# Patient Record
Sex: MALE | Race: WHITE | Employment: OTHER | URBAN - METROPOLITAN AREA
[De-identification: names, ages, dates, MRNs, and addresses within clinical notes are randomized per-mention and may not be internally consistent; named-entity substitution may affect disease eponyms.]

---

## 2017-02-13 ENCOUNTER — APPOINTMENT (OUTPATIENT)
Dept: GENERAL RADIOLOGY | Age: 62
End: 2017-02-13
Attending: EMERGENCY MEDICINE
Payer: MEDICARE

## 2017-02-13 ENCOUNTER — HOSPITAL ENCOUNTER (EMERGENCY)
Age: 62
Discharge: HOME | End: 2017-02-13
Attending: EMERGENCY MEDICINE
Payer: MEDICARE

## 2017-02-13 VITALS
TEMPERATURE: 98 F | HEIGHT: 70 IN | SYSTOLIC BLOOD PRESSURE: 169 MMHG | HEART RATE: 70 BPM | BODY MASS INDEX: 37.22 KG/M2 | RESPIRATION RATE: 17 BRPM | DIASTOLIC BLOOD PRESSURE: 80 MMHG | OXYGEN SATURATION: 97 % | WEIGHT: 260 LBS

## 2017-02-13 DIAGNOSIS — R07.89 CHEST WALL PAIN: Primary | ICD-10-CM

## 2017-02-13 LAB
ALBUMIN SERPL BCP-MCNC: 3.8 G/DL (ref 3.2–4.6)
ALBUMIN/GLOB SERPL: 1 {RATIO} (ref 1.2–3.5)
ALP SERPL-CCNC: 96 U/L (ref 50–136)
ALT SERPL-CCNC: 48 U/L (ref 12–65)
ANION GAP BLD CALC-SCNC: 9 MMOL/L (ref 7–16)
AST SERPL W P-5'-P-CCNC: 21 U/L (ref 15–37)
ATRIAL RATE: 63 BPM
BASOPHILS # BLD AUTO: 0 K/UL (ref 0–0.2)
BASOPHILS # BLD: 1 % (ref 0–2)
BILIRUB SERPL-MCNC: 0.6 MG/DL (ref 0.2–1.1)
BUN SERPL-MCNC: 18 MG/DL (ref 8–23)
CALCIUM SERPL-MCNC: 8.9 MG/DL (ref 8.3–10.4)
CALCULATED P AXIS, ECG09: 64 DEGREES
CALCULATED R AXIS, ECG10: 80 DEGREES
CALCULATED T AXIS, ECG11: 60 DEGREES
CHLORIDE SERPL-SCNC: 105 MMOL/L (ref 98–107)
CO2 SERPL-SCNC: 27 MMOL/L (ref 21–32)
CREAT SERPL-MCNC: 1.04 MG/DL (ref 0.8–1.5)
DIAGNOSIS, 93000: NORMAL
DIASTOLIC BP, ECG02: NORMAL MMHG
DIFFERENTIAL METHOD BLD: NORMAL
EOSINOPHIL # BLD: 0.2 K/UL (ref 0–0.8)
EOSINOPHIL NFR BLD: 3 % (ref 0.5–7.8)
ERYTHROCYTE [DISTWIDTH] IN BLOOD BY AUTOMATED COUNT: 13.5 % (ref 11.9–14.6)
GLOBULIN SER CALC-MCNC: 3.8 G/DL (ref 2.3–3.5)
GLUCOSE SERPL-MCNC: 119 MG/DL (ref 65–100)
HCT VFR BLD AUTO: 43.8 % (ref 41.1–50.3)
HGB BLD-MCNC: 14.7 G/DL (ref 13.6–17.2)
IMM GRANULOCYTES # BLD: 0.1 K/UL (ref 0–0.5)
IMM GRANULOCYTES NFR BLD AUTO: 0.8 % (ref 0–5)
LYMPHOCYTES # BLD AUTO: 18 % (ref 13–44)
LYMPHOCYTES # BLD: 1.1 K/UL (ref 0.5–4.6)
MCH RBC QN AUTO: 30.4 PG (ref 26.1–32.9)
MCHC RBC AUTO-ENTMCNC: 33.6 G/DL (ref 31.4–35)
MCV RBC AUTO: 90.5 FL (ref 79.6–97.8)
MONOCYTES # BLD: 0.7 K/UL (ref 0.1–1.3)
MONOCYTES NFR BLD AUTO: 11 % (ref 4–12)
NEUTS SEG # BLD: 4.4 K/UL (ref 1.7–8.2)
NEUTS SEG NFR BLD AUTO: 66 % (ref 43–78)
P-R INTERVAL, ECG05: 158 MS
PLATELET # BLD AUTO: 179 K/UL (ref 150–450)
PMV BLD AUTO: 10.9 FL (ref 10.8–14.1)
POTASSIUM SERPL-SCNC: 4 MMOL/L (ref 3.5–5.1)
PROT SERPL-MCNC: 7.6 G/DL (ref 6.3–8.2)
Q-T INTERVAL, ECG07: 398 MS
QRS DURATION, ECG06: 86 MS
QTC CALCULATION (BEZET), ECG08: 407 MS
RBC # BLD AUTO: 4.84 M/UL (ref 4.23–5.67)
SODIUM SERPL-SCNC: 141 MMOL/L (ref 136–145)
SYSTOLIC BP, ECG01: NORMAL MMHG
TROPONIN I BLD-MCNC: 0 NG/ML (ref 0–0.08)
VENTRICULAR RATE, ECG03: 63 BPM
WBC # BLD AUTO: 6.5 K/UL (ref 4.3–11.1)

## 2017-02-13 PROCEDURE — 99284 EMERGENCY DEPT VISIT MOD MDM: CPT | Performed by: EMERGENCY MEDICINE

## 2017-02-13 PROCEDURE — 93005 ELECTROCARDIOGRAM TRACING: CPT | Performed by: EMERGENCY MEDICINE

## 2017-02-13 PROCEDURE — 85025 COMPLETE CBC W/AUTO DIFF WBC: CPT | Performed by: EMERGENCY MEDICINE

## 2017-02-13 PROCEDURE — 96374 THER/PROPH/DIAG INJ IV PUSH: CPT | Performed by: EMERGENCY MEDICINE

## 2017-02-13 PROCEDURE — 71020 XR CHEST PA LAT: CPT

## 2017-02-13 PROCEDURE — 80053 COMPREHEN METABOLIC PANEL: CPT | Performed by: EMERGENCY MEDICINE

## 2017-02-13 PROCEDURE — 96375 TX/PRO/DX INJ NEW DRUG ADDON: CPT | Performed by: EMERGENCY MEDICINE

## 2017-02-13 PROCEDURE — 74011250636 HC RX REV CODE- 250/636: Performed by: EMERGENCY MEDICINE

## 2017-02-13 PROCEDURE — 84484 ASSAY OF TROPONIN QUANT: CPT

## 2017-02-13 RX ORDER — KETOROLAC TROMETHAMINE 30 MG/ML
30 INJECTION, SOLUTION INTRAMUSCULAR; INTRAVENOUS
Status: COMPLETED | OUTPATIENT
Start: 2017-02-13 | End: 2017-02-13

## 2017-02-13 RX ORDER — METHYLPREDNISOLONE 4 MG/1
TABLET ORAL
Qty: 1 DOSE PACK | Refills: 0 | Status: SHIPPED | OUTPATIENT
Start: 2017-02-13 | End: 2018-03-06

## 2017-02-13 RX ORDER — DEXAMETHASONE SODIUM PHOSPHATE 100 MG/10ML
10 INJECTION INTRAMUSCULAR; INTRAVENOUS
Status: COMPLETED | OUTPATIENT
Start: 2017-02-13 | End: 2017-02-13

## 2017-02-13 RX ORDER — HYDROCODONE BITARTRATE AND ACETAMINOPHEN 7.5; 325 MG/1; MG/1
1 TABLET ORAL
Qty: 20 TAB | Refills: 0 | Status: SHIPPED | OUTPATIENT
Start: 2017-02-13 | End: 2018-03-06

## 2017-02-13 RX ADMIN — KETOROLAC TROMETHAMINE 30 MG: 30 INJECTION, SOLUTION INTRAMUSCULAR at 09:36

## 2017-02-13 RX ADMIN — DEXAMETHASONE SODIUM PHOSPHATE 10 MG: 10 INJECTION INTRAMUSCULAR; INTRAVENOUS at 10:13

## 2017-02-13 NOTE — ED TRIAGE NOTES
Patient complains of left side chest pain. Patient is unsure if pain is from a fall yesterday. Pain is worse with movement and deep breathing.

## 2017-02-13 NOTE — ED NOTES
I have reviewed discharge instructions with the patient. The patient verbalized understanding. Pt ambulatory to exit with family.

## 2017-02-13 NOTE — ED PROVIDER NOTES
HPI Comments: Patient complains of left chest pain for the last 5 days, worse since last night. Patient was looking in a house with his daughter  5 days ago, triple walking on a cement walkway letting on his left side. States his chest was a little sore then begun precipitously worse over the last 12 hours or so. He denies any shortness of breath, diaphoresis, nausea or vomiting. Pain is pleuritic as well as reproducible with palpation. Patient is a 60 y.o. male presenting with chest pain. The history is provided by a relative. Chest Pain (Angina)    This is a new problem. The current episode started yesterday. The problem has not changed since onset. Duration of episode(s) is 12 hours. The problem occurs constantly. The pain is associated with normal activity. The pain is present in the lateral region and left side. The pain is at a severity of 6/10. The quality of the pain is described as sharp. The pain does not radiate. The symptoms are aggravated by movement, deep breathing, palpation and certain positions. Pertinent negatives include no abdominal pain, no back pain, no claudication, no cough, no diaphoresis, no dizziness, no exertional chest pressure, no fever, no headaches, no hemoptysis, no irregular heartbeat, no leg pain, no lower extremity edema, no malaise/fatigue, no nausea, no near-syncope, no numbness, no orthopnea, no palpitations, no PND, no shortness of breath, no sputum production, no vomiting and no weakness. He has tried rest for the symptoms. The treatment provided no relief. Risk factors include male gender, hypertension and obesity. His past medical history is significant for HTN. His past medical history does not include aneurysm, cancer, DM, DVT, PE or CHF.  Pertinent negatives include no cardiac catheterization and no exercise treadmill test.       Past Medical History:   Diagnosis Date    Arthritis     Chronic pain 2003     back fracture    CP (cerebral palsy) ([facility] [address]       3 surgeries right leg as child; no other problems     GERD (gastroesophageal reflux disease)      rare    Gout      med    Hypertension      med    Morbid obesity ([facility] [address]     Rheumatic fever age 14    Unspecified sleep apnea      does not use c-pap       Past Surgical History:   Procedure Laterality Date    Hx orthopaedic       back    Hx orthopaedic       right leg repair x 3 as child ue to C.P. Family History:   Problem Relation Age of Onset    Lung Disease Mother        Social History     Social History    Marital status:      Spouse name: N/A    Number of children: N/A    Years of education: N/A     Occupational History    Not on file. Social History Main Topics    Smoking status: Never Smoker    Smokeless tobacco: Current User      Comment: dips snuff    Alcohol use No    Drug use: No    Sexual activity: Not on file     Other Topics Concern    Not on file     Social History Narrative         ALLERGIES: Review of patient's allergies indicates no known allergies. Review of Systems   Constitutional: Negative for diaphoresis, fever and malaise/fatigue. Respiratory: Negative for cough, hemoptysis, sputum production and shortness of breath. Cardiovascular: Positive for chest pain. Negative for palpitations, orthopnea, claudication, leg swelling, PND and near-syncope. Gastrointestinal: Negative for abdominal pain, nausea and vomiting. Musculoskeletal: Negative for back pain. Neurological: Negative for dizziness, weakness, numbness and headaches. All other systems reviewed and are negative. Vitals:    02/13/17 0907   BP: 139/76   Pulse: 80   Resp: 20   Temp: 97.9 °F (36.6 °C)   SpO2: 94%   Weight: 117.9 kg (260 lb)   Height: 5' 10\" (1.778 m)            Physical Exam   Constitutional: He is oriented to person, place, and time. He appears well-developed and well-nourished. No distress. HENT:   Head: Normocephalic and atraumatic.    Right Ear: Tympanic membrane and external ear normal.   Left Ear: Tympanic membrane and external ear normal.   Mouth/Throat: Oropharynx is clear and moist.   Eyes: Conjunctivae and EOM are normal. Pupils are equal, round, and reactive to light. Neck: Normal range of motion. Neck supple. No tracheal deviation present. Cardiovascular: Normal rate, regular rhythm, normal heart sounds and intact distal pulses. Exam reveals no gallop and no friction rub. No murmur heard. Pulmonary/Chest: Effort normal and breath sounds normal. No respiratory distress. He has no wheezes. He exhibits tenderness (reproduces pain). He exhibits no crepitus, no deformity and no swelling. Abdominal: Soft. Bowel sounds are normal. He exhibits no distension and no mass. There is no hepatosplenomegaly. There is no tenderness. There is no rebound and no guarding. Musculoskeletal: Normal range of motion. He exhibits no edema. Lymphadenopathy:     He has no cervical adenopathy. Neurological: He is alert and oriented to person, place, and time. He displays normal reflexes. No cranial nerve deficit. Skin: Skin is warm and dry. No rash noted. He is not diaphoretic. No erythema. Psychiatric: He has a normal mood and affect. Nursing note and vitals reviewed. MDM  Number of Diagnoses or Management Options  Chest wall pain: new and requires workup     Amount and/or Complexity of Data Reviewed  Clinical lab tests: ordered and reviewed  Tests in the radiology section of CPT®: ordered and reviewed  Obtain history from someone other than the patient: yes  Review and summarize past medical records: yes  Independent visualization of images, tracings, or specimens: yes    Risk of Complications, Morbidity, and/or Mortality  Presenting problems: high  Diagnostic procedures: moderate  Management options: high    Patient Progress  Patient progress: stable    ED Course       Procedures      The patient was observed in the ED.     Results Reviewed:      Recent Results (from the past 24 hour(s))   CBC WITH AUTOMATED DIFF    Collection Time: 02/13/17  9:10 AM   Result Value Ref Range    WBC 6.5 4.3 - 11.1 K/uL    RBC 4.84 4.23 - 5.67 M/uL    HGB 14.7 13.6 - 17.2 g/dL    HCT 43.8 41.1 - 50.3 %    MCV 90.5 79.6 - 97.8 FL    MCH 30.4 26.1 - 32.9 PG    MCHC 33.6 31.4 - 35.0 g/dL    RDW 13.5 11.9 - 14.6 %    PLATELET 951 608 - 960 K/uL    MPV 10.9 10.8 - 14.1 FL    DF AUTOMATED      NEUTROPHILS 66 43 - 78 %    LYMPHOCYTES 18 13 - 44 %    MONOCYTES 11 4.0 - 12.0 %    EOSINOPHILS 3 0.5 - 7.8 %    BASOPHILS 1 0.0 - 2.0 %    IMMATURE GRANULOCYTES 0.8 0.0 - 5.0 %    ABS. NEUTROPHILS 4.4 1.7 - 8.2 K/UL    ABS. LYMPHOCYTES 1.1 0.5 - 4.6 K/UL    ABS. MONOCYTES 0.7 0.1 - 1.3 K/UL    ABS. EOSINOPHILS 0.2 0.0 - 0.8 K/UL    ABS. BASOPHILS 0.0 0.0 - 0.2 K/UL    ABS. IMM. GRANS. 0.1 0.0 - 0.5 K/UL   METABOLIC PANEL, COMPREHENSIVE    Collection Time: 02/13/17  9:10 AM   Result Value Ref Range    Sodium 141 136 - 145 mmol/L    Potassium 4.0 3.5 - 5.1 mmol/L    Chloride 105 98 - 107 mmol/L    CO2 27 21 - 32 mmol/L    Anion gap 9 7 - 16 mmol/L    Glucose 119 (H) 65 - 100 mg/dL    BUN 18 8 - 23 MG/DL    Creatinine 1.04 0.8 - 1.5 MG/DL    GFR est AA >60 >60 ml/min/1.73m2    GFR est non-AA >60 >60 ml/min/1.73m2    Calcium 8.9 8.3 - 10.4 MG/DL    Bilirubin, total 0.6 0.2 - 1.1 MG/DL    ALT (SGPT) 48 12 - 65 U/L    AST (SGOT) 21 15 - 37 U/L    Alk. phosphatase 96 50 - 136 U/L    Protein, total 7.6 6.3 - 8.2 g/dL    Albumin 3.8 3.2 - 4.6 g/dL    Globulin 3.8 (H) 2.3 - 3.5 g/dL    A-G Ratio 1.0 (L) 1.2 - 3.5     POC TROPONIN-I    Collection Time: 02/13/17  9:14 AM   Result Value Ref Range    Troponin-I (POC) 0 0.0 - 0.08 ng/ml     XR CHEST PA LAT   Final Result   Impression: Mild left basilar subsegmental atelectasis or scar. .                     I discussed the results of all labs, procedures, radiographs, and treatments with the patient and available family.   Treatment plan is agreed upon and the patient is ready for discharge. All voiced understanding of the discharge plan and medication instructions or changes as appropriate. Questions about treatment in the ED were answered. All were encouraged to return should symptoms worsen or new problems develop.

## 2018-03-06 ENCOUNTER — HOSPITAL ENCOUNTER (EMERGENCY)
Age: 63
Discharge: HOME | End: 2018-03-06
Attending: EMERGENCY MEDICINE
Payer: MEDICARE

## 2018-03-06 VITALS
OXYGEN SATURATION: 94 % | BODY MASS INDEX: 37.22 KG/M2 | DIASTOLIC BLOOD PRESSURE: 87 MMHG | WEIGHT: 260 LBS | HEART RATE: 68 BPM | TEMPERATURE: 97.5 F | RESPIRATION RATE: 18 BRPM | SYSTOLIC BLOOD PRESSURE: 145 MMHG | HEIGHT: 70 IN

## 2018-03-06 DIAGNOSIS — M54.41 ACUTE RIGHT-SIDED LOW BACK PAIN WITH RIGHT-SIDED SCIATICA: Primary | ICD-10-CM

## 2018-03-06 PROCEDURE — 99283 EMERGENCY DEPT VISIT LOW MDM: CPT | Performed by: EMERGENCY MEDICINE

## 2018-03-06 PROCEDURE — 74011250636 HC RX REV CODE- 250/636: Performed by: EMERGENCY MEDICINE

## 2018-03-06 PROCEDURE — 74011250637 HC RX REV CODE- 250/637: Performed by: EMERGENCY MEDICINE

## 2018-03-06 PROCEDURE — 96372 THER/PROPH/DIAG INJ SC/IM: CPT | Performed by: EMERGENCY MEDICINE

## 2018-03-06 RX ORDER — DEXAMETHASONE SODIUM PHOSPHATE 100 MG/10ML
10 INJECTION INTRAMUSCULAR; INTRAVENOUS
Status: COMPLETED | OUTPATIENT
Start: 2018-03-06 | End: 2018-03-06

## 2018-03-06 RX ORDER — PREDNISONE 20 MG/1
40 TABLET ORAL DAILY
Qty: 10 TAB | Refills: 0 | Status: SHIPPED | OUTPATIENT
Start: 2018-03-06 | End: 2018-03-11

## 2018-03-06 RX ORDER — METAXALONE 800 MG/1
800 TABLET ORAL ONCE
Status: COMPLETED | OUTPATIENT
Start: 2018-03-06 | End: 2018-03-06

## 2018-03-06 RX ORDER — KETOROLAC TROMETHAMINE 30 MG/ML
15 INJECTION, SOLUTION INTRAMUSCULAR; INTRAVENOUS
Status: COMPLETED | OUTPATIENT
Start: 2018-03-06 | End: 2018-03-06

## 2018-03-06 RX ORDER — OXYCODONE HYDROCHLORIDE 5 MG/1
5 TABLET ORAL
Status: COMPLETED | OUTPATIENT
Start: 2018-03-06 | End: 2018-03-06

## 2018-03-06 RX ORDER — METHOCARBAMOL 750 MG/1
1500 TABLET, FILM COATED ORAL 4 TIMES DAILY
Qty: 40 TAB | Refills: 0 | Status: SHIPPED | OUTPATIENT
Start: 2018-03-06 | End: 2018-06-11 | Stop reason: CLARIF

## 2018-03-06 RX ORDER — OXYCODONE HYDROCHLORIDE 5 MG/1
5 TABLET ORAL
Qty: 19 TAB | Refills: 0 | Status: SHIPPED | OUTPATIENT
Start: 2018-03-06 | End: 2018-06-11 | Stop reason: CLARIF

## 2018-03-06 RX ORDER — MELOXICAM 15 MG/1
15 TABLET ORAL DAILY
Qty: 20 TAB | Refills: 0 | Status: SHIPPED | OUTPATIENT
Start: 2018-03-06 | End: 2018-06-11 | Stop reason: CLARIF

## 2018-03-06 RX ADMIN — DEXAMETHASONE SODIUM PHOSPHATE 10 MG: 10 INJECTION INTRAMUSCULAR; INTRAVENOUS at 01:46

## 2018-03-06 RX ADMIN — OXYCODONE HYDROCHLORIDE 5 MG: 5 TABLET ORAL at 01:46

## 2018-03-06 RX ADMIN — METAXALONE 800 MG: 800 TABLET ORAL at 01:45

## 2018-03-06 RX ADMIN — KETOROLAC TROMETHAMINE 15 MG: 30 INJECTION, SOLUTION INTRAMUSCULAR at 01:46

## 2018-03-06 NOTE — ED PROVIDER NOTES
Patient is a 58 y.o. male presenting with back pain. The history is provided by the patient and the spouse. Back Pain    This is a recurrent problem. The current episode started 2 days ago. The problem has not changed since onset. The problem occurs constantly. Patient reports not work related injury. The pain is associated with no known injury. The quality of the pain is described as burning and aching. The pain radiates to the right thigh and right knee. The pain is moderate. The symptoms are aggravated by twisting and bending. The pain is the same all the time. Associated symptoms include tingling. Pertinent negatives include no chest pain, no fever, no numbness, no weight loss, no headaches, no abdominal pain, no dysuria, no leg pain and no paresis. He has tried nothing for the symptoms. The patient's surgical history non-contributory        Past Medical History:   Diagnosis Date    Arthritis     Chronic pain 2003    back fracture    CP (cerebral palsy) ([facility] [address]      3 surgeries right leg as child; no other problems     GERD (gastroesophageal reflux disease)     rare    Gout     med    Hypertension     med    Morbid obesity ([facility] [address]     Rheumatic fever age 14    Unspecified sleep apnea     does not use c-pap       Past Surgical History:   Procedure Laterality Date    HX ORTHOPAEDIC      back    HX ORTHOPAEDIC      right leg repair x 3 as child ue to C.P. Family History:   Problem Relation Age of Onset    Lung Disease Mother        Social History     Social History    Marital status:      Spouse name: N/A    Number of children: N/A    Years of education: N/A     Occupational History    Not on file.      Social History Main Topics    Smoking status: Never Smoker    Smokeless tobacco: Current User      Comment: dips snuff    Alcohol use No    Drug use: No    Sexual activity: Not on file     Other Topics Concern    Not on file     Social History Narrative         ALLERGIES: Review of patient's allergies indicates no known allergies. Review of Systems   Constitutional: Negative for activity change, chills, diaphoresis, fever and weight loss. HENT: Negative for dental problem, hearing loss, nosebleeds, rhinorrhea and sore throat. Eyes: Negative for pain, discharge, redness and visual disturbance. Respiratory: Negative for cough, chest tightness and shortness of breath. Cardiovascular: Negative for chest pain, palpitations and leg swelling. Gastrointestinal: Negative for abdominal pain, constipation, diarrhea, nausea and vomiting. Endocrine: Negative for cold intolerance, heat intolerance, polydipsia and polyuria. Genitourinary: Negative for dysuria and flank pain. Musculoskeletal: Positive for back pain. Negative for arthralgias, joint swelling, myalgias and neck pain. Skin: Negative for pallor and rash. Allergic/Immunologic: Negative for environmental allergies and food allergies. Neurological: Positive for tingling. Negative for dizziness, tremors, light-headedness, numbness and headaches. Hematological: Negative for adenopathy. Does not bruise/bleed easily. Psychiatric/Behavioral: Negative for confusion and dysphoric mood. The patient is not nervous/anxious and is not hyperactive. All other systems reviewed and are negative. Vitals:    03/06/18 0108   BP: 132/69   Pulse: 97   Resp: 20   Temp: 97.5 °F (36.4 °C)   SpO2: 95%   Weight: 117.9 kg (260 lb)   Height: 5' 10\" (1.778 m)            Physical Exam   Constitutional: He is oriented to person, place, and time. He appears well-developed and well-nourished. He appears distressed. HENT:   Head: Normocephalic and atraumatic. Mouth/Throat: Oropharynx is clear and moist.   Eyes: Conjunctivae and EOM are normal. Pupils are equal, round, and reactive to light. Right eye exhibits no discharge. Left eye exhibits no discharge. No scleral icterus. Neck: Normal range of motion. Neck supple. No JVD present. Cardiovascular: Normal rate, regular rhythm, normal heart sounds and intact distal pulses. Exam reveals no gallop and no friction rub. No murmur heard. Pulmonary/Chest: Effort normal and breath sounds normal. No respiratory distress. He has no wheezes. Abdominal: Soft. Bowel sounds are normal. He exhibits no distension. There is no tenderness. There is no rebound and no guarding. Musculoskeletal: Normal range of motion. He exhibits no edema or tenderness. Back:    Lymphadenopathy:     He has no cervical adenopathy. Neurological: He is alert and oriented to person, place, and time. He has normal strength. No cranial nerve deficit or sensory deficit. He exhibits normal muscle tone. GCS eye subscore is 4. GCS verbal subscore is 5. GCS motor subscore is 6. Patient has  Chronic pain and weakness in his legs related to cerebral palsy and advanced degenerative arthritis in both knees. He reports that his strength and sensation is intact and unchanged. Skin: Skin is warm and dry. No rash noted. He is not diaphoretic. No erythema. Psychiatric: He has a normal mood and affect. His speech is normal and behavior is normal. Judgment and thought content normal. Cognition and memory are normal.   Nursing note reviewed. MDM  Number of Diagnoses or Management Options  Acute right-sided low back pain with right-sided sciatica: new and does not require workup  Diagnosis management comments: Differential diagnosis  Sciatica, lumbar strain, muscle spasm       Amount and/or Complexity of Data Reviewed  Tests in the medicine section of CPT®: ordered and reviewed  Review and summarize past medical records: yes    Risk of Complications, Morbidity, and/or Mortality  Presenting problems: moderate  Diagnostic procedures: low  Management options: moderate  General comments: Elements of this note have been dictated via voice recognition software.   Text and phrases may be limited by the accuracy of the software. The chart has been reviewed, but errors may still be present.       Patient Progress  Patient progress: improved        ED Course       Procedures

## 2018-03-06 NOTE — ED TRIAGE NOTES
Pt states that his back when out 2 or 3 days ago states that he had bad knees and was to go get shots this morning with ortho.  Pt states he tried to lay on the floor and the pain got worse

## 2018-03-06 NOTE — DISCHARGE INSTRUCTIONS

## 2018-06-11 ENCOUNTER — HOSPITAL ENCOUNTER (OUTPATIENT)
Dept: SURGERY | Age: 63
Discharge: HOME | End: 2018-06-11
Payer: MEDICARE

## 2018-06-11 ENCOUNTER — HOSPITAL ENCOUNTER (OUTPATIENT)
Dept: PHYSICAL THERAPY | Age: 63
Discharge: HOME | End: 2018-06-11
Payer: MEDICARE

## 2018-06-11 VITALS
WEIGHT: 260 LBS | TEMPERATURE: 97.2 F | SYSTOLIC BLOOD PRESSURE: 134 MMHG | BODY MASS INDEX: 37.22 KG/M2 | HEART RATE: 66 BPM | HEIGHT: 70 IN | DIASTOLIC BLOOD PRESSURE: 70 MMHG | RESPIRATION RATE: 18 BRPM | OXYGEN SATURATION: 96 %

## 2018-06-11 LAB
ANION GAP SERPL CALC-SCNC: 11 MMOL/L (ref 7–16)
APPEARANCE UR: CLEAR
APTT PPP: 29.2 SEC (ref 23.2–35.3)
ATRIAL RATE: 63 BPM
BACTERIA SPEC CULT: NORMAL
BASOPHILS # BLD: 0.1 K/UL (ref 0–0.2)
BASOPHILS NFR BLD: 1 % (ref 0–2)
BILIRUB UR QL: NEGATIVE
BUN SERPL-MCNC: 22 MG/DL (ref 8–23)
CALCIUM SERPL-MCNC: 8.9 MG/DL (ref 8.3–10.4)
CALCULATED P AXIS, ECG09: 30 DEGREES
CALCULATED R AXIS, ECG10: 43 DEGREES
CALCULATED T AXIS, ECG11: 41 DEGREES
CHLORIDE SERPL-SCNC: 103 MMOL/L (ref 98–107)
CO2 SERPL-SCNC: 25 MMOL/L (ref 21–32)
COLOR UR: YELLOW
CREAT SERPL-MCNC: 1.24 MG/DL (ref 0.8–1.5)
DIAGNOSIS, 93000: NORMAL
DIFFERENTIAL METHOD BLD: ABNORMAL
EOSINOPHIL # BLD: 0.2 K/UL (ref 0–0.8)
EOSINOPHIL NFR BLD: 4 % (ref 0.5–7.8)
ERYTHROCYTE [DISTWIDTH] IN BLOOD BY AUTOMATED COUNT: 13.4 % (ref 11.9–14.6)
GLUCOSE SERPL-MCNC: 148 MG/DL (ref 65–100)
GLUCOSE UR STRIP.AUTO-MCNC: NEGATIVE MG/DL
HCT VFR BLD AUTO: 41 % (ref 41.1–50.3)
HGB BLD-MCNC: 13.6 G/DL (ref 13.6–17.2)
HGB UR QL STRIP: NEGATIVE
IMM GRANULOCYTES # BLD: 0 K/UL (ref 0–0.5)
IMM GRANULOCYTES NFR BLD AUTO: 1 % (ref 0–5)
INR PPP: 1.1
KETONES UR QL STRIP.AUTO: NEGATIVE MG/DL
LEUKOCYTE ESTERASE UR QL STRIP.AUTO: NEGATIVE
LYMPHOCYTES # BLD: 1.3 K/UL (ref 0.5–4.6)
LYMPHOCYTES NFR BLD: 21 % (ref 13–44)
MCH RBC QN AUTO: 30 PG (ref 26.1–32.9)
MCHC RBC AUTO-ENTMCNC: 33.2 G/DL (ref 31.4–35)
MCV RBC AUTO: 90.5 FL (ref 79.6–97.8)
MONOCYTES # BLD: 0.4 K/UL (ref 0.1–1.3)
MONOCYTES NFR BLD: 7 % (ref 4–12)
NEUTS SEG # BLD: 4.2 K/UL (ref 1.7–8.2)
NEUTS SEG NFR BLD: 66 % (ref 43–78)
NITRITE UR QL STRIP.AUTO: NEGATIVE
P-R INTERVAL, ECG05: 154 MS
PH UR STRIP: 6 [PH] (ref 5–9)
PLATELET # BLD AUTO: 170 K/UL (ref 150–450)
PMV BLD AUTO: 11.5 FL (ref 10.8–14.1)
POTASSIUM SERPL-SCNC: 3.9 MMOL/L (ref 3.5–5.1)
PROT UR STRIP-MCNC: NEGATIVE MG/DL
PROTHROMBIN TIME: 14 SEC (ref 11.5–14.5)
Q-T INTERVAL, ECG07: 410 MS
QRS DURATION, ECG06: 88 MS
QTC CALCULATION (BEZET), ECG08: 419 MS
RBC # BLD AUTO: 4.53 M/UL (ref 4.23–5.67)
SERVICE CMNT-IMP: NORMAL
SODIUM SERPL-SCNC: 139 MMOL/L (ref 136–145)
SP GR UR REFRACTOMETRY: 1.02 (ref 1–1.02)
UROBILINOGEN UR QL STRIP.AUTO: 0.2 EU/DL (ref 0.2–1)
VENTRICULAR RATE, ECG03: 63 BPM
WBC # BLD AUTO: 6.3 K/UL (ref 4.3–11.1)

## 2018-06-11 PROCEDURE — G8979 MOBILITY GOAL STATUS: HCPCS

## 2018-06-11 PROCEDURE — 85025 COMPLETE CBC W/AUTO DIFF WBC: CPT | Performed by: PHYSICIAN ASSISTANT

## 2018-06-11 PROCEDURE — 36415 COLL VENOUS BLD VENIPUNCTURE: CPT | Performed by: PHYSICIAN ASSISTANT

## 2018-06-11 PROCEDURE — 85610 PROTHROMBIN TIME: CPT | Performed by: PHYSICIAN ASSISTANT

## 2018-06-11 PROCEDURE — 93005 ELECTROCARDIOGRAM TRACING: CPT | Performed by: ANESTHESIOLOGY

## 2018-06-11 PROCEDURE — 87641 MR-STAPH DNA AMP PROBE: CPT | Performed by: PHYSICIAN ASSISTANT

## 2018-06-11 PROCEDURE — 77030027138 HC INCENT SPIROMETER -A

## 2018-06-11 PROCEDURE — 80048 BASIC METABOLIC PNL TOTAL CA: CPT | Performed by: PHYSICIAN ASSISTANT

## 2018-06-11 PROCEDURE — 85730 THROMBOPLASTIN TIME PARTIAL: CPT | Performed by: PHYSICIAN ASSISTANT

## 2018-06-11 PROCEDURE — 81003 URINALYSIS AUTO W/O SCOPE: CPT | Performed by: PHYSICIAN ASSISTANT

## 2018-06-11 PROCEDURE — 97161 PT EVAL LOW COMPLEX 20 MIN: CPT

## 2018-06-11 PROCEDURE — G8980 MOBILITY D/C STATUS: HCPCS

## 2018-06-11 PROCEDURE — G8978 MOBILITY CURRENT STATUS: HCPCS

## 2018-06-11 RX ORDER — CEFDINIR 300 MG/1
300 CAPSULE ORAL 2 TIMES DAILY
COMMUNITY
End: 2020-12-28 | Stop reason: CLARIF

## 2018-06-11 RX ORDER — DICLOFENAC SODIUM 75 MG/1
75 TABLET, DELAYED RELEASE ORAL 2 TIMES DAILY
COMMUNITY
Start: 2018-01-22 | End: 2018-06-24

## 2018-06-11 NOTE — PERIOP NOTES
Labs dated 6/11/18 routed via [address] [facility] to patients PCP, Dr. Jacinto Dawson, per Dr. Lui Negron' request. Lab results also routed via [facility] to Dr. Lui Negron.

## 2018-06-11 NOTE — PERIOP NOTES
Recent Results (from the past 12 hour(s))   EKG, 12 LEAD, INITIAL    Collection Time: 06/11/18  9:24 AM   Result Value Ref Range    Ventricular Rate 63 BPM    Atrial Rate 63 BPM    P-R Interval 154 ms    QRS Duration 88 ms    Q-T Interval 410 ms    QTC Calculation (Bezet) 419 ms    Calculated P Axis 30 degrees    Calculated R Axis 43 degrees    Calculated T Axis 41 degrees    Diagnosis       Sinus rhythm with Premature atrial complexes  Otherwise normal ECG  When compared with ECG of 13-FEB-2017 09:09,  Premature atrial complexes are now Present     CBC WITH AUTOMATED DIFF    Collection Time: 06/11/18  9:29 AM   Result Value Ref Range    WBC 6.3 4.3 - 11.1 K/uL    RBC 4.53 4.23 - 5.67 M/uL    HGB 13.6 13.6 - 17.2 g/dL    HCT 41.0 (L) 41.1 - 50.3 %    MCV 90.5 79.6 - 97.8 FL    MCH 30.0 26.1 - 32.9 PG    MCHC 33.2 31.4 - 35.0 g/dL    RDW 13.4 11.9 - 14.6 %    PLATELET 583 108 - 399 K/uL    MPV 11.5 10.8 - 14.1 FL    DF AUTOMATED      NEUTROPHILS 66 43 - 78 %    LYMPHOCYTES 21 13 - 44 %    MONOCYTES 7 4.0 - 12.0 %    EOSINOPHILS 4 0.5 - 7.8 %    BASOPHILS 1 0.0 - 2.0 %    IMMATURE GRANULOCYTES 1 0.0 - 5.0 %    ABS. NEUTROPHILS 4.2 1.7 - 8.2 K/UL    ABS. LYMPHOCYTES 1.3 0.5 - 4.6 K/UL    ABS. MONOCYTES 0.4 0.1 - 1.3 K/UL    ABS. EOSINOPHILS 0.2 0.0 - 0.8 K/UL    ABS. BASOPHILS 0.1 0.0 - 0.2 K/UL    ABS. IMM.  GRANS. 0.0 0.0 - 0.5 K/UL   PROTHROMBIN TIME + INR    Collection Time: 06/11/18  9:29 AM   Result Value Ref Range    Prothrombin time 14.0 11.5 - 14.5 sec    INR 1.1     PTT    Collection Time: 06/11/18  9:29 AM   Result Value Ref Range    aPTT 29.2 23.2 - 15.4 SEC   METABOLIC PANEL, BASIC    Collection Time: 06/11/18  9:29 AM   Result Value Ref Range    Sodium 139 136 - 145 mmol/L    Potassium 3.9 3.5 - 5.1 mmol/L    Chloride 103 98 - 107 mmol/L    CO2 25 21 - 32 mmol/L    Anion gap 11 7 - 16 mmol/L    Glucose 148 (H) 65 - 100 mg/dL    BUN 22 8 - 23 MG/DL    Creatinine 1.24 0.8 - 1.5 MG/DL    GFR est AA >60 >60 ml/min/1.73m2    GFR est non-AA >60 >60 ml/min/1.73m2    Calcium 8.9 8.3 - 10.4 MG/DL   URINALYSIS W/ RFLX MICROSCOPIC    Collection Time: 06/11/18  9:29 AM   Result Value Ref Range    Color YELLOW      Appearance CLEAR      Specific gravity 1.017 1.001 - 1.023      pH (UA) 6.0 5.0 - 9.0      Protein NEGATIVE  NEG mg/dL    Glucose NEGATIVE  mg/dL    Ketone NEGATIVE  NEG mg/dL    Bilirubin NEGATIVE  NEG      Blood NEGATIVE  NEG      Urobilinogen 0.2 0.2 - 1.0 EU/dL    Nitrites NEGATIVE  NEG      Leukocyte Esterase NEGATIVE  NEG

## 2018-06-11 NOTE — PROGRESS NOTES
Satinder Mc  : (17 y.o.) [address] at [place] [address].  [phone]       Physical Therapy Prehab Plan of Treatment and Evaluation Summary:2018    ICD-10: Treatment Diagnosis:   · Pain in Right Knee (M25.561)  · Stiffness of Right Knee, Not elsewhere classified (M25.661)  · Difficulty in walking, Not elsewhere classified (R26.2)  · Other abnormalities of gait and mobility (R26.89)  Precautions/Allergies:   Penicillins  MEDICAL/REFERRING DIAGNOSIS:  Unilateral primary osteoarthritis, right knee [M17.11]  REFERRING PHYSICIAN: Wagner Holliday, *  DATE OF SURGERY: 18   Assessment:   Comments:  Pain in right knee joint with decreased independence with functional mobility. Pt plans to return home following hospital stay. Pt motivated and prepared for surgery. PROBLEM LIST (Impacting functional limitations):  Mr. Sheryle Lower presents with the following right lower extremity(s) problems:  1. Transfers  2. Gait  3. Strength  4. Range of Motion  5. Pain   INTERVENTIONS PLANNED:  1. Home Exercise Program  2. Educational Discussion     TREATMENT PLAN: Effective Dates: 2018 TO 2018. Frequency/Duration: Patient to continue to perform home exercise program at least twice per day up until his surgery. GOALS: (Goals have been discussed and agreed upon with patient.)  Discharge Goals: Time Frame: 1 Day  1. Patient will demonstrate independence with a home exercise program designed to increase strength, range of motion and pain control to minimize functional deficits and optimize patient for total joint replacement. Rehabilitation Potential For Stated Goals: Good  Regarding Ava Ocampo's therapy, I certify that the treatment plan above will be carried out by a therapist or under their direction.   Thank you for this referral,  Jarvis Reese, PT               HISTORY:   Present Symptoms:  Pain Intensity 1: 0   History of Present Injury/Illness (Reason for Referral):  Medical/Referring Diagnosis: Unilateral primary osteoarthritis, right knee [M17.11]   Past Medical History/Comorbidities:   Mr. Yuliya Estes  has a past medical history of Arthritis; Chewing tobacco use; Chronic pain (2003); CP [facility]cerebral palsy) ([facility] [address] Environmental and seasonal allergies; GERD (gastroesophageal reflux disease); Gout; History of vertebral fracture (2003); Hypertension; Morbid obesity ([facility] [address] Rheumatic fever (age 14); Sinus infection; and Unspecified sleep apnea. He also has no past medical history of Other ill-defined conditions(799.89) or Unspecified adverse effect of anesthesia. Mr. Yuliya Estes  has a past surgical history that includes hx orthopaedic (Right); hx orthopaedic (Right, 01/2014); hx back surgery (1989); and hx colonoscopy.   Social History/Living Environment:   Home Environment: Private residence  # Steps to Enter: 2  One/Two Story Residence: One story  Living Alone: No  Support Systems: Spouse/Significant Other/Partner  Patient Expects to be Discharged to[de-identified] Private residence  Current DME Used/Available at Home: Cane, straight  Tub or Shower Type: Tub/Shower combination  Work/Activity:  disabled  Dominant Side:  LEFT  Current Medications:  See Pre-assessment nursing note   Number of Personal Factors/Comorbidities that affect the Plan of Care: 0: LOW COMPLEXITY   EXAMINATION:   ADLs (Current Functional Status):   Ambulation:  [x] Independent  [] Walk Indoors Only  [] Walk Outdoors  [] Use Assistive Device  [] Use Wheelchair Only Dressing:  [x] [address] from Someone for:  [] Sock/Shoes  [] Pants  [] Everything   Bathing/Showering:   [x] Independent  [] Requires Assistance from Someone  [] [address]:  [x] Routine house and yard work  [] Light Housework Only  [] None   Observation/Orthostatic Postural Assessment:   Within defined limits   ROM/Flexibility:   AROM: Generally decreased, functional LLE Assessment  LLE Assessment (WDL): Within defined limits      RLE AROM  R Knee Flexion: 90  R Knee Extension: 30   Strength:   Strength: Generally decreased, functional                  Functional Mobility:    Coordination: Generally decreased, functional    Gait Description (WDL): Within defined limits  Stand to Sit: Independent  Sit to Stand: Independent  Distance (ft): 1000 Feet (ft)  Ambulation - Level of Assistance: Independent  Assistive Device: Cane, straight  Gait Abnormalities: Antalgic          Balance:    Sitting: Intact  Standing: Intact   Body Structures Involved:  1. Bones  2. Joints  3. Muscles  4. Ligaments Body Functions Affected:  1. Neuromusculoskeletal  2. Movement Related Activities and Participation Affected:  1. Mobility   Number of elements that affect the Plan of Care: 4+: HIGH COMPLEXITY   CLINICAL PRESENTATION:   Presentation: Stable and uncomplicated: LOW COMPLEXITY   CLINICAL DECISION MAKING:   Outcome Measure: Tool Used: Lower Extremity Functional Scale (LEFS)  Score:  Initial: 21/80 Most Recent: X/80 (Date: -- )   Interpretation of Score: 20 questions each scored on a 5 point scale with 0 representing \"extreme difficulty or unable to perform\" and 4 representing \"no difficulty\". The lower the score, the greater the functional disability. 80/80 represents no disability. Minimal detectable change is 9 points. Score 80 79-65 64-49 48-33 32-17 16-1 0   Modifier CH CI CJ CK CL CM CN     ? Mobility - Walking and Moving Around:     - CURRENT STATUS: CL - 60%-79% impaired, limited or restricted    - GOAL STATUS: CL - 60%-79% impaired, limited or restricted    - D/C STATUS:  CL - 60%-79% impaired, limited or restricted  Medical Necessity:   · Mr. Hansa Frias is expected to optimize his lower extremity strength and ROM in preparation for joint replacement surgery.   Reason for Services/Other Comments:  · Achieve baseline assesment of musculoskeletal system, functional mobility and home environment. , educate in PT HEP in preparation for surgery, educate in hospital plan of care. Use of outcome tool(s) and clinical judgement create a POC that gives a: Clear prediction of patient's progress: LOW COMPLEXITY   TREATMENT:   Treatment/Session Assessment:  Patient was instructed in PT- HEP to increase strength and ROM in LEs. Answered all questions. · Post session pain:  0  · Compliance with Program/Exercises: compliant most of the time.   Total Treatment Duration:  PT Patient Time In/Time Out  Time In: 0915  Time Out: [address] [facility]

## 2018-06-11 NOTE — PERIOP NOTES
Patient verified name, , and surgery as listed in [facility]. Type 3 surgery, PAT joint assessment complete. Labs per surgeon: cbc, bmp, ua, pt/inr, ptt, mrsa/mssa swab, T&S DOS; results within anesthesia limits. Labs per anesthesia protocol: All required lab work included in surgeon's orders. EKG: completed 18; results within anesthesia limits. Hibiclens and instructions to return bottle on DOS given per hospital policy. Nasal Swab collected per MD order and instructions for Mupirocin nasal ointment if required. Patient provided with handouts including Guide to Surgery, Pain Management, Hand Hygiene, Blood Transfusion Education, and [place] [facility] Brochure. Patient answered medical/surgical history questions at their best of ability. All prior to admission medications documented in [facility]. Original medication prescription bottle NOT visualized during patient appointment. Patient instructed to hold all vitamins 7 days prior to surgery and NSAIDS 5 days prior to surgery. Medications to be held: all vitamins/supplements/herbals and Diclofenac. Patient instructed to continue previous medications as prescribed prior to surgery and to take the following medications the day of surgery according to anesthesia guidelines with a small sip of water: Allopurinol and 81 mg ASA. Patient instructed NOT to chew tobacco on the DOS. Patient verbalized understanding. Patient instructed to bring CPAP, bottle of Hibiclens, and incentive spirometer to the hospital on the DOS. Patient teach back successful and patient demonstrates knowledge of instruction.

## 2018-06-11 NOTE — PROGRESS NOTES
06/11/18 0900   Oxygen Therapy   O2 Sat (%) 97 %   Pulse via Oximetry 75 beats per minute   O2 Device Room air   Pre-Treatment   Breath Sounds Bilateral Clear;Diminished   Pre FEV1 (liters) 2.1 liters   % Predicted 61   Incentive Spirometry Treatment   Actual Volume (ml) 3250 ml     Initial respiratory Assessment completed with pt. Pt was interviewed and evaluated in Joint camp prior to surgery. Patient ID:  Dede Miles  [id number]  30 y.o.  1955  Surgeon: Dr. Martine Yepez  Date of Surgery: 6/22/2018  Procedure: Total Right Knee Arthroplasty  Primary Care Physician: Perla Vázquez -[phone]  Specialists:                                  Pt instructed in the use of Incentive Spirometry. Pt instructed to bring Incentive Spirometer back on date of surgery & to start using Is upon return to pt room. Pt taught proper cough technique    History of smoking:   NONE                                                       Quit date:     Secondhand smoke:MOTHER      Past procedures with Oxygen desaturation:NONE    Past Medical History:   Diagnosis Date    Arthritis     OA    Chewing tobacco use     Chronic pain 2003    back fracture    CP (cerebral palsy) ([address]      3 surgeries right leg as child; no other problems     Environmental and seasonal allergies     GERD (gastroesophageal reflux disease)     rare-no medication     Gout     Controlled with medication     History of vertebral fracture 2003    Hypertension     Controlled with medication     Morbid obesity ([address]     Rheumatic fever age 14    history-no murmur auscultated on 6/11/18    Sinus infection     currently on Cefdinir     Unspecified sleep apnea     Patient no longer on CPAP, has to get refitted. Followed by Dr. Gregory Burgess, per last office note (1/2018) \" home study shows moderate to severe FRANCISCO JAVIER with AHI of 28.6, worse in supine position. Lowest oxygen desaturation 66%. \" Respiratory history:DENIES SOB                                                               Respiratory meds:                                         FAMILY PRESENT:           ,                                                   NO                                        PAST SLEEP STUDY:        YES                  HX OF FRANCISCO JAVIER:                        YES             - POSITIVE FOR FRANCISCO JAVIER- NON-COMPLIANT WITH CPAP. DANGERS OF NON-COMPLIANCE EXPLAINED TO PT                                   FRANCISCO JAVIER assessment:                                                                                               PHYSICAL EXAM   Body mass index is 37.31 kg/(m^2).    Visit Vitals    /70 (BP 1 Location: Left arm, BP Patient Position: At rest;Sitting)    Pulse 66    Temp 97.2 °F (36.2 °C)    Resp 18    Ht 5' 10\" (1.778 m)    Wt 117.9 kg (260 lb)    SpO2 96%    BMI 37.31 kg/m2     Neck circumference:  51.5    cm    Loud snoring:        YES                                Witnessed apnea or wakening gasping or choking:,                                                                                                         APNEA    Awakens with headaches:                                                  DENIES    Morning or daytime tiredness/ sleepiness:                                                                                                         TIRED   Dry mouth or sore throat in morning:                YES                                                                       Lilly stage:  4    SACS score:110    STOP/BAN                              CPAP:                       NONE                                              CONT SAT HS        O2 AT 3  LPM HS      Referrals:    Pt. Phone Number:

## 2018-06-12 PROBLEM — Z91.14 NONCOMPLIANCE WITH CPAP TREATMENT: Status: ACTIVE | Noted: 2018-06-12

## 2018-06-12 NOTE — PERIOP NOTES
6/11/2018  1:08 PM - [facility], Lab In [facility]   Component Results   Component Value Flag Ref Range Units Status   Special Requests: NO SPECIAL REQUESTS     Final   Culture result:      Final   SA target not detected.                                 A MRSA NEGATIVE, SA NEGATIVE test result does not preclude MRSA or SA nasal colonization.

## 2018-06-12 NOTE — ADVANCED PRACTICE NURSE
Total Joint Surgery Preoperative Chart Review      Patient ID:  Mt Hatfield  [id number]  54 y.o.  1955  Surgeon: Dr. Shanna Burgos  Date of Surgery: 6/22/2018  Procedure: Total Right Knee Arthroplasty  Primary Care Physician: Curtis Richter [phone]  Specialty Physician(s):      Subjective:   Mt Hatfield is a 58 y.o. WHITE OR  male who presents for preoperative evaluation for Total Right Knee arthroplasty. This is a preoperative chart review note based on data collected by the nurse at the surgical Pre-Assessment visit. Past Medical History:   Diagnosis Date    Arthritis     OA    Chewing tobacco use     Chronic pain 2003    back fracture    [address]ce[address]) ([facility] [address]      3 surgeries right leg as child; no other problems     Environmental and seasonal allergies     GERD (gastroesophageal reflux disease)     rare-no medication     Gout     Controlled with medication     History of vertebral fracture 2003    Hypertension     Controlled with medication     Morbid obesity ([address]     Rheumatic fever age 14    history-no murmur auscultated on 6/11/18    Sinus infection     currently on Cefdinir     Unspecified sleep apnea     Patient no longer on CPAP, has to get refitted. Followed by Dr. Armond Lopez, per last office note (1/2018) \" home study shows moderate to severe FRANCISCO JAVIER with AHI of 28.6, worse in supine position. Lowest oxygen desaturation 66%. \"      Past Surgical History:   Procedure Laterality Date    HX BACK SURGERY  1989    Herniated disc repair     HX COLONOSCOPY      x2    HX ORTHOPAEDIC Right     leg repair x 3 as child ue to C.P.    HX ORTHOPAEDIC Right 01/2014    big toe metatarsophalangeal joint fusion     Family History   Problem Relation Age of Onset    Lung Disease Mother       Social History   Substance Use Topics    Smoking status: Never Smoker    Smokeless tobacco: Current User      Comment: 1 can of chewing tobacco a day for 40 years    Alcohol use No Prior to Admission medications    Medication Sig Start Date End Date Taking? Authorizing Provider   diclofenac EC (VOLTAREN) 75 mg EC tablet Take 75 mg by mouth two (2) times a day. 1/22/18 1/22/19 Yes Historical Provider   cefdinir (OMNICEF) 300 mg capsule Take 300 mg by mouth two (2) times a day. Yes Historical Provider   allopurinol (ZYLOPRIM) 300 mg tablet Take  by mouth daily. Take morning of surgery per anesthesia guidelines. Indications: GOUT   Yes Historical Provider   losartan (COZAAR) 100 mg tablet Take 100 mg by mouth daily. Indications: HYPERTENSION   Yes Historical Provider   Aspirin, Buffered 81 mg tab Take  by mouth daily. Take morning of surgery per anesthesia guidelines. Indications: preventative   Yes Historical Provider     Allergies   Allergen Reactions    Penicillins Itching and Nausea Only          Objective:     Physical Exam:     Visit Vitals    /70 (BP 1 Location: Left arm, BP Patient Position: At rest;Sitting)    Pulse 66    Temp 97.2 °F (36.2 °C)    Resp 18    Ht 5' 10\" (1.778 m)    Wt 117.9 kg (260 lb)    SpO2 96%    BMI 37.31 kg/m2         ECG:    EKG Results     Procedure 720 Value Units Date/Time    EKG, 12 LEAD, INITIAL [[id number]] Collected:  06/11/18 0924    Order Status:  Completed Updated:  06/11/18 2204     Ventricular Rate 63 BPM      Atrial Rate 63 BPM      P-R Interval 154 ms      QRS Duration 88 ms      Q-T Interval 410 ms      QTC Calculation (Bezet) 419 ms      Calculated P Axis 30 degrees      Calculated R Axis 43 degrees      Calculated T Axis 41 degrees      Diagnosis --     Sinus rhythm    When compared with ECG of 13-FEB-2017 09:09,  No significant change was found  Confirmed by ST JOLLY ANDREWS MD (), CHELE CORONADO ([id number]) on 6/11/2018 10:04:21 PM            Data Review:   Labs:     Results for Ezio Crawford (MRN [id number]) as of 6/12/2018 12:00   Ref.  Range 6/11/2018 09:29   WBC Latest Ref Range: 4.3 - 11.1 K/uL 6.3   RBC Latest Ref Range: 4.23 - 5.67 M/uL 4.53   HGB Latest Ref Range: 13.6 - 17.2 g/dL 13.6   HCT Latest Ref Range: 41.1 - 50.3 % 41.0 (L)   MCV Latest Ref Range: 79.6 - 97.8 FL 90.5   MCH Latest Ref Range: 26.1 - 32.9 PG 30.0   MCHC Latest Ref Range: 31.4 - 35.0 g/dL 33.2   RDW Latest Ref Range: 11.9 - 14.6 % 13.4   PLATELET Latest Ref Range: 150 - 450 K/uL 170     Results for Fe Zee (MRN [id number]) as of 6/12/2018 12:00   Ref. Range 6/11/2018 09:29   Sodium Latest Ref Range: 136 - 145 mmol/L 139   Potassium Latest Ref Range: 3.5 - 5.1 mmol/L 3.9   Chloride Latest Ref Range: 98 - 107 mmol/L 103   CO2 Latest Ref Range: 21 - 32 mmol/L 25   Anion gap Latest Ref Range: 7 - 16 mmol/L 11   Glucose Latest Ref Range: 65 - 100 mg/dL 148 (H)   BUN Latest Ref Range: 8 - 23 MG/DL 22   Creatinine Latest Ref Range: 0.8 - 1.5 MG/DL 1.24   Calcium Latest Ref Range: 8.3 - 10.4 MG/DL 8.9   GFR est non-AA Latest Ref Range: >60 ml/min/1.73m2 >60   GFR est AA Latest Ref Range: >60 ml/min/1.73m2 >60     Problem List:  )  Patient Active Problem List   Diagnosis Code    Noncompliance with CPAP treatment Z91.14    Obesity, Class II, BMI 35-39.9, with comorbidity E66.9       Total Joint Surgery Pre-Assessment Recommendations: The patient is not compliant with wearing CPAP. Recommend oxygen saturation monitoring during hospitalization and oxygen at 3 liter via nc qhs.     PEP therapy BID          Signed By: GUDELIA Hobbs    June 12, 2018

## 2018-06-19 NOTE — H&P
[id number] [facility]  Pre Operative History and Physical Exam    Patient ID:  Matt Malone  [id number]  2 y.o.  1955    Today: June 19, 2018       Assessment:   1. Arthritis of the right knee with history of CP  Right leg and foot drop on the right side. 2. Rx Celebrex postop         Plan:    1. Proceed with scheduled Procedure(s) (LRB):  RIGHT KNEE ARTHROPLASTY TOTAL/ YANCI/ FNB (Right)            CC:  Right knee pain    HPI:   The patient has end stage arthritis of the right knee. The patient was evaluated and examined during a consultation prior to this office visit. There have been no changes to the patient's orthopedic condition since the initial consultation. The patient has failed previous conservative treatment for this condition including antiinflammatories , and lifestyle modifications. The necessity for joint replacement is present. The patient will be admitted the day of surgery for Procedure(s) (LRB):  RIGHT KNEE ARTHROPLASTY TOTAL/ YANCI/ FNB (Right)      Past Medical/Surgical History:  Past Medical History:   Diagnosis Date    Arthritis     OA    Chewing tobacco use     Chronic pain 2003    back fracture    CP (cerebral palsy) ([address]      3 surgeries right leg as child; no other problems     Environmental and seasonal allergies     GERD (gastroesophageal reflux disease)     rare-no medication     Gout     Controlled with medication     History of vertebral fracture 2003    Hypertension     Controlled with medication     Morbid obesity ([address]     Rheumatic fever age 14    history-no murmur auscultated on 6/11/18    Sinus infection     currently on Cefdinir     Unspecified sleep apnea     Patient no longer on CPAP, has to get refitted. Followed by Dr. Rafal Santiago, per last office note (1/2018) \" home study shows moderate to severe FRANCISCO JAVIER with AHI of 28.6, worse in supine position. Lowest oxygen desaturation 66%. \"     Past Surgical History:   Procedure Laterality Date    HX BACK SURGERY  1989    Herniated disc repair     HX COLONOSCOPY      x2    HX ORTHOPAEDIC Right     leg repair x 3 as child ue to C.P.    HX ORTHOPAEDIC Right 01/2014    big toe metatarsophalangeal joint fusion        Allergies: Allergies   Allergen Reactions    Penicillins Itching and Nausea Only        Physical Exam:   General: NAD, Alert, Oriented, Appears their stated age     [de-identified]: NC/AT, PERRL    Skin: No rashes, lesions or wounds seen      Psych: normal affect      Heart: Regular Rate, Rhythm     Lungs: unlabored respirations, normal breath sounds     Abdomen: Soft and non-distended     Ortho: Pain with limited ROM of the right knee    Neuro: weakness right leg with foot drop on the right side     Lymph: no lymphadenopathy     Meds:   No current facility-administered medications for this encounter. Current Outpatient Prescriptions   Medication Sig    diclofenac EC (VOLTAREN) 75 mg EC tablet Take 75 mg by mouth two (2) times a day.  cefdinir (OMNICEF) 300 mg capsule Take 300 mg by mouth two (2) times a day.  allopurinol (ZYLOPRIM) 300 mg tablet Take  by mouth daily. Take morning of surgery per anesthesia guidelines. Indications: GOUT    losartan (COZAAR) 100 mg tablet Take 100 mg by mouth daily. Indications: HYPERTENSION    Aspirin, Buffered 81 mg tab Take  by mouth daily. Take morning of surgery per anesthesia guidelines.    Indications: preventative         Labs:  Hospital Outpatient Visit on 06/11/2018   Component Date Value Ref Range Status    WBC 06/11/2018 6.3  4.3 - 11.1 K/uL Final    RBC 06/11/2018 4.53  4.23 - 5.67 M/uL Final    HGB 06/11/2018 13.6  13.6 - 17.2 g/dL Final    HCT 06/11/2018 41.0* 41.1 - 50.3 % Final    MCV 06/11/2018 90.5  79.6 - 97.8 FL Final    MCH 06/11/2018 30.0  26.1 - 32.9 PG Final    MCHC 06/11/2018 33.2  31.4 - 35.0 g/dL Final    RDW 06/11/2018 13.4  11.9 - 14.6 % Final    PLATELET 22/08/2739 479  150 - 450 K/uL Final    MPV 06/11/2018 11.5  10.8 - 14.1 FL Final    DF 06/11/2018 AUTOMATED    Final    NEUTROPHILS 06/11/2018 66  43 - 78 % Final    LYMPHOCYTES 06/11/2018 21  13 - 44 % Final    MONOCYTES 06/11/2018 7  4.0 - 12.0 % Final    EOSINOPHILS 06/11/2018 4  0.5 - 7.8 % Final    BASOPHILS 06/11/2018 1  0.0 - 2.0 % Final    IMMATURE GRANULOCYTES 06/11/2018 1  0.0 - 5.0 % Final    ABS. NEUTROPHILS 06/11/2018 4.2  1.7 - 8.2 K/UL Final    ABS. LYMPHOCYTES 06/11/2018 1.3  0.5 - 4.6 K/UL Final    ABS. MONOCYTES 06/11/2018 0.4  0.1 - 1.3 K/UL Final    ABS. EOSINOPHILS 06/11/2018 0.2  0.0 - 0.8 K/UL Final    ABS. BASOPHILS 06/11/2018 0.1  0.0 - 0.2 K/UL Final    ABS. IMM. GRANS. 06/11/2018 0.0  0.0 - 0.5 K/UL Final    Prothrombin time 06/11/2018 14.0  11.5 - 14.5 sec Final    INR 06/11/2018 1.1    Final    Comment: Suggested therapeutic INR range:  Venous thrombosis and embolus  2.0-3.0  Prosthetic heart valve         2.5-3.5  ** Note new reference range and method **      aPTT 06/11/2018 29.2  23.2 - 35.3 SEC Final    Comment: Heparin Therapeutic Range = 74 - 123 seconds  In addition to factor deficiency, monitoring heparin therapy, etc., evaluation of a prolonged aPTT result should include consideration of preanalytic variables such as heparin flush contamination, specimen integrity issues, etc.  ** Note new reference range and method **      Sodium 06/11/2018 139  136 - 145 mmol/L Final    Potassium 06/11/2018 3.9  3.5 - 5.1 mmol/L Final    Chloride 06/11/2018 103  98 - 107 mmol/L Final    CO2 06/11/2018 25  21 - 32 mmol/L Final    Anion gap 06/11/2018 11  7 - 16 mmol/L Final    Glucose 06/11/2018 148* 65 - 100 mg/dL Final    Comment: 47 - 60 mg/dl Consistent with, but not fully diagnostic of hypoglycemia.   101 - 125 mg/dl Impaired fasting glucose/consistent with pre-diabetes mellitus  > 126 mg/dl Fasting glucose consistent with overt diabetes mellitus      BUN 06/11/2018 22  8 - 23 MG/DL Final    Creatinine 06/11/2018 1.24  0.8 - 1.5 MG/DL Final    GFR est AA 06/11/2018 >60  >60 ml/min/1.73m2 Final    GFR est non-AA 06/11/2018 >60  >60 ml/min/1.73m2 Final    Comment: (NOTE)  Estimated GFR is calculated using the Modification of Diet in Renal   Disease (MDRD) Study equation, reported for both  Americans   (GFRAA) and non- Americans (GFRNA), and normalized to 1.73m2   body surface area. The physician must decide which value applies to   the patient. The MDRD study equation should only be used in   individuals age 25 or older. It has not been validated for the   following: pregnant women, patients with serious comorbid conditions,   or on certain medications, or persons with extremes of body size,   muscle mass, or nutritional status.  Calcium 06/11/2018 8.9  8.3 - 10.4 MG/DL Final    Color 06/11/2018 YELLOW    Final    Appearance 06/11/2018 CLEAR    Final    Specific gravity 06/11/2018 1.017  1.001 - 1.023   Final    pH (UA) 06/11/2018 6.0  5.0 - 9.0   Final    Protein 06/11/2018 NEGATIVE   NEG mg/dL Final    Glucose 06/11/2018 NEGATIVE   mg/dL Final    Ketone 06/11/2018 NEGATIVE   NEG mg/dL Final    Bilirubin 06/11/2018 NEGATIVE   NEG   Final    Blood 06/11/2018 NEGATIVE   NEG   Final    Urobilinogen 06/11/2018 0.2  0.2 - 1.0 EU/dL Final    Nitrites 06/11/2018 NEGATIVE   NEG   Final    Leukocyte Esterase 06/11/2018 NEGATIVE   NEG   Final    Special Requests: 06/11/2018 NO SPECIAL REQUESTS    Final    Culture result: 06/11/2018 SA target not detected. A MRSA NEGATIVE, SA NEGATIVE test result does not preclude MRSA or SA nasal colonization.     Final    Ventricular Rate 06/11/2018 63  BPM Final    Atrial Rate 06/11/2018 63  BPM Final    P-R Interval 06/11/2018 154  ms Final    QRS Duration 06/11/2018 88  ms Final    Q-T Interval 06/11/2018 410  ms Final    QTC Calculation (Bezet) 06/11/2018 419  ms Final    Calculated P Axis 06/11/2018 30  degrees Final    Calculated R Axis 06/11/2018 43  degrees Final    Calculated T Axis 06/11/2018 41  degrees Final    Diagnosis 06/11/2018    Final                    Value:Sinus rhythm    When compared with ECG of 13-FEB-2017 09:09,  No significant change was found  Confirmed by ST JOLLY ANDREWS MD (), CHELE CORONADO ([id number]) on 6/11/2018 10:04:21 PM                   Patient Active Problem List   Diagnosis Code    Noncompliance with CPAP treatment Z91.14    Obesity, Class II, BMI 35-39.9, with comorbidity E66.9         Signed By: DARON Jean Baptiste  June 19, 2018

## 2018-06-21 ENCOUNTER — ANESTHESIA EVENT (OUTPATIENT)
Dept: SURGERY | Age: 63
DRG: 470 | End: 2018-06-21
Payer: MEDICARE

## 2018-06-22 ENCOUNTER — ANESTHESIA (OUTPATIENT)
Dept: SURGERY | Age: 63
DRG: 470 | End: 2018-06-22
Payer: MEDICARE

## 2018-06-22 ENCOUNTER — HOSPITAL ENCOUNTER (INPATIENT)
Age: 63
LOS: 2 days | Discharge: HOME HEALTH | DRG: 470 | End: 2018-06-24
Attending: ORTHOPAEDIC SURGERY | Admitting: ORTHOPAEDIC SURGERY
Payer: MEDICARE

## 2018-06-22 DIAGNOSIS — Z96.651 STATUS POST RIGHT KNEE REPLACEMENT: Primary | ICD-10-CM

## 2018-06-22 DIAGNOSIS — I10 ESSENTIAL HYPERTENSION: ICD-10-CM

## 2018-06-22 DIAGNOSIS — M17.11 PRIMARY OSTEOARTHRITIS OF RIGHT KNEE: ICD-10-CM

## 2018-06-22 LAB
ABO + RH BLD: NORMAL
BLOOD GROUP ANTIBODIES SERPL: NORMAL
GLUCOSE BLD STRIP.AUTO-MCNC: 107 MG/DL (ref 65–100)
HGB BLD-MCNC: 13 G/DL (ref 13.6–17.2)
SPECIMEN EXP DATE BLD: NORMAL

## 2018-06-22 PROCEDURE — 85018 HEMOGLOBIN: CPT | Performed by: ORTHOPAEDIC SURGERY

## 2018-06-22 PROCEDURE — 74011250636 HC RX REV CODE- 250/636

## 2018-06-22 PROCEDURE — 77030031139 HC SUT VCRL2 J&J -A: Performed by: ORTHOPAEDIC SURGERY

## 2018-06-22 PROCEDURE — 76010000162 HC OR TIME 1.5 TO 2 HR INTENSV-TIER 1: Performed by: ORTHOPAEDIC SURGERY

## 2018-06-22 PROCEDURE — 74011000258 HC RX REV CODE- 258: Performed by: ORTHOPAEDIC SURGERY

## 2018-06-22 PROCEDURE — 76942 ECHO GUIDE FOR BIOPSY: CPT | Performed by: ORTHOPAEDIC SURGERY

## 2018-06-22 PROCEDURE — 74011250636 HC RX REV CODE- 250/636: Performed by: ANESTHESIOLOGY

## 2018-06-22 PROCEDURE — 77030025452 HC KT TIB SZR TRTH DSP STRY -B: Performed by: ORTHOPAEDIC SURGERY

## 2018-06-22 PROCEDURE — 77030036688 HC BLNKT CLD THER S2SG -B

## 2018-06-22 PROCEDURE — 77030002912 HC SUT ETHBND J&J -A: Performed by: ORTHOPAEDIC SURGERY

## 2018-06-22 PROCEDURE — 76010010054 HC POST OP PAIN BLOCK: Performed by: ORTHOPAEDIC SURGERY

## 2018-06-22 PROCEDURE — 97165 OT EVAL LOW COMPLEX 30 MIN: CPT

## 2018-06-22 PROCEDURE — 99221 1ST HOSP IP/OBS SF/LOW 40: CPT | Performed by: PHYSICAL MEDICINE & REHABILITATION

## 2018-06-22 PROCEDURE — 74011000250 HC RX REV CODE- 250: Performed by: ORTHOPAEDIC SURGERY

## 2018-06-22 PROCEDURE — 0SRC0JA REPLACEMENT OF RIGHT KNEE JOINT WITH SYNTHETIC SUBSTITUTE, UNCEMENTED, OPEN APPROACH: ICD-10-PCS | Performed by: ORTHOPAEDIC SURGERY

## 2018-06-22 PROCEDURE — 77030037363 HC FEM INST CR  DISP STRY -C: Performed by: ORTHOPAEDIC SURGERY

## 2018-06-22 PROCEDURE — 77030020143 HC AIRWY LARYN INTUB CGAS -A: Performed by: ANESTHESIOLOGY

## 2018-06-22 PROCEDURE — 65270000029 HC RM PRIVATE

## 2018-06-22 PROCEDURE — 77030020782 HC GWN BAIR PAWS FLX 3M -B: Performed by: ANESTHESIOLOGY

## 2018-06-22 PROCEDURE — 77030013727 HC IRR FAN PULSVC ZIMM -B: Performed by: ORTHOPAEDIC SURGERY

## 2018-06-22 PROCEDURE — 77030003602 HC NDL NRV BLK BBMI -B: Performed by: ANESTHESIOLOGY

## 2018-06-22 PROCEDURE — 86900 BLOOD TYPING SEROLOGIC ABO: CPT | Performed by: PHYSICIAN ASSISTANT

## 2018-06-22 PROCEDURE — 77030034849: Performed by: ORTHOPAEDIC SURGERY

## 2018-06-22 PROCEDURE — 86580 TB INTRADERMAL TEST: CPT | Performed by: ORTHOPAEDIC SURGERY

## 2018-06-22 PROCEDURE — 94760 N-INVAS EAR/PLS OXIMETRY 1: CPT

## 2018-06-22 PROCEDURE — 77030035236 HC SUT PDS STRATFX BARB J&J -B: Performed by: ORTHOPAEDIC SURGERY

## 2018-06-22 PROCEDURE — 77030037364 HC TIB INST CR  DISP STRY -C: Performed by: ORTHOPAEDIC SURGERY

## 2018-06-22 PROCEDURE — 77030012935 HC DRSG AQUACEL BMS -B: Performed by: ORTHOPAEDIC SURGERY

## 2018-06-22 PROCEDURE — 77030008467 HC STPLR SKN COVD -B: Performed by: ORTHOPAEDIC SURGERY

## 2018-06-22 PROCEDURE — 77030019557 HC ELECTRD VES SEAL MEDT -F: Performed by: ORTHOPAEDIC SURGERY

## 2018-06-22 PROCEDURE — 77030002966 HC SUT PDS J&J -A: Performed by: ORTHOPAEDIC SURGERY

## 2018-06-22 PROCEDURE — 74011000250 HC RX REV CODE- 250: Performed by: ANESTHESIOLOGY

## 2018-06-22 PROCEDURE — 77030006720 HC BLD PAT RMR ZIMM -B: Performed by: ORTHOPAEDIC SURGERY

## 2018-06-22 PROCEDURE — C1776 JOINT DEVICE (IMPLANTABLE): HCPCS | Performed by: ORTHOPAEDIC SURGERY

## 2018-06-22 PROCEDURE — 74011250636 HC RX REV CODE- 250/636: Performed by: ORTHOPAEDIC SURGERY

## 2018-06-22 PROCEDURE — 77030018836 HC SOL IRR NACL ICUM -A: Performed by: ORTHOPAEDIC SURGERY

## 2018-06-22 PROCEDURE — 77030020263 HC SOL INJ SOD CL0.9% LFCR 1000ML

## 2018-06-22 PROCEDURE — 76060000034 HC ANESTHESIA 1.5 TO 2 HR: Performed by: ORTHOPAEDIC SURGERY

## 2018-06-22 PROCEDURE — 76210000016 HC OR PH I REC 1 TO 1.5 HR: Performed by: ORTHOPAEDIC SURGERY

## 2018-06-22 PROCEDURE — 77030032490 HC SLV COMPR SCD KNE COVD -B

## 2018-06-22 PROCEDURE — 77030011640 HC PAD GRND REM COVD -A: Performed by: ORTHOPAEDIC SURGERY

## 2018-06-22 PROCEDURE — 77030003665 HC NDL SPN BBMI -A: Performed by: ANESTHESIOLOGY

## 2018-06-22 PROCEDURE — 77030007880 HC KT SPN EPDRL BBMI -B: Performed by: ANESTHESIOLOGY

## 2018-06-22 PROCEDURE — 82962 GLUCOSE BLOOD TEST: CPT

## 2018-06-22 PROCEDURE — 36415 COLL VENOUS BLD VENIPUNCTURE: CPT | Performed by: ORTHOPAEDIC SURGERY

## 2018-06-22 PROCEDURE — 77030011208: Performed by: ORTHOPAEDIC SURGERY

## 2018-06-22 PROCEDURE — 74011000250 HC RX REV CODE- 250

## 2018-06-22 PROCEDURE — 74011000302 HC RX REV CODE- 302: Performed by: ORTHOPAEDIC SURGERY

## 2018-06-22 PROCEDURE — 74011250637 HC RX REV CODE- 250/637: Performed by: ORTHOPAEDIC SURGERY

## 2018-06-22 PROCEDURE — 97161 PT EVAL LOW COMPLEX 20 MIN: CPT

## 2018-06-22 DEVICE — BASEPLATE TIB SZ 6 AP52MM ML77MM KNEE TRITANIUM 4 CRUCFRM: Type: IMPLANTABLE DEVICE | Site: KNEE | Status: FUNCTIONAL

## 2018-06-22 DEVICE — PAT ASYM MTL-BK 11MM SZ A38 -- TRIATHLON: Type: IMPLANTABLE DEVICE | Site: KNEE | Status: FUNCTIONAL

## 2018-06-22 DEVICE — COMPNT FEM CR TRIATHLN 5 R PA --: Type: IMPLANTABLE DEVICE | Site: KNEE | Status: FUNCTIONAL

## 2018-06-22 DEVICE — IMPLANTABLE DEVICE: Type: IMPLANTABLE DEVICE | Site: KNEE | Status: FUNCTIONAL

## 2018-06-22 RX ORDER — SODIUM CHLORIDE, SODIUM LACTATE, POTASSIUM CHLORIDE, CALCIUM CHLORIDE 600; 310; 30; 20 MG/100ML; MG/100ML; MG/100ML; MG/100ML
75 INJECTION, SOLUTION INTRAVENOUS CONTINUOUS
Status: DISCONTINUED | OUTPATIENT
Start: 2018-06-22 | End: 2018-06-22 | Stop reason: HOSPADM

## 2018-06-22 RX ORDER — NALOXONE HYDROCHLORIDE 0.4 MG/ML
0.1 INJECTION, SOLUTION INTRAMUSCULAR; INTRAVENOUS; SUBCUTANEOUS AS NEEDED
Status: DISCONTINUED | OUTPATIENT
Start: 2018-06-22 | End: 2018-06-22 | Stop reason: HOSPADM

## 2018-06-22 RX ORDER — SODIUM CHLORIDE 0.9 % (FLUSH) 0.9 %
5-10 SYRINGE (ML) INJECTION AS NEEDED
Status: DISCONTINUED | OUTPATIENT
Start: 2018-06-22 | End: 2018-06-22 | Stop reason: HOSPADM

## 2018-06-22 RX ORDER — DIPHENHYDRAMINE HCL 25 MG
25 CAPSULE ORAL
Status: DISCONTINUED | OUTPATIENT
Start: 2018-06-22 | End: 2018-06-24 | Stop reason: HOSPADM

## 2018-06-22 RX ORDER — SODIUM CHLORIDE 9 MG/ML
100 INJECTION, SOLUTION INTRAVENOUS CONTINUOUS
Status: DISPENSED | OUTPATIENT
Start: 2018-06-22 | End: 2018-06-24

## 2018-06-22 RX ORDER — CELECOXIB 200 MG/1
200 CAPSULE ORAL EVERY 12 HOURS
Status: DISCONTINUED | OUTPATIENT
Start: 2018-06-22 | End: 2018-06-24 | Stop reason: HOSPADM

## 2018-06-22 RX ORDER — HYDROMORPHONE HYDROCHLORIDE 1 MG/ML
1 INJECTION, SOLUTION INTRAMUSCULAR; INTRAVENOUS; SUBCUTANEOUS
Status: DISCONTINUED | OUTPATIENT
Start: 2018-06-22 | End: 2018-06-24 | Stop reason: HOSPADM

## 2018-06-22 RX ORDER — SODIUM CHLORIDE, SODIUM LACTATE, POTASSIUM CHLORIDE, CALCIUM CHLORIDE 600; 310; 30; 20 MG/100ML; MG/100ML; MG/100ML; MG/100ML
1000 INJECTION, SOLUTION INTRAVENOUS CONTINUOUS
Status: DISCONTINUED | OUTPATIENT
Start: 2018-06-22 | End: 2018-06-22 | Stop reason: HOSPADM

## 2018-06-22 RX ORDER — SODIUM CHLORIDE 0.9 % (FLUSH) 0.9 %
5-10 SYRINGE (ML) INJECTION EVERY 8 HOURS
Status: DISCONTINUED | OUTPATIENT
Start: 2018-06-22 | End: 2018-06-24 | Stop reason: HOSPADM

## 2018-06-22 RX ORDER — AMOXICILLIN 250 MG
2 CAPSULE ORAL DAILY
Status: DISCONTINUED | OUTPATIENT
Start: 2018-06-23 | End: 2018-06-24 | Stop reason: HOSPADM

## 2018-06-22 RX ORDER — CEFDINIR 300 MG/1
300 CAPSULE ORAL 2 TIMES DAILY
Status: DISCONTINUED | OUTPATIENT
Start: 2018-06-22 | End: 2018-06-22

## 2018-06-22 RX ORDER — ROPIVACAINE HYDROCHLORIDE 2 MG/ML
INJECTION, SOLUTION EPIDURAL; INFILTRATION; PERINEURAL AS NEEDED
Status: DISCONTINUED | OUTPATIENT
Start: 2018-06-22 | End: 2018-06-22 | Stop reason: HOSPADM

## 2018-06-22 RX ORDER — ASPIRIN 81 MG/1
81 TABLET ORAL EVERY 12 HOURS
Qty: 70 TAB | Refills: 0 | Status: SHIPPED | OUTPATIENT
Start: 2018-06-22 | End: 2018-07-27

## 2018-06-22 RX ORDER — LIDOCAINE HYDROCHLORIDE 10 MG/ML
0.1 INJECTION INFILTRATION; PERINEURAL AS NEEDED
Status: DISCONTINUED | OUTPATIENT
Start: 2018-06-22 | End: 2018-06-22 | Stop reason: HOSPADM

## 2018-06-22 RX ORDER — HYDROMORPHONE HYDROCHLORIDE 2 MG/1
2 TABLET ORAL
Status: DISCONTINUED | OUTPATIENT
Start: 2018-06-22 | End: 2018-06-24 | Stop reason: HOSPADM

## 2018-06-22 RX ORDER — PROPOFOL 10 MG/ML
INJECTION, EMULSION INTRAVENOUS AS NEEDED
Status: DISCONTINUED | OUTPATIENT
Start: 2018-06-22 | End: 2018-06-22 | Stop reason: HOSPADM

## 2018-06-22 RX ORDER — MIDAZOLAM HYDROCHLORIDE 1 MG/ML
INJECTION, SOLUTION INTRAMUSCULAR; INTRAVENOUS AS NEEDED
Status: DISCONTINUED | OUTPATIENT
Start: 2018-06-22 | End: 2018-06-22 | Stop reason: HOSPADM

## 2018-06-22 RX ORDER — ONDANSETRON 8 MG/1
4 TABLET, ORALLY DISINTEGRATING ORAL
Status: DISCONTINUED | OUTPATIENT
Start: 2018-06-22 | End: 2018-06-24 | Stop reason: HOSPADM

## 2018-06-22 RX ORDER — OXYCODONE HYDROCHLORIDE 5 MG/1
10 TABLET ORAL
Status: DISCONTINUED | OUTPATIENT
Start: 2018-06-22 | End: 2018-06-22 | Stop reason: HOSPADM

## 2018-06-22 RX ORDER — CEFAZOLIN SODIUM/WATER 2 G/20 ML
2 SYRINGE (ML) INTRAVENOUS ONCE
Status: COMPLETED | OUTPATIENT
Start: 2018-06-22 | End: 2018-06-22

## 2018-06-22 RX ORDER — DIPHENHYDRAMINE HYDROCHLORIDE 50 MG/ML
12.5 INJECTION, SOLUTION INTRAMUSCULAR; INTRAVENOUS ONCE
Status: DISCONTINUED | OUTPATIENT
Start: 2018-06-22 | End: 2018-06-22 | Stop reason: HOSPADM

## 2018-06-22 RX ORDER — ACETAMINOPHEN 500 MG
500 TABLET ORAL ONCE
Status: DISCONTINUED | OUTPATIENT
Start: 2018-06-22 | End: 2018-06-22 | Stop reason: HOSPADM

## 2018-06-22 RX ORDER — FENTANYL CITRATE 50 UG/ML
INJECTION, SOLUTION INTRAMUSCULAR; INTRAVENOUS AS NEEDED
Status: DISCONTINUED | OUTPATIENT
Start: 2018-06-22 | End: 2018-06-22 | Stop reason: HOSPADM

## 2018-06-22 RX ORDER — SODIUM CHLORIDE 0.9 % (FLUSH) 0.9 %
5-10 SYRINGE (ML) INJECTION AS NEEDED
Status: DISCONTINUED | OUTPATIENT
Start: 2018-06-22 | End: 2018-06-24 | Stop reason: HOSPADM

## 2018-06-22 RX ORDER — ASPIRIN 81 MG/1
81 TABLET ORAL EVERY 12 HOURS
Status: DISCONTINUED | OUTPATIENT
Start: 2018-06-22 | End: 2018-06-24 | Stop reason: HOSPADM

## 2018-06-22 RX ORDER — SODIUM CHLORIDE 0.9 % (FLUSH) 0.9 %
5-10 SYRINGE (ML) INJECTION EVERY 8 HOURS
Status: DISCONTINUED | OUTPATIENT
Start: 2018-06-22 | End: 2018-06-22 | Stop reason: HOSPADM

## 2018-06-22 RX ORDER — ACETAMINOPHEN 500 MG
1000 TABLET ORAL EVERY 6 HOURS
Status: DISCONTINUED | OUTPATIENT
Start: 2018-06-23 | End: 2018-06-24 | Stop reason: HOSPADM

## 2018-06-22 RX ORDER — DEXAMETHASONE SODIUM PHOSPHATE 100 MG/10ML
10 INJECTION INTRAMUSCULAR; INTRAVENOUS ONCE
Status: ACTIVE | OUTPATIENT
Start: 2018-06-23 | End: 2018-06-24

## 2018-06-22 RX ORDER — LOSARTAN POTASSIUM 50 MG/1
100 TABLET ORAL DAILY
Status: DISCONTINUED | OUTPATIENT
Start: 2018-06-23 | End: 2018-06-24 | Stop reason: HOSPADM

## 2018-06-22 RX ORDER — NEOMYCIN AND POLYMYXIN B SULFATES 40; 200000 MG/ML; [USP'U]/ML
SOLUTION IRRIGATION AS NEEDED
Status: DISCONTINUED | OUTPATIENT
Start: 2018-06-22 | End: 2018-06-22 | Stop reason: HOSPADM

## 2018-06-22 RX ORDER — CEFAZOLIN SODIUM/WATER 2 G/20 ML
2 SYRINGE (ML) INTRAVENOUS EVERY 8 HOURS
Status: COMPLETED | OUTPATIENT
Start: 2018-06-22 | End: 2018-06-23

## 2018-06-22 RX ORDER — ACETAMINOPHEN 10 MG/ML
1000 INJECTION, SOLUTION INTRAVENOUS ONCE
Status: COMPLETED | OUTPATIENT
Start: 2018-06-22 | End: 2018-06-22

## 2018-06-22 RX ORDER — ONDANSETRON 2 MG/ML
INJECTION INTRAMUSCULAR; INTRAVENOUS AS NEEDED
Status: DISCONTINUED | OUTPATIENT
Start: 2018-06-22 | End: 2018-06-22 | Stop reason: HOSPADM

## 2018-06-22 RX ORDER — TRANEXAMIC ACID 100 MG/ML
INJECTION, SOLUTION INTRAVENOUS AS NEEDED
Status: DISCONTINUED | OUTPATIENT
Start: 2018-06-22 | End: 2018-06-22 | Stop reason: HOSPADM

## 2018-06-22 RX ORDER — HYDROMORPHONE HYDROCHLORIDE 2 MG/ML
0.5 INJECTION, SOLUTION INTRAMUSCULAR; INTRAVENOUS; SUBCUTANEOUS
Status: DISCONTINUED | OUTPATIENT
Start: 2018-06-22 | End: 2018-06-22 | Stop reason: HOSPADM

## 2018-06-22 RX ORDER — DEXAMETHASONE SODIUM PHOSPHATE 4 MG/ML
INJECTION, SOLUTION INTRA-ARTICULAR; INTRALESIONAL; INTRAMUSCULAR; INTRAVENOUS; SOFT TISSUE AS NEEDED
Status: DISCONTINUED | OUTPATIENT
Start: 2018-06-22 | End: 2018-06-22 | Stop reason: HOSPADM

## 2018-06-22 RX ORDER — NALOXONE HYDROCHLORIDE 0.4 MG/ML
.2-.4 INJECTION, SOLUTION INTRAMUSCULAR; INTRAVENOUS; SUBCUTANEOUS
Status: DISCONTINUED | OUTPATIENT
Start: 2018-06-22 | End: 2018-06-24 | Stop reason: HOSPADM

## 2018-06-22 RX ORDER — ONDANSETRON 2 MG/ML
4 INJECTION INTRAMUSCULAR; INTRAVENOUS ONCE
Status: DISCONTINUED | OUTPATIENT
Start: 2018-06-22 | End: 2018-06-22 | Stop reason: HOSPADM

## 2018-06-22 RX ORDER — FENTANYL CITRATE 50 UG/ML
100 INJECTION, SOLUTION INTRAMUSCULAR; INTRAVENOUS AS NEEDED
Status: DISCONTINUED | OUTPATIENT
Start: 2018-06-22 | End: 2018-06-22 | Stop reason: HOSPADM

## 2018-06-22 RX ORDER — HYDROMORPHONE HYDROCHLORIDE 2 MG/1
2 TABLET ORAL
Qty: 40 TAB | Refills: 0 | Status: SHIPPED | OUTPATIENT
Start: 2018-06-22 | End: 2020-12-28 | Stop reason: CLARIF

## 2018-06-22 RX ORDER — OXYCODONE HYDROCHLORIDE 5 MG/1
5 TABLET ORAL
Status: DISCONTINUED | OUTPATIENT
Start: 2018-06-22 | End: 2018-06-22 | Stop reason: HOSPADM

## 2018-06-22 RX ORDER — ALLOPURINOL 300 MG/1
300 TABLET ORAL DAILY
Status: DISCONTINUED | OUTPATIENT
Start: 2018-06-23 | End: 2018-06-24 | Stop reason: HOSPADM

## 2018-06-22 RX ORDER — MIDAZOLAM HYDROCHLORIDE 1 MG/ML
2 INJECTION, SOLUTION INTRAMUSCULAR; INTRAVENOUS
Status: COMPLETED | OUTPATIENT
Start: 2018-06-22 | End: 2018-06-22

## 2018-06-22 RX ADMIN — SODIUM CHLORIDE 100 ML/HR: 900 INJECTION, SOLUTION INTRAVENOUS at 14:45

## 2018-06-22 RX ADMIN — SODIUM CHLORIDE, SODIUM LACTATE, POTASSIUM CHLORIDE, AND CALCIUM CHLORIDE 500 ML: 600; 310; 30; 20 INJECTION, SOLUTION INTRAVENOUS at 08:01

## 2018-06-22 RX ADMIN — FENTANYL CITRATE 25 MCG: 50 INJECTION, SOLUTION INTRAMUSCULAR; INTRAVENOUS at 10:32

## 2018-06-22 RX ADMIN — FENTANYL CITRATE 25 MCG: 50 INJECTION, SOLUTION INTRAMUSCULAR; INTRAVENOUS at 11:27

## 2018-06-22 RX ADMIN — HYDROMORPHONE HYDROCHLORIDE 0.5 MG: 2 INJECTION, SOLUTION INTRAMUSCULAR; INTRAVENOUS; SUBCUTANEOUS at 11:57

## 2018-06-22 RX ADMIN — ACETAMINOPHEN 1000 MG: 10 INJECTION, SOLUTION INTRAVENOUS at 17:32

## 2018-06-22 RX ADMIN — TUBERCULIN PURIFIED PROTEIN DERIVATIVE 5 UNITS: 5 INJECTION, SOLUTION INTRADERMAL at 08:02

## 2018-06-22 RX ADMIN — ASPIRIN 81 MG: 81 TABLET, COATED ORAL at 21:09

## 2018-06-22 RX ADMIN — HYDROMORPHONE HYDROCHLORIDE 0.5 MG: 2 INJECTION, SOLUTION INTRAMUSCULAR; INTRAVENOUS; SUBCUTANEOUS at 12:02

## 2018-06-22 RX ADMIN — ROPIVACAINE HYDROCHLORIDE 20 ML: 2 INJECTION, SOLUTION EPIDURAL; INFILTRATION; PERINEURAL at 08:29

## 2018-06-22 RX ADMIN — SODIUM CHLORIDE, SODIUM LACTATE, POTASSIUM CHLORIDE, AND CALCIUM CHLORIDE: 600; 310; 30; 20 INJECTION, SOLUTION INTRAVENOUS at 09:51

## 2018-06-22 RX ADMIN — ONDANSETRON 4 MG: 2 INJECTION INTRAMUSCULAR; INTRAVENOUS at 10:25

## 2018-06-22 RX ADMIN — Medication 2 G: at 09:52

## 2018-06-22 RX ADMIN — MIDAZOLAM HYDROCHLORIDE 2 MG: 1 INJECTION, SOLUTION INTRAMUSCULAR; INTRAVENOUS at 08:27

## 2018-06-22 RX ADMIN — FENTANYL CITRATE 100 MCG: 50 INJECTION INTRAMUSCULAR; INTRAVENOUS at 08:27

## 2018-06-22 RX ADMIN — HYDROMORPHONE HYDROCHLORIDE 1 MG: 1 INJECTION, SOLUTION INTRAMUSCULAR; INTRAVENOUS; SUBCUTANEOUS at 14:47

## 2018-06-22 RX ADMIN — CELECOXIB 200 MG: 200 CAPSULE ORAL at 21:09

## 2018-06-22 RX ADMIN — DEXAMETHASONE SODIUM PHOSPHATE 10 MG: 4 INJECTION, SOLUTION INTRA-ARTICULAR; INTRALESIONAL; INTRAMUSCULAR; INTRAVENOUS; SOFT TISSUE at 10:25

## 2018-06-22 RX ADMIN — SODIUM CHLORIDE, SODIUM LACTATE, POTASSIUM CHLORIDE, AND CALCIUM CHLORIDE: 600; 310; 30; 20 INJECTION, SOLUTION INTRAVENOUS at 10:20

## 2018-06-22 RX ADMIN — LIDOCAINE HYDROCHLORIDE 0.1 ML: 10 INJECTION, SOLUTION INFILTRATION; PERINEURAL at 08:01

## 2018-06-22 RX ADMIN — PROPOFOL 160 MG: 10 INJECTION, EMULSION INTRAVENOUS at 10:11

## 2018-06-22 RX ADMIN — Medication 2 G: at 17:29

## 2018-06-22 RX ADMIN — MIDAZOLAM HYDROCHLORIDE 1 MG: 1 INJECTION, SOLUTION INTRAMUSCULAR; INTRAVENOUS at 09:57

## 2018-06-22 RX ADMIN — HYDROMORPHONE HYDROCHLORIDE 2 MG: 2 TABLET ORAL at 21:52

## 2018-06-22 RX ADMIN — MIDAZOLAM HYDROCHLORIDE 1 MG: 1 INJECTION, SOLUTION INTRAMUSCULAR; INTRAVENOUS at 09:55

## 2018-06-22 RX ADMIN — TRANEXAMIC ACID 1000 MG: 100 INJECTION, SOLUTION INTRAVENOUS at 09:59

## 2018-06-22 RX ADMIN — HYDROMORPHONE HYDROCHLORIDE 2 MG: 2 TABLET ORAL at 17:38

## 2018-06-22 NOTE — ANESTHESIA PROCEDURE NOTES
Peripheral Block    Start time: 6/22/2018 8:27 AM  End time: 6/22/2018 8:30 AM  Performed by: Corrine Aguilar by: [place]       Pre-procedure: Indications: at surgeon's request, post-op pain management and procedure for pain    Preanesthetic Checklist: patient identified, risks and benefits discussed, site marked, timeout performed, anesthesia consent given and patient being monitored    Timeout Time: 08:27          Block Type:   Block Type:   Adductor canal  Laterality:  Right  Monitoring:  Standard ASA monitoring, responsive to questions, oxygen, continuous pulse ox, frequent vital sign checks and heart rate  Injection Technique:  Single shot  Procedures: ultrasound guided    Patient Position: supine  Prep: chlorhexidine    Location:  Mid thigh  Needle Type:  Stimuplex  Needle Gauge:  22 G  Needle Localization:  Ultrasound guidance  Medication Injected:  0.2%  ropivacaine  Volume (mL):  20    Assessment:  Number of attempts:  1  Injection Assessment:  Incremental injection every 5 mL, no paresthesia, ultrasound image on chart, no intravascular symptoms, negative aspiration for blood and local visualized surrounding nerve on ultrasound  Patient tolerance:  Patient tolerated the procedure well with no immediate complications

## 2018-06-22 NOTE — PERIOP NOTES
Betadine lavage:  17.5cc of betadine lot # [id number], exp. Date 09/19,  in 500cc of . 9NS Lot # [id number], exp.  Date :03/01/2021

## 2018-06-22 NOTE — PROGRESS NOTES
Patient arrived via bed into room. Patient alert and oriented. Patient unable to move lower extremities due to surgery. Pedal pulses present 2+. SCD applied. Neurovascular status WNL. Admission assessment complete. Discuss diet and pain. Bed is low and locked. Call light within reach. Instructed to call for assistance. Family at bedside.

## 2018-06-22 NOTE — PROGRESS NOTES
Problem: Mobility Impaired (Adult and Pediatric)  Goal: *Acute Goals and Plan of Care (Insert Text)  GOALS (1-4 days):  (1.)Mr. Ocampo will move from supine to sit and sit to supine  in bed with STAND BY ASSIST.  (2.)Mr. Ocampo will transfer from bed to chair and chair to bed with STAND BY ASSIST using the least restrictive device. (3.)Mr. Ocampo will ambulate with STAND BY ASSIST for 100 feet with the least restrictive device. (4.)Mr. Ocampo will ambulate up/down 3 steps with bilateral  railing with CONTACT GUARD ASSIST with no device. (5.)Mr. Ocampo will increase right knee ROM to 5°-80°.  ________________________________________________________________________________________________    PHYSICAL THERAPY Joint camp tKa: Initial Assessment 6/22/2018  INPATIENT: Hospital Day: 1  Payor: Krista Pinto / Plan: [address] [place] [address] HMO / Product Type: [employer] Medicare /      NAME/AGE/GENDER: Abida Coleman is a 58 y.o. male   PRIMARY DIAGNOSIS:  Arthritis of knee, right [M17.11]   Procedure(s) and Anesthesia Type:     * RIGHT KNEE ARTHROPLASTY TOTAL - General (Right)  ICD-10: Treatment Diagnosis:    · Pain in Right Knee (M25.561)  · Stiffness of Right Knee, Not elsewhere classified (M25.661)  · Difficulty in walking, Not elsewhere classified (R26.2)      ASSESSMENT:     Mr. Adelaida Tom presents with limited ROM and strength following his R TKA. We were unable to get him up to the chair this afternoon due to decreased proprioception from nerve block. He will benefit from PT to increase his functional independence. He plans on discharging home with family and HHPT. This section established at most recent assessment   PROBLEM LIST (Impairments causing functional limitations):  1. Decreased Strength  2. Decreased Transfer Abilities  3. Decreased Ambulation Ability/Technique  4. Decreased Balance  5. Increased Pain  6.  Decreased Flexibility/Joint Mobility   INTERVENTIONS PLANNED: (Benefits and precautions of physical therapy have been discussed with the patient.)  1. Bed Mobility  2. Cold  3. Gait Training  4. Home Exercise Program (HEP)  5. Therapeutic Exercise/Strengthening  6. Transfer Training  7. Range of Motion: active/assisted/passive  8. Therapeutic Activities  9. Group Therapy     TREATMENT PLAN: Frequency/Duration: Follow patient BID for duration of hospital stay to address above goals. Rehabilitation Potential For Stated Goals: Good     RECOMMENDED REHABILITATION/EQUIPMENT: (at time of discharge pending progress): Continue Skilled Therapy and Home Health: Physical Therapy. HISTORY:   History of Present Injury/Illness (Reason for Referral): Admitted for R  TKA. Past Medical History/Comorbidities:   Mr. Nael Doe  has a past medical history of Arthritis; Chewing tobacco use; Chronic pain (2003); CP (cerebral palsy) ([facility] [address] Environmental and seasonal allergies; GERD (gastroesophageal reflux disease); Gout; History of vertebral fracture (2003); Hypertension; Morbid obesity ([address] Rheumatic fever (age 14); Sinus infection; Status post right knee replacement (6/22/2018); and Unspecified sleep apnea. He also has no past medical history of Other ill-defined conditions(799.89) or Unspecified adverse effect of anesthesia. Mr. Nael Doe  has a past surgical history that includes hx orthopaedic (Right); hx orthopaedic (Right, 01/2014); hx back surgery (1989); and hx colonoscopy.   Social History/Living Environment:   Home Environment: Private residence  # Steps to Enter: 2  One/Two Story Residence: One story  Living Alone: No  Support Systems: Spouse/Significant Other/Partner  Patient Expects to be Discharged to[de-identified] Private residence  Current DME Used/Available at Home: robby Whitney  Tub or Shower Type: Tub/Shower combination  Prior Level of Function/Work/Activity:  Independent prior to admit   Number of Personal Factors/Comorbidities that affect the Plan of Care: 1-2: MODERATE COMPLEXITY   EXAMINATION:   Most Recent Physical Functioning:      Gross Assessment  AROM: Within functional limits (limited R LE )  Strength: Within functional limits (limited R LE and RLE atrophied due to CP)  Coordination: Within functional limits (limited R LE > L LE due to spinal block)                     Bed Mobility  Supine to Sit: Contact guard assistance  Sit to Supine: Contact guard assistance  Scooting: Additional time    Transfers  Sit to Stand: Moderate assistance;Assist x2  Stand to Sit: Moderate assistance;Assist x2    Balance  Sitting: Intact  Standing: Pull to stand; With support    Posture  Posture (WDL): Within defined limits         Weight Bearing Status  Right Side Weight Bearing: As tolerated  Distance (ft):  (2-3 steps)  Ambulation - Level of Assistance: Assist x2; Moderate assistance  Assistive Device: Walker, rolling  Speed/Krystina: Slow  Step Length: Left shortened;Right shortened  Stance: Right decreased  Gait Abnormalities: Antalgic;Decreased step clearance; Step to gait  Interventions: Safety awareness training;Verbal cues     Braces/Orthotics: none    Right Knee Cold  Type: Cryocuff      Body Structures Involved:  1. Joints  2. Muscles Body Functions Affected:  1. Movement Related Activities and Participation Affected:  1. Mobility   Number of elements that affect the Plan of Care: 4+: HIGH COMPLEXITY   CLINICAL PRESENTATION:   Presentation: Stable and uncomplicated: LOW COMPLEXITY   CLINICAL DECISION MAKIN [place] [id number] AM-PAC 6 Clicks   Basic Mobility Inpatient Short Form  How much difficulty does the patient currently have. .. Unable A Lot A Little None   1. Turning over in bed (including adjusting bedclothes, sheets and blankets)? [] 1   [] 2   [x] 3   [] 4   2. Sitting down on and standing up from a chair with arms ( e.g., wheelchair, bedside commode, etc.)   [] 1   [x] 2   [] 3   [] 4   3. Moving from lying on back to sitting on the side of the bed?    [] 1   [] 2   [x] 3   [] 4   How much help from another person does the patient currently need. .. Total A Lot A Little None   4. Moving to and from a bed to a chair (including a wheelchair)? [] 1   [x] 2   [] 3   [] 4   5. Need to walk in hospital room? [x] 1   [] 2   [] 3   [] 4   6. Climbing 3-5 steps with a railing? [x] 1   [] 2   [] 3   [] 4   © 2007, Trustees of [address] [place] [address] [address] [address], under license to [employer]. All rights reserved        Score:  Initial: 12 Most Recent: X (Date: -- )    Interpretation of Tool:  Represents activities that are increasingly more difficult (i.e. Bed mobility, Transfers, Gait). Score 24 23 22-20 19-15 14-10 9-7 6     Modifier CH CI CJ CK CL CM CN      ? Mobility - Walking and Moving Around:     - CURRENT STATUS: CL - 60%-79% impaired, limited or restricted    - GOAL STATUS: CK - 40%-59% impaired, limited or restricted    - D/C STATUS:  ---------------To be determined---------------  Payor: [employer] MEDICARE / Plan: [address] [place] [address] HMO / Product Type: Managed Care Medicare /      Medical Necessity:     · Patient is expected to demonstrate progress in strength and range of motion to increase independence with gait and transfers. Reason for Services/Other Comments:  · Patient continues to require skilled intervention due to limited functional independence.    Use of outcome tool(s) and clinical judgement create a POC that gives a: Questionable prediction of patient's progress: MODERATE COMPLEXITY            TREATMENT:   (In addition to Assessment/Re-Assessment sessions the following treatments were rendered)     Pre-treatment Symptoms/Complaints:  Some pain  Pain: Initial: 3     Post Session:  3     Assessment/Reassessment only, no treatment provided today    Date:   Date:   Date:     ACTIVITY/EXERCISE AM PM AM PM AM PM   GROUP THERAPY  []  []  []  []  []  []   Ankle Pumps         Quad Sets         Gluteal Sets         Hip ABd/ADduction         Straight Leg Raises         Knee Slides         Short Arc Quads Long Arc Quads         Chair Slides                  B = bilateral; AA = active assistive; A = active; P = passive      Treatment/Session Assessment:     Response to Treatment:  Tolerated therapy well but limited mobility due to spinal block. Education:  [] Home Exercises  [x] Fall Precautions  [] Hip Precautions [] D/C Instruction Review  [] Knee/Hip Prosthesis Review  [x] Walker Management/Safety [] Adaptive Equipment as Needed       Interdisciplinary Collaboration:   o Physical Therapist  o Occupational Therapist  o Registered Nurse    After treatment position/precautions:   o Supine in bed  o Bed/Chair-wheels locked  o Bed in low position  o Call light within reach  o RN notified  o Family at bedside    Compliance with Program/Exercises: compliant all of the time. Recommendations/Intent for next treatment session:  Treatment next visit will focus on increasing Mr. Kaye De Jesus independence with bed mobility, transfers, gait training, strength/ROM exercises, modalities for pain, and patient education.       Total Treatment Duration:  PT Patient Time In/Time Out  Time In: 1540  Time Out: [address], PT

## 2018-06-22 NOTE — OP NOTES
[facility]  Cementless Total Knee Arthroplasty: Posterior Cruciate Retaining     Patient:Julius Ocampo   : 1955  Medical Record Number:[id number]  Pre-operative Diagnosis:  Arthritis of knee, right [M17.11]  Post-operative Diagnosis: Osteoarthritis of right knee  Location: [address] [place] [address]  Surgeon: Mayelin Mejias MD   Assistant: Jodie Delacruz PA-C    Anesthesia: General and FNB    Procedure:Procedure(s) (LRB):  RIGHT KNEE ARTHROPLASTY TOTAL (Right)   The complexity of the total joint surgery requires the use of a first assistant for positioning, retraction and expertise in closure. Tourniquet Time: 0 minutes  EBL: 250 cc  Findings: severe patellofemoral arthritis with loss of cartilage and bony erosion of femur and patella  BMI: Body mass index is 37.33 kg/(m^2). Kylie Olson was brought to the operating room and positioned on the operating table. He was anesthestized with anesthesia. A gtz catheter was placed preoperatively and IV antibiotics was administered. Prior to the incision being made a timeout was called identifying the patient, procedure ,operative side and surgeon The operative leg was prepped and draped in the usual sterile manner. An anterior longitudinal incision was accomplished just medial to the tibial tubercle and extending approximal 6 centimeters proximal to the superior pole of the patella. A medial parapatellar capsular incision was performed. The medial capsular flap was elevated around to the insertion of the semimembranous tendon. The patella was everted and the knee flexed and externally rotated. The medial and external menisci were excised. The lateral half of the fat pad excised and the patella femoral ligament was released. The anterior cruciate ligament was resect and the posterior cruciate ligament was retained. Using extramedullary instrumentation, the tibial cut was accomplished with appropriate posterior slope.       The distal femur was addressed. A drill hole was made above the intracondylar notch. Using appropriate intramedullary instrumentation,a five degree valgus distal cut was accomplished. The femur was sized. The anterior and posterior cuts were then made about the distal femur. The osteophytes were removed from the tibial and femoral surfaces. The flexion and extension gaps were assessed with the appropriate spacer blocks. Additional surgical procedures included: none. The flexion and extension gaps were deemed appropriately balanced. The appropriate cutting blocks were then utilized to perform the anterior, posterior and chamfer cuts, with appropriate lateral translation accomplished for the patellofemoral groove. Approximately 9 mm of bone was removed from the high side of the tibia. The tibia was sized. .  The tibial base plate was pinned into place with the appropriate external rotation and stem site prepared. A preliminary range of motion was accomplished. The  Patient was found to obtain full extension as well as appropriate flexion. The patient's ligaments were stable in flexion and extension to medial and lateral stressing and the alignment was through the appropriate mechanical axis. The patella was then everted. The bone was resect to accommodate the three peg patella button. A trial reduction revealed appropriate tracking through the patellofemoral groove with no lateral retinacular release being accomplished. All trial components were removed. The real implants were opened: Sizes listed below. The knee was irrigated. There were no femoral deficiencies. There were no tibial deficiencies. No augmentation was utilized. The permanent cementless Tibial and Femoral components were impacted into place. The cementless  patella component was then pressed in place. [facility] [place] [facility] was placed through range of motion and noted to be stable as mentioned above with the trail components.   The wound was dry, therefore no drain was used. The operative knee was injected with 60 cc of Naropin, 10 cc's of morphine and 1 cc of 30 mg of Toradol. The knee was then soaked with a diluted betadine solution for approximately 3 min. This was then thoroughly irrigated. The capsular layer was closed using a #1 PDS suture. Then, 1 gram (100 mg/ml) of Transexamic Acid was injected into the joint space. The subcutaneous layers were closed using 2-0 Stratafix. Finally the skin was closed using 3-0 Vicryl and skin staples, which were applied in occlusive fashion and sterile bandage applied. An Iceman cryo pad was applied on the operative leg. Sponge count and needle counts were correct. Catracho Bhargav left the operating room     Implants:   Implant Name Type Inv.  Item Serial No.  Lot No. LRB No. Used   COMPNT FEM CR TRIATHLN 5 R PA --  - [id number]  COMPNT FEM CR TRIATHLN 5 R PA --  [id number] [employer] [facility] [id number] Right 1   BASEPLT TIB PC TRITNM SZ 6 -- TRIATHLON - [id number]  BASEPLT TIB PC TRITNM SZ 6 -- TRIATHLON [id number] [employer] [facility] [id number] Right 1   PAT ASYM MTL-BK 11MM SZ A38 -- TRIATHLON - [id number]  PAT ASYM MTL-BK 11MM SZ A38 -- TRIATHLON DTEY [employer] HOW [id number] Right 1   INSERT TIB CR TRIATHLN 6 11MM --  - [id number]   INSERT TIB CR TRIATHLN 6 11MM --  [id number] [employer] HOW [id number] Right 1         Signed By: Marek Clemente MD   6/22/2018,  11:26 AM

## 2018-06-22 NOTE — ANESTHESIA PREPROCEDURE EVALUATION
Anesthetic History   No history of anesthetic complications            Review of Systems / Medical History  Patient summary reviewed, nursing notes reviewed and pertinent labs reviewed    Pulmonary        Sleep apnea (Non compliant - Desateration to 60's in sleep study - subsequent noncompliance): No treatment           Neuro/Psych         Neuromuscular disease (Cerebral Palsy)     Cardiovascular    Hypertension: well controlled              Exercise tolerance: >4 METS     GI/Hepatic/Renal     GERD: well controlled           Endo/Other        Morbid obesity and arthritis     Other Findings   Comments: Back surgery in past for ruptured discs and vertebral fx           Physical Exam    Airway  Mallampati: II  TM Distance: 4 - 6 cm  Neck ROM: normal range of motion   Mouth opening: Normal     Cardiovascular  Regular rate and rhythm,  S1 and S2 normal,  no murmur, click, rub, or gallop  Rhythm: regular  Rate: normal         Dental    Dentition: Upper partial plate     Pulmonary  Breath sounds clear to auscultation               Abdominal         Other Findings            Anesthetic Plan    ASA: 3  Anesthesia type: spinal - saphenous block      Post-op pain plan if not by surgeon: peripheral nerve block single    Induction: Intravenous  Anesthetic plan and risks discussed with: Patient and Spouse      Discussed SAB with light sedation given Severe FRANCISCO JAVIER. Also discussed possible difficulty with SAB given CP and positioning.  Will plan SAB with GA backup

## 2018-06-22 NOTE — ANESTHESIA POSTPROCEDURE EVALUATION
Post-Anesthesia Evaluation and Assessment    Patient: Redd Sandoval MRN: [id number]  SSN: [id number]    YOB: 1955  Age: 58 y.o. Sex: male       Cardiovascular Function/Vital Signs  Visit Vitals    /79    Pulse 63    Temp 36.8 °C (98.3 °F)    Resp 15    Ht 5' 10\" (1.778 m)    Wt 118 kg (260 lb 2.3 oz)    SpO2 98%    BMI 37.33 kg/m2       Patient is status post spinal anesthesia for Procedure(s):  RIGHT KNEE ARTHROPLASTY TOTAL. Nausea/Vomiting: None    Postoperative hydration reviewed and adequate. Pain:  Pain Scale 1: Visual (06/22/18 0827)  Pain Intensity 1: 0 (06/22/18 0827)   Managed    Neurological Status:   Neuro (WDL): Within Defined Limits (06/22/18 0749)   At baseline    Mental Status and Level of Consciousness: Arousable    Pulmonary Status:   O2 Device: Nasal cannula (06/22/18 1139)   Adequate oxygenation and airway patent    Complications related to anesthesia: None    Post-anesthesia assessment completed.  No concerns    Signed By: Michael Florentino MD     June 22, 2018

## 2018-06-22 NOTE — PERIOP NOTES
Teach back method used with patient concerning hibiclens wash, TB screening, incentive spirometer , pain management and educated pt and family on home discharge needs list  Incentive spirometer achievement in pre-op-

## 2018-06-22 NOTE — CONSULTS
Physical Medicine & Rehabilitation Note-consult    Patient: Leonel Greenwood MRN: [id number]  SSN: [id number]    YOB: 1955  Age: 58 y.o. Sex: male      Admit Date: 6/22/2018  Admitting Physician: Shona Davila MD    Medical Decision Making/Plan/Recommend: Gait impairment /right total knee arthroplasty. Post op PT/ OT therapy progressing steadily, without unusual barriers to progress. Patient plans for home discharge. Continued rehab at home via [facility] PT would be reasonable and necessary. Patient is to continue PT, OT for active/assisted/passive right TKA ROM, strengthening, mobility, transfers, and gait training. Will follow progress. Chief Complaint : Gait dysfunction secondary to below. Admit Diagnosis: Arthritis of knee, right [M17.11]  right total knee arthroplasty   6/22/2018  Pain  DVT risk  Post op hemorrhagic anemia  Acute Rehab Dx:  Gait impairment  Debility    deconditioning  Mobility and ambulation deficits  Self Care/ADL deficits    Medical Dx:  Past Medical History:   Diagnosis Date    Arthritis     OA    Chewing tobacco use     Chronic pain 2003    back fracture    CP (cerebral palsy) ([address]      3 surgeries right leg as child; no other problems     Environmental and seasonal allergies     GERD (gastroesophageal reflux disease)     rare-no medication     Gout     Controlled with medication     History of vertebral fracture 2003    Hypertension     Controlled with medication     Morbid obesity ([address]     Rheumatic fever age 14    history-no murmur auscultated on 6/11/18    Sinus infection     currently on Cefdinir     Status post right knee replacement 6/22/2018    Unspecified sleep apnea     Patient no longer on CPAP, has to get refitted. Followed by Dr. Mao Zelaya, per last office note (1/2018) \" home study shows moderate to severe FRANCISCO JAVIER with AHI of 28.6, worse in supine position. Lowest oxygen desaturation 66%. \"     Subjective:     Date of Evaluation: June 23, 2018    HPI: Christopher Hart is a 58 y.o. male patient at [address] [place] [address] who was admitted on 6/22/2018  by Nettie Bradshaw MD with below mentioned medical history, is being seen for Physical Medicine and Rehabilitation consult. Christopher Hart had right knee pain due to end stage DJD that has not been well controlled with conservative management. Patient underwent a right TKA per Dr. Nettie Bradshaw MD on 6/22/2018. Patient is progressing fairly with acute PT/ OT; starting to stand, taking steps with CGA/ min assist. Patient still shows significant functional deficits due to right knee pain, decreased ROM and strength. We are consulted to assist with rehab needs and placement. Christopher Hart is seen and examined today. Medical Records reviewed. Patient denies any other prior debilities. Patient has been independent with ambulation at baseline. Previous Functional Level:  independent    Current Level of Function:   bed mobility - min A, transfers - min A, decreased balance , ambulation - 150' with RW and CGA         Family History   Problem Relation Age of Onset    Lung Disease Mother       Social History   Substance Use Topics    Smoking status: Never Smoker    Smokeless tobacco: Current User      Comment: 1 can of chewing tobacco a day for 40 years    Alcohol use No     Past Surgical History:   Procedure Laterality Date    HX BACK SURGERY  1989    Herniated disc repair     HX COLONOSCOPY      x2    HX ORTHOPAEDIC Right     leg repair x 3 as child ue to C.P.    HX ORTHOPAEDIC Right 01/2014    big toe metatarsophalangeal joint fusion      Prior to Admission medications    Medication Sig Start Date End Date Taking? Authorizing Provider   aspirin delayed-release 81 mg tablet Take 1 Tab by mouth every twelve (12) hours every twelve (12) hours for 35 days.  6/22/18 7/27/18 Yes DARON Alvarenga   HYDROmorphone (DILAUDID) 2 mg tablet Take 1 Tab by mouth every four (4) hours as needed. Max Daily Amount: 12 mg. 18  Yes DARON Mauricio   allopurinol (ZYLOPRIM) 300 mg tablet Take  by mouth daily. Take morning of surgery per anesthesia guidelines. Indications: GOUT   Yes Historical Provider   Aspirin, Buffered 81 mg tab Take  by mouth daily. Take morning of surgery per anesthesia guidelines. Indications: preventative   Yes Historical Provider   diclofenac EC (VOLTAREN) 75 mg EC tablet Take 75 mg by mouth two (2) times a day. 18  Historical Provider   cefdinir (OMNICEF) 300 mg capsule Take 300 mg by mouth two (2) times a day. Historical Provider   losartan (COZAAR) 100 mg tablet Take 100 mg by mouth daily. Indications: HYPERTENSION    Historical Provider     Allergies   Allergen Reactions    Penicillins Itching and Nausea Only        Review of Systems: +right knee pain, +antalgic gait. Denies chest pain, shortness of breath, cough, headache, visual problems, abdominal pain, dysurea, calf pain. Pertinent positives are as noted in the medical records and unremarkable otherwise. Objective:     Vitals:  Blood pressure 134/61, pulse 79, temperature 97.6 °F (36.4 °C), resp. rate 19, height 5' 10\" (1.778 m), weight 260 lb 2.3 oz (118 kg), SpO2 96 %. Temp (24hrs), Av.9 °F (36.6 °C), Min:97.4 °F (36.3 °C), Max:98.6 °F (37 °C)    BMI (calculated): 37.3 (18 0629)   Intake and Output:   1901 -  0700  In: 5096 [P.O.:720; I.V.:4376]  Out: 1550 [Urine:1300]    Physical Exam:   General: Alert and age appropriately oriented. No acute cardio respiratory distress. HEENT: Normocephalic, no conjunctival pallor, no scleral icterus  Oral mucosa moist without cyanosis, no JVD   Lungs: Clear to auscultation, no wheezing  Respiration even and unlabored   Heart: Regular rate and rhythm, S1, S2   No  murmurs, clicks, rub or gallops   Abdomen: Soft, non-tender, non-distended. Genitourinary: defered   Neuromuscular:      Grossly no focal motor deficits.   Right knee extension strong  Right ankle dorsiflexion 5/5  Right ankle plantarflexion 5/5  No sensory deficits distally BLE to soft touch. Skin/extremity: No calf tenderness BLE. No rashes, no marginal erythema. Labs/Studies:  Recent Results (from the past 72 hour(s))   TYPE & SCREEN    Collection Time: 06/22/18  8:03 AM   Result Value Ref Range    Crossmatch Expiration 06/25/2018     ABO/Rh(D) Mir Shake NEGATIVE     Antibody screen NEG    GLUCOSE, POC    Collection Time: 06/22/18  8:12 AM   Result Value Ref Range    Glucose (POC) 107 (H) 65 - 100 mg/dL   HEMOGLOBIN    Collection Time: 06/22/18  6:38 PM   Result Value Ref Range    HGB 13.0 (L) 13.6 - 17.2 g/dL   HEMOGLOBIN    Collection Time: 06/23/18  4:52 AM   Result Value Ref Range    HGB 11.7 (L) 13.6 - 36.9 g/dL   METABOLIC PANEL, BASIC    Collection Time: 06/23/18  4:52 AM   Result Value Ref Range    Sodium 134 (L) 136 - 145 mmol/L    Potassium 4.2 3.5 - 5.1 mmol/L    Chloride 102 98 - 107 mmol/L    CO2 23 21 - 32 mmol/L    Anion gap 9 7 - 16 mmol/L    Glucose 197 (H) 65 - 100 mg/dL    BUN 16 8 - 23 MG/DL    Creatinine 1.28 0.8 - 1.5 MG/DL    GFR est AA >60 >60 ml/min/1.73m2    GFR est non-AA >60 >60 ml/min/1.73m2    Calcium 8.2 (L) 8.3 - 10.4 MG/DL       Functional Assessment:  Reviewed participation and progress in therapies  Gross Assessment  AROM: Within functional limits (limited R LE ) (06/22/18 1600)  Strength: Within functional limits (limited R LE and RLE atrophied due to CP) (06/22/18 1600)  Coordination: Within functional limits (limited R LE > L LE due to spinal block) (06/22/18 1600)   Bed Mobility  Supine to Sit: Supervision (06/23/18 1156)  Sit to Supine: Contact guard assistance (06/23/18 1000)  Scooting:  Additional time (06/22/18 1558)   Balance  Sitting: Intact (06/23/18 1156)  Standing: With support (06/23/18 5173)   Grooming  Grooming Assistance: Independent (06/23/18 1154)  Shaving: Independent (06/23/18 1154)  Brushing/Combing Hair: Independent (06/23/18 1154)           Bed/Mat Mobility  Supine to Sit: Supervision (06/23/18 1156)  Sit to Supine: Contact guard assistance (06/23/18 1000)  Sit to Stand: Supervision (06/23/18 1156)  Bed to Chair: Supervision (06/23/18 1156)  Scooting:  Additional time (06/22/18 1558)     Ambulation:  Gait  Speed/Krystina: Delayed (06/23/18 1000)  Step Length: Left shortened;Right shortened (06/23/18 1000)  Stance: Right decreased (06/23/18 1000)  Gait Abnormalities: Antalgic;Decreased step clearance (06/23/18 1000)  Ambulation - Level of Assistance: Contact guard assistance (06/23/18 1000)  Distance (ft): 150 Feet (ft) (06/23/18 1000)  Assistive Device: Walker, rolling (06/23/18 1000)  Interventions: Verbal cues (06/23/18 1000)    Impression/Plan:     Principal Problem:    Status post right knee replacement (6/22/2018)    Active Problems:    Osteoarthritis of right knee (6/22/2018)        Current Facility-Administered Medications   Medication Dose Route Frequency Provider Last Rate Last Dose    allopurinol (ZYLOPRIM) tablet 300 mg  300 mg Oral DAILY Joel Felix MD   300 mg at 06/23/18 0945    losartan (COZAAR) tablet 100 mg  100 mg Oral DAILY Joel Felix MD   100 mg at 06/23/18 0945    0.9% sodium chloride infusion  100 mL/hr IntraVENous CONTINUOUS Joel Felix  mL/hr at 06/22/18 1445 100 mL/hr at 06/22/18 1445    sodium chloride (NS) flush 5-10 mL  5-10 mL IntraVENous Kayce Bennett MD        sodium chloride (NS) flush 5-10 mL  5-10 mL IntraVENous PRN Joel Felix MD        acetaminophen (TYLENOL) tablet 1,000 mg  1,000 mg Oral Q6H Joel Felix MD   1,000 mg at 06/23/18 7260    celecoxib (CELEBREX) capsule 200 mg  200 mg Oral Q12H Joel Felix MD   200 mg at 06/23/18 0945    HYDROmorphone (DILAUDID) tablet 2 mg  2 mg Oral Q4H PRN Joel Felix MD   2 mg at 06/23/18 0945    HYDROmorphone (PF) (DILAUDID) injection 1 mg  1 mg IntraVENous Q3H PRN Raven Hairston MD   1 mg at 06/23/18 0148    naloxone [facility]) injection 0.2-0.4 mg  0.2-0.4 mg IntraVENous Q10MIN PRN Raven Hairston MD        dexamethasone (DECADRON) injection 10 mg  10 mg IntraVENous ONCE Raven Hairston MD        ondansetron (ZOFRAN ODT) tablet 4 mg  4 mg Oral Q4H PRN Raven Hairston MD        diphenhydrAMINE (BENADRYL) capsule 25 mg  25 mg Oral Q4H PRN Raven Hairston MD   25 mg at 06/23/18 2213    senna-docusate (PERICOLACE) 8.6-50 mg per tablet 2 Tab  2 Tab Oral DAILY Raven Hairston MD   2 Tab at 06/23/18 0945    aspirin delayed-release tablet 81 mg  81 mg Oral Q12H Raven Hairston MD   81 mg at 06/23/18 0945        Recommendations:  Plan for home discharge with [facility] PT. Continue Acute Rehab Program.  Coordination of rehab/medical care. Counseling of Physical Medicine & Rehab care issues management. Rehabilitation Management/ Medical Management: 1. Devices:Walkers, Type: Rolling Walker  2. Consult:Rehab team including PT, OT,  and . 3. Disposition Rehab-discussed with patient. 4. Thigh-high or knee-high ABRIL's when out of bed. 5. DVT Prophylaxis - started on aspirin 81mg bid x 30days. 6. Incentive spirometer Q1H while awake  7. Post op hemorrhagic anemia- monitor. hgb 11.7  8. Activity: WBAT RLE  9. Planned Labs: CBC,BMP  10. Pain Control:  Continue with scheduled tylenol, celebrex and  PRN meds. Continue current management. 11. Wound Care: Keep right TKA wound clean and dry and reinforce dressing PRN. May remove Aquacel 1 week post op ad replace with new one. Remove staples 12-14 post surgery, when incision appears appropriately closed and apply benzoin and 1/2\" steristrips. Follow up with ORTHO per instructions. Thank you for the opportunity to participate in the care of this patient.     Signed By: Jorge Aleman MD June 23, 2018

## 2018-06-22 NOTE — PROGRESS NOTES
06/22/18 1415   Oxygen Therapy   O2 Sat (%) 94 %   Pulse via Oximetry 73 beats per minute   O2 Device Room air   O2 Flow Rate (L/min) 0 l/min   Incentive Spirometry Treatment   Actual Volume (ml) 2000 ml   Joint Camp Notes Reviewed. Pt working on IS. Pt encouraged to do 10 breaths per hour while awake on IS. Good NPC. No respiratory distress noted at this time. No complications noted at this time.   C/s # 13

## 2018-06-22 NOTE — PROGRESS NOTES
Problem: Self Care Deficits Care Plan (Adult)  Goal: *Acute Goals and Plan of Care (Insert Text)  GOALS:   DISCHARGE GOALS (in preparation for going home/rehab):  3 days  1. Mr. Nael Doe will perform one lower body dressing activity with minimal assistance required to demonstrate improved functional mobility and safety. 2.  Mr. Nael Doe will perform one lower body bathing activity with minimal assistance required to demonstrate improved functional mobility and safety. 3.  Mr. Nael Doe will perform toileting/toilet transfer with contact guard assistance to demonstrate improved functional mobility and safety. 4.  Mr. Nael Doe will perform shower transfer with contact guard assistance to demonstrate improved functional mobility and safety. JOINT CAMP OCCUPATIONAL THERAPY TKA: Initial Assessment 6/22/2018  INPATIENT: Hospital Day: 1  Payor: Sandra Willis / Plan: [address] [place] [address] HMO / Product Type: [employer] Medicare /      NAME/AGE/GENDER: Mari Buckner is a 58 y.o. male   PRIMARY DIAGNOSIS:  Arthritis of knee, right [M17.11]   Procedure(s) and Anesthesia Type:     * RIGHT KNEE ARTHROPLASTY TOTAL - General (Right)  ICD-10: Treatment Diagnosis:    · Pain in Right Knee (M25.561)  · Stiffness of Right Knee, Not elsewhere classified (M25.661)      ASSESSMENT:     Mr. Nael Doe is s/p right TKA and presents with decreased weight bearing on right LE and decreased independence with functional mobility and activities of daily living as compared to baseline level of function and safety. Pt has CP but does not affect his function. Patient would benefit from skilled Occupational Therapy to maximize independence and safety with self-care task and functional mobility.   Pt would also benefit from education on adaptive equipment and safety precautions in preparation for going home or for recommendations for post-hospital rehab program.  Patient plans for further rehab at home with home health services and good family support, wife.  OT reviewed therapy schedule and plan of care with patient. Patient was able to transfer and preform self care skills as charted below. Patient instructed to call for assistance when needing to get up from the bed and all needs in reach. Patient verbalized understanding of call light. This section established at most recent assessment   PROBLEM LIST (Impairments causing functional limitations):  1. Decreased Strength  2. Decreased ADL/Functional Activities  3. Decreased Transfer Abilities  4. Increased Pain  5. Increased Fatigue  6. Decreased Flexibility/Joint Mobility  7. Decreased Knowledge of Precautions   INTERVENTIONS PLANNED: (Benefits and precautions of occupational therapy have been discussed with the patient.)  1. Activities of daily living training  2. Adaptive equipment training  3. Balance training  4. Clothing management  5. Donning&doffing training  6. Theraputic activity     TREATMENT PLAN: Frequency/Duration: Follow patient 1-2 times to address above goals. Rehabilitation Potential For Stated Goals: Good     RECOMMENDED REHABILITATION/EQUIPMENT: (at time of discharge pending progress): Continue Skilled Therapy and Home Health: Physical Therapy. OCCUPATIONAL PROFILE AND HISTORY:   History of Present Injury/Illness (Reason for Referral): Pt presents this date s/p (right) TKA. Past Medical History/Comorbidities:   Mr. Hyun Iverson  has a past medical history of Arthritis; Chewing tobacco use; Chronic pain (2003); CP (cerebral palsy) ([address] Environmental and seasonal allergies; GERD (gastroesophageal reflux disease); Gout; History of vertebral fracture (2003); Hypertension; Morbid obesity ([address] Rheumatic fever (age 14); Sinus infection; Status post right knee replacement (6/22/2018); and Unspecified sleep apnea. He also has no past medical history of Other ill-defined conditions(799.89) or Unspecified adverse effect of anesthesia.   Mr. Hyun Iverson  has a past surgical history that includes hx orthopaedic (Right); hx orthopaedic (Right, 2014); hx back surgery (); and hx colonoscopy. Social History/Living Environment:      Prior Level of Function/Work/Activity:  Independent      Number of Personal Factors/Comorbidities that affect the Plan of Care: Brief history (0):  LOW COMPLEXITY   ASSESSMENT OF OCCUPATIONAL PERFORMANCE[de-identified]   Most Recent Physical Functioning:   Balance  Sitting: Intact  Standing: Pull to stand; With support                    Coordination  Fine Motor Skills-Upper: Left Intact; Right Intact  Gross Motor Skills-Upper: Left Intact; Right Intact         Mental Status  Neurologic State: Alert  Orientation Level: Oriented X4  Cognition: Appropriate decision making  Perception: Appears intact  Perseveration: No perseveration noted  Safety/Judgement: Awareness of environment                Basic ADLs (From Assessment) Complex ADLs (From Assessment)   Basic ADL  Feeding: Independent  Oral Facial Hygiene/Grooming: Supervision  Bathing: Moderate assistance  Upper Body Dressing: Supervision  Lower Body Dressing: Maximum assistance  Toileting: Maximum assistance     Grooming/Bathing/Dressing Activities of Daily Living     Cognitive Retraining  Safety/Judgement: Awareness of environment                 Functional Transfers  Toilet Transfer : Maximum assistance  Shower Transfer: Maximum assistance     Bed/Mat Mobility  Supine to Sit: Contact guard assistance  Sit to Supine: Contact guard assistance  Sit to Stand: Moderate assistance;Assist x2  Scooting: Additional time         Physical Skills Involved:  1. Range of Motion  2. Balance  3. Strength Cognitive Skills Affected (resulting in the inability to perform in a timely and safe manner): 1. none Psychosocial Skills Affected:  1.  Environmental Adaptation   Number of elements that affect the Plan of Care: 3-5:  MODERATE COMPLEXITY   CLINICAL DECISION MAKIN [facility] [address] [id number] AM-PAC 6 [facility]   Daily Activity Inpatient Short Form  How much help from another person does the patient currently need. .. Total A Lot A Little None   1. Putting on and taking off regular lower body clothing? [] 1   [x] 2   [] 3   [] 4   2. Bathing (including washing, rinsing, drying)? [] 1   [x] 2   [] 3   [] 4   3. Toileting, which includes using toilet, bedpan or urinal?   [] 1   [x] 2   [] 3   [] 4   4. Putting on and taking off regular upper body clothing? [] 1   [] 2   [] 3   [x] 4   5. Taking care of personal grooming such as brushing teeth? [] 1   [] 2   [] 3   [x] 4   6. Eating meals? [] 1   [] 2   [] 3   [x] 4   © 2007, Trustees of [address] [place] [address] [address] [address], under license to [employer]. All rights reserved     Score:  Initial: 18 Most Recent: X (Date: -- )    Interpretation of Tool:  Represents activities that are increasingly more difficult (i.e. Bed mobility, Transfers, Gait). Score 24 23 22-20 19-15 14-10 9-7 6     Modifier CH CI CJ CK CL CM CN      ? Self Care:     - CURRENT STATUS: CK - 40%-59% impaired, limited or restricted    - GOAL STATUS: CJ - 20%-39% impaired, limited or restricted    - D/C STATUS:  ---------------To be determined---------------  Payor: Roseline Falk / Plan: [address] [place] [address] HMO / Product Type: [employer] Care Medicare /      Medical Necessity:     · Patient is expected to demonstrate progress in range of motion, balance and functional technique to increase independence with self care. Reason for Services/Other Comments:  · Patient would benefit from skilled Occupational Therapy to maximize independence and safety with self-care task and functional mobility.  .   Use of outcome tool(s) and clinical judgement create a POC that gives a: LOW COMPLEXITY            TREATMENT:   (In addition to Assessment/Re-Assessment sessions the following treatments were rendered)     Pre-treatment Symptoms/Complaints:  Pt with complaint of being uncomfortable   Pain: Initial:   Pain Intensity 1: 3  Post Session: 3     Assessment/Reassessment only, no treatment provided today    Treatment/Session Assessment:     Response to Treatment:  Pt up to edge of bed tolerated well. Education:  [] Home Exercises  [x] Fall Precautions  [] Hip Precautions [] Going Home Video  [x] Knee/Hip Prosthesis Review  [x] Walker Management/Safety [x] Adaptive Equipment as Needed       Interdisciplinary Collaboration:   o Physical Therapist  o Occupational Therapist  o Registered Nurse    After treatment position/precautions:   o Supine in bed  o Bed/Chair-wheels locked  o Call light within reach  o RN notified  o Family at bedside     Compliance with Program/Exercises: compliant all of the time. Recommendations/Intent for next treatment session:  Treatment next visit will focus on increasing Mr. Charlie Colon independence with bed mobility, transfers, self care, functional mobility, modalities for pain, and patient education.       Total Treatment Duration:  OT Patient Time In/Time Out  Time In: 1550  Time Out: 329 [facility],

## 2018-06-22 NOTE — IP AVS SNAPSHOT
[facility] 
 
 
 [address] 
[phone] Patient: Christopher Hart MRN: [id number] ZYL:[id number] About your hospitalization You were admitted on:  June 22, 2018 You last received care in the:  [facility] You were discharged on:  June 24, 2018 Why you were hospitalized Your primary diagnosis was:  Status Post Right Knee Replacement Your diagnoses also included:  Osteoarthritis Of Right Knee, Htn (Hypertension) Follow-up Information Follow up With Details Comments Contact Info Jc Ponce MD   [address] 
[phone] Nettie Bradshaw MD  Go to scheduled follow-up appointment. [address] [address] 
[phone] [address] [place] [address]  Will call you within 48 hours to schedule home visit. [address] [place] [id number] 
[phone] Discharge Orders Procedure Order Date Status Priority Quantity Spec Type Associated Dx CALL YOUR DOCTOR For: Temperature greater than 100.4., Severe uncontrolled pain. , Persistant nausea and vomiting., Persistant dizziness or light-headedness. , Hives, Difficulty breathing, headache, or visual disturbances. , Redness, tenderness, or s. .. 06/22/18 1422 Normal Routine 1  Status post right knee replacement [[id number]] Questions: For:  Temperature greater than 100.4. For:  Severe uncontrolled pain. For:  Persistant nausea and vomiting. For:  Persistant dizziness or light-headedness. For:  Gia Fling For:  Difficulty breathing, headache, or visual disturbances. For:  Redness, tenderness, or signs of infection. ACTIVITY AFTER DISCHARGE Patient should: Restrict driving, Restrict lifting, Other (specify) 06/22/18 1422 Normal Routine 1  Status post right knee replacement [[id number]] Questions: Patient should:  Restrict driving Patient should:  Restrict lifting Patient should: Other (specify) DRESSING, CHANGE SPECIFY 06/22/18 1422 Normal Routine 1  Status post right knee replacement [[id number]] Comments:  Routine dressing changes. Notify if excessive drainage. If staples are present they are to be removed 10 days post surgery and steri strips applied. REFERRAL TO HOME HEALTH 06/22/18 1422 Normal Routine 1  Status post right knee replacement [[id number]] REFERRAL TO PHYSICAL THERAPY 06/22/18 1422 Normal Routine 1  Status post right knee replacement [[id number]] Comments:  Referral to Home PT A check trixie indicates which time of day the medication should be taken. My Medications START taking these medications Instructions Each Dose to Equal  
 Morning Noon Evening Bedtime  
 aspirin delayed-release 81 mg tablet Your last dose was:  today Your next dose is:  tonight Take 1 Tab by mouth every twelve (12) hours every twelve (12) hours for 35 days. 81 mg HYDROmorphone 2 mg tablet Commonly known as:  DILAUDID Your last dose was:  0835am  
Your next dose is:  1235pm  
Notes to Patient:  As needed Take 1 Tab by mouth every four (4) hours as needed. Max Daily Amount: 12 mg.  
 2 mg CONTINUE taking these medications Instructions Each Dose to Equal  
 Morning Noon Evening Bedtime  
 allopurinol 300 mg tablet Commonly known as:  Eunice Jones Your next dose is:  tomorrow Take  by mouth daily. Take morning of surgery per anesthesia guidelines. Indications: GOUT  
     
   
   
   
  
 aspirin, buffered 81 mg Tab Notes to Patient:  Do not take Take  by mouth daily. Take morning of surgery per anesthesia guidelines. Indications: preventative  
     
   
   
   
  
 cefdinir 300 mg capsule Commonly known as:  OMNICEF Take 300 mg by mouth two (2) times a day.   
 300 mg  
    
   
   
   
  
 losartan 100 mg tablet Commonly known as:  COZAAR Your next dose is:  tomorrow Take 100 mg by mouth daily. Indications: HYPERTENSION  
 100 mg  
    
   
   
   
  
  
STOP taking these medications   
 diclofenac EC 75 mg EC tablet Commonly known as:  VOLTAREN Where to Get Your Medications Information on where to get these meds will be given to you by the nurse or doctor. ! Ask your nurse or doctor about these medications  
  aspirin delayed-release 81 mg tablet HYDROmorphone 2 mg tablet Opioid Education Prescription Opioids: What You Need to Know: 
 
Prescription opioids can be used to help relieve moderate-to-severe pain and are often prescribed following a surgery or injury, or for certain health conditions. These medications can be an important part of treatment but also come with serious risks. Opioids are strong pain medicines. Examples include hydrocodone, oxycodone, fentanyl, and morphine. Heroin is an example of an illegal opioid. It is important to work with your health care provider to make sure you are getting the safest, most effective care. WHAT ARE THE RISKS AND SIDE EFFECTS OF OPIOID USE? Prescription opioids carry serious risks of addiction and overdose, especially with prolonged use. An opioid overdose, often marked by slow breathing, can cause sudden death. The use of prescription opioids can have a number of side effects as well, even when taken as directed. · Tolerance-meaning you might need to take more of a medication for the same pain relief · Physical dependence-meaning you have symptoms of withdrawal when the medication is stopped. Withdrawal symptoms can include nausea, sweating, chills, diarrhea, stomach cramps, and muscle aches. Withdrawal can last up to several weeks, depending on which drug you took and how long you took it. · Increased sensitivity to pain · Constipation · Nausea, vomiting, and dry mouth · Sleepiness and dizziness · Confusion · Depression · Low levels of testosterone that can result in lower sex drive, energy, and strength · Itching and sweating RISKS ARE GREATER WITH:      
· History of drug misuse, substance use disorder, or overdose · Mental health conditions (such as depression or anxiety) · Sleep apnea · Older age (72 years or older) · Pregnancy Avoid alcohol while taking prescription opioids. Also, unless specifically advised by your health care provider, medications to avoid include: · Benzodiazepines (such as Xanax or Valium) · Muscle relaxants (such as Soma or Flexeril) · Hypnotics (such as Ambien or Lunesta) · Other prescription opioids KNOW YOUR OPTIONS Talk to your health care provider about ways to manage your pain that don't involve prescription opioids. Some of these options may actually work better and have fewer risks and side effects. Options may include: 
· Pain relievers such as acetaminophen, ibuprofen, and naproxen · Some medications that are also used for depression or seizures · Physical therapy and exercise · Counseling to help patients learn how to cope better with triggers of pain and stress. · Application of heat or cold compress · Massage therapy · Relaxation techniques Be Informed Make sure you know the name of your medication, how much and how often to take it, and its potential risks & side effects. IF YOU ARE PRESCRIBED OPIOIDS FOR PAIN: 
· Never take opioids in greater amounts or more often than prescribed. Remember the goal is not to be pain-free but to manage your pain at a tolerable level. · Follow up with your primary care provider to: · Work together to create a plan on how to manage your pain. · Talk about ways to help manage your pain that don't involve prescription opioids. · Talk about any and all concerns and side effects. · Help prevent misuse and abuse. · Never sell or share prescription opioids · Help prevent misuse and abuse. · Store prescription opioids in a secure place and out of reach of others (this may include visitors, children, friends, and family). · Safely dispose of unused/unwanted prescription opioids: Find your community drug take-back program or your pharmacy mail-back program, or flush them down the toilet, following guidance from the Food and Drug Administration (www.fda.gov/Drugs/ResourcesForYou). · Visit www.cdc.gov/drugoverdose to learn about the risks of opioid abuse and overdose. · If you believe you may be struggling with addiction, tell your health care provider and ask for guidance or call [employer] at 6-212-231-OBLO. Discharge Instructions [facility] [employer] Patient Discharge Instructions Fredi Snider / [id number] : 1955 Admitted 2018 Discharged: 2018 IF YOU HAVE ANY PROBLEMS ONCE YOU ARE AT HOME CALL THE FOLLOWING NUMBERS:  
Main office number: [phone] Medications · The medications you are to continue on are listed on the medication reconciliation sheet. · Narcotic pain medications as well as supplemental iron can cause constipation. If this occurs try stopping the narcotic pain medication and/or the iron. · It is important that you take the medication exactly as they are prescribed. · Medications which increase your risk of blood clots are listed to stop for 5 weeks after surgery as well as medications or supplements which increase your risk of bleeding complications. · Keep your medication in the bottles provided by the pharmacist and keep a list of the medication names, dosages, and times to be taken in your wallet. · Do not take other medications without consulting your doctor. Important Information Do NOT smoke as this will greatly increase your risk of infection! Resume your prehospital diet. If you have excessive nausea or vomitting call your doctor's office Leg swelling and warmth is normal for 6 months after surgery. If you experience swelling in your leg elevate you leg while laying down with your toes above your heart. If you have sudden onset severe swelling with leg pain call our office. Use Fei Hose stockings until we see you in the office for your follow up appointment. The stitches deep inside take approximately 6 months to dissolve. There will be sharp shooting, stinging and burning pain. This is normal and will resolve between 3-6 months after surgery. Difficulty sleeping is normal following total Knee and Hip replacement. You may try melatonin, an over-the-counter sleep aid or benadryl to help with sleep. Most patients will resume sleeping through the night 8 weeks after surgery. Home Physical Therapy is arranged. Home Health will contact you within 48 hrs of discharge that you have chosen. If you have not received a call within this time frame please contact that provider you chose. You should be given this information before you leave the hospital.  
 
You are at a risk for falls. Use the rolling walker when walking. Patients who have had a joint replacement should not drive if they are still taking narcotic pain mediation during the daytime hours. Most patients wean themselves off of pain medication within 2-5 weeks after surgery. When to Call the office - If you have a temperature greater then 101 
- Uncontrolled vomiting - Loose control of your bladder or bowel function - Are unable to bear any wieght  
- Need a pain medication refill Information obtained by : 
I understand that if any problems occur once I am at home I am to contact my physician. I understand and acknowledge receipt of the instructions indicated above. Physician's or R.N.'s Signature                                                                  Date/Time Patient or Representative Signature                                                          Date/Time Call your doctor for any of the following: 
 
Temp greater than 101 Nausea or vomiting Pain unrelieved by medication Swelling, numbness or tingling Yellow or green drainage from incision Questions or concerns Do not take a tub bath. You may shower. Do not remove your bandage. The home health staff will change the bandage as needed. Only sit in chairs with arms. You will need them to help you stand. Always use your walker until the physical therapist instructs you to use a cane. Only perform exercises as instructed by your therapist. 
 
 
  
Total Knee Replacement: What to Expect at Home Your Recovery When you leave the hospital, you should be able to move around with a walker or crutches. But you will need someone to help you at home for the next few weeks or until you have more energy and can move around better. If there is no one to help you at home, you may go to a rehabilitation center. You will go home with a bandage and stitches or staples. Change the bandage as your doctor tells you to. Your doctor will remove your stitches or staples 10 to 21 days after your surgery. You may still have some mild pain, and the area may be swollen for 3 to 6 months after surgery. Your knee will continue to improve for 6 to 12 months. You will probably use a walker for 1 to 3 weeks and then use crutches. When you are ready, you can use a cane. You will probably be able to walk on your own in 4 to 8 weeks.  
You will need to do months of physical rehabilitation (rehab) after a knee replacement. Rehab will help you strengthen the muscles of the knee and help you regain movement. After you recover, your artificial knee will allow you to do normal daily activities with less pain or no pain at all. You may be able to hike, dance, ride a bike, and play golf. Talk to your doctor about whether you can do more strenuous activities. Always tell your caregivers that you have an artificial knee. How long it will take to walk on your own, return to normal activities, and go back to work depends on your health and how well your rehabilitation (rehab) program goes. The better you do with your rehab exercises, the quicker you will get your strength and movement back. This care sheet gives you a general idea about how long it will take for you to recover. But each person recovers at a different pace. Follow the steps below to get better as quickly as possible. How can you care for yourself at home? Activity ? · Rest when you feel tired. You may take a nap, but do not stay in bed all day. When you sit, use a chair with arms. You can use the arms to help you stand up. ? · Work with your physical therapist to find the best way to exercise. You may be able to take frequent, short walks using crutches or a walker. What you can do as your knee heals will depend on whether your new knee is cemented or uncemented. You may not be able to do certain things for a while if your new knee is uncemented. ? · After your knee has healed enough, you can do more strenuous activities with caution. ¨ You can golf, but use a golf cart, and do not wear shoes with spikes. ¨ You can bike on a flat road or on a stationary bike. Avoid biking up hills. ¨ Your doctor may suggest that you stay away from activities that put stress on your knee. These include tennis or badminton, squash or racquetball, contact sports like football, jumping (such as in basketball), jogging, or running. ¨ Avoid activities where you might fall. These include horseback riding, skiing, and mountain biking. ? · Do not sit for more than 1 hour at a time. Get up and walk around for a while before you sit again. If you must sit for a long time, prop up your leg with a chair or footstool. This will help you avoid swelling. ? · Ask your doctor when you can shower. You may need to take sponge baths until your stitches or staples have been removed. ? · Ask your doctor when you can drive again. It may take up to 8 weeks after knee replacement surgery before it is safe for you to drive. ? · When you get into a car, sit on the edge of the seat. Then pull in your legs, and turn to face the front. ? · You should be able to do many everyday activities 3 to 6 weeks after your surgery. You will probably need to take 4 to 16 weeks off from work. When you can go back to work depends on the type of work you do and how you feel. ? · Ask your doctor when it is okay for you to have sex. ? · Do not lift anything heavier than 10 pounds and do not lift weights for 12 weeks. Diet ? · By the time you leave the hospital, you should be eating your normal diet. If your stomach is upset, try bland, low-fat foods like plain rice, broiled chicken, toast, and yogurt. Your doctor may suggest that you take iron and vitamin supplements. ? · Drink plenty of fluids (unless your doctor tells you not to). ? · Eat healthy foods, and watch your portion sizes. Try to stay at your ideal weight. Too much weight puts more stress on your new knee. ? · You may notice that your bowel movements are not regular right after your surgery. This is common. Try to avoid constipation and straining with bowel movements. You may want to take a fiber supplement every day. If you have not had a bowel movement after a couple of days, ask your doctor about taking a mild laxative. Medicines ? · Your doctor will tell you if and when you can restart your medicines. He or she will also give you instructions about taking any new medicines. ? · If you take blood thinners, such as warfarin (Coumadin), clopidogrel (Plavix), or aspirin, be sure to talk to your doctor. He or she will tell you if and when to start taking those medicines again. Make sure that you understand exactly what your doctor wants you to do.  
? · Your doctor may give you a blood-thinning medicine to prevent blood clots. If you take a blood thinner, be sure you get instructions about how to take your medicine safely. Blood thinners can cause serious bleeding problems. This medicine could be in pill form or as a shot (injection). If a shot is necessary, your doctor will tell you how to do this. ? · Be safe with medicines. Take pain medicines exactly as directed. ¨ If the doctor gave you a prescription medicine for pain, take it as prescribed. ¨ If you are not taking a prescription pain medicine, ask your doctor if you can take an over-the-counter medicine. ¨ Plan to take your pain medicine 30 minutes before exercises. It is easier to prevent pain before it starts than to stop it once it has started. ? · If you think your pain medicine is making you sick to your stomach: 
¨ Take your medicine after meals (unless your doctor has told you not to). ¨ Ask your doctor for a different pain medicine. ? · If your doctor prescribed antibiotics, take them as directed. Do not stop taking them just because you feel better. You need to take the full course of antibiotics. Incision care ? · You will have a bandage over the cut (incision) and staples or stitches. Take the bandage off when your doctor says it is okay. ? · Your doctor will remove the staples or stitches 10 days to 3 weeks after the surgery and replace them with strips of tape. Leave the tape on for a week or until it falls off. Exercise ? · Your rehab program will give you a number of exercises to do to help you get back your knee's range of motion and strength. Always do them as your therapist tells you. Ice and elevation ? · For pain and swelling, put ice or a cold pack on the area for 10 to 20 minutes at a time. Put a thin cloth between the ice and your skin. Other instructions ? · Continue to wear your support stockings as your doctor says. These help to prevent blood clots. The length of time that you will have to wear them depends on your activity level and the amount of swelling. ? · You have metal pieces in your knee. These may set off some airport metal detectors. Carry a medical alert card that says you have an artificial joint, just in case. Follow-up care is a key part of your treatment and safety. Be sure to make and go to all appointments, and call your doctor if you are having problems. It's also a good idea to know your test results and keep a list of the medicines you take. When should you call for help? Call 911 anytime you think you may need emergency care. For example, call if: 
? · You passed out (lost consciousness). ? · You have severe trouble breathing. ? · You have sudden chest pain and shortness of breath, or you cough up blood. ?Call your doctor now or seek immediate medical care if: 
? · You have signs of infection, such as: 
¨ Increased pain, swelling, warmth, or redness. ¨ Red streaks leading from the incision. ¨ Pus draining from the incision. ¨ A fever. ? · You have signs of a blood clot, such as: 
¨ Pain in your calf, back of the knee, thigh, or groin. ¨ Redness and swelling in your leg or groin. ? · Your incision comes open and begins to bleed, or the bleeding increases. ? · You have pain that does not get better after you take pain medicine. ? Watch closely for changes in your health, and be sure to contact your doctor if: ? · You do not have a bowel movement after taking a laxative. Where can you learn more? Go to http://lucia-león.info/. Enter G845 in the search box to learn more about \"Total Knee Replacement: What to Expect at Home. \" Current as of: March 21, 2017 Content Version: 11.4 © 1796-3806 [employer]. Care instructions adapted under license by [employer] (which disclaims liability or warranty for this information). If you have questions about a medical condition or this instruction, always ask your healthcare professional. [address] any warranty or liability for your use of this information. DISCHARGE SUMMARY from Nurse PATIENT INSTRUCTIONS: 
 
After general anesthesia or intravenous sedation, for 24 hours or while taking prescription Narcotics: · Limit your activities · Do not drive and operate hazardous machinery · Do not make important personal or business decisions · Do  not drink alcoholic beverages · If you have not urinated within 8 hours after discharge, please contact your surgeon on call. Report the following to your surgeon: 
· Excessive pain, swelling, redness or odor of or around the surgical area · Temperature over 100.5 · Nausea and vomiting lasting longer than 4 hours or if unable to take medications · Any signs of decreased circulation or nerve impairment to extremity: change in color, persistent  numbness, tingling, coldness or increase pain · Any questions These are general instructions for a healthy lifestyle: No smoking/ No tobacco products/ Avoid exposure to second hand smoke Surgeon General's Warning:  Quitting smoking now greatly reduces serious risk to your health. Obesity, smoking, and sedentary lifestyle greatly increases your risk for illness A healthy diet, regular physical exercise & weight monitoring are important for maintaining a healthy lifestyle You may be retaining fluid if you have a history of heart failure or if you experience any of the following symptoms:  Weight gain of 3 pounds or more overnight or 5 pounds in a week, increased swelling in our hands or feet or shortness of breath while lying flat in bed. Please call your doctor as soon as you notice any of these symptoms; do not wait until your next office visit. Recognize signs and symptoms of STROKE: 
 
F-face looks uneven A-arms unable to move or move unevenly S-speech slurred or non-existent T-time-call 911 as soon as signs and symptoms begin-DO NOT go Back to bed or wait to see if you get better-TIME IS BRAIN. Warning Signs of HEART ATTACK Call 911 if you have these symptoms: 
? Chest discomfort. Most heart attacks involve discomfort in the center of the chest that lasts more than a few minutes, or that goes away and comes back. It can feel like uncomfortable pressure, squeezing, fullness, or pain. ? Discomfort in other areas of the upper body. Symptoms can include pain or discomfort in one or both arms, the back, neck, jaw, or stomach. ? Shortness of breath with or without chest discomfort. ? Other signs may include breaking out in a cold sweat, nausea, or lightheadedness. Don't wait more than five minutes to call [address]! Fast action can save your life. Calling 911 is almost always the fastest way to get lifesaving treatment. Emergency Medical Services staff can begin treatment when they arrive  up to an hour sooner than if someone gets to the hospital by car. The discharge information has been reviewed with the patient. The patient verbalized understanding. Discharge medications reviewed with the patient and appropriate educational materials and side effects teaching were provided. ___________________________________________________________________________________________________________________________________ Introducing [facility]! [address] [place] [address] introduces [employer] patient portal. Now you can access parts of your medical record, email your doctor's office, and request medication refills online. 1. In your internet browser, go to [email or web address] 2. Click on the First Time User? Click Here link in the Sign In box. You will see the New Member Sign Up page. 3. Enter your [employer] Access Code exactly as it appears below. You will not need to use this code after youve completed the sign-up process. If you do not sign up before the expiration date, you must request a new code. · [employer] Access Code: [email or web address] Expires: 9/9/2018 12:43 AM 
 
4. Enter the last four digits of your Social Security Number (xxxx) and Date of Birth (mm/dd/yyyy) as indicated and click Submit. You will be taken to the next sign-up page. 5. Create a [employer] ID. This will be your [employer] login ID and cannot be changed, so think of one that is secure and easy to remember. 6. Create a [employer] password. You can change your password at any time. 7. Enter your Password Reset Question and Answer. This can be used at a later time if you forget your password. 8. Enter your e-mail address. You will receive e-mail notification when new information is available in [address] 9. Click Sign Up. You can now view and download portions of your medical record. 10. Click the Download Summary menu link to download a portable copy of your medical information. If you have questions, please visit the Frequently Asked Questions section of the [employer] website. Remember, [employer] is NOT to be used for urgent needs. For medical emergencies, dial 911. Now available from your iPhone and Android! Introducing Yobany Bedoya As a [address] [place] [address] patient, I wanted to make you aware of our electronic visit tool called Yobany Bedoya. [address] [place] [address] 24/7 allows you to connect within minutes with a medical provider 24 hours a day, seven days a week via a mobile device or tablet or logging into a secure website from your computer. You can access [employer] from anywhere in the [place]. A virtual visit might be right for you when you have a simple condition and feel like you just dont want to get out of bed, or cant get away from work for an appointment, when your regular Aurelia Dumonterin provider is not available (evenings, weekends or holidays), or when youre out of town and need minor care. Electronic visits cost only $49 and if the Aurelia Leone 24/7 provider determines a prescription is needed to treat your condition, one can be electronically transmitted to a nearby pharmacy*. Please take a moment to enroll today if you have not already done so. The enrollment process is free and takes just a few minutes. To enroll, please download the [employer] 24/7 david to your tablet or phone, or visit [email or web address] to enroll on your computer. And, as an [address] [place] [address] patient with a [employer] account, the results of your visits will be scanned into your electronic medical record and your primary care provider will be able to view the scanned results. We urge you to continue to see your regular Aurelia Sulema provider for your ongoing medical care. And while your primary care provider may not be the one available when you seek a Yobany Valenciafin virtual visit, the peace of mind you get from getting a real diagnosis real time can be priceless. For more information on Yobany Akeneogeofin, view our Frequently Asked Questions (FAQs) at [email or web address]. Sincerely, 
 
Enoch Adan MD 
Chief Medical Officer [facility] Luba Hernandez *:  certain medications cannot be prescribed via Yobany AkeneoniCellcrypt Unresulted tests-please follow up with your PCP on these results Procedure/Test Authorizing Provider  Aisha Hernandez MD  
 MD Maryann Moore [address] Bianca Salas MD  
 METABOLIC PANEL, [facility] Yari Flores MD  
  
Providers Seen During Your Hospitalization Provider Specialty Primary office phone Yari Flores MD Orthopedic Surgery [phone] Immunizations Administered for This Admission Name Date  
 TB Skin Test (PPD) Intradermal 6/22/2018 Your Primary Care Physician (PCP) Primary Care Physician Office Phone Office Fax Robert Nagy [phone] You are allergic to the following Allergen Reactions Penicillins Itching Nausea Only Recent Documentation Height Weight BMI Smoking Status 1.778 m 118 kg 37.33 kg/m2 Never Smoker Emergency Contacts Name Discharge Info Relation Home Work Mobile DamarisLily DISCHARGE CAREGIVER [3] Spouse [3] 21 [phone] Patient Belongings The following personal items are in your possession at time of discharge: 
  Dental Appliances: Uppers  Visual Aid: Glasses      Home Medications: None   Jewelry: Ring  Clothing: Footwear, Pants, Shirt Please provide this summary of care documentation to your next provider. Signatures-by signing, you are acknowledging that this After Visit Summary has been reviewed with you and you have received a copy. Patient Signature:  ____________________________________________________________ Date:  ____________________________________________________________  
  
Barbara Carteramento Provider Signature:  ____________________________________________________________ Date:  ____________________________________________________________

## 2018-06-22 NOTE — PROGRESS NOTES
TRANSFER - IN REPORT:    Verbal report received from Cooper RN(name) on [employer]  being received from [facility]) for routine post - op      Report consisted of patients Situation, Background, Assessment and   Recommendations(SBAR). Information from the following report(s) SBAR, OR Summary, Procedure Summary, Intake/Output and MAR was reviewed with the receiving nurse. Opportunity for questions and clarification was provided. Assessment completed upon patients arrival to unit and care assumed.

## 2018-06-22 NOTE — PERIOP NOTES
TRANSFER - OUT REPORT:    Verbal report given to receiving nurse Gina Dunaway  (name) on [facility] being transferred to [facility](unit) for routine progression of care       Report consisted of patient's Situation, Background, Assessment and   Recommendations(SBAR). Information from the following report(s) OR Summary, Procedure Summary, Intake/Output and MAR was reviewed with the receiving nurse. Opportunity for questions and clarification was provided.       Patient transported with:   O2 @ 2 liters  Tech

## 2018-06-23 ENCOUNTER — HOME HEALTH ADMISSION (OUTPATIENT)
Dept: HOME HEALTH SERVICES | Facility: HOME HEALTH | Age: 63
End: 2018-06-23
Payer: MEDICARE

## 2018-06-23 PROBLEM — I10 HTN (HYPERTENSION): Status: ACTIVE | Noted: 2018-06-23

## 2018-06-23 LAB
ANION GAP SERPL CALC-SCNC: 9 MMOL/L (ref 7–16)
BUN SERPL-MCNC: 16 MG/DL (ref 8–23)
CALCIUM SERPL-MCNC: 8.2 MG/DL (ref 8.3–10.4)
CHLORIDE SERPL-SCNC: 102 MMOL/L (ref 98–107)
CO2 SERPL-SCNC: 23 MMOL/L (ref 21–32)
CREAT SERPL-MCNC: 1.28 MG/DL (ref 0.8–1.5)
GLUCOSE SERPL-MCNC: 197 MG/DL (ref 65–100)
HGB BLD-MCNC: 11.7 G/DL (ref 13.6–17.2)
POTASSIUM SERPL-SCNC: 4.2 MMOL/L (ref 3.5–5.1)
SODIUM SERPL-SCNC: 134 MMOL/L (ref 136–145)

## 2018-06-23 PROCEDURE — 94760 N-INVAS EAR/PLS OXIMETRY 1: CPT

## 2018-06-23 PROCEDURE — 74011250636 HC RX REV CODE- 250/636: Performed by: ORTHOPAEDIC SURGERY

## 2018-06-23 PROCEDURE — 97110 THERAPEUTIC EXERCISES: CPT

## 2018-06-23 PROCEDURE — 94664 DEMO&/EVAL PT USE INHALER: CPT

## 2018-06-23 PROCEDURE — 36415 COLL VENOUS BLD VENIPUNCTURE: CPT | Performed by: ORTHOPAEDIC SURGERY

## 2018-06-23 PROCEDURE — 97535 SELF CARE MNGMENT TRAINING: CPT

## 2018-06-23 PROCEDURE — 74011250637 HC RX REV CODE- 250/637: Performed by: ORTHOPAEDIC SURGERY

## 2018-06-23 PROCEDURE — 65270000029 HC RM PRIVATE

## 2018-06-23 PROCEDURE — 97150 GROUP THERAPEUTIC PROCEDURES: CPT

## 2018-06-23 PROCEDURE — 97116 GAIT TRAINING THERAPY: CPT

## 2018-06-23 PROCEDURE — 85018 HEMOGLOBIN: CPT | Performed by: ORTHOPAEDIC SURGERY

## 2018-06-23 PROCEDURE — 80048 BASIC METABOLIC PNL TOTAL CA: CPT | Performed by: ORTHOPAEDIC SURGERY

## 2018-06-23 RX ADMIN — HYDROMORPHONE HYDROCHLORIDE 2 MG: 2 TABLET ORAL at 13:16

## 2018-06-23 RX ADMIN — HYDROMORPHONE HYDROCHLORIDE 2 MG: 2 TABLET ORAL at 17:23

## 2018-06-23 RX ADMIN — ASPIRIN 81 MG: 81 TABLET, COATED ORAL at 09:45

## 2018-06-23 RX ADMIN — CELECOXIB 200 MG: 200 CAPSULE ORAL at 21:58

## 2018-06-23 RX ADMIN — Medication 10 ML: at 22:54

## 2018-06-23 RX ADMIN — Medication 2 G: at 01:15

## 2018-06-23 RX ADMIN — SENNOSIDES AND DOCUSATE SODIUM 2 TABLET: 8.6; 5 TABLET ORAL at 09:45

## 2018-06-23 RX ADMIN — ACETAMINOPHEN 1000 MG: 500 TABLET, FILM COATED ORAL at 13:16

## 2018-06-23 RX ADMIN — HYDROMORPHONE HYDROCHLORIDE 1 MG: 1 INJECTION, SOLUTION INTRAMUSCULAR; INTRAVENOUS; SUBCUTANEOUS at 19:49

## 2018-06-23 RX ADMIN — LOSARTAN POTASSIUM 100 MG: 50 TABLET ORAL at 09:45

## 2018-06-23 RX ADMIN — ALLOPURINOL 300 MG: 300 TABLET ORAL at 09:45

## 2018-06-23 RX ADMIN — ASPIRIN 81 MG: 81 TABLET, COATED ORAL at 21:58

## 2018-06-23 RX ADMIN — HYDROMORPHONE HYDROCHLORIDE 2 MG: 2 TABLET ORAL at 09:45

## 2018-06-23 RX ADMIN — DIPHENHYDRAMINE HYDROCHLORIDE 25 MG: 25 CAPSULE ORAL at 02:04

## 2018-06-23 RX ADMIN — HYDROMORPHONE HYDROCHLORIDE 2 MG: 2 TABLET ORAL at 22:02

## 2018-06-23 RX ADMIN — CELECOXIB 200 MG: 200 CAPSULE ORAL at 09:45

## 2018-06-23 RX ADMIN — HYDROMORPHONE HYDROCHLORIDE 1 MG: 1 INJECTION, SOLUTION INTRAMUSCULAR; INTRAVENOUS; SUBCUTANEOUS at 01:48

## 2018-06-23 RX ADMIN — HYDROMORPHONE HYDROCHLORIDE 2 MG: 2 TABLET ORAL at 05:53

## 2018-06-23 RX ADMIN — ACETAMINOPHEN 1000 MG: 500 TABLET, FILM COATED ORAL at 05:53

## 2018-06-23 RX ADMIN — ACETAMINOPHEN 1000 MG: 500 TABLET, FILM COATED ORAL at 00:05

## 2018-06-23 NOTE — PROGRESS NOTES
Problem: Mobility Impaired (Adult and Pediatric)  Goal: *Acute Goals and Plan of Care (Insert Text)  GOALS (1-4 days):  (1.)Mr. Ocampo will move from supine to sit and sit to supine  in bed with STAND BY ASSIST.  (2.)Mr. Ocampo will transfer from bed to chair and chair to bed with STAND BY ASSIST using the least restrictive device. (3.)Mr. Ocampo will ambulate with STAND BY ASSIST for 100 feet with the least restrictive device. (4.)Mr. Ocampo will ambulate up/down 3 steps with bilateral  railing with CONTACT GUARD ASSIST with no device. (5.)Mr. Ocampo will increase right knee ROM to 5°-80°.  ________________________________________________________________________________________________    PHYSICAL THERAPY Joint camp tKa: Daily Note 6/23/2018  INPATIENT: Hospital Day: 2  Payor: You Villegas / Plan: [address] [place] [address] HMO / Product Type: Managed Care Medicare /      NAME/AGE/GENDER: Christopher Hart is a 58 y.o. male   PRIMARY DIAGNOSIS:  Arthritis of knee, right [M17.11]   Procedure(s) and Anesthesia Type:     * RIGHT KNEE ARTHROPLASTY TOTAL - General (Right)  ICD-10: Treatment Diagnosis:    · Pain in Right Knee (M25.561)  · Stiffness of Right Knee, Not elsewhere classified (M25.661)  · Difficulty in walking, Not elsewhere classified (R26.2)      ASSESSMENT:     Mr. Karyn Choudhury presents with limited ROM and strength following his R TKA. Patient did well this morning. Increased ambulation- required less assistance. Patient left sitting edge of bed eating a late breakfast with wife in the room. This section established at most recent assessment   PROBLEM LIST (Impairments causing functional limitations):  1. Decreased Strength  2. Decreased Transfer Abilities  3. Decreased Ambulation Ability/Technique  4. Decreased Balance  5. Increased Pain  6. Decreased Flexibility/Joint Mobility   INTERVENTIONS PLANNED: (Benefits and precautions of physical therapy have been discussed with the patient.)  1.  Bed Mobility  2. Cold  3. Gait Training  4. Home Exercise Program (HEP)  5. Therapeutic Exercise/Strengthening  6. Transfer Training  7. Range of Motion: active/assisted/passive  8. Therapeutic Activities  9. Group Therapy     TREATMENT PLAN: Frequency/Duration: Follow patient BID for duration of hospital stay to address above goals. Rehabilitation Potential For Stated Goals: Good     RECOMMENDED REHABILITATION/EQUIPMENT: (at time of discharge pending progress): Continue Skilled Therapy and Home Health: Physical Therapy. HISTORY:   History of Present Injury/Illness (Reason for Referral): Admitted for R  TKA. Past Medical History/Comorbidities:   Mr. Nael Doe  has a past medical history of Arthritis; Chewing tobacco use; Chronic pain (2003); CP (cerebral palsy) ([facility] [address] Environmental and seasonal allergies; GERD (gastroesophageal reflux disease); Gout; History of vertebral fracture (2003); Hypertension; Morbid obesity ([facility] [address] Rheumatic fever (age 14); Sinus infection; Status post right knee replacement (6/22/2018); and Unspecified sleep apnea. He also has no past medical history of Other ill-defined conditions(799.89) or Unspecified adverse effect of anesthesia. Mr. Nael Doe  has a past surgical history that includes hx orthopaedic (Right); hx orthopaedic (Right, 01/2014); hx back surgery (1989); and hx colonoscopy.   Social History/Living Environment:   Home Environment: Private residence  # Steps to Enter: 2  One/Two Story Residence: One story  Living Alone: No  Support Systems: Spouse/Significant Other/Partner  Patient Expects to be Discharged to[de-identified] Private residence  Current DME Used/Available at Home: robby Whitney  Tub or Shower Type: Tub/Shower combination  Prior Level of Function/Work/Activity:  Independent prior to admit   Number of Personal Factors/Comorbidities that affect the Plan of Care: 1-2: MODERATE COMPLEXITY   EXAMINATION:   Most Recent Physical Functioning:                            Bed Mobility  Supine to Sit: Contact guard assistance  Sit to Supine: Contact guard assistance    Transfers  Sit to Stand: Minimum assistance  Stand to Sit: Contact guard assistance                   Weight Bearing Status  Right Side Weight Bearing: As tolerated  Distance (ft): 150 Feet (ft)  Ambulation - Level of Assistance: Contact guard assistance  Assistive Device: Walker, rolling  Speed/Krystina: Delayed  Step Length: Left shortened;Right shortened  Stance: Right decreased  Gait Abnormalities: Antalgic;Decreased step clearance  Interventions: Verbal cues     Braces/Orthotics: none           Body Structures Involved:  1. Joints  2. Muscles Body Functions Affected:  1. Movement Related Activities and Participation Affected:  1. Mobility   Number of elements that affect the Plan of Care: 4+: HIGH COMPLEXITY   CLINICAL PRESENTATION:   Presentation: Stable and uncomplicated: LOW COMPLEXITY   CLINICAL DECISION MAKIN[address] [place] [id number] AM-PAC 6 Clicks   Basic Mobility Inpatient Short Form  How much difficulty does the patient currently have. .. Unable A Lot A Little None   1. Turning over in bed (including adjusting bedclothes, sheets and blankets)? [] 1   [] 2   [x] 3   [] 4   2. Sitting down on and standing up from a chair with arms ( e.g., wheelchair, bedside commode, etc.)   [] 1   [x] 2   [] 3   [] 4   3. Moving from lying on back to sitting on the side of the bed? [] 1   [] 2   [x] 3   [] 4   How much help from another person does the patient currently need. .. Total A Lot A Little None   4. Moving to and from a bed to a chair (including a wheelchair)? [] 1   [x] 2   [] 3   [] 4   5. Need to walk in hospital room? [x] 1   [] 2   [] 3   [] 4   6. Climbing 3-5 steps with a railing? [x] 1   [] 2   [] 3   [] 4   © , Trustees of [address] [place] [address], under license to [employer].  All rights reserved        Score:  Initial: 12 Most Recent: X (Date: -- )    Interpretation of Tool:  Represents activities that are increasingly more difficult (i.e. Bed mobility, Transfers, Gait). Score 24 23 22-20 19-15 14-10 9-7 6     Modifier CH CI CJ CK CL CM CN      ? Mobility - Walking and Moving Around:     - CURRENT STATUS: CL - 60%-79% impaired, limited or restricted    - GOAL STATUS: CK - 40%-59% impaired, limited or restricted    - D/C STATUS:  ---------------To be determined---------------  Payor: [employer] MEDICARE / Plan: [address] [place] [address] HMO / Product Type: Managed Care Medicare /      Medical Necessity:     · Patient is expected to demonstrate progress in strength and range of motion to increase independence with gait and transfers. Reason for Services/Other Comments:  · Patient continues to require skilled intervention due to limited functional independence. Use of outcome tool(s) and clinical judgement create a POC that gives a: Questionable prediction of patient's progress: MODERATE COMPLEXITY            TREATMENT:   (In addition to Assessment/Re-Assessment sessions the following treatments were rendered)     Pre-treatment Symptoms/Complaints: \"I am doing better\"  Pain: Initial: 3     Post Session:  3     Gait Training (  15):  Gait training to improve and/or restore physical functioning as related to mobility, strength, balance and coordination. Ambulated 150 Feet (ft) with Contact guard assistance using a Walker, rolling and minimal Verbal cues related to their push off to promote proper body alignment, promote proper body posture and promote proper body mechanics. Therapeutic Exercise: (  15):  Exercises per grid below to improve mobility, strength, balance and coordination. Required minimal verbal cues to promote proper body alignment, promote proper body posture and promote proper body mechanics. Progressed repetitions and complexity of movement as indicated.         Date:  6-23-18 Date:   Date:     ACTIVITY/EXERCISE AM PM AM PM AM PM   GROUP THERAPY  []  []  []  []  []  [] Ankle Pumps 20 R        Quad Sets 15        Gluteal Sets 15        Hip ABd/ADduction 15        Straight Leg Raises 15        Knee Slides 15        Short Arc Quads         Long Arc Quads         Chair Slides                  B = bilateral; AA = active assistive; A = active; P = passive      Treatment/Session Assessment:     Response to Treatment:  Tolerated therapy well this morning    Education:  [] Home Exercises  [x] Fall Precautions  [] Hip Precautions [] D/C Instruction Review  [] Knee/Hip Prosthesis Review  [x] Walker Management/Safety [] Adaptive Equipment as Needed       Interdisciplinary Collaboration:   o Physical Therapist  o Occupational Therapist  o Registered Nurse    After treatment position/precautions:   o Up in chair  o Bed/Chair-wheels locked  o Bed in low position  o Call light within reach  o RN notified  o Family at bedside    Compliance with Program/Exercises: compliant all of the time. Recommendations/Intent for next treatment session:  Treatment next visit will focus on increasing Mr. Antoni Quintana independence with bed mobility, transfers, gait training, strength/ROM exercises, modalities for pain, and patient education.       Total Treatment Duration:  PT Patient Time In/Time Out  Time In: 0930  Time Out: [address]

## 2018-06-23 NOTE — PROGRESS NOTES
Orthopedic Progress Note    2018  Admit Date: 2018  Admit Diagnosis: Arthritis of knee, right [M17.11]    Post Op day: 1 Day Post-Op    Subjective:     Deborah Denis     Patient appears to be doing okay       Objective:     Vital Signs:    Temp (24hrs), Av °F (36.7 °C), Min:97.4 °F (36.3 °C), Max:98.6 °F (37 °C)      LAB:    [unfilled]  Lab Results   Component Value Date/Time    INR 1.1 2018 09:29 AM     Lab Results   Component Value Date/Time    HGB 11.7 (L) 2018 04:52 AM    HGB 13.0 (L) 2018 06:38 PM       Physical Exam:    Patient appears to be A/O  No apparent distress   No neurovascular issues noted   No obvious problems      Plan:     Continue PT/OT rehab as indicated    Nursing and SS for disposition plans         Signed By: Dmitriy Miguel MD

## 2018-06-23 NOTE — PROGRESS NOTES
Problem: Mobility Impaired (Adult and Pediatric)  Goal: *Acute Goals and Plan of Care (Insert Text)  GOALS (1-4 days):  (1.)Mr. Ocampo will move from supine to sit and sit to supine  in bed with STAND BY ASSIST.  (2.)Mr. Ocampo will transfer from bed to chair and chair to bed with STAND BY ASSIST using the least restrictive device. (3.)Mr. Ocampo will ambulate with STAND BY ASSIST for 100 feet with the least restrictive device. (4.)Mr. Ocampo will ambulate up/down 3 steps with bilateral  railing with CONTACT GUARD ASSIST with no device. (5.)Mr. Ocampo will increase right knee ROM to 5°-80°.  ________________________________________________________________________________________________    PHYSICAL THERAPY [facility] camp tKa: Daily Note 6/23/2018  INPATIENT: Hospital Day: 2  Payor: Negin Brown / Plan: [address] [place] [address] HMO / Product Type: [employer] Medicare /      NAME/AGE/GENDER: Maxime Mccarthy is a 58 y.o. male   PRIMARY DIAGNOSIS:  Arthritis of knee, right [M17.11]   Procedure(s) and Anesthesia Type:     * RIGHT KNEE ARTHROPLASTY TOTAL - General (Right)  ICD-10: Treatment Diagnosis:    · Pain in Right Knee (M25.561)  · Stiffness of Right Knee, Not elsewhere classified (M25.661)  · Difficulty in walking, Not elsewhere classified (R26.2)      ASSESSMENT:     Mr. Pallavi Howell presents with limited ROM and strength following his R TKA. Patient did well this afternoon. Increased ambulation- required less assistance. Patient did well on the steps. This section established at most recent assessment   PROBLEM LIST (Impairments causing functional limitations):  1. Decreased Strength  2. Decreased Transfer Abilities  3. Decreased Ambulation Ability/Technique  4. Decreased Balance  5. Increased Pain  6. Decreased Flexibility/Joint Mobility   INTERVENTIONS PLANNED: (Benefits and precautions of physical therapy have been discussed with the patient.)  1. Bed Mobility  2. Cold  3. Gait Training  4.  Home Exercise Program (HEP)  5. Therapeutic Exercise/Strengthening  6. Transfer Training  7. Range of Motion: active/assisted/passive  8. Therapeutic Activities  9. Group Therapy     TREATMENT PLAN: Frequency/Duration: Follow patient BID for duration of hospital stay to address above goals. Rehabilitation Potential For Stated Goals: Good     RECOMMENDED REHABILITATION/EQUIPMENT: (at time of discharge pending progress): Continue Skilled Therapy and Home Health: Physical Therapy. HISTORY:   History of Present Injury/Illness (Reason for Referral): Admitted for R  TKA. Past Medical History/Comorbidities:   Mr. Paco Calvillo  has a past medical history of Arthritis; Chewing tobacco use; Chronic pain (2003); CP (cerebral palsy) ([facility] [address] Environmental and seasonal allergies; GERD (gastroesophageal reflux disease); Gout; History of vertebral fracture (2003); Hypertension; Morbid obesity ([address] Rheumatic fever (age 14); Sinus infection; Status post right knee replacement (6/22/2018); and Unspecified sleep apnea. He also has no past medical history of Other ill-defined conditions(799.89) or Unspecified adverse effect of anesthesia. Mr. Paco Calvillo  has a past surgical history that includes hx orthopaedic (Right); hx orthopaedic (Right, 01/2014); hx back surgery (1989); and hx colonoscopy.   Social History/Living Environment:   Home Environment: Private residence  # Steps to Enter: 2  One/Two Story Residence: One story  Living Alone: No  Support Systems: Spouse/Significant Other/Partner  Patient Expects to be Discharged to[de-identified] Private residence  Current DME Used/Available at Home: [address] [facility] [address], straight  Tub or Shower Type: Tub/Shower combination  Prior Level of Function/Work/Activity:  Independent prior to admit   Number of Personal Factors/Comorbidities that affect the Plan of Care: 1-2: MODERATE COMPLEXITY   EXAMINATION:   Most Recent Physical Functioning:               RLE AROM  R Knee Flexion: 95  R Knee Extension: 10            Bed Mobility  Supine to Sit: Supervision  Sit to Supine: Contact guard assistance    Transfers  Sit to Stand: Supervision  Stand to Sit: Contact guard assistance  Bed to Chair: Supervision    Balance  Sitting: Intact  Standing: With support              Weight Bearing Status  Right Side Weight Bearing: As tolerated  Distance (ft): 280 Feet (ft)  Ambulation - Level of Assistance: Stand-by assistance  Assistive Device: Walker, rolling  Speed/Krystina: Delayed  Step Length: Left shortened;Right shortened  Stance: Right decreased  Gait Abnormalities: Antalgic;Decreased step clearance  Interventions: Verbal cues     Up and down steps twice with railings. Body Structures Involved:  1. Joints  2. Muscles Body Functions Affected:  1. Movement Related Activities and Participation Affected:  1. Mobility   Number of elements that affect the Plan of Care: 4+: HIGH COMPLEXITY   CLINICAL PRESENTATION:   Presentation: Stable and uncomplicated: LOW COMPLEXITY   CLINICAL DECISION MAKIN[address] [place] [id number] AM-PAC 6 Clicks   Basic Mobility Inpatient Short Form  How much difficulty does the patient currently have. .. Unable A Lot A Little None   1. Turning over in bed (including adjusting bedclothes, sheets and blankets)? [] 1   [] 2   [x] 3   [] 4   2. Sitting down on and standing up from a chair with arms ( e.g., wheelchair, bedside commode, etc.)   [] 1   [x] 2   [] 3   [] 4   3. Moving from lying on back to sitting on the side of the bed? [] 1   [] 2   [x] 3   [] 4   How much help from another person does the patient currently need. .. Total A Lot A Little None   4. Moving to and from a bed to a chair (including a wheelchair)? [] 1   [x] 2   [] 3   [] 4   5. Need to walk in hospital room? [x] 1   [] 2   [] 3   [] 4   6. Climbing 3-5 steps with a railing? [x] 1   [] 2   [] 3   [] 4   © , Trustees of [address] [place], [place] [address], under license to [employer].  All rights reserved        Score:  Initial: 12 Most Recent: X (Date: -- ) Interpretation of Tool:  Represents activities that are increasingly more difficult (i.e. Bed mobility, Transfers, Gait). Score 24 23 22-20 19-15 14-10 9-7 6     Modifier CH CI CJ CK CL CM CN      ? Mobility - Walking and Moving Around:     - CURRENT STATUS: CL - 60%-79% impaired, limited or restricted    - GOAL STATUS: CK - 40%-59% impaired, limited or restricted    - D/C STATUS:  ---------------To be determined---------------  Payor: [employer]ICARE / Plan: [address] [place] [address] HMO / Product Type: Managed Care Medicare /      Medical Necessity:     · Patient is expected to demonstrate progress in strength and range of motion to increase independence with gait and transfers. Reason for Services/Other Comments:  · Patient continues to require skilled intervention due to limited functional independence. Use of outcome tool(s) and clinical judgement create a POC that gives a: Questionable prediction of patient's progress: MODERATE COMPLEXITY            TREATMENT:   (In addition to Assessment/Re-Assessment sessions the following treatments were rendered)     Pre-treatment Symptoms/Complaints: \"I am doing better\"  Pain: Initial: 3     Post Session:  3     Gait Training (  15):  Gait training to improve and/or restore physical functioning as related to mobility, strength, balance and coordination. Ambulated 280 Feet (ft) with Stand-by assistance using a Walker, rolling and minimal Verbal cues related to their push off to promote proper body alignment, promote proper body posture and promote proper body mechanics. Therapeutic Exercise: (  45):  Exercises per grid below to improve mobility, strength, balance and coordination. Required minimal verbal cues to promote proper body alignment, promote proper body posture and promote proper body mechanics. Progressed repetitions and complexity of movement as indicated.         Date:  6-23-18 Date:   Date:     ACTIVITY/EXERCISE AM PM AM PM AM PM GROUP THERAPY  []  [x]  []  []  []  []   Ankle Pumps 20 R 20       Quad Sets 15 15       Gluteal Sets 15 15       Hip ABd/ADduction 15 15       Straight Leg Raises 15 15       Knee Slides 15 15       Short Arc Quads  15       Long Arc Quads         Chair Slides  15                B = bilateral; AA = active assistive; A = active; P = passive      Treatment/Session Assessment:     Response to Treatment:  Tolerated therapy well this pm    Education:  [] Home Exercises  [x] Fall Precautions  [] Hip Precautions [] D/C Instruction Review  [] Knee/Hip Prosthesis Review  [x] Walker Management/Safety [] Adaptive Equipment as Needed       Interdisciplinary Collaboration:   o Physical Therapist  o Occupational Therapist  o Registered Nurse    After treatment position/precautions:   o Up in chair  o Bed/Chair-wheels locked  o Bed in low position  o Call light within reach  o RN notified  o Family at bedside    Compliance with Program/Exercises: compliant all of the time. Recommendations/Intent for next treatment session:  Treatment next visit will focus on increasing Mr. Nabila Shay independence with bed mobility, transfers, gait training, strength/ROM exercises, modalities for pain, and patient education.       Total Treatment Duration:  PT Patient Time In/Time Out  Time In: 0930  Time Out: [address] [place] [address], [facility]

## 2018-06-23 NOTE — PROGRESS NOTES
Care Management Interventions  Transition of Care Consult (CM Consult): [address]: Yes  Discharge Durable Medical Equipment: Yes (provided ELZA BRAN from [place] [address])  Current Support Network: Lives with Spouse  Plan discussed with Pt/Family/Caregiver: Yes  Freedom of Choice Offered: Yes  Discharge Location  Discharge Placement: Home with home health

## 2018-06-23 NOTE — PROGRESS NOTES
[address]  Face to Face Encounter    Patients Name: [place]    YOB: 1955    Ordering Physician: Feli Coello      Primary Diagnosis: RTKA    Date of Face to Face:   6/22/2018  Face to Face Encounter findings are related to primary reason for home care:   yes. 1. I certify that the patient needs intermittent care as follows: physical therapy: strengthening    2. I certify that this patient is homebound, that is: 1) patient requires the use of a walker device, special transportation, or assistance of another to leave the home; or 2) patient's condition makes leaving the home medically contraindicated; and 3) patient has a normal inability to leave the home and leaving the home requires considerable and taxing effort. Patient may leave the home for infrequent and short duration for medical reasons, and occasional absences for non-medical reasons. Homebound status is due to the following functional limitations: Patient with strength deficits limiting the performance of all ADL's without caregiver assistance or the use of an assistive device. 3. I certify that this patient is under my care and that I, or a nurse practitioner or [phone], or clinical nurse specialist, or certified nurse midwife, working with me, had a Face-to-Face Encounter that meets the physician Face-to-Face Encounter requirements. The following are the clinical findings from the [address] [place] [address] encounter that support the need for skilled services and is a summary of the encounter: See hospital chart. See attached progess note      Darvin Wesley LMSW  5/31/2017      THE FOLLOWING TO BE COMPLETED BY THE COMMUNITY PHYSICIAN:    I concur with the findings described above from the F encounter that this patient is homebound and in need of a skilled service.     Certifying Physician: _____________________________________      Printed Certifying Physician Name: _____________________________________    Date: _________________

## 2018-06-23 NOTE — PROGRESS NOTES
Problem: Self Care Deficits Care Plan (Adult)  Goal: *Acute Goals and Plan of Care (Insert Text)  GOALS:   DISCHARGE GOALS (in preparation for going home/rehab):  3 days  1. Mr. Tim Eaton will perform one lower body dressing activity with minimal assistance required to demonstrate improved functional mobility and safety. GOAL MET 6/23/2018  2. Mr. Tim Eaton will perform one lower body bathing activity with minimal assistance required to demonstrate improved functional mobility and safety. GOAL MET 6/23/2018  3. Mr. Tim Eaton will perform toileting/toilet transfer with contact guard assistance to demonstrate improved functional mobility and safety. GOAL MET 6/23/2018  4. Mr. Tim Eaton will perform shower transfer with contact guard assistance to demonstrate improved functional mobility and safety. GOAL MET 6/23/2018    JOINT CAMP OCCUPATIONAL THERAPY TKA: Daily Note, Discharge, Treatment Day: 1st and AM 6/23/2018  INPATIENT: Hospital Day: 2  Payor: Lety Luna / Plan: [address] [place] [address] HMO / Product Type: Managed Care Medicare /      NAME/AGE/GENDER: Kunal Jones is a 58 y.o. male   PRIMARY DIAGNOSIS:  Arthritis of knee, right [M17.11]   Procedure(s) and Anesthesia Type:     * RIGHT KNEE ARTHROPLASTY TOTAL - General (Right)  ICD-10: Treatment Diagnosis:    · Pain in Right Knee (M25.561)  · Stiffness of Right Knee, Not elsewhere classified (M25.661)      ASSESSMENT:      Mr. Tim Eaton is s/p R TKA and presents with decreased weight bearing on R LE and decreased independence with functional mobility and activities of daily living. Patient completed shower and dressing as charter below in ADL grid and is ambulating with rolling walker with supervision. Patient has met 4/4 goals and plans to return home with good family support. Family able to provide patient with appropriate level of assistance at this time. OT reviewed safety precautions throughout session and therapy schedule for the remainder of today.   Patient instructed to call for assistance when needing to get up from recliner and all needs in reach. Patient verbalized understanding of call light. This section established at most recent assessment   PROBLEM LIST (Impairments causing functional limitations):  1. Decreased Strength  2. Decreased ADL/Functional Activities  3. Decreased Transfer Abilities  4. Increased Pain  5. Increased Fatigue  6. Decreased Flexibility/Joint Mobility  7. Decreased Knowledge of Precautions   INTERVENTIONS PLANNED: (Benefits and precautions of occupational therapy have been discussed with the patient.)  1. Activities of daily living training  2. Adaptive equipment training  3. Balance training  4. Clothing management  5. Donning&doffing training  6. Theraputic activity     TREATMENT PLAN: Frequency/Duration: Follow patient 1-2 times to address above goals. Rehabilitation Potential For Stated Goals: Good     RECOMMENDED REHABILITATION/EQUIPMENT: (at time of discharge pending progress): Continue Skilled Therapy and Home Health: Physical Therapy. OCCUPATIONAL PROFILE AND HISTORY:   History of Present Injury/Illness (Reason for Referral): Pt presents this date s/p (right) TKA. Past Medical History/Comorbidities:   Mr. Haily Hale  has a past medical history of Arthritis; Chewing tobacco use; Chronic pain (2003); CP (cerebral palsy) ([address] Environmental and seasonal allergies; GERD (gastroesophageal reflux disease); Gout; History of vertebral fracture (2003); Hypertension; Morbid obesity ([address] Rheumatic fever (age 14); Sinus infection; Status post right knee replacement (6/22/2018); and Unspecified sleep apnea. He also has no past medical history of Other ill-defined conditions(799.89) or Unspecified adverse effect of anesthesia. Mr. Haily Hale  has a past surgical history that includes hx orthopaedic (Right); hx orthopaedic (Right, 01/2014); hx back surgery (1989); and hx colonoscopy.   Social History/Living Environment: Home Environment: Private residence  # Steps to Enter: 2  One/Two Story Residence: One story  Living Alone: No  Support Systems: Spouse/Significant Other/Partner  Patient Expects to be Discharged to[de-identified] Private residence  Current DME Used/Available at Home: Aldona Shayy, straight  Tub or Shower Type: Tub/Shower combination  Prior Level of Function/Work/Activity:  Independent      Number of Personal Factors/Comorbidities that affect the Plan of Care: Brief history (0):  LOW COMPLEXITY   ASSESSMENT OF OCCUPATIONAL PERFORMANCE[de-identified]   Most Recent Physical Functioning:   Balance  Sitting: Intact  Standing: With support                              Mental Status  Neurologic State: Alert  Orientation Level: Oriented X4  Cognition: Appropriate decision making; Appropriate for age attention/concentration  Perception: Appears intact  Perseveration: No perseveration noted  Safety/Judgement: Fall prevention                Basic ADLs (From Assessment) Complex ADLs (From Assessment)   Basic ADL  Feeding: Independent  Oral Facial Hygiene/Grooming: Supervision  Bathing:  Moderate assistance  Type of Bath: Chlorhexidine (CHG), Full, Shower  Upper Body Dressing: Supervision  Lower Body Dressing: Maximum assistance  Toileting: Maximum assistance     Grooming/Bathing/Dressing Activities of Daily Living   Grooming  Grooming Assistance: Independent  Shaving: Independent  Brushing/Combing Hair: Independent Cognitive Retraining  Safety/Judgement: Fall prevention   Upper Body Bathing  Bathing Assistance: Modified independent  Position Performed: Seated in chair  Adaptive Equipment: Shower chair     Lower Body Bathing  Bathing Assistance: Modified independent  Perineal  : Independent  Lower Body : Modified independent  Adaptive Equipment: Shower chair     Upper Body Dressing Assistance  Dressing Assistance: Independent  Pullover Shirt: Independent Functional Transfers  Shower Transfer: Supervision   Lower Body Dressing Assistance  Dressing Assistance: Minimum assistance  Underpants: Minimum assistance  Pants With Elastic Waist: Minimum assistance  Socks: Minimum assistance  Cues: Doff;Don;Physical assistance Bed/Mat Mobility  Supine to Sit: Supervision  Sit to Supine: Contact guard assistance  Sit to Stand: Supervision  Bed to Chair: Supervision         Physical Skills Involved:  1. Range of Motion  2. Balance  3. Strength Cognitive Skills Affected (resulting in the inability to perform in a timely and safe manner): 1. none Psychosocial Skills Affected:  1. Environmental Adaptation   Number of elements that affect the Plan of Care: 3-5:  MODERATE COMPLEXITY   CLINICAL DECISION MAKIN[address] [place] [address] AM-PAC 6 Clicks   Daily Activity Inpatient Short Form  How much help from another person does the patient currently need. .. Total A Lot A Little None   1. Putting on and taking off regular lower body clothing? [] 1   [x] 2   [] 3   [] 4   2. Bathing (including washing, rinsing, drying)? [] 1   [x] 2   [] 3   [] 4   3. Toileting, which includes using toilet, bedpan or urinal?   [] 1   [x] 2   [] 3   [] 4   4. Putting on and taking off regular upper body clothing? [] 1   [] 2   [] 3   [x] 4   5. Taking care of personal grooming such as brushing teeth? [] 1   [] 2   [] 3   [x] 4   6. Eating meals? [] 1   [] 2   [] 3   [x] 4   © , Trustees of [address] [place] [address], under license to [employer]. All rights reserved     Score:  Initial: 18 Most Recent: X (Date: -- )    Interpretation of Tool:  Represents activities that are increasingly more difficult (i.e. Bed mobility, Transfers, Gait). Score 24 23 22-20 19-15 14-10 9-7 6     Modifier CH CI CJ CK CL CM CN      ?  Self Care:     - CURRENT STATUS: CK - 40%-59% impaired, limited or restricted    - GOAL STATUS: CJ - 20%-39% impaired, limited or restricted    - D/C STATUS:  ---------------To be determined---------------  Payor: Sandra Wilils / Plan: [address] [place] [address] / Product Type: [email or web address] Medicare /      ·    Use of outcome tool(s) and clinical judgement create a POC that gives a: LOW COMPLEXITY            TREATMENT:   (In addition to Assessment/Re-Assessment sessions the following treatments were rendered)     Pre-treatment Symptoms/Complaints:  Pt with complaint of being uncomfortable   Pain: Initial:   Pain Intensity 1: 3  Pain Location 1: Knee  Pain Intervention(s) 1: Position, Shower  Post Session:  3     Self Care: (45): Procedure(s) (per grid) utilized to improve and/or restore self-care/home management as related to dressing, bathing and grooming. Required min verbal and manual cueing to facilitate activities of daily living skills. Treatment/Session Assessment:     Response to Treatment:  Pt up to edge of bed tolerated well. Education:  [] Home Exercises  [x] Fall Precautions  [] Hip Precautions [] Going Home Video  [x] Knee/Hip Prosthesis Review  [x] Walker Management/Safety [x] Adaptive Equipment as Needed       Interdisciplinary Collaboration:   o Physical Therapist  o Certified Occupational Therapy Assistant  o Registered Nurse    After treatment position/precautions:   o Up in chair  o Bed/Chair-wheels locked  o Call light within reach  o RN notified  o Family at bedside     Compliance with Program/Exercises: compliant all of the time. Pt doing well all goals met and will do well at home with support from family. Patient will be discharged home with home health PT. No further Occupation Therapy warranted, will discharge Occupational Therapy services.       Total Treatment Duration:  OT Patient Time In/Time Out  Time In: 0845  Time Out: [address] [place]

## 2018-06-23 NOTE — PROGRESS NOTES
06/23/18 0840   Oxygen Therapy   O2 Sat (%) 93 %   Pulse via Oximetry 80 beats per minute   O2 Device Room air   Patient achieved   2000    Ml/sec on IS. Patient encouraged to do every hour while awake-patient agreed and demonstrated. No shortness of breath or distress noted. BS are clear b/l. Patient tolerated PEP therapy well.

## 2018-06-23 NOTE — CONSULTS
Hospitalist H&P/Consult Note     Admit Date:  2018  7:15 AM   Name:  Dannielle Stoddard   Age:  58 y.o.  :  1955   MRN:  [id number]   PCP:  Concepcion Odonnell MD  Treatment Team: Attending Provider: Pal Castro MD; Consulting Provider: Gloria Sharp MD; Consulting Provider: Sorin Maria MD; Utilization Review: Karri Raymundo RN    HPI:      58years old M with PMH of cerebral palsy, HTN presented to the hospital for Total R knee replacement on . Hospitalist consulted for medical management. Patient stated having pain on R knee for seral years. Patient tried NSAIDs in the past with minimal improvement of the symptoms. Patient stated pain is controlled after surgery. Patient also reported his blood pressure is usually well controlled at home. Patient denies chest pain, SOB, abdominal pain ,nausea, vomiting, diarrhea. Patient reported having some underlying weakness on RLE due to cerebral palsy. 10 systems reviewed and negative except as noted in HPI. Past Medical History:   Diagnosis Date    Arthritis     OA    Chewing tobacco use     Chronic pain     back fracture    CP (cerebral palsy) ([address]      3 surgeries right leg as child; no other problems     Environmental and seasonal allergies     GERD (gastroesophageal reflux disease)     rare-no medication     Gout     Controlled with medication     History of vertebral fracture     Hypertension     Controlled with medication     Morbid obesity ([address]     Rheumatic fever age 14    history-no murmur auscultated on 18    Sinus infection     currently on Cefdinir     Status post right knee replacement 2018    Unspecified sleep apnea     Patient no longer on CPAP, has to get refitted. Followed by Dr. Aurelia Orourke, per last office note (2018) \" home study shows moderate to severe FRANCISCO JAVIER with AHI of 28.6, worse in supine position. Lowest oxygen desaturation 66%. \"      Past Surgical History:   Procedure Laterality Date    HX BACK SURGERY  1989    Herniated disc repair     HX COLONOSCOPY      x2    HX ORTHOPAEDIC Right     leg repair x 3 as child ue to C.P.    HX ORTHOPAEDIC Right 01/2014    big toe metatarsophalangeal joint fusion      Prior to Admission Medications   Prescriptions Last Dose Informant Patient Reported? Taking? Aspirin, Buffered 81 mg tab 6/22/2018 at Unknown time  Yes Yes   Sig: Take  by mouth daily. Take morning of surgery per anesthesia guidelines. Indications: preventative   allopurinol (ZYLOPRIM) 300 mg tablet 6/22/2018 at Unknown time  Yes Yes   Sig: Take  by mouth daily. Take morning of surgery per anesthesia guidelines. Indications: GOUT   cefdinir (OMNICEF) 300 mg capsule   Yes No   Sig: Take 300 mg by mouth two (2) times a day. diclofenac EC (VOLTAREN) 75 mg EC tablet 6/17/2018  Yes No   Sig: Take 75 mg by mouth two (2) times a day. losartan (COZAAR) 100 mg tablet   Yes No   Sig: Take 100 mg by mouth daily.  Indications: HYPERTENSION      Facility-Administered Medications: None     Allergies   Allergen Reactions    Penicillins Itching and Nausea Only      Social History   Substance Use Topics    Smoking status: Never Smoker    Smokeless tobacco: Current User      Comment: 1 can of chewing tobacco a day for 40 years    Alcohol use No      Family History   Problem Relation Age of Onset    Lung Disease Mother       Immunization History   Administered Date(s) Administered    TB Skin Test (PPD) Intradermal 06/22/2018       Objective:   Patient Vitals for the past 24 hrs:   Temp Pulse Resp BP SpO2   06/23/18 1119 97.6 °F (36.4 °C) 79 19 134/61 96 %   06/23/18 0840 - - - - 93 %   06/23/18 0726 97.9 °F (36.6 °C) 78 18 122/66 95 %   06/23/18 0430 97.9 °F (36.6 °C) 76 16 138/63 97 %   06/23/18 0030 98.6 °F (37 °C) 61 16 119/62 93 %   06/22/18 2015 97.4 °F (36.3 °C) 82 16 122/72 96 %   06/22/18 1415 - - - - 94 %   06/22/18 1254 98.1 °F (36.7 °C) 69 15 177/80 97 %   06/22/18 1239 - 66 15 155/64 96 %   06/22/18 1224 - 63 15 143/79 98 %     Oxygen Therapy  O2 Sat (%): 96 % (06/23/18 1119)  Pulse via Oximetry: 80 beats per minute (06/23/18 0840)  O2 Device: Room air (06/23/18 0840)  O2 Flow Rate (L/min): 0 l/min (06/22/18 1415)    Intake/Output Summary (Last 24 hours) at 06/23/18 1220  Last data filed at 06/23/18 0553   Gross per 24 hour   Intake             3296 ml   Output             1150 ml   Net             2146 ml       Physical Exam:  General:    Well nourished. Alert. Eyes:   Normal sclera. Extraocular movements intact. ENT:  Normocephalic, atraumatic. Moist mucous membranes  CV:   RRR. No murmur, rub, or gallop. Lungs:  CTAB. No wheezing, rhonchi, or rales. Abdomen: Soft, nontender, nondistended. Bowel sounds normal.   Extremities: Warm and dry. R knee with clean dressing. (+) pedal pulse can move RLE  Neurologic: CN II-XII grossly intact. Sensation intact. Skin:     No rashes or jaundice. No wounds.       Data Review:   Recent Results (from the past 24 hour(s))   HEMOGLOBIN    Collection Time: 06/22/18  6:38 PM   Result Value Ref Range    HGB 13.0 (L) 13.6 - 17.2 g/dL   HEMOGLOBIN    Collection Time: 06/23/18  4:52 AM   Result Value Ref Range    HGB 11.7 (L) 13.6 - 82.5 g/dL   METABOLIC PANEL, BASIC    Collection Time: 06/23/18  4:52 AM   Result Value Ref Range    Sodium 134 (L) 136 - 145 mmol/L    Potassium 4.2 3.5 - 5.1 mmol/L    Chloride 102 98 - 107 mmol/L    CO2 23 21 - 32 mmol/L    Anion gap 9 7 - 16 mmol/L    Glucose 197 (H) 65 - 100 mg/dL    BUN 16 8 - 23 MG/DL    Creatinine 1.28 0.8 - 1.5 MG/DL    GFR est AA >60 >60 ml/min/1.73m2    GFR est non-AA >60 >60 ml/min/1.73m2    Calcium 8.2 (L) 8.3 - 10.4 MG/DL       Imaging Studies:  CXR Results  (Last 48 hours)    None        CT Results  (Last 48 hours)    None          Assessment and Plan:     Hospital Problems as of 6/23/2018  Date Reviewed: 1/20/2014          Codes Class Noted - Resolved POA    Osteoarthritis of right knee ICD-10-CM: M17.11  ICD-9-CM: 715.96  6/22/2018 - Present Unknown        * (Principal)Status post right knee replacement ICD-10-CM: V44.136  ICD-9-CM: V43.65  6/22/2018 - Present Clinically Undetermined              A/P:    -S/p R total knee replacement  DVT ppx and management as per primary team    -HTN  Controlled  Cont losartan      We appreciate the opportunity to participate in this patient's care. Will sign off as medical conditions are stable. Will be available, please call us as needed. Estimated LOS:      Signed:   Aster Bobo MD

## 2018-06-24 VITALS
TEMPERATURE: 97.7 F | DIASTOLIC BLOOD PRESSURE: 77 MMHG | HEART RATE: 80 BPM | BODY MASS INDEX: 37.24 KG/M2 | RESPIRATION RATE: 20 BRPM | OXYGEN SATURATION: 93 % | HEIGHT: 70 IN | WEIGHT: 260.14 LBS | SYSTOLIC BLOOD PRESSURE: 152 MMHG

## 2018-06-24 LAB — HGB BLD-MCNC: 11.1 G/DL (ref 13.6–17.2)

## 2018-06-24 PROCEDURE — 74011250637 HC RX REV CODE- 250/637: Performed by: ORTHOPAEDIC SURGERY

## 2018-06-24 PROCEDURE — 97116 GAIT TRAINING THERAPY: CPT

## 2018-06-24 PROCEDURE — 77010033678 HC OXYGEN DAILY

## 2018-06-24 PROCEDURE — 97150 GROUP THERAPEUTIC PROCEDURES: CPT

## 2018-06-24 PROCEDURE — 85018 HEMOGLOBIN: CPT | Performed by: ORTHOPAEDIC SURGERY

## 2018-06-24 PROCEDURE — 94760 N-INVAS EAR/PLS OXIMETRY 1: CPT

## 2018-06-24 PROCEDURE — 36415 COLL VENOUS BLD VENIPUNCTURE: CPT | Performed by: ORTHOPAEDIC SURGERY

## 2018-06-24 RX ORDER — CYCLOBENZAPRINE HCL 10 MG
10 TABLET ORAL
Status: DISCONTINUED | OUTPATIENT
Start: 2018-06-24 | End: 2018-06-24 | Stop reason: HOSPADM

## 2018-06-24 RX ORDER — DIAZEPAM 5 MG/1
2.5 TABLET ORAL ONCE
Status: COMPLETED | OUTPATIENT
Start: 2018-06-24 | End: 2018-06-24

## 2018-06-24 RX ADMIN — CELECOXIB 200 MG: 200 CAPSULE ORAL at 08:35

## 2018-06-24 RX ADMIN — SENNOSIDES AND DOCUSATE SODIUM 2 TABLET: 8.6; 5 TABLET ORAL at 08:34

## 2018-06-24 RX ADMIN — HYDROMORPHONE HYDROCHLORIDE 2 MG: 2 TABLET ORAL at 12:32

## 2018-06-24 RX ADMIN — CYCLOBENZAPRINE HYDROCHLORIDE 10 MG: 10 TABLET, FILM COATED ORAL at 10:03

## 2018-06-24 RX ADMIN — LOSARTAN POTASSIUM 100 MG: 50 TABLET ORAL at 08:35

## 2018-06-24 RX ADMIN — DIAZEPAM 2.5 MG: 5 TABLET ORAL at 00:51

## 2018-06-24 RX ADMIN — ASPIRIN 81 MG: 81 TABLET, COATED ORAL at 08:35

## 2018-06-24 RX ADMIN — ACETAMINOPHEN 1000 MG: 500 TABLET, FILM COATED ORAL at 12:31

## 2018-06-24 RX ADMIN — ACETAMINOPHEN 1000 MG: 500 TABLET, FILM COATED ORAL at 05:32

## 2018-06-24 RX ADMIN — Medication 10 ML: at 05:33

## 2018-06-24 RX ADMIN — HYDROMORPHONE HYDROCHLORIDE 2 MG: 2 TABLET ORAL at 08:35

## 2018-06-24 RX ADMIN — HYDROMORPHONE HYDROCHLORIDE 2 MG: 2 TABLET ORAL at 04:52

## 2018-06-24 RX ADMIN — ALLOPURINOL 300 MG: 300 TABLET ORAL at 08:35

## 2018-06-24 RX ADMIN — ACETAMINOPHEN 1000 MG: 500 TABLET, FILM COATED ORAL at 00:10

## 2018-06-24 NOTE — PROGRESS NOTES
Problem: Mobility Impaired (Adult and Pediatric)  Goal: *Acute Goals and Plan of Care (Insert Text)  GOALS (1-4 days):  (1.)Mr. Ocampo will move from supine to sit and sit to supine  in bed with STAND BY ASSIST.  (2.)Mr. Ocampo will transfer from bed to chair and chair to bed with STAND BY ASSIST using the least restrictive device. (3.)Mr. Ocampo will ambulate with STAND BY ASSIST for 100 feet with the least restrictive device. (4.)Mr. Ocampo will ambulate up/down 3 steps with bilateral  railing with CONTACT GUARD ASSIST with no device. (5.)Mr. Ocampo will increase right knee ROM to 5°-80°.  ________________________________________________________________________________________________    PHYSICAL THERAPY [facility] camp tKa: Daily Note 6/24/2018  INPATIENT: Hospital Day: 3  Payor: [employer] / Plan: [address] [place] [address] HMO / Product Type: [employer] /      NAME/AGE/GENDER: Redd Sandoval is a 58 y.o. male   PRIMARY DIAGNOSIS:  Arthritis of knee, right [M17.11]   Procedure(s) and Anesthesia Type:     * RIGHT KNEE ARTHROPLASTY TOTAL - General (Right)  ICD-10: Treatment Diagnosis:    · Pain in Right Knee (M25.561)  · Stiffness of Right Knee, Not elsewhere classified (M25.661)  · Difficulty in walking, Not elsewhere classified (R26.2)      ASSESSMENT:     Mr. Yuliya Estes presents with limited ROM and strength following his R TKA. Patient did well this afternoon. Increased ambulation- required less assistance. Patient did well on the steps. This section established at most recent assessment   PROBLEM LIST (Impairments causing functional limitations):  1. Decreased Strength  2. Decreased Transfer Abilities  3. Decreased Ambulation Ability/Technique  4. Decreased Balance  5. Increased Pain  6. Decreased Flexibility/Joint Mobility   INTERVENTIONS PLANNED: (Benefits and precautions of physical therapy have been discussed with the patient.)  1. Bed Mobility  2. Cold  3. Gait Training  4.  Home Exercise Program (HEP)  5. Therapeutic Exercise/Strengthening  6. Transfer Training  7. Range of Motion: active/assisted/passive  8. Therapeutic Activities  9. Group Therapy     TREATMENT PLAN: Frequency/Duration: Follow patient BID for duration of hospital stay to address above goals. Rehabilitation Potential For Stated Goals: Good     RECOMMENDED REHABILITATION/EQUIPMENT: (at time of discharge pending progress): Continue Skilled Therapy and Home Health: Physical Therapy. HISTORY:   History of Present Injury/Illness (Reason for Referral): Admitted for R  TKA. Past Medical History/Comorbidities:   Mr. Chandana Tracy  has a past medical history of Arthritis; Chewing tobacco use; Chronic pain (2003); CP (cerebral palsy) [address] Environmental and seasonal allergies; GERD (gastroesophageal reflux disease); Gout; History of vertebral fracture (2003); Hypertension; Morbid obesity ([facility] Rheumatic fever (age 14); Sinus infection; Status post right knee replacement (6/22/2018); and Unspecified sleep apnea. He also has no past medical history of Other ill-defined conditions(799.89) or Unspecified adverse effect of anesthesia. Mr. Chandana Tracy  has a past surgical history that includes hx orthopaedic (Right); hx orthopaedic (Right, 01/2014); hx back surgery (1989); and hx colonoscopy.   Social History/Living Environment:   Home Environment: Private residence  # Steps to Enter: 2  One/Two Story Residence: One story  Living Alone: No  Support Systems: Spouse/Significant Other/Partner  Patient Expects to be Discharged to[de-identified] Private residence  Current DME Used/Available at Home: [facility] [place], straight  Tub or Shower Type: Tub/Shower combination  Prior Level of Function/Work/Activity:  Independent prior to admit   Number of Personal Factors/Comorbidities that affect the Plan of Care: 1-2: MODERATE COMPLEXITY   EXAMINATION:   Most Recent Physical Functioning:               RLE AROM  R Knee Flexion: 90  R Knee Extension: 12            Bed Mobility  Supine to Sit: Supervision    Transfers  Sit to Stand: Supervision  Bed to Chair: Supervision    Balance  Sitting: Intact  Standing: Intact;Pull to stand              Weight Bearing Status  Right Side Weight Bearing: As tolerated  Distance (ft): 60 Feet (ft) (decreased because of increased pain and spasm's)  Ambulation - Level of Assistance: Stand-by assistance  Assistive Device: Walker, rolling  Speed/Krystina: Delayed        Up and down steps twice with railings. Body Structures Involved:  1. Joints  2. Muscles Body Functions Affected:  1. Movement Related Activities and Participation Affected:  1. Mobility   Number of elements that affect the Plan of Care: 4+: HIGH COMPLEXITY   CLINICAL PRESENTATION:   Presentation: Stable and uncomplicated: LOW COMPLEXITY   CLINICAL DECISION MAKIN[address] [place] [id number] AM-PAC 6 Clicks   Basic Mobility Inpatient Short Form  How much difficulty does the patient currently have. .. Unable A Lot A Little None   1. Turning over in bed (including adjusting bedclothes, sheets and blankets)? [] 1   [] 2   [x] 3   [] 4   2. Sitting down on and standing up from a chair with arms ( e.g., wheelchair, bedside commode, etc.)   [] 1   [x] 2   [] 3   [] 4   3. Moving from lying on back to sitting on the side of the bed? [] 1   [] 2   [x] 3   [] 4   How much help from another person does the patient currently need. .. Total A Lot A Little None   4. Moving to and from a bed to a chair (including a wheelchair)? [] 1   [x] 2   [] 3   [] 4   5. Need to walk in hospital room? [x] 1   [] 2   [] 3   [] 4   6. Climbing 3-5 steps with a railing? [x] 1   [] 2   [] 3   [] 4   © 2007, Trustees of [address] [place] [address] [address] [address], under license to [employer]. All rights reserved        Score:  Initial: 12 Most Recent: X (Date: -- )    Interpretation of Tool:  Represents activities that are increasingly more difficult (i.e. Bed mobility, Transfers, Gait).    Score 24 23 22-20 19-15 14-10 9-7 6 Modifier CH CI CJ CK CL CM CN      ? Mobility - Walking and Moving Around:     - CURRENT STATUS: CL - 60%-79% impaired, limited or restricted    - GOAL STATUS: CK - 40%-59% impaired, limited or restricted    - D/C STATUS:  ---------------To be determined---------------  Payor: [employer] MEDICARE / Plan: [address] [place] [address] HMO / Product Type: Managed Care Medicare /      Medical Necessity:     · Patient is expected to demonstrate progress in strength and range of motion to increase independence with gait and transfers. Reason for Services/Other Comments:  · Patient continues to require skilled intervention due to limited functional independence. Use of outcome tool(s) and clinical judgement create a POC that gives a: Questionable prediction of patient's progress: MODERATE COMPLEXITY            TREATMENT:   (In addition to Assessment/Re-Assessment sessions the following treatments were rendered)     Pre-treatment Symptoms/Complaints: \"I am having a hard time, painful and spasms all night and this morning\"  Pain: Initial: 7     Post Session:  3     Gait Training (  15):  Gait training to improve and/or restore physical functioning as related to mobility, strength, balance and coordination. Ambulated 60 Feet (ft) (decreased because of increased pain and spasm's) with Stand-by assistance using a Walker, rolling and minimal   related to their push off to promote proper body alignment, promote proper body posture and promote proper body mechanics. Therapeutic Exercise: (  45):  Exercises per grid below to improve mobility, strength, balance and coordination. Required minimal verbal cues to promote proper body alignment, promote proper body posture and promote proper body mechanics. Progressed repetitions and complexity of movement as indicated.         Date:  6-23-18 Date:  6-24-18 Date:     ACTIVITY/EXERCISE AM PM AM PM AM PM   GROUP THERAPY  []  [x]  [x]  []  []  []   Ankle Pumps 20 R 20 20 Quad Sets 15 15 20      Gluteal Sets 15 15 20      Hip ABd/ADduction 15 15 20      Straight Leg Raises 15 15 20      Knee Slides 15 15 20      Short Arc Quads  15 20      Long Arc Quads         Chair Slides  15 20               B = bilateral; AA = active assistive; A = active; P = passive      Treatment/Session Assessment:     Response to Treatment:  More pain today in knee and spasms in Quad today. Education:  [] Home Exercises  [x] Fall Precautions  [] Hip Precautions [x] D/C Instruction Review  [] Knee/Hip Prosthesis Review  [x] Walker Management/Safety [] Adaptive Equipment as Needed       Interdisciplinary Collaboration:   o Physical Therapist  o Occupational Therapist  o Registered Nurse    After treatment position/precautions:   o Up in chair  o Bed/Chair-wheels locked  o Bed in low position  o Call light within reach  o RN notified  o Family at bedside    Compliance with Program/Exercises: compliant all of the time. Recommendations/Intent for next treatment session:  Treatment next visit will focus on increasing Mr. Mary Cornejo independence with bed mobility, transfers, gait training, strength/ROM exercises, modalities for pain, and patient education.       Total Treatment Duration:  PT Patient Time In/Time Out  Time In: 1030  Time Out: [facility]

## 2018-06-24 NOTE — DISCHARGE INSTRUCTIONS
[facility]   Patient Discharge Instructions    [place] / [id number] : 1955    Admitted 2018 Discharged: 2018     IF YOU HAVE ANY PROBLEMS ONCE YOU ARE AT HOME CALL THE FOLLOWING NUMBERS:   Main office number: [phone]      Medications    · The medications you are to continue on are listed on the medication reconciliation sheet. · Narcotic pain medications as well as supplemental iron can cause constipation. If this occurs try stopping the narcotic pain medication and/or the iron. · It is important that you take the medication exactly as they are prescribed. · Medications which increase your risk of blood clots are listed to stop for 5 weeks after surgery as well as medications or supplements which increase your risk of bleeding complications. · Keep your medication in the bottles provided by the pharmacist and keep a list of the medication names, dosages, and times to be taken in your wallet. · Do not take other medications without consulting your doctor. Important Information    Do NOT smoke as this will greatly increase your risk of infection! Resume your prehospital diet. If you have excessive nausea or vomitting call your doctor's office     Leg swelling and warmth is normal for 6 months after surgery. If you experience swelling in your leg elevate you leg while laying down with your toes above your heart. If you have sudden onset severe swelling with leg pain call our office. Use Fei Hose stockings until we see you in the office for your follow up appointment. The stitches deep inside take approximately 6 months to dissolve. There will be sharp shooting, stinging and burning pain. This is normal and will resolve between 3-6 months after surgery. Difficulty sleeping is normal following total Knee and Hip replacement. You may try melatonin, an over-the-counter sleep aid or benadryl to help with sleep.  Most patients will resume sleeping through the night 8 weeks after surgery. Home Physical Therapy is arranged. Home Health will contact you within 48 hrs of discharge that you have chosen. If you have not received a call within this time frame please contact that provider you chose. You should be given this information before you leave the hospital.     You are at a risk for falls. Use the rolling walker when walking. Patients who have had a joint replacement should not drive if they are still taking narcotic pain mediation during the daytime hours. Most patients wean themselves off of pain medication within 2-5 weeks after surgery. When to Call the office    - If you have a temperature greater then 101  - Uncontrolled vomiting   - Loose control of your bladder or bowel function  - Are unable to bear any wieght   - Need a pain medication refill     Information obtained by :  I understand that if any problems occur once I am at home I am to contact my physician. I understand and acknowledge receipt of the instructions indicated above. Physician's or R.N.'s Signature                                                                  Date/Time                                                                                                                                              Patient or Representative Signature                                                          Date/Time    Call your doctor for any of the following:    Temp greater than 101  Nausea or vomiting  Pain unrelieved by medication  Swelling, numbness or tingling  Yellow or green drainage from incision  Questions or concerns  Do not take a tub bath. You may shower. Do not remove your bandage. The home health staff will change the bandage as needed. Only sit in chairs with arms. You will need them to help you stand.   Always use your walker until the physical therapist instructs you to use a cane. Only perform exercises as instructed by your therapist.         Total Knee Replacement: What to Expect at [address] [place] [address]    When you leave the hospital, you should be able to move around with a walker or crutches. But you will need someone to help you at home for the next few weeks or until you have more energy and can move around better. If there is no one to help you at home, you may go to a rehabilitation center. You will go home with a bandage and stitches or staples. Change the bandage as your doctor tells you to. Your doctor will remove your stitches or staples 10 to 21 days after your surgery. You may still have some mild pain, and the area may be swollen for 3 to 6 months after surgery. Your knee will continue to improve for 6 to 12 months. You will probably use a walker for 1 to 3 weeks and then use crutches. When you are ready, you can use a cane. You will probably be able to walk on your own in 4 to 8 weeks. You will need to do months of physical rehabilitation (rehab) after a knee replacement. Rehab will help you strengthen the muscles of the knee and help you regain movement. After you recover, your artificial knee will allow you to do normal daily activities with less pain or no pain at all. You may be able to hike, dance, ride a bike, and play golf. Talk to your doctor about whether you can do more strenuous activities. Always tell your caregivers that you have an artificial knee. How long it will take to walk on your own, return to normal activities, and go back to work depends on your health and how well your rehabilitation (rehab) program goes. The better you do with your rehab exercises, the quicker you will get your strength and movement back. This care sheet gives you a general idea about how long it will take for you to recover. But each person recovers at a different pace.  Follow the steps below to get better as quickly as possible. How can you care for yourself at home? Activity  ? · Rest when you feel tired. You may take a nap, but do not stay in bed all day. When you sit, use a chair with arms. You can use the arms to help you stand up. ? · Work with your physical therapist to find the best way to exercise. You may be able to take frequent, short walks using crutches or a walker. What you can do as your knee heals will depend on whether your new knee is cemented or uncemented. You may not be able to do certain things for a while if your new knee is uncemented. ? · After your knee has healed enough, you can do more strenuous activities with caution. ¨ You can golf, but use a golf cart, and do not wear shoes with spikes. ¨ You can bike on a flat road or on a stationary bike. Avoid biking up hills. ¨ Your doctor may suggest that you stay away from activities that put stress on your knee. These include tennis or badminton, squash or racquetball, contact sports like football, jumping (such as in basketball), jogging, or running. ¨ Avoid activities where you might fall. These include horseback riding, skiing, and mountain biking. ? · Do not sit for more than 1 hour at a time. Get up and walk around for a while before you sit again. If you must sit for a long time, prop up your leg with a chair or footstool. This will help you avoid swelling. ? · Ask your doctor when you can shower. You may need to take sponge baths until your stitches or staples have been removed. ? · Ask your doctor when you can drive again. It may take up to 8 weeks after knee replacement surgery before it is safe for you to drive. ? · When you get into a car, sit on the edge of the seat. Then pull in your legs, and turn to face the front. ? · You should be able to do many everyday activities 3 to 6 weeks after your surgery. You will probably need to take 4 to 16 weeks off from work.  When you can go back to work depends on the type of work you do and how you feel. ? · Ask your doctor when it is okay for you to have sex. ? · Do not lift anything heavier than 10 pounds and do not lift weights for 12 weeks. Diet  ? · By the time you leave the hospital, you should be eating your normal diet. If your stomach is upset, try bland, low-fat foods like plain rice, broiled chicken, toast, and yogurt. Your doctor may suggest that you take iron and vitamin supplements. ? · Drink plenty of fluids (unless your doctor tells you not to). ? · Eat healthy foods, and watch your portion sizes. Try to stay at your ideal weight. Too much weight puts more stress on your new knee. ? · You may notice that your bowel movements are not regular right after your surgery. This is common. Try to avoid constipation and straining with bowel movements. You may want to take a fiber supplement every day. If you have not had a bowel movement after a couple of days, ask your doctor about taking a mild laxative. Medicines  ? · Your doctor will tell you if and when you can restart your medicines. He or she will also give you instructions about taking any new medicines. ? · If you take blood thinners, such as warfarin (Coumadin), clopidogrel (Plavix), or aspirin, be sure to talk to your doctor. He or she will tell you if and when to start taking those medicines again. Make sure that you understand exactly what your doctor wants you to do.   ? · Your doctor may give you a blood-thinning medicine to prevent blood clots. If you take a blood thinner, be sure you get instructions about how to take your medicine safely. Blood thinners can cause serious bleeding problems. This medicine could be in pill form or as a shot (injection). If a shot is necessary, your doctor will tell you how to do this. ? · Be safe with medicines. Take pain medicines exactly as directed. ¨ If the doctor gave you a prescription medicine for pain, take it as prescribed.   ¨ If you are not taking a prescription pain medicine, ask your doctor if you can take an over-the-counter medicine. ¨ Plan to take your pain medicine 30 minutes before exercises. It is easier to prevent pain before it starts than to stop it once it has started. ? · If you think your pain medicine is making you sick to your stomach:  ¨ Take your medicine after meals (unless your doctor has told you not to). ¨ Ask your doctor for a different pain medicine. ? · If your doctor prescribed antibiotics, take them as directed. Do not stop taking them just because you feel better. You need to take the full course of antibiotics. Incision care  ? · You will have a bandage over the cut (incision) and staples or stitches. Take the bandage off when your doctor says it is okay. ? · Your doctor will remove the staples or stitches 10 days to 3 weeks after the surgery and replace them with strips of tape. Leave the tape on for a week or until it falls off. Exercise  ? · Your rehab program will give you a number of exercises to do to help you get back your knee's range of motion and strength. Always do them as your therapist tells you. Ice and elevation  ? · For pain and swelling, put ice or a cold pack on the area for 10 to 20 minutes at a time. Put a thin cloth between the ice and your skin. Other instructions  ? · Continue to wear your support stockings as your doctor says. These help to prevent blood clots. The length of time that you will have to wear them depends on your activity level and the amount of swelling. ? · You have metal pieces in your knee. These may set off some airport metal detectors. Carry a medical alert card that says you have an artificial joint, just in case. Follow-up care is a key part of your treatment and safety. Be sure to make and go to all appointments, and call your doctor if you are having problems. It's also a good idea to know your test results and keep a list of the medicines you take.   When should you call for help? Call 911 anytime you think you may need emergency care. For example, call if:  ? · You passed out (lost consciousness). ? · You have severe trouble breathing. ? · You have sudden chest pain and shortness of breath, or you cough up blood. ?Call your doctor now or seek immediate medical care if:  ? · You have signs of infection, such as:  ¨ Increased pain, swelling, warmth, or redness. ¨ Red streaks leading from the incision. ¨ Pus draining from the incision. ¨ A fever. ? · You have signs of a blood clot, such as:  ¨ Pain in your calf, back of the knee, thigh, or groin. ¨ Redness and swelling in your leg or groin. ? · Your incision comes open and begins to bleed, or the bleeding increases. ? · You have pain that does not get better after you take pain medicine. ? Watch closely for changes in your health, and be sure to contact your doctor if:  ? · You do not have a bowel movement after taking a laxative. Where can you learn more? Go to http://lucia-león.info/. Enter G862 in the search box to learn more about \"Total Knee Replacement: What to Expect at Home. \"  Current as of: March 21, 2017  Content Version: 11.4  © 9185-5844 [employer]. Care instructions adapted under license by [employer] (which disclaims liability or warranty for this information). If you have questions about a medical condition or this instruction, always ask your healthcare professional. [place] [address] any warranty or liability for your use of this information.     DISCHARGE SUMMARY from Nurse    PATIENT INSTRUCTIONS:    After general anesthesia or intravenous sedation, for 24 hours or while taking prescription Narcotics:  · Limit your activities  · Do not drive and operate hazardous machinery  · Do not make important personal or business decisions  · Do  not drink alcoholic beverages  · If you have not urinated within 8 hours after discharge, please contact your surgeon on call. Report the following to your surgeon:  · Excessive pain, swelling, redness or odor of or around the surgical area  · Temperature over 100.5  · Nausea and vomiting lasting longer than 4 hours or if unable to take medications  · Any signs of decreased circulation or nerve impairment to extremity: change in color, persistent  numbness, tingling, coldness or increase pain  · Any questions    These are general instructions for a healthy lifestyle:    No smoking/ No tobacco products/ Avoid exposure to second hand smoke  Surgeon General's Warning:  Quitting smoking now greatly reduces serious risk to your health. Obesity, smoking, and sedentary lifestyle greatly increases your risk for illness    A healthy diet, regular physical exercise & weight monitoring are important for maintaining a healthy lifestyle    You may be retaining fluid if you have a history of heart failure or if you experience any of the following symptoms:  Weight gain of 3 pounds or more overnight or 5 pounds in a week, increased swelling in our hands or feet or shortness of breath while lying flat in bed. Please call your doctor as soon as you notice any of these symptoms; do not wait until your next office visit. Recognize signs and symptoms of STROKE:    F-face looks uneven    A-arms unable to move or move unevenly    S-speech slurred or non-existent    T-time-call 911 as soon as signs and symptoms begin-DO NOT go       Back to bed or wait to see if you get better-TIME IS BRAIN. Warning Signs of HEART ATTACK     Call 911 if you have these symptoms:   Chest discomfort. Most heart attacks involve discomfort in the center of the chest that lasts more than a few minutes, or that goes away and comes back. It can feel like uncomfortable pressure, squeezing, fullness, or pain.  Discomfort in other areas of the upper body.  Symptoms can include pain or discomfort in one or both arms, the back, neck, jaw, or stomach.  Shortness of breath with or without chest discomfort.  Other signs may include breaking out in a cold sweat, nausea, or lightheadedness. Don't wait more than five minutes to call 911 - MINUTES MATTER! Fast action can save your life. Calling 911 is almost always the fastest way to get lifesaving treatment. Emergency Medical Services staff can begin treatment when they arrive -- up to an hour sooner than if someone gets to the hospital by car. The discharge information has been reviewed with the patient. The patient verbalized understanding. Discharge medications reviewed with the patient and appropriate educational materials and side effects teaching were provided.   ___________________________________________________________________________________________________________________________________

## 2018-06-24 NOTE — PROGRESS NOTES
Orthopedic Progress Note    2018  Admit Date: 2018  Admit Diagnosis: Arthritis of knee, right [M17.11]    Post Op day: 2 Days Post-Op    Subjective:     Rad Lewis     He has a lot of spasticity       Objective:     Vital Signs:    Temp (24hrs), Av.8 °F (36.6 °C), Min:97.5 °F (36.4 °C), Max:98.3 °F (36.8 °C)      LAB:    [unfilled]  Lab Results   Component Value Date/Time    INR 1.1 2018 09:29 AM     Lab Results   Component Value Date/Time    HGB 11.1 (L) 2018 04:36 AM    HGB 11.7 (L) 2018 04:52 AM       Physical Exam:    Patient appears to be A/O  No apparent distress   No neurovascular issues noted   No obvious problems      Plan:     Rx'ed Flexeril 10 mg TID  Continue PT/OT rehab as indicated    Nursing and SS for disposition plans         Signed By: Franchesca Johnson MD

## 2018-06-24 NOTE — PROGRESS NOTES
Patient c/o muscle spasm from medial knee down the calf or surgical leg. Leg is tight and swollen, ice makes spasms worse and causes incisional burning. Leg is visibly tensing up.  Leg is slightly atrophied and patient unable to dorsiflex which patient states is due to cerebral palsy and normal preoperatively

## 2018-06-24 NOTE — PROGRESS NOTES
06/24/18 0815   Oxygen Therapy   O2 Sat (%) 93 %   Pulse via Oximetry 78 beats per minute   O2 Device Room air   Patient achieved   3000     Ml/sec on IS. Patient encouraged to do every hour while awake-patient agreed and demonstrated. No shortness of breath or distress noted. BS are clear b/l. PEP therapy held at this time, patient alert, awake, and stable at this time.

## 2018-06-24 NOTE — PROGRESS NOTES
PC to answering service re: patient having muscle spasms. Received phone call from [place], [place] for Dr Los Nuñez. Verbal order received for 2.5mg Valium one time dose.

## 2018-06-26 ENCOUNTER — HOME CARE VISIT (OUTPATIENT)
Dept: SCHEDULING | Facility: HOME HEALTH | Age: 63
End: 2018-06-26
Payer: MEDICARE

## 2018-06-26 VITALS
HEART RATE: 83 BPM | SYSTOLIC BLOOD PRESSURE: 132 MMHG | RESPIRATION RATE: 18 BRPM | TEMPERATURE: 98.6 F | DIASTOLIC BLOOD PRESSURE: 76 MMHG

## 2018-06-26 PROCEDURE — G0151 HHCP-SERV OF PT,EA 15 MIN: HCPCS

## 2018-06-26 PROCEDURE — 3331090002 HH PPS REVENUE DEBIT

## 2018-06-26 PROCEDURE — 3331090001 HH PPS REVENUE CREDIT

## 2018-06-26 PROCEDURE — 400013 HH SOC

## 2018-06-27 PROCEDURE — 3331090001 HH PPS REVENUE CREDIT

## 2018-06-27 PROCEDURE — 3331090002 HH PPS REVENUE DEBIT

## 2018-06-28 PROCEDURE — 3331090002 HH PPS REVENUE DEBIT

## 2018-06-28 PROCEDURE — 3331090001 HH PPS REVENUE CREDIT

## 2018-06-29 ENCOUNTER — HOME CARE VISIT (OUTPATIENT)
Dept: SCHEDULING | Facility: HOME HEALTH | Age: 63
End: 2018-06-29
Payer: MEDICARE

## 2018-06-29 PROCEDURE — 3331090002 HH PPS REVENUE DEBIT

## 2018-06-29 PROCEDURE — G0157 HHC PT ASSISTANT EA 15: HCPCS

## 2018-06-29 PROCEDURE — 3331090001 HH PPS REVENUE CREDIT

## 2018-06-30 VITALS
DIASTOLIC BLOOD PRESSURE: 70 MMHG | RESPIRATION RATE: 16 BRPM | HEART RATE: 82 BPM | SYSTOLIC BLOOD PRESSURE: 120 MMHG | TEMPERATURE: 97.8 F

## 2018-06-30 PROCEDURE — 3331090001 HH PPS REVENUE CREDIT

## 2018-06-30 PROCEDURE — 3331090002 HH PPS REVENUE DEBIT

## 2018-07-01 PROCEDURE — 3331090001 HH PPS REVENUE CREDIT

## 2018-07-01 PROCEDURE — 3331090002 HH PPS REVENUE DEBIT

## 2018-07-02 ENCOUNTER — HOME CARE VISIT (OUTPATIENT)
Dept: SCHEDULING | Facility: HOME HEALTH | Age: 63
End: 2018-07-02
Payer: MEDICARE

## 2018-07-02 VITALS
DIASTOLIC BLOOD PRESSURE: 80 MMHG | RESPIRATION RATE: 17 BRPM | SYSTOLIC BLOOD PRESSURE: 130 MMHG | HEART RATE: 87 BPM | TEMPERATURE: 98 F

## 2018-07-02 PROCEDURE — G0157 HHC PT ASSISTANT EA 15: HCPCS

## 2018-07-02 PROCEDURE — 3331090001 HH PPS REVENUE CREDIT

## 2018-07-02 PROCEDURE — 3331090002 HH PPS REVENUE DEBIT

## 2018-07-03 PROCEDURE — 3331090002 HH PPS REVENUE DEBIT

## 2018-07-03 PROCEDURE — 3331090001 HH PPS REVENUE CREDIT

## 2018-07-04 PROCEDURE — 3331090001 HH PPS REVENUE CREDIT

## 2018-07-04 PROCEDURE — 3331090002 HH PPS REVENUE DEBIT

## 2018-07-05 PROCEDURE — 3331090002 HH PPS REVENUE DEBIT

## 2018-07-05 PROCEDURE — 3331090001 HH PPS REVENUE CREDIT

## 2018-07-06 ENCOUNTER — HOME CARE VISIT (OUTPATIENT)
Dept: SCHEDULING | Facility: HOME HEALTH | Age: 63
End: 2018-07-06
Payer: MEDICARE

## 2018-07-06 VITALS
DIASTOLIC BLOOD PRESSURE: 70 MMHG | SYSTOLIC BLOOD PRESSURE: 128 MMHG | HEART RATE: 79 BPM | RESPIRATION RATE: 17 BRPM | TEMPERATURE: 98.1 F

## 2018-07-06 PROCEDURE — G0157 HHC PT ASSISTANT EA 15: HCPCS

## 2018-07-06 PROCEDURE — 3331090001 HH PPS REVENUE CREDIT

## 2018-07-06 PROCEDURE — 3331090002 HH PPS REVENUE DEBIT

## 2018-07-07 PROCEDURE — 3331090001 HH PPS REVENUE CREDIT

## 2018-07-07 PROCEDURE — 3331090002 HH PPS REVENUE DEBIT

## 2018-07-08 PROCEDURE — 3331090001 HH PPS REVENUE CREDIT

## 2018-07-08 PROCEDURE — 3331090002 HH PPS REVENUE DEBIT

## 2018-07-09 ENCOUNTER — HOME CARE VISIT (OUTPATIENT)
Dept: HOME HEALTH SERVICES | Facility: HOME HEALTH | Age: 63
End: 2018-07-09
Payer: MEDICARE

## 2018-07-09 ENCOUNTER — HOME CARE VISIT (OUTPATIENT)
Dept: SCHEDULING | Facility: HOME HEALTH | Age: 63
End: 2018-07-09
Payer: MEDICARE

## 2018-07-09 VITALS
RESPIRATION RATE: 17 BRPM | TEMPERATURE: 97.8 F | HEART RATE: 94 BPM | DIASTOLIC BLOOD PRESSURE: 60 MMHG | SYSTOLIC BLOOD PRESSURE: 128 MMHG

## 2018-07-09 PROCEDURE — 3331090002 HH PPS REVENUE DEBIT

## 2018-07-09 PROCEDURE — G0157 HHC PT ASSISTANT EA 15: HCPCS

## 2018-07-09 PROCEDURE — 3331090001 HH PPS REVENUE CREDIT

## 2018-07-10 PROCEDURE — A4649 SURGICAL SUPPLIES: HCPCS

## 2018-07-10 PROCEDURE — 3331090002 HH PPS REVENUE DEBIT

## 2018-07-10 PROCEDURE — 3331090001 HH PPS REVENUE CREDIT

## 2018-07-11 ENCOUNTER — HOME CARE VISIT (OUTPATIENT)
Dept: SCHEDULING | Facility: HOME HEALTH | Age: 63
End: 2018-07-11
Payer: MEDICARE

## 2018-07-11 VITALS
TEMPERATURE: 97.4 F | RESPIRATION RATE: 16 BRPM | DIASTOLIC BLOOD PRESSURE: 70 MMHG | SYSTOLIC BLOOD PRESSURE: 128 MMHG | HEART RATE: 82 BPM

## 2018-07-11 PROCEDURE — G0157 HHC PT ASSISTANT EA 15: HCPCS

## 2018-07-11 PROCEDURE — 3331090002 HH PPS REVENUE DEBIT

## 2018-07-11 PROCEDURE — 3331090001 HH PPS REVENUE CREDIT

## 2018-07-12 PROCEDURE — 3331090001 HH PPS REVENUE CREDIT

## 2018-07-12 PROCEDURE — 3331090002 HH PPS REVENUE DEBIT

## 2018-07-13 PROCEDURE — 3331090002 HH PPS REVENUE DEBIT

## 2018-07-13 PROCEDURE — 3331090001 HH PPS REVENUE CREDIT

## 2018-07-14 PROCEDURE — 3331090001 HH PPS REVENUE CREDIT

## 2018-07-14 PROCEDURE — 3331090002 HH PPS REVENUE DEBIT

## 2018-07-15 PROCEDURE — 3331090001 HH PPS REVENUE CREDIT

## 2018-07-15 PROCEDURE — 3331090002 HH PPS REVENUE DEBIT

## 2018-07-16 ENCOUNTER — HOME CARE VISIT (OUTPATIENT)
Dept: SCHEDULING | Facility: HOME HEALTH | Age: 63
End: 2018-07-16
Payer: MEDICARE

## 2018-07-16 VITALS
DIASTOLIC BLOOD PRESSURE: 70 MMHG | HEART RATE: 84 BPM | RESPIRATION RATE: 18 BRPM | TEMPERATURE: 97.8 F | SYSTOLIC BLOOD PRESSURE: 118 MMHG

## 2018-07-16 PROCEDURE — G0151 HHCP-SERV OF PT,EA 15 MIN: HCPCS

## 2018-07-16 PROCEDURE — 3331090002 HH PPS REVENUE DEBIT

## 2018-07-16 PROCEDURE — 3331090001 HH PPS REVENUE CREDIT

## 2019-04-09 ENCOUNTER — HOSPITAL ENCOUNTER (OUTPATIENT)
Dept: LAB | Age: 64
Discharge: HOME | End: 2019-04-09

## 2019-04-09 PROCEDURE — 88305 TISSUE EXAM BY PATHOLOGIST: CPT

## 2020-01-06 LAB
ALBUMIN SERPL-MCNC: 3.6 G/DL (ref 3.2–4.6)
ALBUMIN/GLOB SERPL: 0.9 {RATIO} (ref 1.2–3.5)
ALP SERPL-CCNC: 132 U/L (ref 50–136)
ALT SERPL-CCNC: 30 U/L (ref 12–65)
ANION GAP SERPL CALC-SCNC: 7 MMOL/L (ref 7–16)
AST SERPL-CCNC: 14 U/L (ref 15–37)
BASOPHILS # BLD: 0.1 K/UL (ref 0–0.2)
BASOPHILS NFR BLD: 1 % (ref 0–2)
BILIRUB SERPL-MCNC: 0.5 MG/DL (ref 0.2–1.1)
BUN SERPL-MCNC: 22 MG/DL (ref 8–23)
CALCIUM SERPL-MCNC: 9.3 MG/DL (ref 8.3–10.4)
CHLORIDE SERPL-SCNC: 106 MMOL/L (ref 98–107)
CO2 SERPL-SCNC: 25 MMOL/L (ref 21–32)
CREAT SERPL-MCNC: 1.2 MG/DL (ref 0.8–1.5)
DIFFERENTIAL METHOD BLD: ABNORMAL
EOSINOPHIL # BLD: 0.2 K/UL (ref 0–0.8)
EOSINOPHIL NFR BLD: 2 % (ref 0.5–7.8)
ERYTHROCYTE [DISTWIDTH] IN BLOOD BY AUTOMATED COUNT: 13.1 % (ref 11.9–14.6)
GLOBULIN SER CALC-MCNC: 4.2 G/DL (ref 2.3–3.5)
GLUCOSE SERPL-MCNC: 142 MG/DL (ref 65–100)
HCT VFR BLD AUTO: 44.9 % (ref 41.1–50.3)
HGB BLD-MCNC: 15.2 G/DL (ref 13.6–17.2)
IMM GRANULOCYTES # BLD AUTO: 0.1 K/UL (ref 0–0.5)
IMM GRANULOCYTES NFR BLD AUTO: 1 % (ref 0–5)
LYMPHOCYTES # BLD: 1.1 K/UL (ref 0.5–4.6)
LYMPHOCYTES NFR BLD: 9 % (ref 13–44)
MCH RBC QN AUTO: 30.8 PG (ref 26.1–32.9)
MCHC RBC AUTO-ENTMCNC: 33.9 G/DL (ref 31.4–35)
MCV RBC AUTO: 91.1 FL (ref 79.6–97.8)
MONOCYTES # BLD: 1 K/UL (ref 0.1–1.3)
MONOCYTES NFR BLD: 8 % (ref 4–12)
NEUTS SEG # BLD: 9.8 K/UL (ref 1.7–8.2)
NEUTS SEG NFR BLD: 79 % (ref 43–78)
NRBC # BLD: 0 K/UL (ref 0–0.2)
PLATELET # BLD AUTO: 195 K/UL (ref 150–450)
PMV BLD AUTO: 10.6 FL (ref 9.4–12.3)
POTASSIUM SERPL-SCNC: 3.6 MMOL/L (ref 3.5–5.1)
PROT SERPL-MCNC: 7.8 G/DL (ref 6.3–8.2)
RBC # BLD AUTO: 4.93 M/UL (ref 4.23–5.6)
SODIUM SERPL-SCNC: 138 MMOL/L (ref 136–145)
WBC # BLD AUTO: 12.5 K/UL (ref 4.3–11.1)

## 2020-01-06 PROCEDURE — 80053 COMPREHEN METABOLIC PANEL: CPT

## 2020-01-06 PROCEDURE — 81003 URINALYSIS AUTO W/O SCOPE: CPT | Performed by: EMERGENCY MEDICINE

## 2020-01-06 PROCEDURE — 99284 EMERGENCY DEPT VISIT MOD MDM: CPT | Performed by: EMERGENCY MEDICINE

## 2020-01-06 PROCEDURE — 85025 COMPLETE CBC W/AUTO DIFF WBC: CPT

## 2020-01-07 ENCOUNTER — APPOINTMENT (OUTPATIENT)
Dept: CT IMAGING | Age: 65
End: 2020-01-07
Attending: EMERGENCY MEDICINE
Payer: MEDICARE

## 2020-01-07 ENCOUNTER — HOSPITAL ENCOUNTER (EMERGENCY)
Age: 65
Discharge: HOME | End: 2020-01-07
Attending: EMERGENCY MEDICINE
Payer: MEDICARE

## 2020-01-07 VITALS
OXYGEN SATURATION: 96 % | TEMPERATURE: 98.3 F | HEIGHT: 70 IN | RESPIRATION RATE: 20 BRPM | BODY MASS INDEX: 37.22 KG/M2 | DIASTOLIC BLOOD PRESSURE: 56 MMHG | HEART RATE: 70 BPM | WEIGHT: 260 LBS | SYSTOLIC BLOOD PRESSURE: 118 MMHG

## 2020-01-07 DIAGNOSIS — N20.1 URETERAL STONE: Primary | ICD-10-CM

## 2020-01-07 LAB
BACTERIA URNS QL MICRO: 0 /HPF
CASTS URNS QL MICRO: 0 /LPF
CRYSTALS URNS QL MICRO: 0 /LPF
EPI CELLS #/AREA URNS HPF: 0 /HPF
MUCOUS THREADS URNS QL MICRO: 0 /LPF
OTHER OBSERVATIONS,UCOM: NORMAL
RBC #/AREA URNS HPF: NORMAL /HPF
WBC URNS QL MICRO: NORMAL /HPF

## 2020-01-07 PROCEDURE — 74011250636 HC RX REV CODE- 250/636: Performed by: EMERGENCY MEDICINE

## 2020-01-07 PROCEDURE — 81015 MICROSCOPIC EXAM OF URINE: CPT

## 2020-01-07 PROCEDURE — 74176 CT ABD & PELVIS W/O CONTRAST: CPT

## 2020-01-07 RX ORDER — MELOXICAM 15 MG/1
15 TABLET ORAL DAILY
Qty: 30 TAB | Refills: 0 | Status: SHIPPED | OUTPATIENT
Start: 2020-01-07 | End: 2020-12-28 | Stop reason: CLARIF

## 2020-01-07 RX ORDER — HYDROCODONE BITARTRATE AND ACETAMINOPHEN 7.5; 325 MG/1; MG/1
1 TABLET ORAL
Qty: 15 TAB | Refills: 0 | Status: SHIPPED | OUTPATIENT
Start: 2020-01-07 | End: 2020-01-12

## 2020-01-07 RX ORDER — TAMSULOSIN HYDROCHLORIDE 0.4 MG/1
0.4 CAPSULE ORAL DAILY
Qty: 15 CAP | Refills: 0 | Status: SHIPPED | OUTPATIENT
Start: 2020-01-07 | End: 2020-01-22

## 2020-01-07 RX ORDER — ONDANSETRON 4 MG/1
4 TABLET, ORALLY DISINTEGRATING ORAL
Qty: 12 TAB | Refills: 0 | Status: SHIPPED | OUTPATIENT
Start: 2020-01-07 | End: 2020-12-28 | Stop reason: CLARIF

## 2020-01-07 RX ADMIN — SODIUM CHLORIDE 1000 ML: 900 INJECTION, SOLUTION INTRAVENOUS at 01:43

## 2020-01-07 NOTE — ED TRIAGE NOTES
Reports groin pain, intermittent over last week. Reports sudden onset right flank pain, n/v/d this afternoon. Denies fevers. Denies urinary symptoms including blood or discoloration. Hx kidney stones.

## 2020-01-07 NOTE — ED NOTES
I have reviewed discharge instructions with the patient. The patient verbalized understanding. Patient left ED via Discharge Method: ambulatory to Home with family. Opportunity for questions and clarification provided. Patient given 3 scripts. To continue your aftercare when you leave the hospital, you may receive an automated call from our care team to check in on how you are doing. This is a free service and part of our promise to provide the best care and service to meet your aftercare needs.  If you have questions, or wish to unsubscribe from this service please call [phone]. Thank you for Choosing our [facility] Emergency Department.

## 2020-01-07 NOTE — DISCHARGE INSTRUCTIONS
Take the Flomax once daily until you pass the stones. Strain your urine. Use the Zofran as needed for nausea. Take the meloxicam for mild to moderate pain and use the Norco for severe pain. If your symptoms persist or start worsening you may need to follow-up with urology. Risk of opioid analgesics include, but are not limited to: Overdose they can stop or slow your breathing and lead to death; fractures from falls; drowsiness leading to injury; tolerance, dependence and addiction. You should not operate any motorized vehicles or work from a height greater than ground level when taking opioid analgesics as they increase your fall risks.

## 2020-01-07 NOTE — ED PROVIDER NOTES
Patient is a pleasant 40-year-old male presenting to the emergency department today complaining of sudden onset right flank pain. The patient said that he has a history of kidney stones and he did notice earlier this week that he had some pain at the head of the penis however it only lasted for a couple seconds and was not severe but this evening he said that he had a sudden sharp uncontrolled pain in the right flank causing him nausea and vomiting. Patient could not find a position of ease originally however while he was waiting to come back to a room his pain stopped without intervention. The patient is currently resting comfortably in no acute distress. He denies any blood in his urine. Patient states \"feels just like my last kidney stone. \"           Past Medical History:   Diagnosis Date    Arthritis     OA    Chewing tobacco use     Chronic pain 2003    back fracture    CP (cerebral palsy) ([address]      3 surgeries right leg as child; no other problems     Environmental and seasonal allergies     GERD (gastroesophageal reflux disease)     rare-no medication     Gout     Controlled with medication     History of vertebral fracture 2003    Hypertension     Controlled with medication     Morbid obesity ([address]     Rheumatic fever age 14    history-no murmur auscultated on 6/11/18    Sinus infection     currently on Cefdinir     Status post right knee replacement 6/22/2018    Unspecified sleep apnea     Patient no longer on CPAP, has to get refitted. Followed by Dr. Nury Brown, per last office note (1/2018) \" home study shows moderate to severe FRANCISCO JAVIER with AHI of 28.6, worse in supine position. Lowest oxygen desaturation 66%. \"       Past Surgical History:   Procedure Laterality Date    HX BACK SURGERY  1989    Herniated disc repair     HX COLONOSCOPY      x2    HX ORTHOPAEDIC Right     leg repair x 3 as child ue to C.P.    HX ORTHOPAEDIC Right 01/2014    big toe metatarsophalangeal joint fusion Family History:   Problem Relation Age of Onset    Lung Disease Mother        Social History     Socioeconomic History    Marital status:      Spouse name: Not on file    Number of children: Not on file    Years of education: Not on file    Highest education level: Not on file   Occupational History    Not on file   Social Needs    Financial resource strain: Not on file    Food insecurity:     Worry: Not on file     Inability: Not on file    Transportation needs:     Medical: Not on file     Non-medical: Not on file   Tobacco Use    Smoking status: Never Smoker    Smokeless tobacco: Current User    Tobacco comment: 1 can of chewing tobacco a day for 40 years   Substance and Sexual Activity    Alcohol use: No    Drug use: No    Sexual activity: Not on file   Lifestyle    Physical activity:     Days per week: Not on file     Minutes per session: Not on file    Stress: Not on file   Relationships    Social connections:     Talks on phone: Not on file     Gets together: Not on file     Attends Yarsanism service: Not on file     Active member of club or organization: Not on file     Attends meetings of clubs or organizations: Not on file     Relationship status: Not on file    Intimate partner violence:     Fear of current or ex partner: Not on file     Emotionally abused: Not on file     Physically abused: Not on file     Forced sexual activity: Not on file   Other Topics Concern    Not on file   Social History Narrative    Not on file         ALLERGIES: Penicillins    Review of Systems   Constitutional: Negative. Respiratory: Negative. Cardiovascular: Negative. Gastrointestinal: Positive for abdominal pain. Genitourinary: Positive for flank pain and penile pain. Negative for difficulty urinating, hematuria and urgency. Neurological: Negative. Hematological: Negative.         Vitals:    01/06/20 1938   BP: 127/85   Pulse: 84   Resp: 20   Temp: 98.3 °F (36.8 °C)   SpO2: 94% Weight: 117.9 kg (260 lb)   Height: 5' 10\" (1.778 m)            Physical Exam     GENERAL:The patient is morbid obesity, and well-hydrated. No acute distress  VITAL SIGNS: Heart rate, blood pressure, respiratory rate reviewed as recorded in  nurse's notes  EYES: Pupils reactive. Extraocular motion intact. No conjunctival redness or drainage. NECK: Supple, no meningeal signs. Trachea midline. No masses or thyromegaly. LUNGS: Breath sounds clear and equal bilaterally no accessory muscle use  CARDIOVASCULAR: Regular rate and rhythm  ABDOMEN: Soft without tenderness. No palpable masses or organomegaly. No  peritoneal signs. No rigidity. EXTREMITIES: No clubbing or cyanosis. No joint swelling. Normal muscle tone. No  restricted range of motion appreciated. NEUROLOGIC: Sensation is grossly intact. Cranial nerve exam reveals face is  symmetrical, tongue is midline speech is clear. SKIN: No rash or petechiae. Good skin turgor palpated. PSYCHIATRIC: Alert and oriented. Appropriate behavior and judgment. MDM  Number of Diagnoses or Management Options  Diagnosis management comments: Constipation, fecal impaction, small bowel obstruction, partial small bowel obstruction,  Ileus    UTI, pyelonephritis, renal colic, ureteral stone     Peptic ulcer disease, esophagitis, GERD    Pancreatitis, pancreatic pseudocyst,           Amount and/or Complexity of Data Reviewed  Clinical lab tests: reviewed and ordered  Tests in the radiology section of CPT®: ordered and reviewed  Tests in the medicine section of CPT®: reviewed and ordered  Decide to obtain previous medical records or to obtain history from someone other than the patient: yes  Independent visualization of images, tracings, or specimens: yes      ED Course as of Jan 07 0143   Tue Jan 07, 2020   0139 IMPRESSION:   1. Mild acute sigmoid diverticulitis. 2. Bilateral kidney stones, and nonobstructing stones in both distal ureters.   3. 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    CT UROGRAM WO CONT [KH]      ED Course User Index  [KH] Marino Hermosillo DO       Procedures

## 2020-12-16 RX ORDER — CEFAZOLIN SODIUM/WATER 2 G/20 ML
2 SYRINGE (ML) INTRAVENOUS ONCE
Status: CANCELLED | OUTPATIENT
Start: 2020-12-16 | End: 2020-12-16

## 2020-12-28 ENCOUNTER — HOSPITAL ENCOUNTER (OUTPATIENT)
Dept: SURGERY | Age: 65
Discharge: HOME | End: 2020-12-28
Payer: MEDICARE

## 2020-12-28 VITALS
TEMPERATURE: 97.4 F | DIASTOLIC BLOOD PRESSURE: 66 MMHG | BODY MASS INDEX: 33.41 KG/M2 | WEIGHT: 233.4 LBS | OXYGEN SATURATION: 95 % | SYSTOLIC BLOOD PRESSURE: 120 MMHG | HEIGHT: 70 IN | RESPIRATION RATE: 17 BRPM | HEART RATE: 66 BPM

## 2020-12-28 LAB
ANION GAP SERPL CALC-SCNC: 5 MMOL/L (ref 7–16)
APPEARANCE UR: CLEAR
BACTERIA SPEC CULT: NORMAL
BASOPHILS # BLD: 0.1 K/UL (ref 0–0.2)
BASOPHILS NFR BLD: 1 % (ref 0–2)
BILIRUB UR QL: NEGATIVE
BUN SERPL-MCNC: 13 MG/DL (ref 8–23)
CALCIUM SERPL-MCNC: 9.3 MG/DL (ref 8.3–10.4)
CHLORIDE SERPL-SCNC: 109 MMOL/L (ref 98–107)
CO2 SERPL-SCNC: 28 MMOL/L (ref 21–32)
COLOR UR: YELLOW
CREAT SERPL-MCNC: 1.26 MG/DL (ref 0.8–1.5)
DIFFERENTIAL METHOD BLD: NORMAL
EOSINOPHIL # BLD: 0.4 K/UL (ref 0–0.8)
EOSINOPHIL NFR BLD: 5 % (ref 0.5–7.8)
ERYTHROCYTE [DISTWIDTH] IN BLOOD BY AUTOMATED COUNT: 13.1 % (ref 11.9–14.6)
GLUCOSE SERPL-MCNC: 105 MG/DL (ref 65–100)
GLUCOSE UR STRIP.AUTO-MCNC: NEGATIVE MG/DL
HCT VFR BLD AUTO: 41.8 % (ref 41.1–50.3)
HGB BLD-MCNC: 13.8 G/DL (ref 13.6–17.2)
HGB UR QL STRIP: NEGATIVE
IMM GRANULOCYTES # BLD AUTO: 0.3 K/UL (ref 0–0.5)
IMM GRANULOCYTES NFR BLD AUTO: 4 % (ref 0–5)
KETONES UR QL STRIP.AUTO: NEGATIVE MG/DL
LEUKOCYTE ESTERASE UR QL STRIP.AUTO: NEGATIVE
LYMPHOCYTES # BLD: 1.2 K/UL (ref 0.5–4.6)
LYMPHOCYTES NFR BLD: 16 % (ref 13–44)
MCH RBC QN AUTO: 31.4 PG (ref 26.1–32.9)
MCHC RBC AUTO-ENTMCNC: 33 G/DL (ref 31.4–35)
MCV RBC AUTO: 95 FL (ref 79.6–97.8)
MONOCYTES # BLD: 0.8 K/UL (ref 0.1–1.3)
MONOCYTES NFR BLD: 10 % (ref 4–12)
NEUTS SEG # BLD: 5.1 K/UL (ref 1.7–8.2)
NEUTS SEG NFR BLD: 64 % (ref 43–78)
NITRITE UR QL STRIP.AUTO: NEGATIVE
NRBC # BLD: 0 K/UL (ref 0–0.2)
PH UR STRIP: 6 [PH] (ref 5–9)
PLATELET # BLD AUTO: 193 K/UL (ref 150–450)
PMV BLD AUTO: 9.9 FL (ref 9.4–12.3)
POTASSIUM SERPL-SCNC: 3.6 MMOL/L (ref 3.5–5.1)
PROT UR STRIP-MCNC: NEGATIVE MG/DL
RBC # BLD AUTO: 4.4 M/UL (ref 4.23–5.6)
SERVICE CMNT-IMP: NORMAL
SODIUM SERPL-SCNC: 142 MMOL/L (ref 136–145)
SP GR UR REFRACTOMETRY: 1.01 (ref 1–1.02)
UROBILINOGEN UR QL STRIP.AUTO: 1 EU/DL (ref 0.2–1)
WBC # BLD AUTO: 8 K/UL (ref 4.3–11.1)

## 2020-12-28 PROCEDURE — 81003 URINALYSIS AUTO W/O SCOPE: CPT

## 2020-12-28 PROCEDURE — 80048 BASIC METABOLIC PNL TOTAL CA: CPT

## 2020-12-28 PROCEDURE — 87641 MR-STAPH DNA AMP PROBE: CPT

## 2020-12-28 PROCEDURE — 85025 COMPLETE CBC W/AUTO DIFF WBC: CPT

## 2020-12-28 PROCEDURE — 77030027138 HC INCENT SPIROMETER -A

## 2020-12-28 RX ORDER — DULOXETIN HYDROCHLORIDE 60 MG/1
60 CAPSULE, DELAYED RELEASE ORAL
COMMUNITY

## 2020-12-28 RX ORDER — HYDROCODONE BITARTRATE AND ACETAMINOPHEN 7.5; 325 MG/1; MG/1
1 TABLET ORAL
COMMUNITY
End: 2021-02-19

## 2020-12-28 RX ORDER — ROPINIROLE 1 MG/1
1 TABLET, FILM COATED ORAL
COMMUNITY

## 2020-12-28 NOTE — PERIOP NOTES
Patient verified name, , and surgery as listed in [facility]. Patient provided medical/health information and PTA medications to the best of their ability. TYPE  CASE: 3  Orders per surgeon: were received  Labs per surgeon: cbcw/ diff, bmp, ua, mrsa/mssa colledted and results in [facility]. Labs per anesthesia protocol: no additional    EKG:  EKG from 10/2020 in care everywhere. Patient denies any hx of CAD, chest pain or BANUELOS    Patient aware that a negative Covid swab result is required to proceed with surgery;  appointments are made by the surgeon office and test should be collected 7 days prior to surgery. The testing center is located at the . [address]. Scheduled for 2020     Nasal Swab collected per MD order. Patient provided with and instructed on education handouts including Guide to Surgery, blood transfusions, pain management, and hand hygiene for the family and community, and [facility] brochure. Road to Recovery Spine surgery patient guide given. Instructed on incentive spirometry  Patient viewed spine prehab video. Hibiclens and instructions given per hospital policy. Original medication prescription bottles  were visualized during patient appointment. Patient teach back successful and patient demonstrates knowledge of instruction. PLEASE CONTINUE  Taking PRESCRIPTION MEDICATIONS UP TO THE DAY OF SURGERY UNLESS OTHERWISE DIRECTED BELOW. DISCONTINUE all vitamins and supplements 7 days prior to surgery. DISCONTINUE Non-Steriodal Anti-Inflammatory (NSAIDS) such as Advil and Aleve 5 days prior to surgery. Home Medications to take  the day of surgery      HYDROCODONE IF NEEDED           Home Medications   to [address] [place] [address] 5 DAYS PRIOR TO SURGERY     Comments          *Visitor policy of 1 visitor per patient discussed.        Please do not bring home medications with you on the day of surgery unless otherwise directed by your nurse. If you are instructed to bring home medications, please give them to your nurse as they will be administered by the nursing staff. If you have any questions, please call [facility] [phone] or [facility] [phone]. A copy of this note was provided to the patient for reference.

## 2021-02-10 ENCOUNTER — HOSPITAL ENCOUNTER (OUTPATIENT)
Dept: SURGERY | Age: 66
Discharge: HOME | End: 2021-02-10
Payer: MEDICARE

## 2021-02-10 VITALS
OXYGEN SATURATION: 95 % | WEIGHT: 233.1 LBS | SYSTOLIC BLOOD PRESSURE: 118 MMHG | DIASTOLIC BLOOD PRESSURE: 69 MMHG | HEART RATE: 90 BPM | BODY MASS INDEX: 33.37 KG/M2 | RESPIRATION RATE: 18 BRPM | HEIGHT: 70 IN | TEMPERATURE: 97.1 F

## 2021-02-10 LAB
ANION GAP SERPL CALC-SCNC: 8 MMOL/L (ref 7–16)
APPEARANCE UR: CLEAR
BACTERIA SPEC CULT: NORMAL
BACTERIA URNS QL MICRO: 0 /HPF
BASOPHILS # BLD: 0.1 K/UL (ref 0–0.2)
BASOPHILS NFR BLD: 1 % (ref 0–2)
BILIRUB UR QL: NEGATIVE
BUN SERPL-MCNC: 17 MG/DL (ref 8–23)
CALCIUM SERPL-MCNC: 9.4 MG/DL (ref 8.3–10.4)
CASTS URNS QL MICRO: ABNORMAL /LPF
CHLORIDE SERPL-SCNC: 108 MMOL/L (ref 98–107)
CO2 SERPL-SCNC: 25 MMOL/L (ref 21–32)
COLOR UR: YELLOW
CREAT SERPL-MCNC: 0.91 MG/DL (ref 0.8–1.5)
DIFFERENTIAL METHOD BLD: NORMAL
EOSINOPHIL # BLD: 0.3 K/UL (ref 0–0.8)
EOSINOPHIL NFR BLD: 3 % (ref 0.5–7.8)
EPI CELLS #/AREA URNS HPF: 0 /HPF
ERYTHROCYTE [DISTWIDTH] IN BLOOD BY AUTOMATED COUNT: 12.6 % (ref 11.9–14.6)
GLUCOSE SERPL-MCNC: 96 MG/DL (ref 65–100)
GLUCOSE UR STRIP.AUTO-MCNC: NEGATIVE MG/DL
HCT VFR BLD AUTO: 42.7 % (ref 41.1–50.3)
HGB BLD-MCNC: 14.2 G/DL (ref 13.6–17.2)
HGB UR QL STRIP: NEGATIVE
IMM GRANULOCYTES # BLD AUTO: 0.1 K/UL (ref 0–0.5)
IMM GRANULOCYTES NFR BLD AUTO: 1 % (ref 0–5)
KETONES UR QL STRIP.AUTO: NEGATIVE MG/DL
LEUKOCYTE ESTERASE UR QL STRIP.AUTO: ABNORMAL
LYMPHOCYTES # BLD: 1.3 K/UL (ref 0.5–4.6)
LYMPHOCYTES NFR BLD: 18 % (ref 13–44)
MCH RBC QN AUTO: 30.9 PG (ref 26.1–32.9)
MCHC RBC AUTO-ENTMCNC: 33.3 G/DL (ref 31.4–35)
MCV RBC AUTO: 93 FL (ref 79.6–97.8)
MONOCYTES # BLD: 0.6 K/UL (ref 0.1–1.3)
MONOCYTES NFR BLD: 9 % (ref 4–12)
NEUTS SEG # BLD: 5 K/UL (ref 1.7–8.2)
NEUTS SEG NFR BLD: 69 % (ref 43–78)
NITRITE UR QL STRIP.AUTO: NEGATIVE
NRBC # BLD: 0 K/UL (ref 0–0.2)
PH UR STRIP: 6 [PH] (ref 5–9)
PLATELET # BLD AUTO: 202 K/UL (ref 150–450)
PMV BLD AUTO: 10.5 FL (ref 9.4–12.3)
POTASSIUM SERPL-SCNC: 3.9 MMOL/L (ref 3.5–5.1)
PROT UR STRIP-MCNC: NEGATIVE MG/DL
RBC # BLD AUTO: 4.59 M/UL (ref 4.23–5.6)
RBC #/AREA URNS HPF: ABNORMAL /HPF
SERVICE CMNT-IMP: NORMAL
SODIUM SERPL-SCNC: 141 MMOL/L (ref 136–145)
SP GR UR REFRACTOMETRY: 1.02 (ref 1–1.02)
UROBILINOGEN UR QL STRIP.AUTO: 1 EU/DL (ref 0.2–1)
WBC # BLD AUTO: 7.3 K/UL (ref 4.3–11.1)
WBC URNS QL MICRO: ABNORMAL /HPF

## 2021-02-10 PROCEDURE — 85025 COMPLETE CBC W/AUTO DIFF WBC: CPT

## 2021-02-10 PROCEDURE — 81001 URINALYSIS AUTO W/SCOPE: CPT

## 2021-02-10 PROCEDURE — 87641 MR-STAPH DNA AMP PROBE: CPT

## 2021-02-10 PROCEDURE — 80048 BASIC METABOLIC PNL TOTAL CA: CPT

## 2021-02-10 NOTE — PERIOP NOTES
Patient verified name, , and surgery as listed in [facility]. Patient provided medical/health information and PTA medications to the best of their ability. TYPE  CASE: 3  Orders per surgeon: were received  Labs per surgeon: cbc w/ diff, bmp, ua, mrsa/mssa collected and results in [facility] received  Labs per anesthesia protocol: no additional  EKG:  EKG in [facility] from 10/01/2020    Patient COVID test date 2021; The testing center is located at the . [address] , [place]. If appointment is needed patient provided telephone number of [phone]. Nasal Swab collected per MD order. Patient previuously viewed video and states still have road to recovery book and incentive spirometry     Patient provided with and instructed on education handouts including Guide to Surgery, blood transfusions, pain management, and hand hygiene for the family and community, and [facility] brochure. .    Hibiclens and instructions given per hospital policy. Original medication prescription bottles not visualized during patient appointment. Patient teach back successful and patient demonstrates knowledge of instruction. PLEASE CONTINUE TAKING ALL PRESCRIPTION MEDICATIONS UP TO THE DAY OF SURGERY UNLESS OTHERWISE DIRECTED BELOW. DISCONTINUE all vitamins and supplements 7 days prior to surgery. DISCONTINUE Non-Steriodal Anti-Inflammatory (NSAIDS) such as Advil and Aleve 5 days prior to surgery. Home Medications to take  the day of surgery      HYDROCODONE IF NEEDED           Home Medications   to Hold     HOLD PREVENTIVE ASPIRIN 5 DAYS PRIOR TO SURGERY     HOLD LOSARTAN MORNING OF SURGERY     Comments    Covid test 2021 @ 2 2 [facility],[address], [address] policy of 1 visitor per patient discussed.        Please do not bring home medications with you on the day of surgery unless otherwise directed by your nurse.  If you are instructed to bring home medications, please give them to your nurse as they will be administered by the nursing staff. If you have any questions, please call [facility] Jemima Castanon [phone] or [address] [place] [address] [phone]. A copy of this note was provided to the patient for reference.

## 2021-02-16 ENCOUNTER — ANESTHESIA EVENT (OUTPATIENT)
Dept: SURGERY | Age: 66
End: 2021-02-16
Payer: MEDICARE

## 2021-02-17 ENCOUNTER — ANESTHESIA (OUTPATIENT)
Dept: SURGERY | Age: 66
End: 2021-02-17
Payer: MEDICARE

## 2021-02-17 ENCOUNTER — APPOINTMENT (OUTPATIENT)
Dept: GENERAL RADIOLOGY | Age: 66
End: 2021-02-17
Attending: ORTHOPAEDIC SURGERY
Payer: MEDICARE

## 2021-02-17 ENCOUNTER — HOSPITAL ENCOUNTER (OUTPATIENT)
Age: 66
Discharge: HOME | End: 2021-02-19
Attending: ORTHOPAEDIC SURGERY | Admitting: ORTHOPAEDIC SURGERY
Payer: MEDICARE

## 2021-02-17 DIAGNOSIS — Z98.1 STATUS POST LUMBAR SPINAL ARTHRODESIS: Primary | ICD-10-CM

## 2021-02-17 PROBLEM — M48.062 LUMBAR STENOSIS WITH NEUROGENIC CLAUDICATION: Status: ACTIVE | Noted: 2021-02-17

## 2021-02-17 PROBLEM — M43.10 SPONDYLOLISTHESIS: Status: ACTIVE | Noted: 2021-02-17

## 2021-02-17 LAB
ABO + RH BLD: NORMAL
BLOOD GROUP ANTIBODIES SERPL: NORMAL
SPECIMEN EXP DATE BLD: NORMAL

## 2021-02-17 PROCEDURE — 74011250637 HC RX REV CODE- 250/637: Performed by: ANESTHESIOLOGY

## 2021-02-17 PROCEDURE — 77030039425 HC BLD LARYNG TRULITE DISP TELE -A: Performed by: ANESTHESIOLOGY

## 2021-02-17 PROCEDURE — 74011250636 HC RX REV CODE- 250/636

## 2021-02-17 PROCEDURE — 97530 THERAPEUTIC ACTIVITIES: CPT

## 2021-02-17 PROCEDURE — 76010000172 HC OR TIME 2.5 TO 3 HR INTENSV-TIER 1: Performed by: ORTHOPAEDIC SURGERY

## 2021-02-17 PROCEDURE — 77030028270 HC SRGFL HEMSTAT MTRX J&J -C: Performed by: ORTHOPAEDIC SURGERY

## 2021-02-17 PROCEDURE — 77030040830 HC CATH URETH FOL MDII -A: Performed by: ORTHOPAEDIC SURGERY

## 2021-02-17 PROCEDURE — C1889 IMPLANT/INSERT DEVICE, NOC: HCPCS | Performed by: ORTHOPAEDIC SURGERY

## 2021-02-17 PROCEDURE — 74011000272 HC RX REV CODE- 272: Performed by: ORTHOPAEDIC SURGERY

## 2021-02-17 PROCEDURE — 77030037145 HC XCONN SPN POLYX XIA STRY -G: Performed by: ORTHOPAEDIC SURGERY

## 2021-02-17 PROCEDURE — 77030012894: Performed by: ORTHOPAEDIC SURGERY

## 2021-02-17 PROCEDURE — 72100 X-RAY EXAM L-S SPINE 2/3 VWS: CPT

## 2021-02-17 PROCEDURE — 77030038552 HC DRN WND MDII -A: Performed by: ORTHOPAEDIC SURGERY

## 2021-02-17 PROCEDURE — 74011250637 HC RX REV CODE- 250/637

## 2021-02-17 PROCEDURE — 97112 NEUROMUSCULAR REEDUCATION: CPT

## 2021-02-17 PROCEDURE — 77030031139 HC SUT VCRL2 J&J -A: Performed by: ORTHOPAEDIC SURGERY

## 2021-02-17 PROCEDURE — 74011000250 HC RX REV CODE- 250: Performed by: ANESTHESIOLOGY

## 2021-02-17 PROCEDURE — 77030037158 HC BIT DRL SPN XIA DISP STRY -C: Performed by: ORTHOPAEDIC SURGERY

## 2021-02-17 PROCEDURE — 97165 OT EVAL LOW COMPLEX 30 MIN: CPT

## 2021-02-17 PROCEDURE — 74011250636 HC RX REV CODE- 250/636: Performed by: ORTHOPAEDIC SURGERY

## 2021-02-17 PROCEDURE — 74011250636 HC RX REV CODE- 250/636: Performed by: ANESTHESIOLOGY

## 2021-02-17 PROCEDURE — 74011250637 HC RX REV CODE- 250/637: Performed by: ORTHOPAEDIC SURGERY

## 2021-02-17 PROCEDURE — 77030019908 HC STETH ESOPH SIMS -A: Performed by: ANESTHESIOLOGY

## 2021-02-17 PROCEDURE — 77030037162 HC ROD SPN W/O HEX XIA TI STRY -D: Performed by: ORTHOPAEDIC SURGERY

## 2021-02-17 PROCEDURE — 77030037157 HC TAP SPN MOD XIA DISP STRY -C: Performed by: ORTHOPAEDIC SURGERY

## 2021-02-17 PROCEDURE — 77030037710: Performed by: ORTHOPAEDIC SURGERY

## 2021-02-17 PROCEDURE — 77030010507 HC ADH SKN DERMBND J&J -B: Performed by: ORTHOPAEDIC SURGERY

## 2021-02-17 PROCEDURE — 76060000036 HC ANESTHESIA 2.5 TO 3 HR: Performed by: ORTHOPAEDIC SURGERY

## 2021-02-17 PROCEDURE — 97535 SELF CARE MNGMENT TRAINING: CPT

## 2021-02-17 PROCEDURE — 77030038601 HC DEV SYS W/CANN LITE BIO STRY -F: Performed by: ORTHOPAEDIC SURGERY

## 2021-02-17 PROCEDURE — 74011000250 HC RX REV CODE- 250: Performed by: REGISTERED NURSE

## 2021-02-17 PROCEDURE — 77030019557 HC ELECTRD VES SEAL MEDT -F: Performed by: ORTHOPAEDIC SURGERY

## 2021-02-17 PROCEDURE — 74011250636 HC RX REV CODE- 250/636: Performed by: REGISTERED NURSE

## 2021-02-17 PROCEDURE — 77030032817 HC GRFT BN CHIPS STRY -C: Performed by: ORTHOPAEDIC SURGERY

## 2021-02-17 PROCEDURE — 77030037088 HC TUBE ENDOTRACH ORAL NSL COVD-A: Performed by: ANESTHESIOLOGY

## 2021-02-17 PROCEDURE — 76210000006 HC OR PH I REC 0.5 TO 1 HR: Performed by: ORTHOPAEDIC SURGERY

## 2021-02-17 PROCEDURE — 77030025623 HC BUR RND PRECIS STRY -D: Performed by: ORTHOPAEDIC SURGERY

## 2021-02-17 PROCEDURE — 2709999900 HC NON-CHARGEABLE SUPPLY: Performed by: ORTHOPAEDIC SURGERY

## 2021-02-17 PROCEDURE — 74011000250 HC RX REV CODE- 250: Performed by: ORTHOPAEDIC SURGERY

## 2021-02-17 PROCEDURE — 2709999900 HC NON-CHARGEABLE SUPPLY

## 2021-02-17 PROCEDURE — 77030041392 HC GRFT BN PROT GRWTH FCTR BSYC -G1: Performed by: ORTHOPAEDIC SURGERY

## 2021-02-17 PROCEDURE — 77030040361 HC SLV COMPR DVT MDII -B: Performed by: ORTHOPAEDIC SURGERY

## 2021-02-17 PROCEDURE — 77030029099 HC BN WAX SSPC -A: Performed by: ORTHOPAEDIC SURGERY

## 2021-02-17 PROCEDURE — C1713 ANCHOR/SCREW BN/BN,TIS/BN: HCPCS | Performed by: ORTHOPAEDIC SURGERY

## 2021-02-17 PROCEDURE — 97161 PT EVAL LOW COMPLEX 20 MIN: CPT

## 2021-02-17 PROCEDURE — 86900 BLOOD TYPING SEROLOGIC ABO: CPT

## 2021-02-17 PROCEDURE — 77030018673: Performed by: ORTHOPAEDIC SURGERY

## 2021-02-17 PROCEDURE — 77030037155 HC BLCKR SPN CAP LOK AXI STRY -B: Performed by: ORTHOPAEDIC SURGERY

## 2021-02-17 DEVICE — IMPLANTABLE DEVICE: Type: IMPLANTABLE DEVICE | Site: SPINE LUMBAR | Status: FUNCTIONAL

## 2021-02-17 DEVICE — VITALLIUM PREBENT AND PRECUT ROD WITHOUT HEX
Type: IMPLANTABLE DEVICE | Site: SPINE LUMBAR | Status: FUNCTIONAL
Brand: XIA 4 5

## 2021-02-17 DEVICE — BLOCKER
Type: IMPLANTABLE DEVICE | Site: SPINE LUMBAR | Status: FUNCTIONAL
Brand: XIA 4.5 SYSTEM -  XIA CT

## 2021-02-17 DEVICE — BIO DBM PLUS PUTTY (WITH CANCELLOUS)
Type: IMPLANTABLE DEVICE | Site: SPINE LUMBAR | Status: FUNCTIONAL
Brand: BIO DBM

## 2021-02-17 DEVICE — POLYAXIAL CORTICAL SCREW
Type: IMPLANTABLE DEVICE | Site: SPINE LUMBAR | Status: FUNCTIONAL
Brand: XIA 4.5 SYSTEM -  XIA CT

## 2021-02-17 DEVICE — 30-36 MM POLYAXIAL CROSS CONNECTOR
Type: IMPLANTABLE DEVICE | Site: SPINE LUMBAR | Status: FUNCTIONAL
Brand: XIA 4 5

## 2021-02-17 RX ORDER — LIDOCAINE HYDROCHLORIDE 20 MG/ML
INJECTION, SOLUTION EPIDURAL; INFILTRATION; INTRACAUDAL; PERINEURAL AS NEEDED
Status: DISCONTINUED | OUTPATIENT
Start: 2021-02-17 | End: 2021-02-17 | Stop reason: HOSPADM

## 2021-02-17 RX ORDER — GLYCOPYRROLATE 0.2 MG/ML
INJECTION INTRAMUSCULAR; INTRAVENOUS AS NEEDED
Status: DISCONTINUED | OUTPATIENT
Start: 2021-02-17 | End: 2021-02-17 | Stop reason: HOSPADM

## 2021-02-17 RX ORDER — MORPHINE SULFATE 2 MG/ML
2 INJECTION, SOLUTION INTRAMUSCULAR; INTRAVENOUS
Status: DISCONTINUED | OUTPATIENT
Start: 2021-02-17 | End: 2021-02-19 | Stop reason: HOSPADM

## 2021-02-17 RX ORDER — CEFAZOLIN SODIUM/WATER 2 G/20 ML
2 SYRINGE (ML) INTRAVENOUS EVERY 8 HOURS
Status: COMPLETED | OUTPATIENT
Start: 2021-02-17 | End: 2021-02-18

## 2021-02-17 RX ORDER — VANCOMYCIN HYDROCHLORIDE 1 G/20ML
INJECTION, POWDER, LYOPHILIZED, FOR SOLUTION INTRAVENOUS AS NEEDED
Status: DISCONTINUED | OUTPATIENT
Start: 2021-02-17 | End: 2021-02-17 | Stop reason: HOSPADM

## 2021-02-17 RX ORDER — ACETAMINOPHEN 325 MG/1
650 TABLET ORAL EVERY 6 HOURS
Status: DISCONTINUED | OUTPATIENT
Start: 2021-02-17 | End: 2021-02-19 | Stop reason: HOSPADM

## 2021-02-17 RX ORDER — NEOSTIGMINE METHYLSULFATE 1 MG/ML
INJECTION, SOLUTION INTRAVENOUS AS NEEDED
Status: DISCONTINUED | OUTPATIENT
Start: 2021-02-17 | End: 2021-02-17 | Stop reason: HOSPADM

## 2021-02-17 RX ORDER — FENTANYL CITRATE 50 UG/ML
INJECTION, SOLUTION INTRAMUSCULAR; INTRAVENOUS AS NEEDED
Status: DISCONTINUED | OUTPATIENT
Start: 2021-02-17 | End: 2021-02-17 | Stop reason: HOSPADM

## 2021-02-17 RX ORDER — PROPOFOL 10 MG/ML
INJECTION, EMULSION INTRAVENOUS AS NEEDED
Status: DISCONTINUED | OUTPATIENT
Start: 2021-02-17 | End: 2021-02-17 | Stop reason: HOSPADM

## 2021-02-17 RX ORDER — DIPHENHYDRAMINE HCL 25 MG
25 CAPSULE ORAL
Status: DISCONTINUED | OUTPATIENT
Start: 2021-02-17 | End: 2021-02-19 | Stop reason: HOSPADM

## 2021-02-17 RX ORDER — ACETAMINOPHEN 500 MG
1000 TABLET ORAL ONCE
Status: COMPLETED | OUTPATIENT
Start: 2021-02-17 | End: 2021-02-17

## 2021-02-17 RX ORDER — OXYCODONE HYDROCHLORIDE 5 MG/1
5-10 TABLET ORAL
Status: DISCONTINUED | OUTPATIENT
Start: 2021-02-17 | End: 2021-02-19 | Stop reason: HOSPADM

## 2021-02-17 RX ORDER — SODIUM CHLORIDE 0.9 % (FLUSH) 0.9 %
5-40 SYRINGE (ML) INJECTION EVERY 8 HOURS
Status: DISCONTINUED | OUTPATIENT
Start: 2021-02-17 | End: 2021-02-19 | Stop reason: HOSPADM

## 2021-02-17 RX ORDER — PROMETHAZINE HYDROCHLORIDE 25 MG/ML
INJECTION, SOLUTION INTRAMUSCULAR; INTRAVENOUS
Status: ACTIVE
Start: 2021-02-17 | End: 2021-02-17

## 2021-02-17 RX ORDER — DULOXETIN HYDROCHLORIDE 60 MG/1
60 CAPSULE, DELAYED RELEASE ORAL
Status: DISCONTINUED | OUTPATIENT
Start: 2021-02-17 | End: 2021-02-19 | Stop reason: HOSPADM

## 2021-02-17 RX ORDER — DOCUSATE SODIUM 100 MG/1
100 CAPSULE, LIQUID FILLED ORAL 2 TIMES DAILY
Status: DISCONTINUED | OUTPATIENT
Start: 2021-02-17 | End: 2021-02-19 | Stop reason: HOSPADM

## 2021-02-17 RX ORDER — OXYCODONE HYDROCHLORIDE 5 MG/1
5 TABLET ORAL
Status: DISCONTINUED | OUTPATIENT
Start: 2021-02-17 | End: 2021-02-17 | Stop reason: HOSPADM

## 2021-02-17 RX ORDER — ONDANSETRON 2 MG/ML
INJECTION INTRAMUSCULAR; INTRAVENOUS AS NEEDED
Status: DISCONTINUED | OUTPATIENT
Start: 2021-02-17 | End: 2021-02-17 | Stop reason: HOSPADM

## 2021-02-17 RX ORDER — CYCLOBENZAPRINE HCL 10 MG
10 TABLET ORAL
Status: DISCONTINUED | OUTPATIENT
Start: 2021-02-17 | End: 2021-02-19 | Stop reason: HOSPADM

## 2021-02-17 RX ORDER — DIPHENHYDRAMINE HYDROCHLORIDE 50 MG/ML
12.5 INJECTION, SOLUTION INTRAMUSCULAR; INTRAVENOUS
Status: DISCONTINUED | OUTPATIENT
Start: 2021-02-17 | End: 2021-02-17 | Stop reason: HOSPADM

## 2021-02-17 RX ORDER — FAMOTIDINE 20 MG/1
20 TABLET, FILM COATED ORAL EVERY 12 HOURS
Status: DISCONTINUED | OUTPATIENT
Start: 2021-02-17 | End: 2021-02-19 | Stop reason: HOSPADM

## 2021-02-17 RX ORDER — LOSARTAN POTASSIUM 50 MG/1
100 TABLET ORAL DAILY
Status: DISCONTINUED | OUTPATIENT
Start: 2021-02-18 | End: 2021-02-19 | Stop reason: HOSPADM

## 2021-02-17 RX ORDER — NALOXONE HYDROCHLORIDE 0.4 MG/ML
0.1 INJECTION, SOLUTION INTRAMUSCULAR; INTRAVENOUS; SUBCUTANEOUS AS NEEDED
Status: DISCONTINUED | OUTPATIENT
Start: 2021-02-17 | End: 2021-02-17 | Stop reason: HOSPADM

## 2021-02-17 RX ORDER — NALOXONE HYDROCHLORIDE 0.4 MG/ML
0.4 INJECTION, SOLUTION INTRAMUSCULAR; INTRAVENOUS; SUBCUTANEOUS
Status: DISCONTINUED | OUTPATIENT
Start: 2021-02-17 | End: 2021-02-19 | Stop reason: HOSPADM

## 2021-02-17 RX ORDER — OXYCODONE HYDROCHLORIDE 5 MG/1
5 TABLET ORAL
Qty: 40 TAB | Refills: 0 | Status: SHIPPED | OUTPATIENT
Start: 2021-02-17 | End: 2021-02-24

## 2021-02-17 RX ORDER — SODIUM CHLORIDE, SODIUM LACTATE, POTASSIUM CHLORIDE, CALCIUM CHLORIDE 600; 310; 30; 20 MG/100ML; MG/100ML; MG/100ML; MG/100ML
75 INJECTION, SOLUTION INTRAVENOUS CONTINUOUS
Status: DISPENSED | OUTPATIENT
Start: 2021-02-17 | End: 2021-02-18

## 2021-02-17 RX ORDER — LIDOCAINE HYDROCHLORIDE 10 MG/ML
0.1 INJECTION INFILTRATION; PERINEURAL AS NEEDED
Status: DISCONTINUED | OUTPATIENT
Start: 2021-02-17 | End: 2021-02-17 | Stop reason: HOSPADM

## 2021-02-17 RX ORDER — DEXAMETHASONE SODIUM PHOSPHATE 4 MG/ML
INJECTION, SOLUTION INTRA-ARTICULAR; INTRALESIONAL; INTRAMUSCULAR; INTRAVENOUS; SOFT TISSUE AS NEEDED
Status: DISCONTINUED | OUTPATIENT
Start: 2021-02-17 | End: 2021-02-17 | Stop reason: HOSPADM

## 2021-02-17 RX ORDER — CEFAZOLIN SODIUM/WATER 2 G/20 ML
2 SYRINGE (ML) INTRAVENOUS ONCE
Status: COMPLETED | OUTPATIENT
Start: 2021-02-17 | End: 2021-02-17

## 2021-02-17 RX ORDER — FLUMAZENIL 0.1 MG/ML
0.2 INJECTION INTRAVENOUS
Status: DISCONTINUED | OUTPATIENT
Start: 2021-02-17 | End: 2021-02-17 | Stop reason: HOSPADM

## 2021-02-17 RX ORDER — FENTANYL CITRATE 50 UG/ML
100 INJECTION, SOLUTION INTRAMUSCULAR; INTRAVENOUS AS NEEDED
Status: DISCONTINUED | OUTPATIENT
Start: 2021-02-17 | End: 2021-02-17 | Stop reason: HOSPADM

## 2021-02-17 RX ORDER — SODIUM CHLORIDE 0.9 G/100ML
IRRIGANT IRRIGATION AS NEEDED
Status: DISCONTINUED | OUTPATIENT
Start: 2021-02-17 | End: 2021-02-17 | Stop reason: HOSPADM

## 2021-02-17 RX ORDER — SODIUM CHLORIDE 0.9 % (FLUSH) 0.9 %
5-40 SYRINGE (ML) INJECTION AS NEEDED
Status: DISCONTINUED | OUTPATIENT
Start: 2021-02-17 | End: 2021-02-19 | Stop reason: HOSPADM

## 2021-02-17 RX ORDER — SODIUM CHLORIDE, SODIUM LACTATE, POTASSIUM CHLORIDE, CALCIUM CHLORIDE 600; 310; 30; 20 MG/100ML; MG/100ML; MG/100ML; MG/100ML
100 INJECTION, SOLUTION INTRAVENOUS CONTINUOUS
Status: DISCONTINUED | OUTPATIENT
Start: 2021-02-17 | End: 2021-02-17 | Stop reason: HOSPADM

## 2021-02-17 RX ORDER — ROPINIROLE 0.5 MG/1
1 TABLET, FILM COATED ORAL
Status: DISCONTINUED | OUTPATIENT
Start: 2021-02-17 | End: 2021-02-19 | Stop reason: HOSPADM

## 2021-02-17 RX ORDER — ROCURONIUM BROMIDE 10 MG/ML
INJECTION, SOLUTION INTRAVENOUS AS NEEDED
Status: DISCONTINUED | OUTPATIENT
Start: 2021-02-17 | End: 2021-02-17 | Stop reason: HOSPADM

## 2021-02-17 RX ORDER — ZOLPIDEM TARTRATE 5 MG/1
5 TABLET ORAL
Status: DISCONTINUED | OUTPATIENT
Start: 2021-02-17 | End: 2021-02-19 | Stop reason: HOSPADM

## 2021-02-17 RX ORDER — ONDANSETRON 2 MG/ML
4 INJECTION INTRAMUSCULAR; INTRAVENOUS
Status: DISCONTINUED | OUTPATIENT
Start: 2021-02-17 | End: 2021-02-19 | Stop reason: HOSPADM

## 2021-02-17 RX ORDER — HYDROMORPHONE HYDROCHLORIDE 2 MG/ML
0.5 INJECTION, SOLUTION INTRAMUSCULAR; INTRAVENOUS; SUBCUTANEOUS
Status: DISCONTINUED | OUTPATIENT
Start: 2021-02-17 | End: 2021-02-17 | Stop reason: HOSPADM

## 2021-02-17 RX ORDER — MIDAZOLAM HYDROCHLORIDE 1 MG/ML
2 INJECTION, SOLUTION INTRAMUSCULAR; INTRAVENOUS ONCE
Status: DISCONTINUED | OUTPATIENT
Start: 2021-02-17 | End: 2021-02-17 | Stop reason: HOSPADM

## 2021-02-17 RX ORDER — KETOROLAC TROMETHAMINE 30 MG/ML
INJECTION, SOLUTION INTRAMUSCULAR; INTRAVENOUS AS NEEDED
Status: DISCONTINUED | OUTPATIENT
Start: 2021-02-17 | End: 2021-02-17 | Stop reason: HOSPADM

## 2021-02-17 RX ORDER — SODIUM CHLORIDE, SODIUM LACTATE, POTASSIUM CHLORIDE, CALCIUM CHLORIDE 600; 310; 30; 20 MG/100ML; MG/100ML; MG/100ML; MG/100ML
75 INJECTION, SOLUTION INTRAVENOUS CONTINUOUS
Status: DISCONTINUED | OUTPATIENT
Start: 2021-02-17 | End: 2021-02-17 | Stop reason: HOSPADM

## 2021-02-17 RX ADMIN — FAMOTIDINE 20 MG: 20 TABLET ORAL at 22:45

## 2021-02-17 RX ADMIN — PROMETHAZINE HYDROCHLORIDE 6.25 MG: 25 INJECTION INTRAMUSCULAR; INTRAVENOUS at 10:35

## 2021-02-17 RX ADMIN — ACETAMINOPHEN 1000 MG: 500 TABLET ORAL at 06:00

## 2021-02-17 RX ADMIN — PROMETHAZINE HYDROCHLORIDE 6.25 MG: 25 INJECTION INTRAMUSCULAR; INTRAVENOUS at 10:20

## 2021-02-17 RX ADMIN — SODIUM CHLORIDE, SODIUM LACTATE, POTASSIUM CHLORIDE, AND CALCIUM CHLORIDE 75 ML/HR: 600; 310; 30; 20 INJECTION, SOLUTION INTRAVENOUS at 12:53

## 2021-02-17 RX ADMIN — Medication 3 MG: at 09:59

## 2021-02-17 RX ADMIN — ONDANSETRON 4 MG: 2 INJECTION INTRAMUSCULAR; INTRAVENOUS at 07:42

## 2021-02-17 RX ADMIN — DEXAMETHASONE SODIUM PHOSPHATE 10 MG: 4 INJECTION, SOLUTION INTRAMUSCULAR; INTRAVENOUS at 07:42

## 2021-02-17 RX ADMIN — HYDROMORPHONE HYDROCHLORIDE 0.5 MG: 2 INJECTION INTRAMUSCULAR; INTRAVENOUS; SUBCUTANEOUS at 10:25

## 2021-02-17 RX ADMIN — Medication 5 ML: at 22:45

## 2021-02-17 RX ADMIN — GLYCOPYRROLATE 0.4 MG: 0.2 INJECTION, SOLUTION INTRAMUSCULAR; INTRAVENOUS at 09:59

## 2021-02-17 RX ADMIN — ACETAMINOPHEN 650 MG: 325 TABLET ORAL at 17:56

## 2021-02-17 RX ADMIN — FAMOTIDINE 20 MG: 20 TABLET ORAL at 12:52

## 2021-02-17 RX ADMIN — DULOXETINE HYDROCHLORIDE 60 MG: 60 CAPSULE, DELAYED RELEASE ORAL at 22:45

## 2021-02-17 RX ADMIN — SODIUM CHLORIDE, SODIUM LACTATE, POTASSIUM CHLORIDE, AND CALCIUM CHLORIDE 100 ML/HR: 600; 310; 30; 20 INJECTION, SOLUTION INTRAVENOUS at 06:01

## 2021-02-17 RX ADMIN — FENTANYL CITRATE 50 MCG: 50 INJECTION INTRAMUSCULAR; INTRAVENOUS at 10:02

## 2021-02-17 RX ADMIN — ONDANSETRON 4 MG: 2 INJECTION INTRAMUSCULAR; INTRAVENOUS at 23:33

## 2021-02-17 RX ADMIN — ROCURONIUM BROMIDE 20 MG: 10 INJECTION, SOLUTION INTRAVENOUS at 08:59

## 2021-02-17 RX ADMIN — ROPINIROLE HYDROCHLORIDE 1 MG: 0.5 TABLET, FILM COATED ORAL at 22:48

## 2021-02-17 RX ADMIN — ACETAMINOPHEN 650 MG: 325 TABLET ORAL at 23:06

## 2021-02-17 RX ADMIN — ZOLPIDEM TARTRATE 5 MG: 5 TABLET, COATED ORAL at 22:45

## 2021-02-17 RX ADMIN — PROPOFOL 200 MG: 10 INJECTION, EMULSION INTRAVENOUS at 07:24

## 2021-02-17 RX ADMIN — CYCLOBENZAPRINE 10 MG: 10 TABLET, FILM COATED ORAL at 22:45

## 2021-02-17 RX ADMIN — Medication 1 AMPULE: at 22:45

## 2021-02-17 RX ADMIN — FENTANYL CITRATE 100 MCG: 50 INJECTION INTRAMUSCULAR; INTRAVENOUS at 07:22

## 2021-02-17 RX ADMIN — OXYCODONE 10 MG: 5 TABLET ORAL at 17:56

## 2021-02-17 RX ADMIN — OXYCODONE 10 MG: 5 TABLET ORAL at 22:45

## 2021-02-17 RX ADMIN — CEFAZOLIN SODIUM 2 G: 100 INJECTION, POWDER, LYOPHILIZED, FOR SOLUTION INTRAVENOUS at 16:00

## 2021-02-17 RX ADMIN — CEFAZOLIN SODIUM 2 G: 100 INJECTION, POWDER, LYOPHILIZED, FOR SOLUTION INTRAVENOUS at 23:07

## 2021-02-17 RX ADMIN — FENTANYL CITRATE 50 MCG: 50 INJECTION INTRAMUSCULAR; INTRAVENOUS at 09:59

## 2021-02-17 RX ADMIN — Medication 10 ML: at 14:04

## 2021-02-17 RX ADMIN — ROCURONIUM BROMIDE 50 MG: 10 INJECTION, SOLUTION INTRAVENOUS at 07:24

## 2021-02-17 RX ADMIN — ACETAMINOPHEN 650 MG: 325 TABLET ORAL at 12:52

## 2021-02-17 RX ADMIN — Medication 2 G: at 07:40

## 2021-02-17 RX ADMIN — Medication 1 AMPULE: at 12:52

## 2021-02-17 RX ADMIN — DOCUSATE SODIUM 100 MG: 100 CAPSULE, LIQUID FILLED ORAL at 12:52

## 2021-02-17 RX ADMIN — Medication 3 AMPULE: at 05:59

## 2021-02-17 RX ADMIN — LIDOCAINE HYDROCHLORIDE 100 MG: 20 INJECTION, SOLUTION EPIDURAL; INFILTRATION; INTRACAUDAL; PERINEURAL at 07:24

## 2021-02-17 RX ADMIN — KETOROLAC TROMETHAMINE 30 MG: 30 INJECTION, SOLUTION INTRAMUSCULAR at 09:53

## 2021-02-17 NOTE — ANESTHESIA PREPROCEDURE EVALUATION
Anesthetic History   No history of anesthetic complications            Review of Systems / Medical History  Patient summary reviewed, nursing notes reviewed and pertinent labs reviewed    Pulmonary        Sleep apnea (Sounds like severe FRANCISCO JAVIER - noncompliant with CPAP):  No treatment           Neuro/Psych         Neuromuscular disease (Cerebral Palsy - does not really affect him currently other than his R leg is slightly shorter)     Cardiovascular    Hypertension: well controlled              Exercise tolerance[de-identified] Questionable 4 METs currently - limited by significant arthritis and pain  Comments: Nuclear stress 2018 - negative ischemia, normal perfusion, normal LV function    GI/Hepatic/Renal     GERD: well controlled    Renal disease: stones       Endo/Other        Obesity and arthritis     Other Findings   Comments: Hb 14.2         Physical Exam    Airway  Mallampati: II  TM Distance: > 6 cm  Neck ROM: normal range of motion   Mouth opening: Normal     Cardiovascular  Regular rate and rhythm,  S1 and S2 normal,  no murmur, click, rub, or gallop  Rhythm: regular  Rate: normal         Dental    Dentition: Upper partial plate     Pulmonary  Breath sounds clear to auscultation               Abdominal  GI exam deferred       Other Findings            Anesthetic Plan    ASA: 3  Anesthesia type: general  ETT        Induction: Intravenous  Anesthetic plan and risks discussed with: Patient and Spouse

## 2021-02-17 NOTE — OP NOTES
[facility] [address]   [phone]    OPERATIVE REPORT    Patient ID:Julius Ocampo  [id number]  1955  66 y.o.    DATE OF SURGERY: 2/17/2021    SURGEON: Brayan Carmona MD    PREOP DIAGNOSIS:     1. Lumbar spondylolisthesis   2. Lumbar stenosis     POSTOP DIAGNOSIS:     1. Lumbar spondylolisthesis   2. Lumbar stenosis   3. Intraspinal extradural lesion    PROCEDURE:  1. Posterolateral and transforaminal lumbar interbody fusion L4 through L5  (CPT 49089)  2. Insertion biomechanical device L4 through L5 (CPT 87965)  3. Lumbar laminectomy L4-5 for extradural lesion.  (CPT 89992)  4. Cortical screw instrumentation  L4 through L5 . (CPT 98547)   5. Local autograft bone harvest (CPT 00558)     ANESTHESIA: General    ESTIMATED BLOOD LOSS:  300 ml    INTRAOPERATIVE COMPLICATIONS: None.    POSTOP CONDITION: Stable.    IMPLANTS:   Implant Name Type Inv. Item Serial No.  Lot No. LRB No. Used Action   BIO chips cancellous 15cc 1-4mm   [id number]   N/A 1 Implanted   Proteios growth factor 2.5cc   [id number] [employer] SYSTEM MATT [id number] N/A 1 Implanted   SERVICE FEE 10ML BIO DBM PTTY W CHIP - [id number]  SERVICE FEE 10ML BIO DBM PTTY W CHIP  YANCI SPINE HOWM_WD [id number] N/A 1 Implanted   CAGE SPNL 12DEG [id number] 10X25/28MM SELF EXP INTBDY FUS - [id number]  CAGE SPNL 12DEG [id number] 10X25/28MM SELF EXP INTBDY FUS [id number] SPINEOLOGY [facility]_WD  N/A 1 Implanted   BLOCKER SPNL CT SRAVANI 45 - [id number]  BLOCKER SPNL CT SRAVANI 45  YANCI SPINE HOWM_WD [id number] N/A 4 Implanted   SCREW SPNL L20MM OD55MM POLYAX CRYSTAL CT SRAVANI - [id number]  SCREW SPNL L20MM OD55MM POLYAX CRYSTAL CT SRAVANI  YANCI SPINE HOWM_WD [id number] N/A 2 Implanted   JANNETH SPINE PREBENT W O HEX VITALIUM [id number] SRAVANI - [id number]  JANNETH SPINE PREBENT W O HEX VITALIUM [id number] SRAVANI  YANCI SPINE HOWM_WD [id number] N/A 1 Implanted   SCREW SPNL L45MM OD5MM POLYAX CRYSTAL CT SRAVANI - [id number]  SCREW SPNL L45MM  OD5MM POLYAX CRYSTAL CT Alia Iliff SPINE HOWM_WD [id number] N/A 1 Implanted   SCREW SPNL L40MM OD5MM POLYAX CRYSTAL CT SRAVANI - [id number]  SCREW SPNL L40MM OD5MM POLYAX CRYSTAL CT Alia Iliff SPINE HOWM_WD [id number] N/A 1 Implanted   CONNECTOR SURG [facility] M POLYAX VIERHÖF - [id number]  CONNECTOR SURG [facility] M POLYAX Arnaud Charisse SPINE HOWM_WD [id number] N/A 1 Implanted   JANNETH SPINE PREBENT W O HEX VITALIUM [id number] [facility] [id number]  JANNETH SPINE PREBENT W O HEX VITALIUM [id number] Alia Iliff SPINE HOWM_WD [id number] N/A 1 Implanted       INDICATIONS FOR PROCEDURE: Patient has had low back pain with radiation to the buttocks and lower extremities for an extended period of time. The symptoms and exam findings were felt to be consistent with neurogenic claudication. The preoperative radiographs and other imaging confirmed showed spondylolisthesis and stenosis. Conservative measures have been exhausted as outlined in the H&P. The symptoms progressed to the point where there is difficulty performing any task that requires prolonged standing or walking which interfered with activities of daily living and ability to enjoy life. In the outpatient setting the risks, benefits and potential complications of the above-listed procedure were discussed with her and an informed consent was obtained. DESCRIPTION OF PROCEDURE: After adequate induction of general anesthesia, the patient was positioned prone on the [facility] spinal table. Care was taken to pad all bony prominences. The shoulders and elbows were placed in the 90/90 position. The abdomen was allowed to hang free to decrease intraabdominal and venous pressure. The lumbar area was prepped and draped in the usual sterile fashion. Preoperative antibiotics were administered. A time out was called to confirm appropriate patient, proposed procedure and proposed incision site.  With this confirmation, an incision was created in the midline of the back over the lumbar spinous processes. Dissection was carried down through the skin and subcutaneous tissues to the level of the lumbodorsal fascia. The lumbar dorsal fascia was released off of the spinous processes. The paraspinous musculature was elevated in a subperiosteal fashion in a lateral direction off of the lamina and over the the facet joints to be fused. A curet was slipped beneath the lamina and a cross table flouroscopic image was obtained to identify appropriate spinal level. The L4 through L5 transverse processes were exposed to their lateral tips. All soft tissue was elevated off of the intertransverse membrane laterally in preparation for a fusion bed. With the posterior lateral dissection completed, attention was directed centrally where a Leksell rongeur was used to resect the spinous processes of L4 through L5. The 4 mm jaqueline was then used to thin the lamina to an egg shell like thickness. A triple-0 angled curet was used to elevate the ligamentum flavum off of its origin on the caudal surface of the L4 lamina as well as off the cephalad surface of the L5 lamina. The ligamentum flavum was elevated from the thecal sac and a plane was created in the epidural space with a Saurabh elevator. A 4 mm Kerrison was used to perform a central laminectomy of L5 and this was carried cephalad through L4. The central laminectomy was widened to the medial border of the pedicle at each level. With the central laminectomy completed, a 4 mm Kerrison was used to undercut the lateral recess along the entire length of the laminectomy site. A large right sided extradural lesion was noted and consistent with an facet cyst. The cyst was quite adherent to the dura. The majority of the cyst was elevated with a Saurabh and curet and excised from the wound. A portion of the lesion was adherent to the dura and could not be elevated. This was debulked sharply with a 15 blade.    Then using the [facility] elevator to retract the thecal sac and identify each of the nerve roots, partial foraminotomies were performed and each nerve was visualized and decompressed bilaterally. Attention was re-directed towards the lumbar wound. The jin nerve root retractor was used to retrace the thecal sac overlying the L4 - L5  disc space. Care was taken to protect the exiting and descending nerve roots at all times. An annulotomy was then performed with a 15-blade. A complete discectomy was performed using a series of curets and rongeurs. The interbody sizing system was brought to the field and the sizing paddles were used sequentially to an appropriate fit. The endplates were prepared for fusion using the rasps. A trial interbody device was sized and inserted. Fluoroscopic images were obtained of the trial in both planes. The final interbody implant was the opened and packed with local autograft. Additional local bone graft was placed anteriorly in the interbody space. The biomechanical device was then impacted and rotated appropriately. Again fluoroscopy was ws used to confirm cage positioning. The 4 mm jaqueline was used to decorticate the previously exposed transverse processes and lateral aspect of the facet joints and pars intra-articularis. The Christen Kissousa spinal instrumentation system was brought to the field and using a free hand intersection technique, cortical screws were placed bilaterally from L4 to L5. The medial border of the pedicle was visualized through the spinal canal to confirm no medial or inferior breech. This was felt to be satisfactory. At this point the Protios soaked allograft was then packed into the lateral gutters beneath the screw heads, along the decorticated transverse processes and lateral facet joints for the arthrodesis. Appropriately sized rods were then selected and bent into additional lordosis and laid into the pedicle screw heads. The set screws were then applied and tightened to the appropriate torque.  C-arm fluoroscopy was brought in and used to obtain images to confirm appropriate hardware level and placement. This was felt to be satisfactory. A cross-link was added minimize the risk of pull-out and provide additional rotational stability. With this, the wound was liberally irrigated and a hemovac drain was inserted through a separate incision in a subfascial plane. The lumbodorsal fascia was approximated with a # 1 Vicryl suture in an interrupted fashion. The subcutaneous tissue and skin were approximated in a layered fashion. Benzoin and Steri-Strips were applied. Sterile dressings were applied. The patient tolerated the procedure well and was returned to the postanesthesia care unit in stable condition. At the end of the case, all sponge, needle, and instrument counts were correct.       Jennifer Jernigan MD

## 2021-02-17 NOTE — PROGRESS NOTES
ACUTE PHYSICAL THERAPY GOALS:  (Developed with and agreed upon by patient and/or caregiver.)  (1.) Catrina Loaiza will move from supine to sit and sit to supine , scoot up and down and roll side to side utilizing log roll technique with MODIFIED INDEPENDENCE within 7 treatment day(s). (2.) Montserrat Ocampo will transfer from bed to chair and chair to bed with MODIFIED INDEPENDENCE using the least restrictive device within 7 treatment day(s). (3.) Montserrat Chroman Orlando will ambulate with MODIFIED INDEPENDENCE for 500 feet with the least restrictive device within 7 treatment day(s). (4.) Montserrat Chroman [place] will perform standing static and dynamic balance activities x 25 minutes with MODIFIED INDEPENDENCE to improve safety within 7 treatment day(s). (5.) Montserrat Chroman Damaris will perform bilateral lower extremity exercises x 20 min for HEP with INDEPENDENCE to improve strength, endurance, and functional mobility within 7 treatment day(s).      PHYSICAL THERAPY ASSESSMENT: Initial Assessment PT Treatment Day # 1    Catrina Loaiza is a 78 y.o. male   PRIMARY DIAGNOSIS: Lumbar stenosis with neurogenic claudication  Spondylolisthesis, unspecified spinal region [M43.10]  Lumbar stenosis with neurogenic claudication [M48.062]  Status post lumbar spinal arthrodesis [Z98.1]  Procedure(s) (LRB):  L4 L5 LAMINECTOMY AND FUSION/ ALLOGRAFT, INSTRUMENTATION, AND TLIF (N/A)  Day of Surgery  Reason for Referral:  ICD-10: Treatment Diagnosis: Generalized Muscle Weakness (M62.81)  Other lack of cordination (R27.8)  Difficulty in walking, Not elsewhere classified (R26.2)  Other abnormalities of gait and mobility (R26.89)  Low Back Pain (M54.5)  OUTPATIENT: Payor: HU[employer]NA MEDICARE / Plan: [facility] HU[employer]NA MEDICARE HMO / Product Type: Managed Care Medicare /      ASSESSMENT:     REHAB RECOMMENDATIONS:   Recommendation to date pending progress:  Setting:  [address] Therapy  Equipment:    None - pt reports he already has RW he can use, as well as BSC and tub transfer bench     PRIOR LEVEL OF FUNCTION:  (Prior to Hospitalization) INITIAL/CURRENT LEVEL OF FUNCTION:  (Most Recently Demonstrated)   Bed Mobility:   Modified Independent  Sit to Stand:   Modified Independent  Transfers:   Modified Independent  Gait/Mobility:   Modified Independent Bed Mobility:   Minimal Assistance x 2  Sit to Stand:   Minimal Assistance x 2  Transfers:   Minimal Assistance  Gait/Mobility:   Minimal Assistance x 2     ASSESSMENT:  Mr. Gutierrez Quiñones is a 78year old M who presents s/p L4-5 laminectomy and fusion. PMH includes CP. This date pt performs mobility including bed mobility, sit <> stand transfers, static and dynamic sitting and standing balance activities, and ambulation in room using RW with Min Ax2. Pt mobilizing quite well for day of surgery. Tolerated mobility on room air. Pt presents as functioning below his baseline, with deficits in mobility including transfers, gait, balance, and activity tolerance. Pt will benefit from skilled therapy services to address stated deficits to promote return to highest level of function, independence, and safety. Will continue to follow. SUBJECTIVE:   Mr. Gutierrez Quiñones states, \"I am really feeling good, thank you. \"    SOCIAL HISTORY/LIVING ENVIRONMENT: Lives with his wife in a one story home with a ramp to enter. Has a tub/shower combination and tub transfer bench, as well as a bedside commode which they use to raise the toilet seat. Endorses one fall in the past six months. Typically uses a straight cane for mobility. OBJECTIVE:     PAIN: VITAL SIGNS: LINES/DRAINS:   Pre Treatment: Pain Screen  Pain Scale 1: Numeric (0 - 10)  Pain Intensity 1: 4  Pain Onset 1: post op  Pain Location 1: Back  Pain Orientation 1: Lower  Pain Description 1: Sore;Aching  Post Treatment: 2/10 resting in bed after mobility.  Vital Signs  O2 Sat (%): 95 %  O2 Device: Room air John Catheter, Hemovac and IV  O2 Device: Room air     GROSS EVALUATION:  BLE Within Functional Limits Abnormal/ Functional Abnormal/ Non-Functional (see comments) Not Tested Comments:   AROM [] [x] [] []  decreased RLE knee ext   PROM [] [x] [] []    Strength [] [x] [] []  slightly decreased BLE, but functional   Balance [] [x] [] []  fair standing balance   Posture [] [x] [] []  forward head   Sensation [x] [] [] []  light touch BLE   Coordination [] [] [] [x]    Tone [] [] [] [x]    Edema [] [] [] [x]    Activity Tolerance [] [x] [] []     [] [] [] []      COGNITION/  PERCEPTION: Intact Impaired   (see comments) Comments:   Orientation [x] []    Vision [x] []    Hearing [x] []    Command Following [x] []    Safety Awareness [x] []     [] []      MOBILITY: I Mod I S SBA CGA Min Mod Max Total  NT x2 Comments:   Bed Mobility    Rolling [] [] [] [] [] [x] [] [] [] [] []    Supine to Sit [] [] [] [] [] [x] [] [] [] [] []    Scooting [] [] [] [] [] [x] [] [] [] [] []    Sit to Supine [] [] [] [] [] [x] [] [] [] [] []    Transfers    Sit to Stand [] [] [] [] [] [x] [] [] [] [] [x]    Bed to Chair [] [] [] [] [] [x] [] [] [] [] [x]    Stand to Sit [] [] [] [] [] [x] [] [] [] [] [x]    I=Independent, Mod I=Modified Independent, S=Supervision, SBA=Standby Assistance, CGA=Contact Guard Assistance,   Min=Minimal Assistance, Mod=Moderate Assistance, Max=Maximal Assistance, Total=Total Assistance, NT=Not Tested  GAIT: I Mod I S SBA CGA Min Mod Max Total  NT x2 Comments:   Level of Assistance [] [] [] [] [] [x] [] [] [] [] [x]    Distance 10 + 20 ft    DME Rolling Walker and Gait Belt    Gait Quality Slow, shuffled    Weightbearing Status N/A     I=Independent, Mod I=Modified Independent, S=Supervision, SBA=Standby Assistance, CGA=Contact Guard Assistance,   Min=Minimal Assistance, Mod=Moderate Assistance, Max=Maximal Assistance, Total=Total Assistance, NT=Not Tested    [facility] AM-PAC 6 Clicks   Basic Mobility Inpatient Short Form       How much difficulty does the patient currently have. .. Unable A Lot A Little None   1. Turning over in bed (including adjusting bedclothes, sheets and blankets)? [] 1   [] 2   [x] 3   [] 4   2. Sitting down on and standing up from a chair with arms ( e.g., wheelchair, bedside commode, etc.)   [] 1   [] 2   [x] 3   [] 4   3. Moving from lying on back to sitting on the side of the bed? [] 1   [] 2   [x] 3   [] 4   How much help from another person does the patient currently need. .. Total A Lot A Little None   4. Moving to and from a bed to a chair (including a wheelchair)? [] 1   [] 2   [x] 3   [] 4   5. Need to walk in hospital room? [] 1   [] 2   [x] 3   [] 4   6. Climbing 3-5 steps with a railing? [] 1   [] 2   [x] 3   [] 4   © 2007, Trustees of [address] [place] [id number], under license to [email or web address]. All rights reserved     Score:  Initial: 18 Most Recent: X (Date: -- )    Interpretation of Tool:  Represents activities that are increasingly more difficult (i.e. Bed mobility, Transfers, Gait). PLAN:   FREQUENCY/DURATION: PT Plan of Care: BID for duration of hospital stay or until stated goals are met, whichever comes first.    PROBLEM LIST:   (Skilled intervention is medically necessary to address:)  1. Decreased Activity Tolerance  2. Decreased Balance  3. Decreased Coordination  4. Decreased Gait Ability  5. Decreased Strength  6. Decreased Transfer Abilities  7. Increased Pain   INTERVENTIONS PLANNED:   (Benefits and precautions of physical therapy have been discussed with the patient.)  1. Therapeutic Activity  2. Therapeutic Exercise/HEP  3. Neuromuscular Re-education  4. Gait Training  5. Education     TREATMENT:     EVALUATION: Low Complexity : (Untimed Charge)    TREATMENT:   (     )  Therapeutic Activity (38 Minutes):  Therapeutic activity included Rolling, Supine to Sit, Sit to Supine, Scooting, Lateral Scooting, Transfer Training, Ambulation on level ground, Sitting balance , Standing balance and instruction in post operative mobility recommendations and precautions to improve functional Mobility, Strength and Activity tolerance.     AFTER TREATMENT POSITION/PRECAUTIONS:  Bed, Needs within reach, RN notified and Visitors at bedside    INTERDISCIPLINARY COLLABORATION:  RN/PCT, PT/PTA and OT/BUNN    TOTAL TREATMENT DURATION:  PT Patient Time In/Time Out  Time In: 1453  Time Out: Rajeev 57, PT

## 2021-02-17 NOTE — PROGRESS NOTES
ACUTE OT GOALS:  (Developed with and agreed upon by patient and/or caregiver.)  1. Patient will verbalize and demonstrate understanding of spinal precautions with 100% accuracy during ADLs. 2. Patient will complete lower body bathing and dressing with minimal assistance and adaptive equipment as needed. 3. Patient will complete functional transfers with modified independence and adaptive equipment as needed. 4. Patient will complete toileting and toilet transfer with modified independence and adaptive equipment as needed. 5. Patient will complete functional mobility of household distances with modified independence and adaptive equipment as needed.    6. Patient will demonstrate ability to log roll in bed independently without verbal cues from therapist.     Timeframe: 7 visits     OCCUPATIONAL THERAPY ASSESSMENT: Initial Assessment and Daily Note OT Treatment Day # 1    Gabriel Noyola is a 78 y.o. male   PRIMARY DIAGNOSIS: Lumbar stenosis with neurogenic claudication  Spondylolisthesis, unspecified spinal region [M43.10]  Lumbar stenosis with neurogenic claudication [M48.062]  Status post lumbar spinal arthrodesis [Z98.1]  Procedure(s) (LRB):  L4 L5 LAMINECTOMY AND FUSION/ ALLOGRAFT, INSTRUMENTATION, AND TLIF (N/A)  Day of Surgery  Reason for Referral:    ICD-10: Treatment Diagnosis: Generalized Muscle Weakness (M62.81)  Difficulty in walking, Not elsewhere classified (R26.2)  History of falling (Z91.81)  Low Back Pain (M54.5)  OUTPATIENT: Payor: HUMANA MEDICARE / Plan: [facility] HUMANA MEDICARE HMO / Product Type: Managed Care Medicare /   ASSESSMENT:     REHAB RECOMMENDATIONS:   Recommendation to date pending progress:  [address] [place] [address] Therapy   vs. no d/c needs  Equipment:    None     PRIOR LEVEL OF FUNCTION:  (Prior to Hospitalization)  INITIAL/CURRENT LEVEL OF FUNCTION:  (Based on today's evaluation) Bathing:   Independent  Dressing:   Independent  Feeding/Grooming:   Independent  Toileting:   Modified Independent  Functional Mobility:   Modified Independent Bathing:   Moderate Assistance  Dressing:   Minimal Assistance  Feeding/Grooming:   Set Up  Toileting:   Minimal Assistance  Functional Mobility:   Contact Guard Assistance - Minimal Assistance x2     ASSESSMENT:  Mr. Blu Cancino presents s/p L4-L5 laminectomy and fusion. Pt has hx of CP and reports 1 fall within the past 6 months. Pt lives with wife in a one level home with tub/shower with TTB. Pt ambulates with SPC but has RW available if needed. Pt on 1/2L O2 NC but tolerated ambulation and bed mobility on room air with sats >95% throughout. Pt demonstrated min A log roll technique supine<>sit and CGA RW ambulating household distances in room. Pt able to demonstrate log roll technique and was educated on spinal precautions. Tolerated well for day of surgery. Pt denies dizziness, lightheadedness and pain with ambulation and movement. Pt set up for grooming ADLs EOB. Pt is currently functioning below baseline and would benefit from skilled OT services to address deficits and goals during acute care stay. Pt requesting [address], otherwise no d/c needs. SUBJECTIVE:   Mr. Blu Cancino states, \"That wasn't so bad. \"    SOCIAL HISTORY/LIVING ENVIRONMENT: Pt lives with wife in a one level home with tub/shower with TTB. Pt ambulates with SPC but has RW available if needed.         OBJECTIVE:     PAIN: VITAL SIGNS: LINES/DRAINS:   Pre Treatment: Pain Screen  Pain Scale 1: Numeric (0 - 10)  Pain Intensity 1: 4  Pain Onset 1: post op  Pain Location 1: Back  Pain Orientation 1: Lower  Pain Description 1: Aching  Pain Intervention(s) 1: Repositioned  Post Treatment: same   John Catheter and IV  O2 Device: Room air     GROSS EVALUATION:  BUE Within Functional Limits Abnormal/ Functional Abnormal/ Non-Functional (see comments) Not Tested Comments:   AROM [x] [] [] [] PROM [] [] [] []    Strength [x] [] [] []    Balance [x] [] [] [] Good sitting balance   Posture [x] [] [] []    Sensation [] [] [] []    Coordination [x] [] [] []    Tone [] [] [] []    Edema [] [] [] []    Activity Tolerance [] [x] [] []     [] [] [] []      COGNITION/  PERCEPTION: Intact Impaired   (see comments) Comments:   Orientation [x] []    Vision [] [x] Wears glasses, reports having a lazy eye   Hearing [] []    Judgment/ Insight [x] []    Attention [x] []    Memory [x] []    Command Following [x] []    Emotional Regulation [] []     [] []      ACTIVITIES OF DAILY LIVING: I Mod I S SBA CGA Min Mod Max Total NT Comments   BASIC ADLs:              Bathing/ Showering [] [] [] [] [] [] [] [] [] []    Toileting [] [] [] [] [] [] [] [] [] []    Dressing [] [] [] [] [] [x] [] [] [] [] Supine in bed   Feeding [] [] [] [] [] [] [] [] [] []    Grooming [] [] [x] [] [] [] [] [] [] [] EOB   Personal Device Care [] [] [] [] [] [] [] [] [] []    Functional Mobility [] [] [] [] [x] [x] [] [] [] [] RW x2   I=Independent, Mod I=Modified Independent, S=Supervision, SBA=Standby Assistance, CGA=Contact Guard Assistance,   Min=Minimal Assistance, Mod=Moderate Assistance, Max=Maximal Assistance, Total=Total Assistance, NT=Not Tested    MOBILITY: I Mod I S SBA CGA Min Mod Max Total  NT x2 Comments:   Supine to sit [] [] [] [] [x] [] [] [] [] [] [] Log roll   Sit to supine [] [] [] [] [] [] [] [] [] [] []    Sit to stand [] [] [] [] [x] [x] [] [] [] [] [x] RW   Bed to chair [] [] [] [] [x] [x] [] [] [] [] [x] RW   I=Independent, Mod I=Modified Independent, S=Supervision, SBA=Standby Assistance, CGA=Contact Guard Assistance,   Min=Minimal Assistance, Mod=Moderate Assistance, Max=Maximal Assistance, Total=Total Assistance, NT=Not [facility] 6 Clicks   Daily Activity Inpatient Short Form        How much help from another person does the patient currently need. .. Total A Lot A Little None   1.   Putting on and taking off regular lower body clothing? [] 1   [x] 2   [] 3   [] 4   2. Bathing (including washing, rinsing, drying)? [] 1   [] 2   [x] 3   [] 4   3. Toileting, which includes using toilet, bedpan or urinal?   [] 1   [] 2   [x] 3   [] 4   4. Putting on and taking off regular upper body clothing? [] 1   [] 2   [x] 3   [] 4   5. Taking care of personal grooming such as brushing teeth? [] 1   [] 2   [x] 3   [] 4   6. Eating meals? [] 1   [] 2   [] 3   [x] 4   © 2007, Trustees of [address] [place] [address] [address] [address], under license to [email or web address]. All rights reserved     Score:  Initial: 18 Most Recent: X (Date: -- )   Interpretation of Tool:  Represents activities that are increasingly more difficult (i.e. Bed mobility, Transfers, Gait). PLAN:   FREQUENCY/DURATION: OT Plan of Care: 6 times/week for duration of hospital stay or until stated goals are met, whichever comes first.    PROBLEM LIST:   (Skilled intervention is medically necessary to address:)  1. Decreased ADL/Functional Activities  2. Decreased Activity Tolerance  3. Decreased AROM/PROM  4. Decreased Balance  5. Decreased Coordination  6. Decreased Gait Ability  7. Decreased Strength  8. Decreased Transfer Abilities  9. Increased Pain   INTERVENTIONS PLANNED:   (Benefits and precautions of occupational therapy have been discussed with the patient.)  1. Self Care Training  2. Therapeutic Activity  3. Therapeutic Exercise/HEP  4. Neuromuscular Re-education  5. Gait Training  6.  Education     TREATMENT:     EVALUATION: Low Complexity : (Untimed Charge)    TREATMENT:   (     )  Co-Treatment PT/OT necessary due to patient's decreased overall endurance/tolerance levels, as well as need for high level skilled assistance to complete functional transfers/mobility and functional tasks  Self Care (8 Minutes): Self care including Upper Body Dressing, Grooming and ambulating household distances to increase independence and decrease level of assistance required. Neuromuscular Re-education (30 Minutes): Neuromuscular Re-education included Balance Training, Coordination training, Functional mobility with facilitation, Postural training, Sitting balance training and Standing balance training to improve Balance, Coordination, Functional Mobility and Postural Control.     AFTER TREATMENT POSITION/PRECAUTIONS:  Bed, Needs within reach, RN notified and Visitors at bedside    INTERDISCIPLINARY COLLABORATION:  RN/PCT, PT/PTA and OT/BUNN    TOTAL TREATMENT DURATION:  OT Patient Time In/Time Out  Time In: 1420 [facility]  Time Out: 45 Crystal Mendez, OT

## 2021-02-17 NOTE — ANESTHESIA POSTPROCEDURE EVALUATION
Procedure(s):  L4 L5 LAMINECTOMY AND FUSION/ ALLOGRAFT, INSTRUMENTATION, AND TLIF.    general    Anesthesia Post Evaluation        Patient location during evaluation: PACU  Patient participation: complete - patient participated  Level of consciousness: awake  Pain management: satisfactory to patient  Airway patency: patent  Anesthetic complications: no  Cardiovascular status: hemodynamically stable  Respiratory status: spontaneous ventilation  Hydration status: euvolemic  Post anesthesia nausea and vomiting:  none      INITIAL Post-op Vital signs:   Vitals Value Taken Time   /58 02/17/21 1056   Temp 36.7 °C (98 °F) 02/17/21 1056   Pulse 81 02/17/21 1056   Resp 18 02/17/21 1056   SpO2 98 % 02/17/21 1056

## 2021-02-17 NOTE — PROGRESS NOTES
Pt admitted from PACU late morning. He is still drowsy but oriented x4 when aroused. Surgical incision remains intact with CDI gauze and Tegaderm dressing. HV to right side of incision intact; draining bloody drainage. John catheter maintained. Pt with family at bedside. POC initiated. Will continue to monitor.

## 2021-02-17 NOTE — PROGRESS NOTES
's pre-procedure visit requested by patient. Conveyed care and concern for patient and family. Offered prayer as requested for patient, family, and staff.     Dayna Lorenz MDiv, BS  Board Certified

## 2021-02-17 NOTE — PERIOP NOTES
TRANSFER - OUT REPORT:    Verbal report given to [address] [place] [address] on [employer]  being transferred to [address] [place] [address] for routine post - op       Report consisted of patients Situation, Background, Assessment and   Recommendations(SBAR). Information from the following report(s) OR Summary, Procedure Summary, Intake/Output and MAR was reviewed with the receiving nurse. Lines:   Peripheral IV 02/17/21 Right Hand (Active)   Site Assessment Clean, dry, & intact 02/17/21 1012   Phlebitis Assessment 0 02/17/21 1012   Infiltration Assessment 0 02/17/21 1012   Dressing Status Clean, dry, & intact 02/17/21 1012   Dressing Type Tape;Transparent 02/17/21 1012   Hub Color/Line Status Pink;Patent 02/17/21 1012   Alcohol Cap Used No 02/17/21 1012        Opportunity for questions and clarification was provided. Patient transported with:   O2 @ 2 liters    VTE prophylaxis orders have been written for [employer]. Patient and family given floor number and nurses name. Family updated re: pt status after security code verified.

## 2021-02-18 PROCEDURE — 74011250637 HC RX REV CODE- 250/637: Performed by: ORTHOPAEDIC SURGERY

## 2021-02-18 PROCEDURE — 97530 THERAPEUTIC ACTIVITIES: CPT

## 2021-02-18 PROCEDURE — 74011000250 HC RX REV CODE- 250: Performed by: ORTHOPAEDIC SURGERY

## 2021-02-18 PROCEDURE — 74011250636 HC RX REV CODE- 250/636: Performed by: ORTHOPAEDIC SURGERY

## 2021-02-18 PROCEDURE — 74011250637 HC RX REV CODE- 250/637: Performed by: FAMILY MEDICINE

## 2021-02-18 RX ORDER — MAG HYDROX/ALUMINUM HYD/SIMETH 200-200-20
30 SUSPENSION, ORAL (FINAL DOSE FORM) ORAL
Status: DISCONTINUED | OUTPATIENT
Start: 2021-02-18 | End: 2021-02-19 | Stop reason: HOSPADM

## 2021-02-18 RX ADMIN — ACETAMINOPHEN 650 MG: 325 TABLET ORAL at 17:23

## 2021-02-18 RX ADMIN — ACETAMINOPHEN 650 MG: 325 TABLET ORAL at 06:03

## 2021-02-18 RX ADMIN — ACETAMINOPHEN 650 MG: 325 TABLET ORAL at 12:59

## 2021-02-18 RX ADMIN — FAMOTIDINE 20 MG: 20 TABLET ORAL at 08:57

## 2021-02-18 RX ADMIN — Medication 1 AMPULE: at 20:49

## 2021-02-18 RX ADMIN — ALUMINUM HYDROXIDE, MAGNESIUM HYDROXIDE, AND SIMETHICONE 30 ML: 200; 200; 20 SUSPENSION ORAL at 02:31

## 2021-02-18 RX ADMIN — ROPINIROLE HYDROCHLORIDE 1 MG: 0.5 TABLET, FILM COATED ORAL at 20:48

## 2021-02-18 RX ADMIN — LOSARTAN POTASSIUM 100 MG: 50 TABLET, FILM COATED ORAL at 08:57

## 2021-02-18 RX ADMIN — DULOXETINE HYDROCHLORIDE 60 MG: 60 CAPSULE, DELAYED RELEASE ORAL at 20:50

## 2021-02-18 RX ADMIN — DOCUSATE SODIUM 100 MG: 100 CAPSULE, LIQUID FILLED ORAL at 17:23

## 2021-02-18 RX ADMIN — Medication 10 ML: at 13:01

## 2021-02-18 RX ADMIN — FAMOTIDINE 20 MG: 20 TABLET ORAL at 20:49

## 2021-02-18 RX ADMIN — CEFAZOLIN SODIUM 2 G: 100 INJECTION, POWDER, LYOPHILIZED, FOR SOLUTION INTRAVENOUS at 08:00

## 2021-02-18 RX ADMIN — Medication 10 ML: at 21:00

## 2021-02-18 RX ADMIN — CYCLOBENZAPRINE 10 MG: 10 TABLET, FILM COATED ORAL at 17:23

## 2021-02-18 RX ADMIN — Medication 10 ML: at 06:03

## 2021-02-18 RX ADMIN — DOCUSATE SODIUM 100 MG: 100 CAPSULE, LIQUID FILLED ORAL at 08:57

## 2021-02-18 RX ADMIN — ACETAMINOPHEN 650 MG: 325 TABLET ORAL at 23:20

## 2021-02-18 RX ADMIN — Medication 1 AMPULE: at 08:57

## 2021-02-18 NOTE — PROGRESS NOTES
PT NOTE:    Pt N/A at time, with other provider. Will check back at later time/date as schedule allows.     Zeferino Tsang, PTA

## 2021-02-18 NOTE — PROGRESS NOTES
ACUTE PHYSICAL THERAPY GOALS:  (Developed with and agreed upon by patient and/or caregiver.)  (1.) Maxx Strauss will move from supine to sit and sit to supine , scoot up and down and roll side to side utilizing log roll technique with MODIFIED INDEPENDENCE within 7 treatment day(s). (2.) Sonia Ocampo will transfer from bed to chair and chair to bed with MODIFIED INDEPENDENCE using the least restrictive device within 7 treatment day(s). (3.) Sonia Carters Damaris will ambulate with MODIFIED INDEPENDENCE for 500 feet with the least restrictive device within 7 treatment day(s). (4.) Seabron Chaffee Damaris will perform standing static and dynamic balance activities x 25 minutes with MODIFIED INDEPENDENCE to improve safety within 7 treatment day(s). (5.) Sonia Chaffee [place] will perform bilateral lower extremity exercises x 20 min for HEP with INDEPENDENCE to improve strength, endurance, and functional mobility within 7 treatment day(s). PHYSICAL THERAPY: Daily Note and AM Treatment Day # 2    Maxx Strauss is a 78 y.o. male   PRIMARY DIAGNOSIS: Lumbar stenosis with neurogenic claudication  Spondylolisthesis, unspecified spinal region [M43.10]  Lumbar stenosis with neurogenic claudication [M48.062]  Status post lumbar spinal arthrodesis [Z98.1]  Procedure(s) (LRB):  L4 L5 LAMINECTOMY AND FUSION/ ALLOGRAFT, INSTRUMENTATION, AND TLIF (N/A)  1 Day Post-Op    ASSESSMENT:     REHAB RECOMMENDATIONS: CURRENT LEVEL OF FUNCTION:  (Most Recently Demonstrated)   Recommendation to date pending progress:  [address] [place] [address]  Equipment:    None Bed Mobility:   Not tested  Sit to Stand:   Contact Guard Assistance  Transfers:   Standby Assistance  Gait/Mobility:   Standby Assistance to CGA     ASSESSMENT:  Mr. Ana Del Valle presents sitting up on the side of bed, very agreeable to therapy. Pt was able to increase his ambulation distance with less assistance as well.    Reviewed spinal precautions with pt.  Pt returned to the chair and was left with needs in reach. Good progress towards goals. Is hoping to go home later today. Will continue with POC. SUBJECTIVE:   Mr. Amos Roca states, \"I hope they'll let me go home. \"    SOCIAL HISTORY/ LIVING ENVIRONMENT: Lives with his wife in a one story home with a ramp to enter. Has a tub/shower combination and tub transfer bench, as well as a bedside commode which they use to raise the toilet seat. Endorses one fall in the past six months. Typically uses a straight cane for mobility.      OBJECTIVE:     PAIN: VITAL SIGNS: LINES/DRAINS:   Pre Treatment:    Post Treatment: not rated   IV  O2 Device: Nasal cannula, Room air     MOBILITY: I Mod I S SBA CGA Min Mod Max Total  NT x2 Comments:   Bed Mobility    Rolling [] [] [] [] [] [] [] [] [] [] []    Supine to Sit [] [] [] [] [] [] [] [] [] [] [] Pt already sitting up on side of bed   Scooting [] [] [] [] [] [] [] [] [] [] []    Sit to Supine [] [] [] [] [] [] [] [] [] [] []    Transfers    Sit to Stand [] [] [] [] [x] [] [] [] [] [] []    Bed to Chair [] [] [] [x] [] [] [] [] [] [] []    Stand to Sit [] [] [] [x] [] [] [] [] [] [] []    I=Independent, Mod I=Modified Independent, S=Supervision, SBA=Standby Assistance, CGA=Contact Guard Assistance,   Min=Minimal Assistance, Mod=Moderate Assistance, Max=Maximal Assistance, Total=Total Assistance, NT=Not Tested    GAIT: I Mod I S SBA CGA Min Mod Max Total  NT x2 Comments:   Level of Assistance [] [] [] [x] [x] [] [] [] [] [] []    Distance 250'    DME Rolling Walker    Gait Quality Slow, shuffled    Weightbearing  Status N/A     I=Independent, Mod I=Modified Independent, S=Supervision, SBA=Standby Assistance, CGA=Contact Guard Assistance,   Min=Minimal Assistance, Mod=Moderate Assistance, Max=Maximal Assistance, Total=Total Assistance, NT=Not Tested    PLAN:   FREQUENCY/DURATION: PT Plan of Care: BID for duration of hospital stay or until stated goals are met, whichever comes first.  TREATMENT:     TREATMENT:   (     )  Therapeutic Activity (27 Minutes): Therapeutic activity included Scooting, Ambulation on level ground and Standing balance to improve functional Mobility, Strength and Activity tolerance.     AFTER TREATMENT POSITION/PRECAUTIONS:  Chair, Needs within reach and RN notified    INTERDISCIPLINARY COLLABORATION:  RN/PCT and PT/PTA    TOTAL TREATMENT DURATION:  PT Patient Time In/Time Out  Time In: 0930  Time Out: 2500 Chela Marcano, PTA

## 2021-02-18 NOTE — PROGRESS NOTES
CM spoke with patient to complete initial assessment. Patient alert and oriented x3. States that he lives in a one story home with a ramp to enter with his wife. Patient is completely independent with ADL's, still drives. DME's include WC, RW, shower hair, BSC, cane. [facility] with [address] [place] [address] in reference to knee replacement. PT and OT evaluated patient and recommended HH. CM spoke with patient about recommendation. Patient agreeable and stated that he wanted to take a look at the list and let us know. CM delivered [employer] list to room and will check back in before d/c.     CM continues to follow.      Care Management Interventions  PCP Verified by CM: Yes(Dr. Carlie Weinstein)  Transition of Care Consult (CM Consult): Discharge Planning  Discharge Durable Medical Equipment: No  Physical Therapy Consult: Yes  Occupational Therapy Consult: Yes  Speech Therapy Consult: No  Current Support Network: Lives with Spouse  Confirm Follow Up Transport: Family  Discharge Location  Discharge Placement: Home with home health

## 2021-02-18 NOTE — PROGRESS NOTES
Pt alert and oriented, participating in [facility]. Dora was dc'd this morning and pt has been voiding adequate amts of CY urine since. HV drain to back incision still in place since output in 8hrs is still >50ml per order. PT up with therapy and aaron activity fairly well. Pain is managed with current regimen.

## 2021-02-18 NOTE — PROGRESS NOTES
ORTH POST OP PROGRESS NOTE    2021  Admit Date: 2021  Admit Diagnosis: Spondylolisthesis, unspecified spinal region [M43.10]  Lumbar stenosis with neurogenic claudication [M48.062]  Status post lumbar spinal arthrodesis [Z98.1]  Procedure: Procedure(s):  L4 L5 LAMINECTOMY AND FUSION/ ALLOGRAFT, INSTRUMENTATION, AND TLIF  Post Op day: 1 Day Post-Op      Subjective:     Beny Ocampo is a patient who has no complaints. Objective:     Vital Signs:    Blood pressure 125/78, pulse 75, temperature 98.1 °F (36.7 °C), resp. rate 18, height 5' 10\" (1.778 m), weight 104.7 kg (230 lb 12.8 oz), SpO2 94 %. Temp (24hrs), Av.1 °F (36.7 °C), Min:97.8 °F (36.6 °C), Max:98.6 °F (37 °C)      No intake/output data recorded.  1901 -  0700  In: 1898.8 [P.O.:150; I.V.:1748.8]  Out: 2655 [Urine:2150; Drains:205]    LAB:    No results for input(s): HGB, WBC, PLT, HGBEXT, PLTEXT in the last 72 hours. Physical Exam    General:   Alert and oriented. No acute distress  Lungs:  Respirations unlabored. Extremities: No evidence of cyanosis. Calves soft, nontender. Moves both upper and lower extremities.    Dressing:  clean, dry, and intact  Neuro:  no deficit      Assessment:      Patient Active Problem List   Diagnosis Code    Noncompliance with CPAP treatment Z91.14    Obesity, Class II, BMI 35-39.9, with comorbidity [id number]    Osteoarthritis of right knee M17.11    Status post right knee replacement Z96.651    HTN (hypertension) I10    Spondylolisthesis M43.10    Lumbar stenosis with neurogenic claudication M48.062    Status post lumbar spinal arthrodesis Z98.1       Plan:     Continue PT/OT  Discontinue: drain when less than 50; gtz  Consult: none    Anticipate Discharge To: HOME after PT Thursday or Friday       Signed By: Nithya Burdick PA-C

## 2021-02-19 VITALS
WEIGHT: 230.8 LBS | DIASTOLIC BLOOD PRESSURE: 64 MMHG | RESPIRATION RATE: 17 BRPM | SYSTOLIC BLOOD PRESSURE: 119 MMHG | HEART RATE: 63 BPM | HEIGHT: 70 IN | TEMPERATURE: 98.2 F | OXYGEN SATURATION: 100 % | BODY MASS INDEX: 33.04 KG/M2

## 2021-02-19 PROCEDURE — 74011250637 HC RX REV CODE- 250/637: Performed by: ORTHOPAEDIC SURGERY

## 2021-02-19 PROCEDURE — 97530 THERAPEUTIC ACTIVITIES: CPT

## 2021-02-19 PROCEDURE — 77010033678 HC OXYGEN DAILY

## 2021-02-19 PROCEDURE — 2709999900 HC NON-CHARGEABLE SUPPLY

## 2021-02-19 PROCEDURE — 97110 THERAPEUTIC EXERCISES: CPT

## 2021-02-19 PROCEDURE — 97112 NEUROMUSCULAR REEDUCATION: CPT

## 2021-02-19 PROCEDURE — 94760 N-INVAS EAR/PLS OXIMETRY 1: CPT

## 2021-02-19 RX ADMIN — Medication 1 AMPULE: at 09:41

## 2021-02-19 RX ADMIN — LOSARTAN POTASSIUM 100 MG: 50 TABLET, FILM COATED ORAL at 09:42

## 2021-02-19 RX ADMIN — ACETAMINOPHEN 650 MG: 325 TABLET ORAL at 13:49

## 2021-02-19 RX ADMIN — FAMOTIDINE 20 MG: 20 TABLET ORAL at 09:42

## 2021-02-19 RX ADMIN — ACETAMINOPHEN 650 MG: 325 TABLET ORAL at 05:33

## 2021-02-19 RX ADMIN — DOCUSATE SODIUM 100 MG: 100 CAPSULE, LIQUID FILLED ORAL at 09:42

## 2021-02-19 RX ADMIN — Medication 10 ML: at 05:36

## 2021-02-19 NOTE — DISCHARGE INSTRUCTIONS
Patient Education        Lumbar Laminectomy: What to Expect at Home  Your Recovery  A lumbar laminectomy is surgery to ease pressure on the spinal cord and nerves of the lower spine. The doctor took out pieces of bone that were squeezing the spinal cord and nerves. You can expect your back to feel stiff or sore after surgery. This should improve in the weeks after surgery. You may have trouble sitting or standing in one position for very long and may need pain medicine in the weeks after your surgery. Your doctor may advise you to work with a physical therapist to strengthen the muscles around your spine and trunk. You will need to learn how to lift, twist, and bend so that you don't put too much strain on your back. This care sheet gives you a general idea about how long it will take for you to recover. But each person recovers at a different pace. Follow the steps below to get better as quickly as possible. How can you care for yourself at home? Activity    · Rest when you feel tired. Getting enough sleep will help you recover.     · Try to walk each day. Start by walking a little more than you did the day before. Bit by bit, increase the amount you walk. Walking boosts blood flow and helps prevent pneumonia and constipation. Walking may also decrease your muscle soreness after surgery.     · If advised by your doctor, you may need to avoid lifting anything that would cause excessive strain on your back. This may include a child, heavy grocery bags and milk containers, a heavy briefcase or backpack, cat litter or dog food bags, or a vacuum .     · Avoid strenuous activities, such as bicycle riding, jogging, weight lifting, or aerobic exercise, until your doctor says it is okay.     · Do not drive for 2 to 4 weeks after your surgery or until your doctor says it is okay.     · Avoid riding in a car for more than 30 minutes at a time for 2 to 4 weeks after surgery.  If you must ride in a car for a longer distance, stop often to walk and stretch your legs.     · Try to change your position about every 30 minutes while sitting or standing. This will help decrease your back pain while you are healing.     · You will probably need to take 4 to 6 weeks off from work. It depends on the type of work you do and how you feel.     · You may have sex as soon as you feel able, but avoid positions that put stress on your back or cause pain. Diet    · You can eat your normal diet. If your stomach is upset, try bland, low-fat foods like plain rice, broiled chicken, toast, and yogurt.     · Drink plenty of fluids (unless your doctor tells you not to).     · You may notice that your bowel movements are not regular right after your surgery. This is common. Try to avoid constipation and straining with bowel movements. You may want to take a fiber supplement every day. If you have not had a bowel movement after a couple of days, ask your doctor about taking a mild laxative. Medicines    · Your doctor will tell you if and when you can restart your medicines. He or she will also give you instructions about taking any new medicines.     · If you take aspirin or some other blood thinner, ask your doctor if and when to start taking it again. Make sure that you understand exactly what your doctor wants you to do.     · Take pain medicines exactly as directed. ? If the doctor gave you a prescription medicine for pain, take it as prescribed. ? If you are not taking a prescription pain medicine, ask your doctor if you can take an over-the-counter medicine.     · If your doctor prescribed antibiotics, take them as directed. Do not stop taking them just because you feel better. You need to take the full course of antibiotics.     · If you think your pain medicine is making you sick to your stomach:  ? Take your medicine after meals (unless your doctor has told you not to). ? Ask your doctor for a different pain medicine.    Incision care    · If you have strips of tape on the cut (incision) the doctor made, leave the tape on for a week or until it falls off.     · Wash the area daily with warm, soapy water and pat it dry.     · Keep the area clean and dry. You may cover it with a gauze bandage if it weeps or rubs against clothing. Change the bandage every day. Exercise    · Do back exercises as instructed by your doctor.     · Your doctor may advise you to work with a physical therapist to improve the strength and flexibility of your back. Other instructions    · To reduce stiffness and help sore muscles, use a warm water bottle, a heating pad set on low, or a warm cloth on your back. Do not put heat right over the incision. Do not go to sleep with a heating pad on your skin. Follow-up care is a key part of your treatment and safety. Be sure to make and go to all appointments, and call your doctor if you are having problems. It's also a good idea to know your test results and keep a list of the medicines you take. When should you call for help? Call 911 anytime you think you may need emergency care. For example, call if:    · You passed out (lost consciousness).     · You have sudden chest pain and shortness of breath, or you cough up blood.     · You are unable to move a leg at all. Call your doctor now or seek immediate medical care if:    · You have new or worse symptoms in your legs or buttocks. Symptoms may include:  ? Numbness or tingling. ? Weakness. ? Pain.     · You lose bladder or bowel control.     · You have loose stitches, or your incision comes open.     · You have blood or fluid draining from the incision.     · You have signs of infection, such as:  ? Increased pain, swelling, warmth, or redness. ? Pus draining from the incision. ? A fever. ? Red streaks leading from the incision.    Watch closely for changes in your health, and be sure to contact your doctor if:    · You do not have a bowel movement after taking a laxative.     · You are not getting better as expected. Where can you learn more? Go to http://www.gray.com/  Enter T027 in the search box to learn more about \"Lumbar Laminectomy: What to Expect at Home. \"  Current as of: March 2, 2020               Content Version: 12.6  © 3211-3649 [employer]. Care instructions adapted under license by [employer] (which disclaims liability or warranty for this information). If you have questions about a medical condition or this instruction, always ask your healthcare professional. [address] any warranty or liability for your use of this information. Patient Education        Lumbar Spinal Fusion: What to Expect at Home  Your Recovery     After surgery, you can expect your back to feel stiff and sore. You may have trouble sitting or standing in one position for very long and may need pain medicine in the weeks after your surgery. It may take 4 to 6 weeks to get back to doing simple activities, such as light housework. It may take 6 months to a year for your back to get better completely. You may need to wear a back brace while your back heals. And your doctor may have you go to physical therapy. If your job doesn't require physical labor, you will probably be able to go back to work after 1 or 2 months. If your job involves light physical labor, it may take 3 to 6 months. Most people whose jobs involved heavy labor can never return to those jobs. This care sheet gives you a general idea about how long it will take for you to recover. But each person recovers at a different pace. Follow the steps below to get better as quickly as possible. How can you care for yourself at home? Activity    · Rest when you feel tired. Getting enough sleep will help you recover.     · Try to walk each day. Start by walking a little more than you did the day before. Bit by bit, increase the amount you walk. Walking boosts blood flow and helps prevent pneumonia and constipation. Walking may also decrease your muscle soreness after surgery.     · If advised by your doctor, you may need to avoid lifting anything that would cause excessive strain on your back. This may include a child, heavy grocery bags and milk containers, a heavy briefcase or backpack, cat litter or dog food bags, or a vacuum .     · Avoid strenuous activities, such as bicycle riding, jogging, weight lifting, or aerobic exercise, until your doctor says it is okay.     · Do not drive for 2 to 4 weeks after your surgery or until your doctor says it is okay.     · Avoid riding in a car for more than 30 minutes at a time for 2 to 4 weeks after surgery. If you must ride in a car for a longer distance, stop often to walk and stretch your legs.     · Try to change your position about every 30 minutes while sitting or standing. This will help decrease your back pain while you are healing.     · You will probably need to take at least 4 to 6 weeks off from work. It depends on the type of work you do and how you feel.     · You may have sex as soon as you feel able, but avoid positions that put stress on your back or cause pain. Diet    · You can eat your normal diet. If your stomach is upset, try bland, low-fat foods like plain rice, broiled chicken, toast, and yogurt.     · Drink plenty of fluids (unless your doctor tells you not to).     · You may notice that your bowel movements are not regular right after your surgery. This is common. Try to avoid constipation and straining with bowel movements. You may want to take a fiber supplement every day. If you have not had a bowel movement after a couple of days, ask your doctor about taking a mild laxative. Medicines    · Be safe with medicines. Take pain medicines exactly as directed. ? If the doctor gave you a prescription medicine for pain, take it as prescribed.   ? If you are not taking a prescription pain medicine, ask your doctor if you can take an over-the-counter medicine.     · If your doctor prescribed antibiotics, take them as directed. Do not stop taking them just because you feel better. You need to take the full course of antibiotics.     · If you think your pain medicine is making you sick to your stomach:  ? Take your medicine after meals (unless your doctor has told you not to). ? Ask your doctor for a different pain medicine. Incision care    · You will be given specific instructions about how to care for the cuts (incisions) the doctor made. The instructions will depend on the type of materials used to close the cut. Exercise    · Do back exercises as instructed by your doctor.     · Your doctor may advise you to work with a physical therapist to improve the strength and flexibility of your back. Other instructions    · To reduce stiffness and help sore muscles, use a warm water bottle, a heating pad set on low, or a warm cloth on your back. Do not put heat right over the incision. Do not go to sleep with a heating pad on your skin. Follow-up care is a key part of your treatment and safety. Be sure to make and go to all appointments, and call your doctor if you are having problems. It's also a good idea to know your test results and keep a list of the medicines you take. When should you call for help? Call 911 anytime you think you may need emergency care. For example, call if:    · You passed out (lost consciousness).     · You have sudden chest pain and shortness of breath, or you cough up blood.     · You are unable to move a leg at all. Call your doctor now or seek immediate medical care if:    · You have pain that does not get better after you take pain pills.     · You have new or worse symptoms in your legs or buttocks. Symptoms may include:  ? Numbness or tingling. ? Weakness.   ? Pain.     · You lose bladder or bowel control.     · You have loose stitches, or your incision comes open.     · You have blood or fluid draining from the incision.     · You have signs of infection, such as:  ? Increased pain, swelling, warmth, or redness. ? Pus draining from the incision. ? A fever. Watch closely for any changes in your health, and be sure to contact your doctor if:    · You do not have a bowel movement after taking a laxative.     · You are not getting better as expected. Where can you learn more? Go to http://www.gray.com/  Enter N972 in the search box to learn more about \"Lumbar Spinal Fusion: What to Expect at Home. \"  Current as of: March 2, 2020               Content Version: 12.6  © 2006-2020 [employer]. Care instructions adapted under license by [facility] (which disclaims liability or warranty for this information). If you have questions about a medical condition or this instruction, always ask your healthcare professional. [place] [address] any warranty or liability for your use of this information. Patient Education        Lumbar Spinal Stenosis: Care Instructions  Your Care Instructions     Stenosis in the spine is a narrowing of the canal that is around the spinal cord and nerve roots in your back. It can happen as part of aging. Sometimes bone and other tissue grow into this canal and press on the nerves that branch out from the spinal cord. This can cause pain, numbness, and weakness. When it happens in the lower part of your back, it is called lumbar spinal stenosis. It can cause problems in the legs, feet, and rear end (buttocks). You may be able to get relief from the symptoms of spinal stenosis by taking pain medicine. Your doctor may suggest physical therapy and exercises to keep your spine strong and flexible. Some people try steroid shots to reduce swelling.  If pain and numbness in your legs are still so bad that you cannot do your normal activities, you may need surgery. Follow-up care is a key part of your treatment and safety. Be sure to make and go to all appointments, and call your doctor if you are having problems. It's also a good idea to know your test results and keep a list of the medicines you take. How can you care for yourself at home? · Take an over-the-counter pain medicine. Nonsteroidal anti-inflammatory drugs (NSAIDs) such as ibuprofen or naproxen seem to work best. But if you can't take NSAIDs, you can try acetaminophen. Be safe with medicines. Read and follow all instructions on the label. · Do not take two or more pain medicines at the same time unless the doctor told you to. Many pain medicines have acetaminophen, which is Tylenol. Too much acetaminophen (Tylenol) can be harmful. · Stay at a healthy weight. Being overweight puts extra strain on your spine. · Change positions often when you sit or stand. This can ease pain. It may also reduce pressure on the spinal cord and its nerves. · Avoid doing things that make your symptoms worse. Walking downhill and standing for a long time may cause pain. · Stretch and strengthen your back muscles as your doctor or physical therapist recommends. If your doctor says it is okay to do them, these exercises may help. ? Lie on your back with your knees bent. Gently pull one bent knee to your chest. Put that foot back on the floor, and then pull the other knee to your chest.  ? Do pelvic tilts. Lie on your back with your knees bent. Tighten your stomach muscles. Pull your belly button (navel) in and up toward your ribs. You should feel like your back is pressing to the floor and your hips and pelvis are slightly lifting off the floor. Hold for 6 seconds while breathing smoothly. ? Stand with your back flat against a wall. Slowly slide down until your knees are slightly bent. Hold for 10 seconds, then slide back up the wall. · Remove or change anything in your house that may cause you to fall.  Keep walkways clear of clutter, electrical cords, and throw rugs. When should you call for help? Call 911 anytime you think you may need emergency care. For example, call if:    · You are unable to move a leg at all. Call your doctor now or seek immediate medical care if:    · You have new or worse symptoms in your legs, belly, or buttocks. Symptoms may include:  ? Numbness or tingling. ? Weakness. ? Pain.     · You lose bladder or bowel control. Watch closely for changes in your health, and be sure to contact your doctor if:    · You have a fever, lose weight, or don't feel well.     · You are not getting better as expected. Where can you learn more? Go to http://www.gray.com/  Enter X327 in the search box to learn more about \"Lumbar Spinal Stenosis: Care Instructions. \"  Current as of: March 2, 2020               Content Version: 12.6  © 8899-8897 [facility]. Care instructions adapted under license by [employer] (which disclaims liability or warranty for this information). If you have questions about a medical condition or this instruction, always ask your healthcare professional. [place] [address] any warranty or liability for your use of this information. Discharge Instructions - Spine Surgery    Wound Care and Showering  Your wound will typically be covered with a clear plastic dressing when you go home from the hospital.  Since it is transparent, you will see the underlying gauze turn red with blood which is normal.  You do not need to change the dressing unless it is leaking from the edges. Otherwise, leave this dressing in place. The clear plastic dressing is waterproof, so you can take a shower while it is on. You may remove the clear plastic dressing and the underlying gauze 3 days after surgery. There will be small tape strips under the gauze which should be left in place.   If there is no leaking from the wound, you may take a shower and allow the tape strips to get wet. Some of them may fall off. The remaining strips will be removed once you return to the office. If there is persistent leaking when you first remove the clear dressing, apply new gauze and a new clear plastic dressing (typically purchased at a pharmacy) over the wound. Hair washing is permissible while in the shower. No tub baths, hot tubs or whirlpools until seen in the office. If any of the following should occur, please call the office:   Persistent drainage from incision site   Opening of incisions   Fevers greater than 101 degrees   Flu-like symptoms   Increased redness     Exercise  You have unlimited walking and stair climbing privileges. Walking outside or walking on a treadmill without an incline is also allowed. Do NOT lift anything weighing greater than 10-15 lbs. Especially try to avoid lifting or reaching above your head. Sleeping  You may sleep in any comfortable position. Many patients find comfort sleeping in a recliner chair. It is normal to have difficulty sleeping for the first several weeks following your surgery. We recommend trying Benadryl, Melatonin, or Tylenol PM for help sleeping. All are over-the-counter and can be found in drugstores. Eating  Because of the tubes in your throat while asleep during surgery, it is normal to have a sore throat and some difficulty swallowing solid foods after your surgery. This may persist for several weeks. Eating soft foods like yogurt, macaroni and mashed potatoes seem to help. Pain   If you feel you need pain medicine, you may take regular or extra-strength Tylenol. Do NOT take an anti-inflammatory medication such as Advil, Aleve, or Motrin for the first 8 weeks following your surgery. Anti-inflammatory medications like these hinder bone growth and healing, which is critical in the weeks following surgery.    Do not resume taking Fosamax for eight weeks after your fusion surgery.  To help alleviate persistent soreness around the shoulder blades, apply ice or warm moist compresses. Driving  You may NOT drive a car until told otherwise by your physician. You may be a passenger for short distances (about 20-30 minutes.) If you must take a longer trip, be sure to make several pit stops so that you can walk and stretch your legs. Reclining in the passenger seat seems to be the most comfortable position for most patients. In some states, it is illegal to drive a car while wearing a neck brace. Follow-Up Appointments  When you are discharged from the hospital, a follow up appointment will be made for 2-3 weeks from your surgery date. Call [phone] to confirm your appointment. See attached instructions from Dr Fracisco Mccormick    After general anesthesia or intravenous sedation, for 24 hours or while taking prescription Narcotics:  · Limit your activities  · A responsible adult needs to be with you for the next 24 hours  · Do not drive and operate hazardous machinery  · Do not make important personal or business decisions  · Do not drink alcoholic beverages  · If you have not urinated within 8 hours after discharge, and you are experiencing discomfort from urinary retention, please go to the nearest ED. · If you have sleep apnea and have a CPAP machine, please use it for all naps and sleeping. · Please use caution when taking narcotics and any of your home medications that may cause drowsiness. *  Please give a list of your current medications to your Primary Care Provider. *  Please update this list whenever your medications are discontinued, doses are      changed, or new medications (including over-the-counter products) are added. *  Please carry medication information at all times in case of emergency situations.     These are general instructions for a healthy lifestyle:  No smoking/ No tobacco products/ Avoid exposure to second hand smoke  Surgeon General's Warning: Quitting smoking now greatly reduces serious risk to your health. Obesity, smoking, and sedentary lifestyle greatly increases your risk for illness  A healthy diet, regular physical exercise & weight monitoring are important for maintaining a healthy lifestyle    You may be retaining fluid if you have a history of heart failure or if you experience any of the following symptoms:  Weight gain of 3 pounds or more overnight or 5 pounds in a week, increased swelling in our hands or feet or shortness of breath while lying flat in bed. Please call your doctor as soon as you notice any of these symptoms; do not wait until your next office visit.

## 2021-02-19 NOTE — PROGRESS NOTES
CM in to see patient and wife at bedside to follow up on [place] choices. Patient has chosen [facility]. Order placed and referral faxed. Admission liaison notified.

## 2021-02-19 NOTE — PROGRESS NOTES
ORTHO PROGRESS NOTE    2021    Admit Date: 2021  Admit Diagnosis: Spondylolisthesis, unspecified spinal region [M43.10]  Lumbar stenosis with neurogenic claudication [M48.062]  Status post lumbar spinal arthrodesis [Z98.1]  Post Op day: 2 Days Post-Op      Subjective:     Meron Ocampo is a patient who is now 2 Days Post-Op  and has no complaints. Objective:     PT/OT: satisfactory        Vital Signs:    Patient Vitals for the past 8 hrs:   BP Temp Pulse Resp SpO2   21 0812 (!) 101/52 98.3 °F (36.8 °C) 83 18 95 %   21 0323 119/69 98 °F (36.7 °C) 75 16 93 %     Temp (24hrs), Av.2 °F (36.8 °C), Min:98 °F (36.7 °C), Max:98.3 °F (36.8 °C)      LAB:    No results for input(s): HGB, WBC, PLT, HGBEXT, PLTEXT in the last 72 hours. I/O:  No intake/output data recorded.  1901 -  0700  In: -   Out: 1022 [Urine:4150; Drains:370]    Physical Exam:    Awake and in no acute distress. Mood and affect appropriate. Respirations unlabored and no evidence cyanosis. Calves nontender. Abdomen soft and nontender. Dressing clean/dry  No new neurologic deficit.     Assessment:      Patient Active Problem List   Diagnosis Code    Noncompliance with CPAP treatment Z91.14    Obesity, Class II, BMI 35-39.9, with comorbidity [id number]    Osteoarthritis of right knee M17.11    Status post right knee replacement Z96.651    HTN (hypertension) I10    Spondylolisthesis M43.10    Lumbar stenosis with neurogenic claudication M48.062    Status post lumbar spinal arthrodesis Z98.1       2 Days Post-Op STATUS POST Procedure(s):  L4 L5 LAMINECTOMY AND FUSION/ ALLOGRAFT, INSTRUMENTATION, AND TLIF      Plan:     Continue PT/OT/Rehab  Discontinue: daily dressing change and drain    Consult: none  Anticipate discharge to: HOME today after PM PT      Signed By: Shanice Luis NP

## 2021-02-19 NOTE — DISCHARGE SUMMARY
Discharge Summary    Patient Ciro Dhillon  [id number]  1955  78 y.o. Admit date: 2/17/2021    Discharge date:2/19/2021    Admitting Physician: Wes Zee,*    Discharge Physician: Wes MOURA,*    Reason for Admission: spinal stenosis, spondylolisthesis    Discharge Diagnoses:  Patient Active Problem List   Diagnosis Code    Noncompliance with CPAP treatment Z91.14    Obesity, Class II, BMI 35-39.9, with comorbidity [id number]    Osteoarthritis of right knee M17.11    Status post right knee replacement Z96.651    HTN (hypertension) I10    Spondylolisthesis M43.10    Lumbar stenosis with neurogenic claudication M48.062    Status post lumbar spinal arthrodesis Z98.1          Surgeon: Wes Zee,*    Procedure:   1. Posterolateral and transforaminal lumbar interbody fusion L4 through L5  (CPT 69967)  2. Insertion biomechanical device L4 through L5 (CPT [id number])  3. Lumbar laminectomy L4-5 for extradural lesion. (CPT [id number])  4. Cortical screw instrumentation  L4 through L5 . (CPT [id number])   5. Local autograft bone harvest (CPT 79993)               Post Op complications: none    HBG at Discharge: No results for input(s): HGB, HGBEXT in the last 72 hours. Indications for admission/procedure:  Patient has had low back pain with radiation to the buttocks and lower extremities for an extended period of time. The symptoms and exam findings were felt to be consistent with neurogenic claudication. The preoperative radiographs and MRI/CT showed spondylolisthesis and stenosis. Conservative measures have been exhausted. The symptoms progressed to the point where there is difficulty performing any task that requires prolonged standing or walking. In the outpatient setting the risks, benefits and potential complications of the above-listed procedure were discussed with her and an informed consent was obtained. Hospital Course: Patient admitted to ortho floor. Antibiotics were given postop. SCD and radha hose were in place for DVT prophylaxis. John catheter was discontinued on POD # 1. Patient voided normally. Patient  did not receive blood transfusion. Patient tolerated pain medications and po diet. Hemovac drain was removed on POD # 2. Dressing remained clean, dry, and intact. Physical Therapy started on the day following surgery and progressed to assisted ambulation. Patient remained neurologically stable throughout hospital course. Reports improvement of preoperative pain. At the time of discharge, had understanding of precautions needed following surgery. Discharged to: home    Condition: Stable:    New Medications: oxycodone     Follow up: 2 weeks      Discharge instructions:    -Resume pre hospital diet            -Resume home medications per medical continuation form     -Follow up in office as scheduled   -Call doctor immediately if T>100.5, increased pain, swelling, drainage.   -If shortness of breath or chest pain, immediately go to ER  -Post surgical instruction sheet given to patient    Signed:  Yari Trevizo NP  2/19/2021

## 2021-02-19 NOTE — PROGRESS NOTES
ACUTE OT GOALS:  (Developed with and agreed upon by patient and/or caregiver.)    1. Patient will verbalize and demonstrate understanding of spinal precautions with 100% accuracy during ADLs. 2. Patient will complete lower body bathing and dressing with minimal assistance and adaptive equipment as needed. 3. Patient will complete functional transfers with modified independence and adaptive equipment as needed. 4. Patient will complete toileting and toilet transfer with modified independence and adaptive equipment as needed. 5. Patient will complete functional mobility of household distances with modified independence and adaptive equipment as needed. 6. Patient will demonstrate ability to log roll in bed independently without verbal cues from therapist.   OCCUPATIONAL THERAPY: Daily Note OT Treatment Day # 2    Cee Alexis is a 78 y.o. male   PRIMARY DIAGNOSIS: Lumbar stenosis with neurogenic claudication  Spondylolisthesis, unspecified spinal region [M43.10]  Lumbar stenosis with neurogenic claudication [M48.062]  Status post lumbar spinal arthrodesis [Z98.1]  Procedure(s) (LRB):  L4 L5 LAMINECTOMY AND FUSION/ ALLOGRAFT, INSTRUMENTATION, AND TLIF (N/A)  2 Days Post-Op  Payor: [employer] MEDICARE / Plan: [address] [place] [address] HMO / Product Type: Managed Care Medicare /   ASSESSMENT:     REHAB RECOMMENDATIONS: CURRENT LEVEL OF FUNCTION:  (Most Recently Demonstrated)   Recommendation to date pending progress:  [address] [place] [address]  Equipment:    Rolling Walker Bathing:   Not tested  Dressing:   Not tested  Feeding/Grooming:   Not tested  Toileting:   Not tested  Functional Mobility:   Contact Guard Assistance     ASSESSMENT:  Mr. Berta Cordero completed log rolling with CGA and additional time. Pt with good knowledge of his spinal precautions and upon review of AD for dressing, pt appears to have everything he needs at home with good support from his wife.      SUBJECTIVE:   Mr. Berta Cordero states, \"My knees feel better since my surgery. \"    SOCIAL HISTORY/LIVING ENVIRONMENT:       OBJECTIVE:     PAIN: VITAL SIGNS: LINES/DRAINS:   Pre Treatment: Pain Screen  Pain Scale 1: Visual  Pain Location 1: Back  Post Treatment: 0   APRYL Drain  O2 Device: Nasal cannula, Room air     ACTIVITIES OF DAILY LIVING: I Mod I S SBA CGA Min Mod Max Total NT Comments   BASIC ADLs:              Bathing/ Showering [] [] [] [] [] [] [] [] [] [x]    Toileting [] [] [] [] [] [] [] [] [] [x]    Dressing [] [] [] [] [] [] [] [] [] [x]    Feeding [] [] [] [] [] [] [] [] [] [x]    Grooming [] [] [] [] [] [] [] [] [] [x]    Personal Device Care [] [] [] [] [] [] [] [] [x] []    Functional Mobility [] [] [] [] [x] [] [] [] [] []    I=Independent, Mod I=Modified Independent, S=Supervision, SBA=Standby Assistance, CGA=Contact Guard Assistance,   Min=Minimal Assistance, Mod=Moderate Assistance, Max=Maximal Assistance, Total=Total Assistance, NT=Not Tested    MOBILITY: I Mod I S SBA CGA Min Mod Max Total  NT x2 Comments:   Supine to sit [] [] [] [] [x] [] [] [] [] [] []    Sit to supine [] [] [] [] [] [] [] [] [] [x] []    Sit to stand [] [] [] [] [x] [] [] [] [] [] []    Bed to chair [] [] [] [] [] [] [] [] [] [x] []    I=Independent, Mod I=Modified Independent, S=Supervision, SBA=Standby Assistance, CGA=Contact Guard Assistance,   Min=Minimal Assistance, Mod=Moderate Assistance, Max=Maximal Assistance, Total=Total Assistance, NT=Not Tested    PLAN:   FREQUENCY/DURATION: OT Plan of Care: 6 times/week for duration of hospital stay or until stated goals are met, whichever comes first.    TREATMENT:   TREATMENT:   ( $$ Therapeutic Activity: 8-22 mins  $$ Neuromuscular Re-Education: 8-22 mins   )  Therapeutic Activity (10 Minutes): Therapeutic activity included Rolling, Supine to Sit, Scooting, Transfer Training, Ambulation on level ground and Standing balance to improve functional Mobility, Strength and ROM.   Neuromuscular Re-education (13 Minutes): Neuromuscular Re-education included Balance Training, Functional mobility with facilitation, NDT and Standing balance training to improve Balance, Functional Mobility and Postural Control.     AFTER TREATMENT POSITION/PRECAUTIONS:  Needs within reach    INTERDISCIPLINARY COLLABORATION:  RN/PCT, PT/PTA and OT/BUNN    TOTAL TREATMENT DURATION:  OT Patient Time In/Time Out  Time In: 1110  Time Out: Yazan Morrison

## 2021-02-19 NOTE — PROGRESS NOTES
Problem: Falls - Risk of  Goal: *Absence of Falls  Description: Document Treva Gilbert Fall Risk and appropriate interventions in the flowsheet. Outcome: Progressing Towards Goal  Note: Fall Risk Interventions:  Mobility Interventions: Assess mobility with egress test, Bed/chair exit alarm         Medication Interventions: Teach patient to arise slowly    Elimination Interventions:  Toileting schedule/hourly rounds, Toilet paper/wipes in reach

## 2021-02-19 NOTE — PROGRESS NOTES
ACUTE PHYSICAL THERAPY GOALS:  (Developed with and agreed upon by patient and/or caregiver.)  (1.) Boom Mccoy will move from supine to sit and sit to supine , scoot up and down and roll side to side utilizing log roll technique with MODIFIED INDEPENDENCE within 7 treatment day(s). (2.) Altagracia Ocampo will transfer from bed to chair and chair to bed with MODIFIED INDEPENDENCE using the least restrictive device within 7 treatment day(s). (3.) Altagracia Ocampo will ambulate with MODIFIED INDEPENDENCE for 500 feet with the least restrictive device within 7 treatment day(s). (4.) Altagracia Ocampo will perform standing static and dynamic balance activities x 25 minutes with MODIFIED INDEPENDENCE to improve safety within 7 treatment day(s). (5.) Altagracia Ocampo will perform bilateral lower extremity exercises x 20 min for HEP with INDEPENDENCE to improve strength, endurance, and functional mobility within 7 treatment day(s). PHYSICAL THERAPY: Daily Note and AM Treatment Day # 3    Boom Mccoy is a 78 y.o. male   PRIMARY DIAGNOSIS: Lumbar stenosis with neurogenic claudication  Spondylolisthesis, unspecified spinal region [M43.10]  Lumbar stenosis with neurogenic claudication [M48.062]  Status post lumbar spinal arthrodesis [Z98.1]  Procedure(s) (LRB):  L4 L5 LAMINECTOMY AND FUSION/ ALLOGRAFT, INSTRUMENTATION, AND TLIF (N/A)  2 Days Post-Op    ASSESSMENT:     REHAB RECOMMENDATIONS: CURRENT LEVEL OF FUNCTION:  (Most Recently Demonstrated)   Recommendation to date pending progress:  [address] [place] [address]  Equipment:    None Bed Mobility:   Not tested  Sit to Stand:   Contact Guard Assistance  Transfers:   Standby Assistance  Gait/Mobility:   Standby Assistance to CGA     ASSESSMENT:  Mr. Eugene Reynoso presents sitting up on the side of bed, very agreeable to therapy. Pt ambulated in salvador and performed LE exercises as below. Reviewed spinal precautions.   Pt is making progress towards goals with improved gait quality. Pt has a rollator at home to use. He is expecting to go home later today. Will continue with POC. SUBJECTIVE:   Mr. Margo Inman states, \"I'm going home today. \"    SOCIAL HISTORY/ LIVING ENVIRONMENT: Lives with his wife in a one story home with a ramp to enter. Has a tub/shower combination and tub transfer bench, as well as a bedside commode which they use to raise the toilet seat. Endorses one fall in the past six months. Typically uses a straight cane for mobility.      OBJECTIVE:     PAIN: VITAL SIGNS: LINES/DRAINS:   Pre Treatment:    Post Treatment: not rated   IV  O2 Device: Nasal cannula, Room air     MOBILITY: I Mod I S SBA CGA Min Mod Max Total  NT x2 Comments:   Bed Mobility    Rolling [] [] [] [] [] [] [] [] [] [] []    Supine to Sit [] [] [] [] [] [] [] [] [] [] [] Pt already sitting up on side of bed   Scooting [] [] [] [] [] [] [] [] [] [] []    Sit to Supine [] [] [] [] [] [] [] [] [] [] []    Transfers    Sit to Stand [] [] [] [] [x] [] [] [] [] [] []    Bed to Chair [] [] [] [x] [] [] [] [] [] [] []    Stand to Sit [] [] [] [x] [] [] [] [] [] [] []    I=Independent, Mod I=Modified Independent, S=Supervision, SBA=Standby Assistance, CGA=Contact Guard Assistance,   Min=Minimal Assistance, Mod=Moderate Assistance, Max=Maximal Assistance, Total=Total Assistance, NT=Not Tested    GAIT: I Mod I S SBA CGA Min Mod Max Total  NT x2 Comments:   Level of Assistance [] [] [] [x] [x] [] [] [] [] [] []    Distance 250'    DME Rolling Walker    Gait Quality Slow, shuffled    Weightbearing  Status N/A     I=Independent, Mod I=Modified Independent, S=Supervision, SBA=Standby Assistance, CGA=Contact Guard Assistance,   Min=Minimal Assistance, Mod=Moderate Assistance, Max=Maximal Assistance, Total=Total Assistance, NT=Not Tested    PLAN:   FREQUENCY/DURATION: PT Plan of Care: BID for duration of hospital stay or until stated goals are met, whichever comes first.  TREATMENT: TREATMENT:   (     )  Therapeutic Activity (15 Minutes): Therapeutic activity included Scooting, Ambulation on level ground and Standing balance to improve functional Mobility, Strength and Activity tolerance. Therapeutic Exercise (10 Minutes): Therapeutic exercises noted below to improve functional activity tolerance, AROM and strength.        Date:  2/19/21 Date:   Date:     ACTIVITY/EXERCISE AM PM AM PM AM PM   Ambulation:           Distance  Device  Duration         Standing Heel Raises X 10 B        Seated Toe Raises         Seated Long Arc Quads X 15 B        Standing Marching X 12 B        Standing Hip Abduction X 12 B                 B = bilateral; AA = active assistive; A = active; P = passive        AFTER TREATMENT POSITION/PRECAUTIONS:  Chair, Needs within reach and RN notified    INTERDISCIPLINARY COLLABORATION:  RN/PCT and PT/PTA    TOTAL TREATMENT DURATION:  PT Patient Time In/Time Out  Time In: 0845  Time Out: 333  [facility]

## 2022-03-18 PROBLEM — M43.10 SPONDYLOLISTHESIS: Status: ACTIVE | Noted: 2021-02-17

## 2022-03-19 PROBLEM — Z91.199 NONCOMPLIANCE WITH CPAP TREATMENT: Status: ACTIVE | Noted: 2018-06-12

## 2022-03-19 PROBLEM — Z96.651 STATUS POST RIGHT KNEE REPLACEMENT: Status: ACTIVE | Noted: 2018-06-22

## 2022-03-19 PROBLEM — M48.062 LUMBAR STENOSIS WITH NEUROGENIC CLAUDICATION: Status: ACTIVE | Noted: 2021-02-17

## 2022-03-19 PROBLEM — Z91.14 NONCOMPLIANCE WITH CPAP TREATMENT: Status: ACTIVE | Noted: 2018-06-12

## 2022-03-19 PROBLEM — Z98.1 STATUS POST LUMBAR SPINAL ARTHRODESIS: Status: ACTIVE | Noted: 2021-02-17

## 2022-03-19 PROBLEM — I10 HTN (HYPERTENSION): Status: ACTIVE | Noted: 2018-06-23

## 2022-03-19 PROBLEM — M17.11 OSTEOARTHRITIS OF RIGHT KNEE: Status: ACTIVE | Noted: 2018-06-22

## 2022-08-04 NOTE — DISCHARGE INSTRUCTIONS
Musculoskeletal Chest Pain: Care Instructions  Your Care Instructions  Chest pain is not always a sign that something is wrong with your heart or that you have another serious problem. The doctor thinks your chest pain is caused by strained muscles or ligaments, inflamed chest cartilage, or another problem in your chest, rather than by your heart. You may need more tests to find the cause of your chest pain. Follow-up care is a key part of your treatment and safety. Be sure to make and go to all appointments, and call your doctor if you are having problems. Its also a good idea to know your test results and keep a list of the medicines you take. How can you care for yourself at home? · Take pain medicines exactly as directed. ¨ If the doctor gave you a prescription medicine for pain, take it as prescribed. ¨ If you are not taking a prescription pain medicine, ask your doctor if you can take an over-the-counter medicine. · Rest and protect the sore area. · Stop, change, or take a break from any activity that may be causing your pain or soreness. · Put ice or a cold pack on the sore area for 10 to 20 minutes at a time. Try to do this every 1 to 2 hours for the next 3 days (when you are awake) or until the swelling goes down. Put a thin cloth between the ice and your skin. · After 2 or 3 days, apply a heating pad set on low or a warm cloth to the area that hurts. Some doctors suggest that you go back and forth between hot and cold. · Do not wrap or tape your ribs for support. This may cause you to take smaller breaths, which could increase your risk of lung problems. · Mentholated creams such as Bengay or Icy Hot may soothe sore muscles. Follow the instructions on the package. · Follow your doctor's instructions for exercising. · Gentle stretching and massage may help you get better faster. Stretch slowly to the point just before pain begins, and hold the stretch for at least 15 to 30 seconds.  Do Patient attempted to return your call, please return his call as soon as possible, if he does not answer his home phone feel free to try him on his cell phone as well.    this 3 or 4 times a day. Stretch just after you have applied heat. · As your pain gets better, slowly return to your normal activities. Any increased pain may be a sign that you need to rest a while longer. When should you call for help? Call 911 anytime you think you may need emergency care. For example, call if:  · You have chest pain or pressure. This may occur with:  ¨ Sweating. ¨ Shortness of breath. ¨ Nausea or vomiting. ¨ Pain that spreads from the chest to the neck, jaw, or one or both shoulders or arms. ¨ Dizziness or lightheadedness. ¨ A fast or uneven pulse. After calling 911, chew 1 adult-strength aspirin. Wait for an ambulance. Do not try to drive yourself. · You have sudden chest pain and shortness of breath, or you cough up blood. Call your doctor now or seek immediate medical care if:  · You have any trouble breathing. · Your chest pain gets worse. · Your chest pain occurs consistently with exercise and is relieved by rest.  Watch closely for changes in your health, and be sure to contact your doctor if:  · Your chest pain does not get better after 1 week. Where can you learn more? Go to http://lucia-león.info/. Enter V293 in the search box to learn more about \"Musculoskeletal Chest Pain: Care Instructions. \"  Current as of: May 27, 2016  Content Version: 11.1  © 7731-8708 [employer]. Care instructions adapted under license by [employer] (which disclaims liability or warranty for this information). If you have questions about a medical condition or this instruction, always ask your healthcare professional. [place] [address] any warranty or liability for your use of this information.

## 2022-09-09 ENCOUNTER — OFFICE VISIT (OUTPATIENT)
Dept: ORTHOPEDIC SURGERY | Age: 67
End: 2022-09-09
Payer: COMMERCIAL

## 2022-09-09 VITALS — WEIGHT: 249 LBS | HEIGHT: 70 IN | BODY MASS INDEX: 35.65 KG/M2

## 2022-09-09 DIAGNOSIS — M17.12 PRIMARY OSTEOARTHRITIS OF LEFT KNEE: ICD-10-CM

## 2022-09-09 DIAGNOSIS — M25.562 ACUTE PAIN OF LEFT KNEE: Primary | ICD-10-CM

## 2022-09-09 PROCEDURE — 99214 OFFICE O/P EST MOD 30 MIN: CPT | Performed by: PHYSICIAN ASSISTANT

## 2022-09-09 PROCEDURE — 1123F ACP DISCUSS/DSCN MKR DOCD: CPT | Performed by: PHYSICIAN ASSISTANT

## 2022-09-09 RX ORDER — PREDNISONE 10 MG/1
10 TABLET ORAL SEE ADMIN INSTRUCTIONS
Qty: 48 EACH | Refills: 0 | Status: SHIPPED | OUTPATIENT
Start: 2022-09-09 | End: 2022-09-21

## 2022-09-09 RX ORDER — ASPIRIN 81 MG/1
TABLET, CHEWABLE ORAL
COMMUNITY

## 2022-09-09 RX ORDER — LIDOCAINE 36 MG/1
PATCH TOPICAL
COMMUNITY
Start: 2022-09-05

## 2022-09-09 RX ORDER — OMEPRAZOLE 20 MG/1
CAPSULE, DELAYED RELEASE ORAL
COMMUNITY
Start: 2022-07-19

## 2022-09-09 RX ORDER — HYDROCODONE BITARTRATE AND ACETAMINOPHEN 7.5; 325 MG/1; MG/1
TABLET ORAL
COMMUNITY
Start: 2022-08-23

## 2022-09-09 RX ORDER — FLUTICASONE PROPIONATE 50 MCG
SPRAY, SUSPENSION (ML) NASAL
COMMUNITY
Start: 2021-04-01

## 2022-09-09 RX ORDER — AZELASTINE 1 MG/ML
SPRAY, METERED NASAL
COMMUNITY
Start: 2021-04-01

## 2022-09-09 RX ORDER — CELECOXIB 200 MG/1
200 CAPSULE ORAL DAILY
COMMUNITY
Start: 2022-08-18 | End: 2023-08-18

## 2022-09-09 RX ORDER — IPRATROPIUM BROMIDE 42 UG/1
1 SPRAY, METERED NASAL DAILY PRN
COMMUNITY
Start: 2019-10-01

## 2022-09-09 NOTE — PROGRESS NOTES
Name: Collette Stare Boling  YOB: 1955  Gender: male  MRN: [id number]    CC:   Chief Complaint   Patient presents with    Knee Pain     Left knee pain- fell several times in the last month. Bertha Taylor on concrete 8/18/2022. Has been getting shots from pain management in [facility]- last zirletta injection 3-4 weeks prior to fall. HPI: Tripp Diaz is a 75 y.o. male with a PMHx of HTN, GERD, gout, s/p right TKA in 2018 with Dr. Dileep Varela, s/p right JOSEPH in November 2021 in [facility] after a fall, and in chronic pain management  here for evaluation of left knee pain. The pain has been present for many years and is becoming worse. The pain is primarily on the Medial side. he describes the pain as a constant ache that is intermittently sharp with activity and intermittent locking  The pain is worse with going up and/or down stairs, rising after sitting, standing, walking, and at night, often waking them from sleep  Treatment so far has been prescription and OTC NSAIDS which have not effectively relieved pain/inflammation, activity modification, Tylenol, celebrex, muscle relaxers, opioids, cortisone injections, and voltaren gel with little relief. Patient has been getting Zilretta injections with Jame Doll and his most recent steroid injection into the left knee was in July of this year with his pain management doctor, Dr. Karan Schuler who treats him for his chronic back pain with Norco 7.5 mg every 6 hours prn. Allergies   Allergen Reactions    Penicillins Itching and Nausea Only         Review of Systems:  As per HPI. Pertinent positives and negatives are addressed with the patient, particularly those related to musculoskeletal concerns. Non-orthopaedic concerns were referred back to the primary care physician. PHYSICAL EXAMINATION:   The patient appears their stated age and they are in no distress.   Ht 5' 10\" (1.778 m)   Wt 249 lb (112.9 kg)   BMI 35.73 kg/m²       The lower extremities are as described below. Circulation is normal with palpable pedal pulses bilaterally and no edema. There is no lymph adenopathy in the popliteal or malleolar region. The skin is without stasis disease distally bilaterally. Sensation is intact to light touch bilaterally. There is moderate tenderness to palpation over the medial joint line of the left knee(s)  The gait is noted to be moderate antalgic and and ambulates with a Walker  Range of motion is 0-120 degrees on the right and 0-110 degrees on the left. left knee: There is 2mm of anterior/posterior translation and 2mm of medial/lateral instability bilaterally. There is 2 degrees of varus alignment in the left knee. Limb lengths are equal.  Quadriceps strength is excellent. Their judgment and insight are normal.  They are oriented to time, place and person. Their memory is good and the mood and affect appropriate. X-RAY:   AP, lateral, sunrise, and 45 degree  views of the left knee are reviewed. There is tricompartmental joint space loss, eburnated bone, and osteophyte formation present. X-ray impression:  Advanced degenerative joint disease of left knee      IMPRESSION:     ICD-10-CM    1. Acute pain of left knee  M25.562 XR KNEE LEFT (MIN 4 VIEWS)      2. Primary osteoarthritis of left knee  M17.12           RECOMMENDATIONS:    Reviewed x-ray findings with the patient. This patient's clinical history and physical exam is consistent with osteoarthritis of the left knee(s). We discussed the natural history of this condition as a degenerative process that causes inflammation of the joints due to a breakdown of cartilage, the hard, thick tissue that cushions the joints. We discussed that osteoarthritis never completely resolves on its own and symptoms including pain, stiffness, and swelling may wax and wane as the condition progresses.  He understands that conservative treatments typically include activity modification, NSAIDs and physical therapy. Oral and/or steroid injections into the joint could be considered in resistant scenarios. Viscosupplementation injections may also be useful as a temporary cartilage replacement in certain patients. I advised to avoid any prolonged bedrest and to try to maintain ADLs as much as possible. The patient was counseled to follow up with me should he develop any worsening symptoms that begin to limit activities of daily living. Patient has exhausted nonoperative management at this time. He has advanced OA of the left knee and the need for joint replacement is present. Patient would like to schedule with the first available surgeon. I did discuss with him that I will have to clear this with her partner since he did have a previous right knee replacement in 2018 with Dr. Drew Shannon, however since Dr. Drew Shannon is currently booked until March of next year the patient does not want to wait this long. I instructed him that I will talk with her partners and have the team of the first available surgeon reach out to him with a follow-up appointment and to schedule surgery. Patient states he is okay with meeting the surgeon during his preop appointment which I informed him may be only a week before his surgery, since we discussed the risks, benefits, and contraindications of knee replacement surgery extensively today.    ----The patient will be treated observantly with self directed symptomatic care. Instructions were given to follow up if there is any neurologic or functional decline.  ---- A home exercise program was prescribed for stretching and strengthening. A list of exercises was provided. ----Weight Loss: We discussed that pain and problems in the hip and/or knee may be aggravated by obesity. The effectiveness of joint replacement surgery can also be affected by a patient's weight. Obese patients are higher risk of infections and failure of hardware due to weight stresses.  We discussed that

## 2022-09-09 NOTE — PATIENT INSTRUCTIONS
Patient Education        Learning About Total Knee Replacement Surgery  What is a total knee replacement? A total knee replacement replaces the worn ends of the thighbone (femur) and the lower leg bone (tibia) where they meet at the knee. Sometimes the surface of the patella (kneecap) is replaced too. You may want this surgery if you have knee pain, stiffness, swelling, or problems moving your knee that you cannot treat in other ways. For most people, these problems are caused by arthritis. They can also be caused by a knee injury. If you need to have both knees replaced, you may have both surgeries at the same time. Or your doctor may recommend doing one knee at a time. Your doctor would replace the second knee after you recover from the first knee surgery. Recovery after a double knee replacement takes longer than after a singlereplacement. How is a total knee replacement done? Before surgery, you will get medicine to make you sleep or feel relaxed. If you will be awake during surgery, you will also get a shot of medicine into yourspine to make your legs numb. There are two types of replacement joints. They are:  Cemented joints. The cement acts as glue, attaching the new joint to the bone. Uncemented joints. These have a metal coating with many small openings. Over time, new bone grows and fills up the openings. This new bone attaches the joint to the bone. Your doctor may also use a combination of cemented and uncemented parts. Your doctor makes a cut, called an incision, on the front of your knee. Yourdoctor then:  Replaces the damaged part of your femur with a metal piece. Replaces the damaged part of your tibia with a metal piece and plastic surface. May replace part of your kneecap with plastic. The doctor finishes the surgery by closing your incision with stitches,staples, skin glue, or tape strips. What can you expect as you recover from total knee replacement surgery?   Your knee will be swollen and will hurt when you move it. You'll need to take pain medicine for a time after surgery. Most people will start to walk with awalker or crutches the day of surgery. You'll start rehabilitation (rehab) before you leave the hospital. Rehab willhelp you improve strength and movement in your knee. You may need some extra support or help at home for the first few weeks. After you recover, you should be able to do activities such as go for walks, dance, and ride a bike on flat ground. Talk to your doctor about whether youcan do more strenuous activities. Follow-up care is a key part of your treatment and safety. Be sure to make and go to all appointments, and call your doctor if you are having problems. It's also a good idea to know your test results and keep alist of the medicines you take. Where can you learn more? Go to https://WeDeliver.DestinationRX. org and sign in to your Fatwire account. Enter R754 in the [place] box to learn more about \"Learning About Total Knee Replacement Surgery. \"     If you do not have an account, please click on the \"Sign Up Now\" link. Current as of: March 9, 2022               Content Version: 13.3  © 2006-2022 Healthwise, Guest of a Guest. Care instructions adapted under license by [facility] ([facility]). If you have questions about a medical condition or this instruction, always ask your healthcare professional. [place] [address] any warranty or liability for your use of this information. Patient Education        Total Knee Replacement: Before Your Surgery  What is a total knee replacement? A total knee replacement replaces the worn ends of the bones where they meet at the knee. Those bones are the thighbone (femur) and the lower leg bone (tibia). Your doctor will remove the damaged bone. Then your doctor will replace it with plastic and metal parts. These new parts may be attached to your bones withcement.   Your doctor will make a cut down the center of your knee. This cut is called an incision. It will be several inches long. Sometimes the surgery can be done with a smaller incision. Both kinds of incisions leave scars that usually fadewith time. Your doctor will let you know if you will stay in the hospital or if you can go home the day of surgery. If you have both knees done at the same time, you mayneed to be in the hospital for a few days. Most people go back to normal activities or work in 4 to 16 weeks. This depends on your health. It also depends on how well your knee does in your rehabprogram. This may take longer if you have both knees done at the same time. How do you prepare for surgery? Surgery can be stressful. This information will help you understand what youcan expect. And it will help you safely prepare for surgery. Preparing for surgery    You may need to shower or bathe with a special soap the night before and the morning of your surgery. The soap contains chlorhexidine. It reduces the amount of bacteria on your skin that could cause an infection after surgery. Be sure you have someone to take you home. Anesthesia and pain medicine will make it unsafe for you to drive or get home on your own. Understand exactly what surgery is planned, along with the risks, benefits, and other options. If you take aspirin or some other blood thinner, ask your doctor if you should stop taking it before your surgery. Make sure that you understand exactly what your doctor wants you to do. These medicines increase the risk of bleeding. Tell your doctor ALL the medicines, vitamins, supplements, and herbal remedies you take. Some may increase the risk of problems during your surgery. Your doctor will tell you if you should stop taking any of them before the surgery and how soon to do it. Make sure your doctor and the hospital have a copy of your advance directive. If you don't have one, you may want to prepare one.  It lets others know your health care wishes. It's a good thing to have before any type of surgery or procedure. What happens on the day of surgery? Follow the instructions exactly about when to stop eating and drinking. If you don't, your surgery may be canceled. If your doctor told you to take your medicines on the day of surgery, take them with only a sip of water. Take a bath or shower before you come in for your surgery. Do not apply lotions, perfumes, deodorants, or nail polish. Do not shave the surgical site yourself. Take off all jewelry and piercings. And take out contact lenses, if you wear them. At the hospital or surgery center   Bring a picture ID. The area for surgery is often marked to make sure there are no errors. You will be kept comfortable and safe by your anesthesia provider. The anesthesia may make you sleep. Or it may just numb the area being worked on. You also will get antibiotics through the IV tube before surgery. This lowers the risk of an infection of the incision. The surgery will take about 2 to 3 hours. When should you call your doctor? You have questions or concerns. You don't understand how to prepare for your surgery. You become ill before the surgery (such as fever, flu, or a cold). You need to reschedule or have changed your mind about having the surgery. Where can you learn more? Go to https://ShoeboxmarkHomeschool Snowboarding.My [email or web address]...[email or web address]. org and sign in to your Flipzu account. Enter R072 in the [facility] box to learn more about \"Total Knee Replacement: Before Your Surgery. \"     If you do not have an account, please click on the \"Sign Up Now\" link. Current as of: March 9, 2022               Content Version: 13.3  © 9127-5182 Healthwise, Incorporated. Care instructions adapted under license by [facility] ([facility]).  If you have questions about a medical condition or this instruction, always ask your healthcare professional. [employer] disclaims any warranty or liability for your use of this information. Patient Education        Total Knee Replacement: What to Expect at the Hospital  What care can you expect in the hospital?     After knee replacement surgery, you will be taken to the recovery room. In a few hours, you may go to a hospital room. You may see a metal triangle called a trapeze over your bed. You can use this to help move yourself around in bed. You will be very tired and will want to rest. Your nurse may also help turn youas you rest.  You will be getting fluids into your vein through an IV tube. You may also havea tube called a drain near the cut (incision) in your knee. You may not feel hungry. You may feel sick to your stomach or constipated for a couple of days. This is common. Your nurse may give you stool softeners orlaxatives to help with constipation. You may have stockings that put pressure on your legs to prevent blood clots. Your nurse will also give you medicine and exercise instructions to helpprevent clots. Most people get out of bed with help on the day of surgery. Your doctor will let you know if you will stay in the hospital or if you can go home the day ofsurgery. This care sheet gives you a general idea about how your recovery will begin in the hospital. Each person has a different experience and recovers at adifferent pace. What will happen in the hospital?  Pain and pain medicine    You will receive medicine to help control pain. Some pain medicines are given through an IV. Others are taken by mouth. Take it as needed, and remember that it is easier to prevent pain before it starts than to stop it after it has started. If you are still in pain after you take your medicine, tell your nurse. You may need new medicine or to get the medicine in a different form. Other medicines    Your doctor will tell you if and when you can restart your medicines.  You will also get instructions about taking any new medicines. If you take aspirin or some other blood thinner, ask your doctor if and when to start taking it again. Make sure that you understand exactly what your doctor wants you to do. Your doctor will probably give you blood thinners to prevent blood clots in your leg. You take this medicine during your hospital stay and when you go home. Rehabilitation    Your rehabilitation (rehab) will probably begin the day of your surgery. Your physical therapist will get you started. It may be painful to exercise at first, but your nurse will give you pain medicine if you need it. Your physical therapist will help you walk, go up and down stairs, and get in and out of bed and chairs. The therapist will help improve the movement (range of motion) and strength in your knee. You will learn positions and motions that will help prevent your knee from popping out (dislocating). This is a very important part of your therapy. How quickly you regain strength and motion and do things on your own depends on how well you follow your physical therapy. Your physical therapist will teach you the exercises, but you must do them yourself. An occupational therapist will work with you. You will learn how to bathe, dress, and do daily activities. You may need tools to help with everyday activities. Tools include shower stools, shoehorns, and long-handled sponges. Diet    You will probably get liquids at first, but you can start to eat your normal diet when you feel like it. If your stomach is upset, your nurse will probably bring you bland, low-fat foods like plain rice, broiled chicken, toast, and yogurt. Drinking enough fluids, taking a stool softener, and eating foods that are good sources of fiber can help you avoid constipation after surgery. Incision care    You will have a bandage over your incision. Your nurse will care for this.    Other instructions    Your nurse or respiratory therapist will have you do breathing and coughing exercises to prevent problems such as pneumonia. Breathe in deeply through your nose, and slowly breathe out through your mouth. Do this 3 times, and then cough 2 times. You may have a device (incentive spirometer) that you suck air through to help keep your lungs healthy. Use this as your nurse or therapist tells you to. Follow-up care is a key part of your treatment and safety. Be sure to make and go to all appointments, and call your doctor if you are having problems. It's also a good idea to know your test results and keep alist of the medicines you take. When should you call for help? You have severe trouble breathing. You have a cough, shortness of breath, or chest pain. You are sick to your stomach or cannot keep fluids down. You have signs of a blood clot, such as:  Pain in your calf, back of the knee, thigh, or groin. Redness and swelling in your leg or groin. You are in pain or your pain does not get better after you take pain medicine. Bright red blood has soaked through the bandage over your incision. You have signs of infection, such as: Increased pain, swelling, warmth, or redness. Red streaks leading from the incision. Pus draining from the incision. A fever. Where can you learn more? Go to https://Men Rock.ByAllAccounts. org and sign in to your Tello account. Enter F698 in the [facility] box to learn more about \"Total Knee Replacement: What to Expect at the Hospital.\"     If you do not have an account, please click on the \"Sign Up Now\" link. Current as of: March 9, 2022               Content Version: 13.3  © 2614-4054 Healthwise, Incorporated. Care instructions adapted under license by [facility] ([facility]).  If you have questions about a medical condition or this instruction, always ask your healthcare professional. [address]  any warranty or liability for your use of this information. Patient Education        Knee Arthritis: Exercises  Introduction  Here are some examples of exercises for you to try. The exercises may be suggested for a condition or for rehabilitation. Start each exercise slowly. Ease off the exercises if you start to have pain. You will be told when to start these exercises and which ones will work bestfor you. How to do the exercises  Knee flexion with heel slide    Lie on your back with your knees bent. Slide your heel back by bending your affected knee as far as you can. Then hook your other foot around your ankle to help pull your heel even farther back. Hold for about 6 seconds, then rest for up to 10 seconds. Repeat 8 to 12 times. Switch legs and repeat steps 1 through 4, even if only one knee is sore. Quad [employer] Ben[employer]tez with your affected leg straight and supported on the floor or a firm bed. Place a small, rolled-up towel under your knee. Your other leg should be bent, with that foot flat on the floor. Tighten the thigh muscles of your affected leg by pressing the back of your knee down into the towel. Hold for about 6 seconds, then rest for up to 10 seconds. Repeat 8 to 12 times. Switch legs and repeat steps 1 through 4, even if only one knee is sore. Straight-leg raises to the front    Lie on your back with your good knee bent so that your foot rests flat on the floor. Your affected leg should be straight. Make sure that your low back has a normal curve. You should be able to slip your hand in between the floor and the small of your back, with your palm touching the floor and your back touching the back of your hand. Tighten the thigh muscles in your affected leg by pressing the back of your knee flat down to the floor. Hold your knee straight. Keeping the thigh muscles tight and your leg straight, lift your affected leg up so that your heel is about 12 inches off the floor. Hold for about 6 seconds, then lower slowly.   Relax for up to 10 seconds between repetitions. Repeat 8 to 12 times. Switch legs and repeat steps 1 through 5, even if only one knee is sore. Active knee flexion    Lie on your stomach with your knees straight. If your kneecap is uncomfortable, roll up a washcloth and put it under your leg just above your kneecap. Lift the foot of your affected leg by bending the knee so that you bring the foot up toward your buttock. If this motion hurts, try it without bending your knee quite as far. This may help you avoid any painful motion. Slowly move your leg up and down. Repeat 8 to 12 times. Switch legs and repeat steps 1 through 4, even if only one knee is sore. Quadriceps stretch (facedown)    Lie flat on your stomach, and rest your face on the floor. Wrap a towel or belt strap around the lower part of your affected leg. Then use the towel or belt strap to slowly pull your heel toward your buttock until you feel a stretch. Hold for about 15 to 30 seconds, then relax your leg against the towel or belt strap. Repeat 2 to 4 times. Switch legs and repeat steps 1 through 4, even if only one knee is sore. Stationary exercise bike    If you do not have a stationary exercise bike at home, you can find one to ride at your local health club or community center. Adjust the height of the bike seat so that your knee is slightly bent when your leg is extended downward. If your knee hurts when the pedal reaches the top, you can raise the seat so that your knee does not bend as much. Start slowly. At first, try to do 5 to 10 minutes of cycling with little to no resistance. Then increase your time and the resistance bit by bit until you can do 20 to 30 minutes without pain. If you start to have pain, rest your knee until your pain gets back to the level that is normal for you. Or cycle for less time or with less effort. Follow-up care is a key part of your treatment and safety.  Be sure to make and go to all appointments, and call your doctor if you are having problems. It's also a good idea to know your test results and keep alist of the medicines you take. Where can you learn more? Go to https://TMSpeFanhuan.com.[email or web address] org and sign in to your [facility] account. Enter C159 in the [employer] box to learn more about \"Knee Arthritis: Exercises. \"     If you do not have an account, please click on the \"Sign Up Now\" link. Current as of: March 9, 2022               Content Version: 13.3  © 4779-3737 Healthwise, Incorporated. Care instructions adapted under license by [facility] ([facility]). If you have questions about a medical condition or this instruction, always ask your healthcare professional. [address] any warranty or liability for your use of this information.

## 2022-09-26 ENCOUNTER — TELEPHONE (OUTPATIENT)
Dept: ORTHOPEDIC SURGERY | Age: 67
End: 2022-09-26

## 2022-09-27 ENCOUNTER — TELEPHONE (OUTPATIENT)
Dept: ORTHOPEDIC SURGERY | Age: 67
End: 2022-09-27

## 2022-09-27 NOTE — TELEPHONE ENCOUNTER
He missed a call yesterday. He is waiting to hear about having a knee surgery with someone else since Sanjay Garcia is booked so far out. Please try him again.

## 2022-09-27 NOTE — TELEPHONE ENCOUNTER
SPOKE  WITH PATIENT AND SCHEDULED HIM FOR SURGERY ON 03/08/23 AND WILL PUT HIM ON THE CANCELLATION LIST

## 2022-11-21 ENCOUNTER — TELEPHONE (OUTPATIENT)
Dept: ORTHOPEDIC SURGERY | Age: 67
End: 2022-11-21

## 2022-11-21 NOTE — TELEPHONE ENCOUNTER
Spoke with patient and confirmed we will move patient's surgery to January 9th and he will receive a letter soon with pre-op and post-op appointments.

## 2022-11-23 DIAGNOSIS — M17.12 PRIMARY OSTEOARTHRITIS OF LEFT KNEE: Primary | ICD-10-CM

## 2022-12-12 ENCOUNTER — HOSPITAL ENCOUNTER (OUTPATIENT)
Dept: SURGERY | Age: 67
Discharge: HOME | End: 2022-12-15
Payer: MEDICARE

## 2022-12-12 ENCOUNTER — HOSPITAL ENCOUNTER (OUTPATIENT)
Dept: REHABILITATION | Age: 67
Discharge: HOME | End: 2022-12-15
Payer: MEDICARE

## 2022-12-12 VITALS
WEIGHT: 245 LBS | TEMPERATURE: 97.9 F | DIASTOLIC BLOOD PRESSURE: 75 MMHG | RESPIRATION RATE: 16 BRPM | HEIGHT: 70 IN | SYSTOLIC BLOOD PRESSURE: 133 MMHG | HEART RATE: 74 BPM | OXYGEN SATURATION: 96 % | BODY MASS INDEX: 35.07 KG/M2

## 2022-12-12 DIAGNOSIS — M17.12 PRIMARY OSTEOARTHRITIS OF LEFT KNEE: Primary | ICD-10-CM

## 2022-12-12 DIAGNOSIS — Z91.14 NONCOMPLIANCE WITH CPAP TREATMENT: ICD-10-CM

## 2022-12-12 LAB
ANION GAP SERPL CALC-SCNC: 5 MMOL/L (ref 2–11)
APTT PPP: 26.3 SEC (ref 24.5–34.2)
BACTERIA SPEC CULT: ABNORMAL
BASOPHILS # BLD: 0.1 K/UL (ref 0–0.2)
BASOPHILS NFR BLD: 1 % (ref 0–2)
BUN SERPL-MCNC: 23 MG/DL (ref 8–23)
CALCIUM SERPL-MCNC: 9.1 MG/DL (ref 8.3–10.4)
CHLORIDE SERPL-SCNC: 108 MMOL/L (ref 101–110)
CO2 SERPL-SCNC: 27 MMOL/L (ref 21–32)
CREAT SERPL-MCNC: 1.39 MG/DL (ref 0.8–1.5)
DIFFERENTIAL METHOD BLD: ABNORMAL
EKG ATRIAL RATE: 72 BPM
EKG DIAGNOSIS: NORMAL
EKG P AXIS: 80 DEGREES
EKG P-R INTERVAL: 154 MS
EKG Q-T INTERVAL: 368 MS
EKG QRS DURATION: 84 MS
EKG QTC CALCULATION (BAZETT): 402 MS
EKG R AXIS: 73 DEGREES
EKG T AXIS: 75 DEGREES
EKG VENTRICULAR RATE: 72 BPM
EOSINOPHIL # BLD: 0.3 K/UL (ref 0–0.8)
EOSINOPHIL NFR BLD: 4 % (ref 0.5–7.8)
ERYTHROCYTE [DISTWIDTH] IN BLOOD BY AUTOMATED COUNT: 13.2 % (ref 11.9–14.6)
EST. AVERAGE GLUCOSE BLD GHB EST-MCNC: 148 MG/DL
GLUCOSE SERPL-MCNC: 146 MG/DL (ref 65–100)
HBA1C MFR BLD: 6.8 % (ref 4.8–5.6)
HCT VFR BLD AUTO: 40.1 % (ref 41.1–50.3)
HGB BLD-MCNC: 13.2 G/DL (ref 13.6–17.2)
IMM GRANULOCYTES # BLD AUTO: 0.3 K/UL (ref 0–0.5)
IMM GRANULOCYTES NFR BLD AUTO: 3 % (ref 0–5)
INR PPP: 1.1
LYMPHOCYTES # BLD: 1.3 K/UL (ref 0.5–4.6)
LYMPHOCYTES NFR BLD: 16 % (ref 13–44)
MCH RBC QN AUTO: 30.4 PG (ref 26.1–32.9)
MCHC RBC AUTO-ENTMCNC: 32.9 G/DL (ref 31.4–35)
MCV RBC AUTO: 92.4 FL (ref 82–102)
MONOCYTES # BLD: 0.7 K/UL (ref 0.1–1.3)
MONOCYTES NFR BLD: 8 % (ref 4–12)
NEUTS SEG # BLD: 5.7 K/UL (ref 1.7–8.2)
NEUTS SEG NFR BLD: 69 % (ref 43–78)
NRBC # BLD: 0 K/UL (ref 0–0.2)
PLATELET # BLD AUTO: 193 K/UL (ref 150–450)
PMV BLD AUTO: 10.3 FL (ref 9.4–12.3)
POTASSIUM SERPL-SCNC: 3.7 MMOL/L (ref 3.5–5.1)
PROTHROMBIN TIME: 13.7 SEC (ref 12.6–14.3)
RBC # BLD AUTO: 4.34 M/UL (ref 4.23–5.6)
SERVICE CMNT-IMP: ABNORMAL
SODIUM SERPL-SCNC: 140 MMOL/L (ref 133–143)
WBC # BLD AUTO: 8.3 K/UL (ref 4.3–11.1)

## 2022-12-12 PROCEDURE — 97161 PT EVAL LOW COMPLEX 20 MIN: CPT

## 2022-12-12 PROCEDURE — 80048 BASIC METABOLIC PNL TOTAL CA: CPT

## 2022-12-12 PROCEDURE — 85610 PROTHROMBIN TIME: CPT

## 2022-12-12 PROCEDURE — 85730 THROMBOPLASTIN TIME PARTIAL: CPT

## 2022-12-12 PROCEDURE — 93005 ELECTROCARDIOGRAM TRACING: CPT | Performed by: ANESTHESIOLOGY

## 2022-12-12 PROCEDURE — 94760 N-INVAS EAR/PLS OXIMETRY 1: CPT

## 2022-12-12 PROCEDURE — 87641 MR-STAPH DNA AMP PROBE: CPT

## 2022-12-12 PROCEDURE — 85025 COMPLETE CBC W/AUTO DIFF WBC: CPT

## 2022-12-12 PROCEDURE — 83036 HEMOGLOBIN GLYCOSYLATED A1C: CPT

## 2022-12-12 RX ORDER — SODIUM CHLORIDE 9 MG/ML
INJECTION, SOLUTION INTRAVENOUS PRN
Status: DISCONTINUED | OUTPATIENT
Start: 2022-12-12 | End: 2022-12-12

## 2022-12-12 RX ORDER — SODIUM CHLORIDE 0.9 % (FLUSH) 0.9 %
5-40 SYRINGE (ML) INJECTION EVERY 12 HOURS SCHEDULED
Status: DISCONTINUED | OUTPATIENT
Start: 2022-12-12 | End: 2022-12-12

## 2022-12-12 RX ORDER — ACETAMINOPHEN 500 MG
1000 TABLET ORAL ONCE
Status: DISCONTINUED | OUTPATIENT
Start: 2022-12-12 | End: 2022-12-12

## 2022-12-12 RX ORDER — SODIUM CHLORIDE 0.9 % (FLUSH) 0.9 %
5-40 SYRINGE (ML) INJECTION PRN
Status: DISCONTINUED | OUTPATIENT
Start: 2022-12-12 | End: 2022-12-12

## 2022-12-12 ASSESSMENT — PAIN DESCRIPTION - DESCRIPTORS: DESCRIPTORS: SHARP

## 2022-12-12 ASSESSMENT — KOOS JR
HOW SEVERE IS YOUR KNEE STIFFNESS AFTER FIRST WAKING IN MORNING: 4
BENDING TO THE FLOOR TO PICK UP OBJECT: 4
STANDING UPRIGHT: 4
STRAIGHTENING KNEE FULLY: 3
RISING FROM SITTING: 4
KOOS JR TOTAL INTERVAL SCORE: 15.939
TWISING OR PIVOTING ON KNEE: 3
GOING UP OR DOWN STAIRS: 4

## 2022-12-12 ASSESSMENT — PAIN DESCRIPTION - LOCATION: LOCATION: KNEE

## 2022-12-12 ASSESSMENT — PULMONARY FUNCTION TESTS
FEV1 (%PREDICTED): 50
FEV1 (LITERS): 1.66

## 2022-12-12 NOTE — CARE COORDINATION
[facility] Case Management note:  Patient screened in Prehab for discharge planning needs. Patient scheduled for a future total joint replacement. We discussed Home Health and equipment needed after surgery. List of Home Health providers offered. Pt requesting [facility] -asking for L[facility].  Denies equipment needs as he has fixed wheeled walker and 3-1 BSC

## 2022-12-12 NOTE — PROGRESS NOTES
Fernanda Tatum  : 1955  Primary: [facility]  Secondary:  [facility] at [facility]  [address]  [phone]        Physical Therapy Prehab Evaluation Summary:2022   Time In/Out   PT Charge Capture  Episode     MEDICAL/REFERRING DIAGNOSIS: Unilateral primary osteoarthritis, left knee [M17.12]  REFERRING PHYSICIAN: Jayashree Smalls., *    ICD-10: Treatment Diagnosis:   Pain in Left Knee (M25.562)  Stiffness of Left Knee, Not elsewhere classified (M25.662)  Difficulty in walking, Not elsewhere classified (R26.2)    DATE OF SURGERY: 23  Assessment:   COMMENTS:  Pt. Plans to go home with spouse who is here today. He is using a rollator slowly with flexed posture and mentioned a recent fall. He had a right tka in 2017. 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 PROBLEM LIST:   (Impacting functional limitations):  Mr. Jeannie Alfonso presents with the following lower extremity(s) problems:  Strength  Range of Motion  Home Exercise Program  Pain INTERVENTIONS PLANNED:   (Benefits and precautions of physical therapy have been discussed with the patient.)  Home Exercise Program  Educational Discussion       GOALS: (Goals have been discussed and agreed upon with patient.)  Discharge Goals: Time Frame: 1 Day  Patient will demonstrate independence with a home exercise program designed to increase strength, range of motion, and pain control to minimize functional deficits and optimize patient for total joint replacement.     Subjective:   Past Medical History/Comorbidities:   Mr. Jeannie Alfonso  has a past medical history of Arthritis, Chewing tobacco use, Chronic pain, CP (cerebral palsy) ([address] Environmental and seasonal allergies, GERD (gastroesophageal reflux disease), Gout, History of vertebral fracture, Hypertension, Kidney stones, Morbid obesity ([address] Rheumatic fever, Sinus infection, Status post right knee replacement, and Unspecified sleep apnea. Mr. Ray Calderon  has a past surgical history that includes orthopedic surgery (Right, 01/2014); back surgery (1989); orthopedic surgery (Right); Total knee arthroplasty; Colonoscopy; other surgical history; Hip fracture surgery (Right); and lumbar laminectomy.     Allergies:  Penicillins    Current Medications:  Refer to pre-assessment nursing note    Home Set-Up/Prior Level of Function:  Lives With: Spouse  Type of Home: House  Home Layout: One level  Home Access: Stairs to enter with rails  Entrance Stairs - Number of Steps: 3  Bathroom Shower/Tub: Tub/Shower unit  Bathroom Equipment: Tub transfer bench, 3-in-1 commode  Home Equipment: Ziyad Canchola, jah, Cane    Dominant Side:  Dominant Hand: : Left    Current Functional Status:   Ambulation:  [] Independent  [] Walk Indoors Only  [] Walk Outdoors  [x] Use Assistive Device  [] Use Wheelchair Only Dressing:  [x] [address] from Someone for:  [] Sock/Shoes  [] Pants  [] Everything   Bathing/Showering:   [x] Independent  [] Requires Assistance from Someone  [] [address] Only Household Activities:  [x] Routine house and yard work  [] Light Housework Only  [] None     Objective:   PAIN:   Pre-Treatment Pain  Pain Assessment: 0-10  Pain Level:  (10 at worst)  Pain Location: Knee  Pain Descriptors: Sharp    Post Treatment: knee pain    Outcome Measure:   HOOS-JR:       KOOS-JR:  KOOS, Jr. Knee Survey Score: 26    LOWER EXTREMITY GROSS EVALUATION:  RIGHT LE   Within Functional Limits   Abnormal   Comments   Strength [] []  Decreased functional - smaller girth   Range of Motion [] []  Decreased functional      LEFT LE Within Functional Limits   Abnormal   Comments   Strength [] []  4/5   Range of Motion [] [] AROM LLE (degrees)  LLE AROM : Exceptions  L Knee Flexion (0-145): 101  L Knee Extension (0): 7     UPPER EXTREMITY GROSS EVALUATION:     Within Functional Limits   Abnormal   Comments   Strength [] []    Range of Motion [] [] SENSATION  Sensation [x]       COGNITION/  PERCEPTION: Intact Impaired (Comments):   Orientation [x]     Vision [x]     Hearing [x]     Cognition  [x]       TRANSFERS: I Mod I S SBA CGA Min Mod Max Total  NT x2 Comments:   Sit to Stand [] [] [] [x] [] [] [] [] [] [] []    Stand to Sit [] [] [] [x] [] [] [] [] [] [] []    Stand pivot [] [] [] [] [] [] [] [] [] [] []     [] [] [] [] [] [] [] [] [] [] []    I=Independent, Mod I=Modified Independent, S=Supervision, SBA=Standby Assistance, CGA=Contact Guard Assistance,   Min=Minimal Assistance, Mod=Moderate Assistance, Max=Maximal Assistance, Total=Total Assistance, NT=Not Tested    BALANCE: Good Fair+ Fair Fair- Poor NT Comments   Sitting Static [x] [] [] [] [] []    Sitting Dynamic [x] [] [] [] [] []              Standing Static [] [x] [] [] [] [] With rollator   Standing Dynamic [] [] [x] [] [] [] With rollator     Postural Assessment:   Rounded Shoulders  Trunk Flexion  Knees flexed    GAIT: I Mod I S SBA CGA Min Mod Max Total  NT x2 Comments:   Level of Assistance [] [x] [] [] [] [] [] [] [] [] []    Weightbearing Status       Distance  200 feet    Gait Quality Antalgic, Decreased adelso , Decreased step length, Decreased stance, and Trunk flexion    DME Rollator     Stairs      Ramp     I=Independent, Mod I=Modified Independent, S=Supervision, SBA=Standby Assistance, CGA=Contact Guard Assistance,   Min=Minimal Assistance, Mod=Moderate Assistance, Max=Maximal Assistance, Total=Total Assistance, NT=Not Tested    TREATMENT:   EVALUATION: LOW COMPLEXITY: (Untimed Charge)    TREATMENT PLAN:   Effective Dates: 12/12/2022 TO 12/12/2022. Treatment/Session Assessment:  Patient was instructed in PT- HEP to increase strength and ROM in LEs. Answered all questions. Frequency/Duration: Patient to continue to perform home exercise program at least twice per day up until his surgery.     EDUCATION: Education Given To: Patient, Family  Education Provided: Role of Therapy, Home Exercise Program  Education Method: Demonstration, Verbal  Education Outcome: Verbalized understanding    MEDICAL NECESSITY: Mr. Feli Cuello is expected to optimize hislower extremity strength and ROM in preparation for joint replacement surgery. REASON FOR CONTINUED SERVICES: Achieve baseline assesment of musculoskeletal system, functional mobility and home environment. , educate in PT HEP in preparation for surgery, educate in hospital plan of care. COMPLIANCE WITH PROGRAM/EXERCISE: compliant most of the time. TOTAL TREATMENT DURATION:  Time In: 0830  Time Out: 0840  Minutes: 10    Regarding Jennifer Tatum's therapy, I certify that the treatment plan above will be carried out by a therapist or under their direction.   Thank you for this referral,  Veronika Rojas, PT

## 2022-12-12 NOTE — PERIOP NOTE
Labs done today within anesthesia protocols.       Latest Reference Range & Units 12/12/22 08:11   Sodium 133 - 143 mmol/L 140   Potassium 3.5 - 5.1 mmol/L 3.7   Chloride 101 - 110 mmol/L 108   CO2 21 - 32 mmol/L 27   BUN,BUNPL 8 - 23 MG/DL 23   Creatinine 0.8 - 1.5 MG/DL 1.39   Anion Gap 2 - 11 mmol/L 5   Est, Glom Filt Rate >60 ml/min/1.73m2 55 (L)   Glucose, Random 65 - 100 mg/dL 146 (H)   CALCIUM, SERUM, 307647 8.3 - 10.4 MG/DL 9.1   Hemoglobin A1C 4.8 - 5.6 % 6.8 (H)   eAG (mg/dL) mg/dL 148   WBC 4.3 - 11.1 K/uL 8.3   RBC 4.23 - 5.6 M/uL 4.34   Hemoglobin Quant 13.6 - 17.2 g/dL 13.2 (L)   Hematocrit 41.1 - 50.3 % 40.1 (L)   MCV 82.0 - 102.0 FL 92.4   MCH 26.1 - 32.9 PG 30.4   MCHC 31.4 - 35.0 g/dL 32.9   MPV 9.4 - 12.3 FL 10.3   RDW 11.9 - 14.6 % 13.2   Platelet Count 155 - 450 K/uL 193   Absolute Mono # 0.1 - 1.3 K/UL 0.7   Eosinophils % 0.5 - 7.8 % 4   Basophils Absolute 0.0 - 0.2 K/UL 0.1   Differential Type -   AUTOMATED   Seg Neutrophils 43 - 78 % 69   Segs Absolute 1.7 - 8.2 K/UL 5.7   Lymphocytes 13 - 44 % 16   Absolute Lymph # 0.5 - 4.6 K/UL 1.3   Monocytes 4.0 - 12.0 % 8   Absolute Eos # 0.0 - 0.8 K/UL 0.3   Basophils 0.0 - 2.0 % 1   Immature Granulocytes 0.0 - 5.0 % 3   Nucleated Red Blood Cells 0.0 - 0.2 K/uL 0.00   Absolute Immature Granulocyte 0.0 - 0.5 K/UL 0.3   Prothrombin Time 12.6 - 14.3 sec 13.7   INR -   1.1   PTT 24.5 - 34.2 SEC 26.3   MSSA/MRSA SCREEN BY PCR  Rpt   (L): Data is abnormally low  (H): Data is abnormally high  Rpt: View report in Results Review for more information

## 2022-12-12 NOTE — PROGRESS NOTES
Total Joint Surgery Preoperative Chart Review      Patient ID:  Román Covarrubias  [id number]  14 y.o.  1955  Surgeon: Dr. Makayla Roche  Date of Surgery: 1/9/2023  Procedure: Total Left Knee Arthroplasty  Primary Care Physician: Theodore Peng [phone]  Specialty Physician(s):      Subjective:   Román Covarrubias is a 75 y.o. White (non-) male who presents for preoperative evaluation for Total Left Knee arthroplasty. This is a preoperative chart review note based on data collected by the nurse at the surgical Pre-Assessment visit. Past Medical History:   Diagnosis Date    Arthritis     OA    Chewing tobacco use     Chronic pain 2003    back fracture    CP (cerebral palsy) ([facility]      3 surgeries right leg as child; no other problems     Environmental and seasonal allergies     GERD (gastroesophageal reflux disease)     rare-no medication     Gout     Controlled with medication     History of vertebral fracture 2003    Hypertension     Controlled with medication     Kidney stones     Morbid obesity ([facility]     Rheumatic fever age 14    history-no murmur auscultated on 6/11/18 and 12/12/22    Sinus infection     currently on Cefdinir     Status post right knee replacement 6/22/2018    Unspecified sleep apnea     can not sarahi c pap Followed by Dr. Jennifer Tapia, per last office note (1/2018) \" home study shows moderate to severe MAURIZIO with AHI of 28.6, worse in supine position. Lowest oxygen desaturation 66%. \"      Past Surgical History:   Procedure Laterality Date    BACK SURGERY  1989    Herniated disc repair     COLONOSCOPY      x2    HIP FRACTURE SURGERY Right     LUMBAR LAMINECTOMY      ORTHOPEDIC SURGERY Right 01/2014    big toe metatarsophalangeal joint fusion    ORTHOPEDIC SURGERY Right     leg repair x 3 as child ue to C.P.    OTHER SURGICAL HISTORY      skin cancer removed from right ear    TOTAL KNEE ARTHROPLASTY       Family History   Problem Relation Age of Onset    Heart Disease Brother     Lung Disease Mother     Heart Disease Brother       Social History     Tobacco Use    Smoking status: Never    Smokeless tobacco: Current    Tobacco comments:     Quit smokin can of chewing tobacco a day for 40 years   Substance Use Topics    Alcohol use: No       Prior to Admission medications    Medication Sig Start Date End Date Taking?  Authorizing Provider   celecoxib (CELEBREX) 200 MG capsule Take 200 mg by mouth daily 22  Historical Provider, MD   aspirin 81 MG chewable tablet Take by mouth    Historical Provider, MD   azelastine (ASTELIN) 0.1 % nasal spray USE 1 TO 2 SPRAYS IN EACH NOSTRIL TWICE DAILY 21   Historical Provider, MD   diclofenac sodium (VOLTAREN) 1 % GEL APPLY 4 GRAMS TOPICALLY TO THE AFFECTED AREA FOUR TIMES DAILY  Patient not taking: Reported on 2022   Historical Provider, MD   fluticasone (FLONASE) 50 MCG/ACT nasal spray SHAKE LIQUID AND USE 1 TO 2 SPRAYS IN EACH NOSTRIL DAILY  Patient not taking: Reported on 2022   Historical Provider, MD   HYDROcodone-acetaminophen ([place]) 7.5-325 MG per tablet TAKE 1 TABLET BY MOUTH EVERY 6 HOURS AS NEEDED 22   Historical Provider, MD   ipratropium (ATROVENT) 0.06 % nasal spray 1 spray by Nasal route daily as needed  Patient not taking: Reported on 2022 10/1/19   Historical Provider, MD   ZTLIDO 1.8 % [facility]  22   Historical Provider, MD   omeprazole (PRILOSEC) 20 MG delayed release capsule  22   Historical Provider, MD   allopurinol (ZYLOPRIM) 300 MG tablet Take by mouth daily as needed  Patient not taking: No sig reported    Ar Automatic Reconciliation   ALPRAZolam (XANAX) 0.5 MG tablet Take 1 po 45 min prior to test  Patient not taking: No sig reported 4/15/21   Ar Automatic Reconciliation   cyclobenzaprine (FLEXERIL) 10 MG tablet Take 10 mg by mouth 3 times daily as needed  Patient not taking: No sig reported    Ar Automatic Reconciliation   DULoxetine (CYMBALTA) 60 MG extended release capsule Take 60 mg by mouth    Ar Automatic Reconciliation   gabapentin (NEURONTIN) 100 MG capsule Take 200 mg by mouth 2 times daily. Patient not taking: No sig reported 4/9/21   Ar Automatic Reconciliation   losartan (COZAAR) 100 MG tablet Take 100 mg by mouth daily    Ar Automatic Reconciliation   rOPINIRole (REQUIP) 1 MG tablet Take 4 mg by mouth nightly    Ar Automatic Reconciliation     Allergies   Allergen Reactions    Penicillins Itching and Nausea Only          Objective:     Physical Exam:   Patient Vitals for the past 24 hrs:   Temp Pulse Resp BP SpO2   12/12/22 0748 97.9 °F (36.6 °C) 74 16 133/75 96 %   12/12/22 0715 -- 75 -- -- 96 %       ECG:    No results found for this or any previous visit (from the past 4464 hour(s)). Data Review:   Labs:   Recent Results (from the past 24 hour(s))   MSSA/MRSA Screen BY PCR    Collection Time: 12/12/22  8:11 AM    Specimen: Nares; Swab   Result Value Ref Range    Special Requests NO SPECIAL REQUESTS      Culture (A)       MRSA target DNA not detected, SA target DNA detected. A MRSA negative, SA positive test result does not preclude MRSA nasal colonization.    Protime-INR    Collection Time: 12/12/22  8:11 AM   Result Value Ref Range    Protime 13.7 12.6 - 14.3 sec    INR 1.1     Hemoglobin A1c    Collection Time: 12/12/22  8:11 AM   Result Value Ref Range    Hemoglobin A1C 6.8 (H) 4.8 - 5.6 %    eAG 148 mg/dL   CBC with Auto Differential    Collection Time: 12/12/22  8:11 AM   Result Value Ref Range    WBC 8.3 4.3 - 11.1 K/uL    RBC 4.34 4.23 - 5.6 M/uL    Hemoglobin 13.2 (L) 13.6 - 17.2 g/dL    Hematocrit 40.1 (L) 41.1 - 50.3 %    MCV 92.4 82.0 - 102.0 FL    MCH 30.4 26.1 - 32.9 PG    MCHC 32.9 31.4 - 35.0 g/dL    RDW 13.2 11.9 - 14.6 %    Platelets 684 754 - 807 K/uL    MPV 10.3 9.4 - 12.3 FL    nRBC 0.00 0.0 - 0.2 K/uL    Differential Type AUTOMATED      Seg Neutrophils 69 43 - 78 %    Lymphocytes 16 13 - 44 %    Monocytes 8 4.0 - 12.0 %    Eosinophils % 4 0.5 - 7.8 %    Basophils 1 0.0 - 2.0 %    Immature Granulocytes 3 0.0 - 5.0 %    Segs Absolute 5.7 1.7 - 8.2 K/UL    Absolute Lymph # 1.3 0.5 - 4.6 K/UL    Absolute Mono # 0.7 0.1 - 1.3 K/UL    Absolute Eos # 0.3 0.0 - 0.8 K/UL    Basophils Absolute 0.1 0.0 - 0.2 K/UL    Absolute Immature Granulocyte 0.3 0.0 - 0.5 K/UL   Basic Metabolic Panel    Collection Time: 12/12/22  8:11 AM   Result Value Ref Range    Sodium 140 133 - 143 mmol/L    Potassium 3.7 3.5 - 5.1 mmol/L    Chloride 108 101 - 110 mmol/L    CO2 27 21 - 32 mmol/L    Anion Gap 5 2 - 11 mmol/L    Glucose 146 (H) 65 - 100 mg/dL    BUN 23 8 - 23 MG/DL    Creatinine 1.39 0.8 - 1.5 MG/DL    Est, Glom Filt Rate 55 (L) >60 ml/min/1.73m2    Calcium 9.1 8.3 - 10.4 MG/DL   APTT    Collection Time: 12/12/22  8:11 AM   Result Value Ref Range    PTT 26.3 24.5 - 34.2 SEC   EKG 12 Lead    Collection Time: 12/12/22  8:17 AM   Result Value Ref Range    Ventricular Rate 72 BPM    Atrial Rate 72 BPM    P-R Interval 154 ms    QRS Duration 84 ms    Q-T Interval 368 ms    QTc Calculation (Bazett) 402 ms    P Axis 80 degrees    R Axis 73 degrees    T Axis 75 degrees    Diagnosis Normal sinus rhythm          Problem List:  )  Patient Active Problem List   Diagnosis    Spondylolisthesis    HTN (hypertension)    Status post right knee replacement    Osteoarthritis of right knee    Status post lumbar spinal arthrodesis    Lumbar stenosis with neurogenic claudication    Noncompliance with CPAP treatment    Primary osteoarthritis of left knee       Total Joint Surgery Pre-Assessment Recommendations:           Patient reports the symptoms of snoring, fatigue, observed apnea and /or excessive daytime sleepiness. Will refer patient for HST based on above assessment. Recommend continuous saturation monitoring hours of sleep, during hospitalization.       Signed By: DIAZ Hernandez - CNP-C    December 12, 2022

## 2022-12-12 NOTE — PERIOP NOTE
Patient verified name and . Order for consent is found in EHR and matches case posting; patient verified. Type 3 surgery, Joint camp assessment complete. Labs per surgeon: CBC,BMP, PT/PTT, mrsa swab, hgba1c ; results (processing)  Labs per anesthesia protocol: no additional  EKG:done today (WNL)    MRSA/MSSA swab collected; pharmacy to review and dose antibiotic as appropriate. Hospital approved surgical skin cleanser and instructions to return bottle on DOS given per hospital policy. Patient provided with handouts including Guide to Surgery, Pain Management, Hand Hygiene, Blood Transfusion Education, and [facility] Brochure. Patient answered medical/surgical history questions at their best of ability. All prior to admission medications documented in [facility]. Original medication prescription bottle not visualized during patient appointment. Patient instructed to hold all vitamins 3 weeks prior to surgery and NSAIDS 5 days prior to surgery. Patient teach back successful and patient demonstrates knowledge of instruction.

## 2022-12-12 NOTE — PERIOP NOTE
PLEASE CONTINUE TAKING ALL PRESCRIPTION MEDICATIONS UP TO THE DAY OF SURGERY UNLESS OTHERWISE DIRECTED BELOW. DISCONTINUE all vitamins, herbals and supplements 21 days prior to surgery. DISCONTINUE Non-Steriodal Anti-Inflammatory (NSAIDS) such as Advil, Ibuprofen, Motrin, Naproxen and Aleve 5 days prior to surgery. Home Medications to take  the day of surgery      Nasal spray if needed               Omeprazole     Duloxetine     Hydrocodone if needed     Home Medications   to Hold     Hold Aspirin for 5 days prior to surgery. Comments   Take 1 capful of Miralax daily starting one week prior to surgery. On the day before surgery please take Acetaminophen 1000mg in the morning and then again before bed. You may substitute for Tylenol 650 mg.    Bring Dynahex wash and Incentive Spirometer with you to hospital on the day of surgery. Please do not bring home medications with you on the day of surgery unless otherwise directed by your nurse. If you are instructed to bring home medications, please give them to your nurse as they will be administered by the nursing staff. If you have any questions, please call Adilene Palmer [phone]. A copy of this note was provided to the patient for reference.

## 2022-12-12 NOTE — PROGRESS NOTES
12/12/22 0715   Treatment   Treatment Type Bedside spirometry   Oxygen Therapy/Pulse Ox   O2 Therapy Room air   Heart Rate 75   SpO2 96 %   Pulse Oximeter Device Mode Intermittent   $Pulse Oximeter $Spot check (single)   Bedside Spirometry   FEV-1/Actual (Liters) 1.66 Liters   FEV-1/Predicted (Liters) 50 Liters  (NO SOB)   Pt's symptoms include:    Snoring  Tiredness- excessive daytime sleepiness  Observed apnea  PREVGIOUS DIAGNOSI OF MAURIZIO  Waking up from sleep gasping or choking  HTN  GERD  Neck size      50        cm  Modified Laird stage 4  SACS Score 110    Height    5  '  10  \"   Weight    238 lbs  BMI 35.73      Refer patient for sleep study based on above assessment. HST  Phone number:    [phone]  Ehsan Shaw [phone]                                                   Initial respiratory Assessment completed with pt. Pt was interviewed and evaluated in Joint camp prior to surgery. Patient ID:  Nuris Sams  [id number]  83 y.o.  1955  Surgeon: Dr. Marielos Gracia  Date of Surgery: [email or web address]  Procedure: Total Left Knee Arthroplasty  Primary Care Physician: Ree Davis [phone]  Specialists:     BS:DIMINISHED      Pt taught proper COUGH technique  IS REVIEWED WITH PT AS WELL AS BENEFITS OF USING IS IN SEDENTARY PTS.   DIAPHRAGMATIC BREATHING EXERCISE INSTRUCTIONS GIVEN    History of smoking:   DENIES  PT CURRENTLY DIPS TOBACCO                 Quit date:         Secondhand smoke:DENIES    Past procedures with Oxygen desaturation or delayed awakening:DENIES    Past Medical History:   Diagnosis Date    Arthritis     OA    Chewing tobacco use     Chronic pain 2003    back fracture    CP (cerebral palsy) ([facility] [address]      3 surgeries right leg as child; no other problems     Environmental and seasonal allergies     GERD (gastroesophageal reflux disease)     rare-no medication     Gout     Controlled with medication     History of vertebral fracture 2003    Hypertension Controlled with medication     Kidney stones     Morbid obesity (HCC)     Rheumatic fever age 14    history-no murmur auscultated on 6/11/18    Sinus infection     currently on Cefdinir     Status post right knee replacement 6/22/2018    Unspecified sleep apnea     can not sarahi c pap Followed by Dr. Bia Gonzalez, per last office note (1/2018) \" home study shows moderate to severe MAURIZIO with AHI of 28.6, worse in supine position. Lowest oxygen desaturation 66%. \"    HX OF COVID- PT STATES\"I FELT LIKE I WAS GOING TO DIE\"    Respiratory history:DENIES SOB                                                                  Respiratory meds:  DENIES    FAMILY PRESENT:           WIFE  PAST SLEEP STUDY:        YES                     HX OF MAURIZIO:                        YES                    AHI 28.6  ON BACK 37.14 SAT 66%  MAURIZIO assessment:     DANGERS OF UNTREATED MAURIZIO EXPLAINED TO PT.                                          SLEEPS ON  BACK           PHYSICAL EXAM   There is no height or weight on file to calculate BMI. Vitals:    12/12/22 0748   Pulse:    Temp: 97.9 °F (36.6 °C)   SpO2:      Neck circumference:  50    cm    Loud snoring:                                                 YES             Witnessed apnea or wakening gasping or choking:         APNEA  Awakens with headaches:                                               DENIES  Morning or daytime tiredness/ sleepiness:                           TIRED  Dry mouth or sore throat in morning:            YES                                             Laird stage:  4                                   SACS score:110                                 PT NON-COMPLIANT with CPAP. Dangers of non-compliance with treatment of MAURIZIO explained to pt. MAURIZIO handouts given to pt. ALBUTEROL Q 6 PRN  CONT. SAT MONITOR HS after surgery. O2 @ 3 lpm NC HS   RESPIRATORY ASSESSMENT Q SHIFT. Referrals:  HST  Pt.  Phone Number:

## 2022-12-19 DIAGNOSIS — M17.12 PRIMARY OSTEOARTHRITIS OF LEFT KNEE: Primary | ICD-10-CM

## 2022-12-28 DIAGNOSIS — M17.12 PRIMARY OSTEOARTHRITIS OF LEFT KNEE: ICD-10-CM

## 2023-01-04 ENCOUNTER — OFFICE VISIT (OUTPATIENT)
Dept: ORTHOPEDIC SURGERY | Age: 68
End: 2023-01-04

## 2023-01-04 DIAGNOSIS — M17.12 PRIMARY OSTEOARTHRITIS OF LEFT KNEE: Primary | ICD-10-CM

## 2023-01-04 NOTE — PROGRESS NOTES
[id number] [facility]  Pre Operative History and Physical Exam    Patient ID:  Dontae Saravia  [id number]  87 y.o.  1955    Today: January 4, 2023           CC:  left knee pain    HPI:   The patient has  OA left knee. The patient was evaluated and examined during a consultation prior to this office visit. There have been no changes to the patient's orthopedic condition since the initial consultation. The patient has failed previous conservative treatment for this condition including antiinflammatories , and lifestyle modifications. The necessity for joint replacement is present. The patient will be admitted the day of surgery for left knee replacement. Past Medical/Surgical History:  Past Medical History:   Diagnosis Date    Arthritis     OA    Chewing tobacco use     Chronic pain 2003    back fracture    CP (cerebral palsy) ([address]      3 surgeries right leg as child; no other problems     Environmental and seasonal allergies     GERD (gastroesophageal reflux disease)     rare-no medication     Gout     Controlled with medication     History of vertebral fracture 2003    Hypertension     Controlled with medication     Kidney stones     Morbid obesity ([address]     Rheumatic fever age 14    history-no murmur auscultated on 6/11/18 and 12/12/22    Sinus infection     currently on Cefdinir     Status post right knee replacement 6/22/2018    Unspecified sleep apnea     can not sarahi c pap Followed by Dr. Wally Shay, per last office note (1/2018) \" home study shows moderate to severe MAURIZIO with AHI of 28.6, worse in supine position. Lowest oxygen desaturation 66%. \"     Past Surgical History:   Procedure Laterality Date    BACK SURGERY  1989    Herniated disc repair     COLONOSCOPY      x2    HIP FRACTURE SURGERY Right     LUMBAR LAMINECTOMY      ORTHOPEDIC SURGERY Right 01/2014    big toe metatarsophalangeal joint fusion    ORTHOPEDIC SURGERY Right     leg repair x 3 as child ue to C.P.    OTHER SURGICAL HISTORY skin cancer removed from right ear    TOTAL KNEE ARTHROPLASTY          Allergies: Allergies   Allergen Reactions    Penicillins Itching and Nausea Only        Physical Exam:   General: NAD, Alert, Oriented, Appears their stated age     [de-identified]: NC/AT    Skin: No rashes, lesions or wounds seen      Psych: normal affect      Heart: Regular Rate, Rhythm     Lungs: unlabored respirations, no wheezing    Abdomen: Soft and non-distended     Ortho: Pain with limited ROM of the left knee    Neuro: no focal defects, moving extremities equally    Lymph: no lymphadenopathy     Meds:   Current Outpatient Medications   Medication Sig    celecoxib (CELEBREX) 200 MG capsule Take 200 mg by mouth daily    aspirin 81 MG chewable tablet Take by mouth    azelastine (ASTELIN) 0.1 % nasal spray USE 1 TO 2 SPRAYS IN EACH NOSTRIL TWICE DAILY    diclofenac sodium (VOLTAREN) 1 % GEL APPLY 4 GRAMS TOPICALLY TO THE AFFECTED AREA FOUR TIMES DAILY (Patient not taking: Reported on 12/12/2022)    fluticasone (FLONASE) 50 MCG/ACT nasal spray SHAKE LIQUID AND USE 1 TO 2 SPRAYS IN EACH NOSTRIL DAILY (Patient not taking: Reported on 12/12/2022)    HYDROcodone-acetaminophen (NORCO) 7.5-325 MG per tablet TAKE 1 TABLET BY MOUTH EVERY 6 HOURS AS NEEDED    ipratropium (ATROVENT) 0.06 % nasal spray 1 spray by Nasal route daily as needed (Patient not taking: Reported on 12/12/2022)    ZTLIDO 1.8 % [facility]     omeprazole (PRILOSEC) 20 MG delayed release capsule     allopurinol (ZYLOPRIM) 300 MG tablet Take by mouth daily as needed (Patient not taking: No sig reported)    ALPRAZolam (XANAX) 0.5 MG tablet Take 1 po 45 min prior to test (Patient not taking: No sig reported)    cyclobenzaprine (FLEXERIL) 10 MG tablet Take 10 mg by mouth 3 times daily as needed (Patient not taking: No sig reported)    DULoxetine (CYMBALTA) 60 MG extended release capsule Take 60 mg by mouth    gabapentin (NEURONTIN) 100 MG capsule Take 200 mg by mouth 2 times daily.  (Patient not taking: No sig reported)    losartan (COZAAR) 100 MG tablet Take 100 mg by mouth daily    rOPINIRole (REQUIP) 1 MG tablet Take 4 mg by mouth nightly     No current facility-administered medications for this visit. Labs:  Hospital Outpatient Visit on 12/12/2022   Component Date Value Ref Range Status    Special Requests 12/12/2022 NO SPECIAL REQUESTS    Final    Culture 12/12/2022 MRSA target DNA not detected, SA target DNA detected. A MRSA negative, SA positive test result does not preclude MRSA nasal colonization.  (A)    Final    Ventricular Rate 12/12/2022 72  BPM Final    Atrial Rate 12/12/2022 72  BPM Final    P-R Interval 12/12/2022 154  ms Final    QRS Duration 12/12/2022 84  ms Final    Q-T Interval 12/12/2022 368  ms Final    QTc Calculation (Bazett) 12/12/2022 402  ms Final    P Axis 12/12/2022 80  degrees Final    R Axis 12/12/2022 73  degrees Final    T Axis 12/12/2022 75  degrees Final    Diagnosis 12/12/2022 Normal sinus rhythm   Final    Protime 12/12/2022 13.7  12.6 - 14.3 sec Final    INR 12/12/2022 1.1    Final    Comment: Suggested therapeutic INR range:  Venous thrombosis and embolus  2.0-3.0  Prosthetic heart valve         2.5-3.5  ** Note new reference range and method **      Hemoglobin A1C 12/12/2022 6.8 (A)  4.8 - 5.6 % Final    eAG 12/12/2022 148  mg/dL Final    Comment: Reference Range  Normal: 4.8-5.6  Diabetic >=6.5%  Normal       <5.7%      WBC 12/12/2022 8.3  4.3 - 11.1 K/uL Final    RBC 12/12/2022 4.34  4.23 - 5.6 M/uL Final    Hemoglobin 12/12/2022 13.2 (A)  13.6 - 17.2 g/dL Final    Hematocrit 12/12/2022 40.1 (A)  41.1 - 50.3 % Final    MCV 12/12/2022 92.4  82.0 - 102.0 FL Final    MCH 12/12/2022 30.4  26.1 - 32.9 PG Final    MCHC 12/12/2022 32.9  31.4 - 35.0 g/dL Final    RDW 12/12/2022 13.2  11.9 - 14.6 % Final    Platelets 33/18/2952 193  150 - 450 K/uL Final    MPV 12/12/2022 10.3  9.4 - 12.3 FL Final    nRBC 12/12/2022 0.00  0.0 - 0.2 K/uL Final    **Note: Absolute NRBC parameter is now reported with Hemogram**    Differential Type 12/12/2022 AUTOMATED    Final    Seg Neutrophils 12/12/2022 69  43 - 78 % Final    Lymphocytes 12/12/2022 16  13 - 44 % Final    Monocytes 12/12/2022 8  4.0 - 12.0 % Final    Eosinophils % 12/12/2022 4  0.5 - 7.8 % Final    Basophils 12/12/2022 1  0.0 - 2.0 % Final    Immature Granulocytes 12/12/2022 3  0.0 - 5.0 % Final    Segs Absolute 12/12/2022 5.7  1.7 - 8.2 K/UL Final    Absolute Lymph # 12/12/2022 1.3  0.5 - 4.6 K/UL Final    Absolute Mono # 12/12/2022 0.7  0.1 - 1.3 K/UL Final    Absolute Eos # 12/12/2022 0.3  0.0 - 0.8 K/UL Final    Basophils Absolute 12/12/2022 0.1  0.0 - 0.2 K/UL Final    Absolute Immature Granulocyte 12/12/2022 0.3  0.0 - 0.5 K/UL Final    Sodium 12/12/2022 140  133 - 143 mmol/L Final    Potassium 12/12/2022 3.7  3.5 - 5.1 mmol/L Final    Chloride 12/12/2022 108  101 - 110 mmol/L Final    CO2 12/12/2022 27  21 - 32 mmol/L Final    Anion Gap 12/12/2022 5  2 - 11 mmol/L Final    Glucose 12/12/2022 146 (A)  65 - 100 mg/dL Final    BUN 12/12/2022 23  8 - 23 MG/DL Final    Creatinine 12/12/2022 1.39  0.8 - 1.5 MG/DL Final    Est, Glom Filt Rate 12/12/2022 55 (A)  >60 ml/min/1.73m2 Final    Comment:      Pediatric calculator link: [email or web address] org/professionals/kdoqi/gfr_calculatorped       These results are not intended for use in patients <25years of age. eGFR results are calculated without a race factor using  the 2021 CKD-EPI equation. Careful clinical correlation is recommended, particularly when comparing to results calculated using previous equations. The CKD-EPI equation is less accurate in patients with extremes of muscle mass, extra-renal metabolism of creatinine, excessive creatine ingestion, or following therapy that affects renal tubular secretion.       Calcium 12/12/2022 9.1  8.3 - 10.4 MG/DL Final    PTT 12/12/2022 26.3  24.5 - 34.2 SEC Final    Comment: Heparin Therapeutic Range = 74 - 123 seconds  In addition to factor deficiency, monitoring heparin therapy, etc., evaluation of a prolonged aPTT result should include consideration of preanalytic variables such as heparin flush contamination, specimen integrity issues, etc.                   Patient Active Problem List   Diagnosis    Spondylolisthesis    HTN (hypertension)    Status post right knee replacement    Osteoarthritis of right knee    Status post lumbar spinal arthrodesis    Lumbar stenosis with neurogenic claudication    Noncompliance with CPAP treatment    Primary osteoarthritis of left knee         Assessment:   1. OA left knee      Plan:    1. Proceed with scheduled left knee replacement         All material risks, benefits, and reasonable alternatives including postponing the procedure were discussed. The patient does wish to proceed with the procedure at this time.

## 2023-01-05 ENCOUNTER — TELEPHONE (OUTPATIENT)
Dept: ORTHOPEDIC SURGERY | Age: 68
End: 2023-01-05

## 2023-01-05 NOTE — TELEPHONE ENCOUNTER
DR [facility] IN [place]  SAYS WHILE PT IS IN HOSPITAL IT IS UP  TO [facility] TO MANAGE, THEY WILL TAKE  OVER WHEN PT IS DISCHARGED          I called and spoke with the patient. I clarified our plan. We will plan to prescribe postoperative narcotics for the patient after surgery. Until at which time he can be weaned down to his baseline pain medication regimen by his pain management provider.

## 2023-01-08 ENCOUNTER — HOSPITAL ENCOUNTER (INPATIENT)
Age: 68
LOS: 4 days | Discharge: HOME | DRG: 853 | End: 2023-01-12
Attending: EMERGENCY MEDICINE | Admitting: INTERNAL MEDICINE
Payer: MEDICARE

## 2023-01-08 ENCOUNTER — HOSPITAL ENCOUNTER (EMERGENCY)
Dept: ULTRASOUND IMAGING | Age: 68
Discharge: HOME | DRG: 853 | End: 2023-01-11
Payer: MEDICARE

## 2023-01-08 ENCOUNTER — HOSPITAL ENCOUNTER (EMERGENCY)
Dept: CT IMAGING | Age: 68
Discharge: HOME | DRG: 853 | End: 2023-01-11
Payer: MEDICARE

## 2023-01-08 ENCOUNTER — HOSPITAL ENCOUNTER (INPATIENT)
Dept: GENERAL RADIOLOGY | Age: 68
Discharge: HOME | DRG: 853 | End: 2023-01-11
Payer: MEDICARE

## 2023-01-08 DIAGNOSIS — R10.11 RIGHT UPPER QUADRANT ABDOMINAL PAIN: Primary | ICD-10-CM

## 2023-01-08 DIAGNOSIS — K81.0 ACUTE CHOLECYSTITIS: ICD-10-CM

## 2023-01-08 PROBLEM — I10 HTN (HYPERTENSION): Status: ACTIVE | Noted: 2018-06-23

## 2023-01-08 PROBLEM — J96.01 ACUTE RESPIRATORY FAILURE WITH HYPOXIA (HCC): Status: ACTIVE | Noted: 2023-01-08

## 2023-01-08 PROBLEM — G25.81 RESTLESS LEG SYNDROME: Status: ACTIVE | Noted: 2023-01-08

## 2023-01-08 PROBLEM — G47.33 OSA (OBSTRUCTIVE SLEEP APNEA): Status: ACTIVE | Noted: 2023-01-08

## 2023-01-08 PROBLEM — A41.9 SEPSIS (HCC): Status: ACTIVE | Noted: 2023-01-08

## 2023-01-08 LAB
ALBUMIN SERPL-MCNC: 3.4 G/DL (ref 3.2–4.6)
ALBUMIN/GLOB SERPL: 0.9 (ref 0.4–1.6)
ALP SERPL-CCNC: 93 U/L (ref 50–136)
ALT SERPL-CCNC: 25 U/L (ref 12–65)
ANION GAP SERPL CALC-SCNC: 8 MMOL/L (ref 2–11)
AST SERPL-CCNC: 28 U/L (ref 15–37)
BASOPHILS # BLD: 0.1 K/UL (ref 0–0.2)
BASOPHILS NFR BLD: 1 % (ref 0–2)
BILIRUB SERPL-MCNC: 0.8 MG/DL (ref 0.2–1.1)
BUN SERPL-MCNC: 15 MG/DL (ref 8–23)
CALCIUM SERPL-MCNC: 9.4 MG/DL (ref 8.3–10.4)
CHLORIDE SERPL-SCNC: 104 MMOL/L (ref 101–110)
CO2 SERPL-SCNC: 27 MMOL/L (ref 21–32)
CREAT SERPL-MCNC: 1.08 MG/DL (ref 0.8–1.5)
DIFFERENTIAL METHOD BLD: ABNORMAL
EOSINOPHIL # BLD: 0.2 K/UL (ref 0–0.8)
EOSINOPHIL NFR BLD: 1 % (ref 0.5–7.8)
ERYTHROCYTE [DISTWIDTH] IN BLOOD BY AUTOMATED COUNT: 13 % (ref 11.9–14.6)
FLUAV AG NPH QL IA: NEGATIVE
FLUBV AG NPH QL IA: NEGATIVE
GLOBULIN SER CALC-MCNC: 4 G/DL (ref 2.8–4.5)
GLUCOSE SERPL-MCNC: 157 MG/DL (ref 65–100)
HCT VFR BLD AUTO: 42.4 % (ref 41.1–50.3)
HGB BLD-MCNC: 13.5 G/DL (ref 13.6–17.2)
IMM GRANULOCYTES # BLD AUTO: 0.1 K/UL (ref 0–0.5)
IMM GRANULOCYTES NFR BLD AUTO: 1 % (ref 0–5)
LIPASE SERPL-CCNC: 52 U/L (ref 73–393)
LYMPHOCYTES # BLD: 1.1 K/UL (ref 0.5–4.6)
LYMPHOCYTES NFR BLD: 9 % (ref 13–44)
MCH RBC QN AUTO: 29.7 PG (ref 26.1–32.9)
MCHC RBC AUTO-ENTMCNC: 31.8 G/DL (ref 31.4–35)
MCV RBC AUTO: 93.4 FL (ref 82–102)
MONOCYTES # BLD: 0.8 K/UL (ref 0.1–1.3)
MONOCYTES NFR BLD: 6 % (ref 4–12)
NEUTS SEG # BLD: 10.5 K/UL (ref 1.7–8.2)
NEUTS SEG NFR BLD: 83 % (ref 43–78)
NRBC # BLD: 0 K/UL (ref 0–0.2)
PLATELET # BLD AUTO: 216 K/UL (ref 150–450)
PMV BLD AUTO: 10.3 FL (ref 9.4–12.3)
POTASSIUM SERPL-SCNC: 4 MMOL/L (ref 3.5–5.1)
PROCALCITONIN SERPL-MCNC: <0.05 NG/ML (ref 0–0.49)
PROT SERPL-MCNC: 7.4 G/DL (ref 6.3–8.2)
RBC # BLD AUTO: 4.54 M/UL (ref 4.23–5.6)
SARS-COV-2 RDRP RESP QL NAA+PROBE: NOT DETECTED
SODIUM SERPL-SCNC: 139 MMOL/L (ref 133–143)
SOURCE: NORMAL
SPECIMEN SOURCE: NORMAL
TROPONIN I SERPL HS-MCNC: 11.4 PG/ML (ref 0–14)
WBC # BLD AUTO: 12.7 K/UL (ref 4.3–11.1)

## 2023-01-08 PROCEDURE — 96375 TX/PRO/DX INJ NEW DRUG ADDON: CPT

## 2023-01-08 PROCEDURE — 94760 N-INVAS EAR/PLS OXIMETRY 1: CPT

## 2023-01-08 PROCEDURE — 71045 X-RAY EXAM CHEST 1 VIEW: CPT

## 2023-01-08 PROCEDURE — 85025 COMPLETE CBC W/AUTO DIFF WBC: CPT

## 2023-01-08 PROCEDURE — 83690 ASSAY OF LIPASE: CPT

## 2023-01-08 PROCEDURE — 6370000000 HC RX 637 (ALT 250 FOR IP): Performed by: INTERNAL MEDICINE

## 2023-01-08 PROCEDURE — 74177 CT ABD & PELVIS W/CONTRAST: CPT

## 2023-01-08 PROCEDURE — 87635 SARS-COV-2 COVID-19 AMP PRB: CPT

## 2023-01-08 PROCEDURE — 84145 PROCALCITONIN (PCT): CPT

## 2023-01-08 PROCEDURE — 2580000003 HC RX 258: Performed by: EMERGENCY MEDICINE

## 2023-01-08 PROCEDURE — 6360000004 HC RX CONTRAST MEDICATION: Performed by: EMERGENCY MEDICINE

## 2023-01-08 PROCEDURE — 99285 EMERGENCY DEPT VISIT HI MDM: CPT

## 2023-01-08 PROCEDURE — 93005 ELECTROCARDIOGRAM TRACING: CPT | Performed by: EMERGENCY MEDICINE

## 2023-01-08 PROCEDURE — 84484 ASSAY OF TROPONIN QUANT: CPT

## 2023-01-08 PROCEDURE — 2580000003 HC RX 258: Performed by: INTERNAL MEDICINE

## 2023-01-08 PROCEDURE — 96365 THER/PROPH/DIAG IV INF INIT: CPT

## 2023-01-08 PROCEDURE — 96366 THER/PROPH/DIAG IV INF ADDON: CPT

## 2023-01-08 PROCEDURE — 87804 INFLUENZA ASSAY W/OPTIC: CPT

## 2023-01-08 PROCEDURE — 94761 N-INVAS EAR/PLS OXIMETRY MLT: CPT

## 2023-01-08 PROCEDURE — 6360000002 HC RX W HCPCS: Performed by: EMERGENCY MEDICINE

## 2023-01-08 PROCEDURE — 1100000000 HC RM PRIVATE

## 2023-01-08 PROCEDURE — 2500000003 HC RX 250 WO HCPCS: Performed by: INTERNAL MEDICINE

## 2023-01-08 PROCEDURE — 76705 ECHO EXAM OF ABDOMEN: CPT

## 2023-01-08 PROCEDURE — 80053 COMPREHEN METABOLIC PANEL: CPT

## 2023-01-08 PROCEDURE — 96376 TX/PRO/DX INJ SAME DRUG ADON: CPT

## 2023-01-08 PROCEDURE — 2700000000 HC OXYGEN THERAPY PER DAY

## 2023-01-08 RX ORDER — ACETAMINOPHEN 325 MG/1
650 TABLET ORAL EVERY 6 HOURS PRN
Status: DISCONTINUED | OUTPATIENT
Start: 2023-01-08 | End: 2023-01-12 | Stop reason: HOSPADM

## 2023-01-08 RX ORDER — SODIUM CHLORIDE 0.9 % (FLUSH) 0.9 %
5-40 SYRINGE (ML) INJECTION PRN
Status: CANCELLED | OUTPATIENT
Start: 2023-01-08

## 2023-01-08 RX ORDER — ACETAMINOPHEN 650 MG/1
650 SUPPOSITORY RECTAL EVERY 6 HOURS PRN
Status: DISCONTINUED | OUTPATIENT
Start: 2023-01-08 | End: 2023-01-12 | Stop reason: HOSPADM

## 2023-01-08 RX ORDER — 0.9 % SODIUM CHLORIDE 0.9 %
100 INTRAVENOUS SOLUTION INTRAVENOUS
Status: COMPLETED | OUTPATIENT
Start: 2023-01-08 | End: 2023-01-08

## 2023-01-08 RX ORDER — ONDANSETRON 2 MG/ML
4 INJECTION INTRAMUSCULAR; INTRAVENOUS ONCE
Status: COMPLETED | OUTPATIENT
Start: 2023-01-08 | End: 2023-01-08

## 2023-01-08 RX ORDER — CIPROFLOXACIN 2 MG/ML
400 INJECTION, SOLUTION INTRAVENOUS EVERY 12 HOURS
Status: DISCONTINUED | OUTPATIENT
Start: 2023-01-09 | End: 2023-01-12 | Stop reason: HOSPADM

## 2023-01-08 RX ORDER — METRONIDAZOLE 500 MG/100ML
500 INJECTION, SOLUTION INTRAVENOUS EVERY 6 HOURS
Status: DISCONTINUED | OUTPATIENT
Start: 2023-01-08 | End: 2023-01-12 | Stop reason: HOSPADM

## 2023-01-08 RX ORDER — SODIUM CHLORIDE 9 MG/ML
INJECTION, SOLUTION INTRAVENOUS PRN
Status: CANCELLED | OUTPATIENT
Start: 2023-01-08

## 2023-01-08 RX ORDER — SODIUM CHLORIDE 0.9 % (FLUSH) 0.9 %
10 SYRINGE (ML) INJECTION
Status: COMPLETED | OUTPATIENT
Start: 2023-01-08 | End: 2023-01-08

## 2023-01-08 RX ORDER — LOSARTAN POTASSIUM 50 MG/1
100 TABLET ORAL DAILY
Status: DISCONTINUED | OUTPATIENT
Start: 2023-01-09 | End: 2023-01-12 | Stop reason: HOSPADM

## 2023-01-08 RX ORDER — SODIUM CHLORIDE 9 MG/ML
INJECTION, SOLUTION INTRAVENOUS PRN
Status: DISCONTINUED | OUTPATIENT
Start: 2023-01-08 | End: 2023-01-12 | Stop reason: HOSPADM

## 2023-01-08 RX ORDER — MORPHINE SULFATE 4 MG/ML
4 INJECTION INTRAVENOUS ONCE
Status: DISCONTINUED | OUTPATIENT
Start: 2023-01-08 | End: 2023-01-12 | Stop reason: HOSPADM

## 2023-01-08 RX ORDER — SODIUM CHLORIDE 9 MG/ML
INJECTION, SOLUTION INTRAVENOUS CONTINUOUS
Status: DISCONTINUED | OUTPATIENT
Start: 2023-01-08 | End: 2023-01-10

## 2023-01-08 RX ORDER — MORPHINE SULFATE 2 MG/ML
2 INJECTION, SOLUTION INTRAMUSCULAR; INTRAVENOUS EVERY 6 HOURS PRN
Status: DISCONTINUED | OUTPATIENT
Start: 2023-01-08 | End: 2023-01-12 | Stop reason: HOSPADM

## 2023-01-08 RX ORDER — SODIUM CHLORIDE 0.9 % (FLUSH) 0.9 %
5-40 SYRINGE (ML) INJECTION PRN
Status: DISCONTINUED | OUTPATIENT
Start: 2023-01-08 | End: 2023-01-12 | Stop reason: HOSPADM

## 2023-01-08 RX ORDER — DULOXETIN HYDROCHLORIDE 60 MG/1
60 CAPSULE, DELAYED RELEASE ORAL DAILY
Status: DISCONTINUED | OUTPATIENT
Start: 2023-01-09 | End: 2023-01-12 | Stop reason: HOSPADM

## 2023-01-08 RX ORDER — ONDANSETRON 2 MG/ML
4 INJECTION INTRAMUSCULAR; INTRAVENOUS
Status: COMPLETED | OUTPATIENT
Start: 2023-01-08 | End: 2023-01-08

## 2023-01-08 RX ORDER — HYDROCODONE BITARTRATE AND ACETAMINOPHEN 7.5; 325 MG/1; MG/1
1 TABLET ORAL EVERY 6 HOURS PRN
Status: DISCONTINUED | OUTPATIENT
Start: 2023-01-08 | End: 2023-01-12 | Stop reason: HOSPADM

## 2023-01-08 RX ORDER — OXYCODONE HYDROCHLORIDE 5 MG/1
5 TABLET ORAL
Status: CANCELLED | OUTPATIENT
Start: 2023-01-08 | End: 2023-01-09

## 2023-01-08 RX ORDER — ONDANSETRON 2 MG/ML
4 INJECTION INTRAMUSCULAR; INTRAVENOUS EVERY 6 HOURS PRN
Status: DISCONTINUED | OUTPATIENT
Start: 2023-01-08 | End: 2023-01-12 | Stop reason: HOSPADM

## 2023-01-08 RX ORDER — HYDROMORPHONE HYDROCHLORIDE 1 MG/ML
0.5 INJECTION, SOLUTION INTRAMUSCULAR; INTRAVENOUS; SUBCUTANEOUS EVERY 5 MIN PRN
Status: CANCELLED | OUTPATIENT
Start: 2023-01-08

## 2023-01-08 RX ORDER — FAMOTIDINE 20 MG/1
10 TABLET, FILM COATED ORAL DAILY PRN
Status: DISCONTINUED | OUTPATIENT
Start: 2023-01-08 | End: 2023-01-12 | Stop reason: HOSPADM

## 2023-01-08 RX ORDER — BISACODYL 10 MG
10 SUPPOSITORY, RECTAL RECTAL DAILY PRN
Status: DISCONTINUED | OUTPATIENT
Start: 2023-01-08 | End: 2023-01-12 | Stop reason: HOSPADM

## 2023-01-08 RX ORDER — DEXTROSE MONOHYDRATE 100 MG/ML
INJECTION, SOLUTION INTRAVENOUS CONTINUOUS PRN
Status: CANCELLED | OUTPATIENT
Start: 2023-01-08

## 2023-01-08 RX ORDER — MAGNESIUM HYDROXIDE/ALUMINUM HYDROXICE/SIMETHICONE 120; 1200; 1200 MG/30ML; MG/30ML; MG/30ML
30 SUSPENSION ORAL EVERY 6 HOURS PRN
Status: DISCONTINUED | OUTPATIENT
Start: 2023-01-08 | End: 2023-01-12 | Stop reason: HOSPADM

## 2023-01-08 RX ORDER — DIPHENHYDRAMINE HYDROCHLORIDE 50 MG/ML
12.5 INJECTION INTRAMUSCULAR; INTRAVENOUS
Status: CANCELLED | OUTPATIENT
Start: 2023-01-08 | End: 2023-01-09

## 2023-01-08 RX ORDER — ACETAMINOPHEN 500 MG
1000 TABLET ORAL ONCE
Status: CANCELLED | OUTPATIENT
Start: 2023-01-08 | End: 2023-01-08

## 2023-01-08 RX ORDER — ONDANSETRON 2 MG/ML
4 INJECTION INTRAMUSCULAR; INTRAVENOUS
Status: CANCELLED | OUTPATIENT
Start: 2023-01-08 | End: 2023-01-09

## 2023-01-08 RX ORDER — ONDANSETRON 4 MG/1
4 TABLET, ORALLY DISINTEGRATING ORAL EVERY 8 HOURS PRN
Status: DISCONTINUED | OUTPATIENT
Start: 2023-01-08 | End: 2023-01-12 | Stop reason: HOSPADM

## 2023-01-08 RX ORDER — MORPHINE SULFATE 4 MG/ML
4 INJECTION, SOLUTION INTRAMUSCULAR; INTRAVENOUS
Status: COMPLETED | OUTPATIENT
Start: 2023-01-08 | End: 2023-01-08

## 2023-01-08 RX ORDER — PROCHLORPERAZINE EDISYLATE 5 MG/ML
5 INJECTION INTRAMUSCULAR; INTRAVENOUS
Status: CANCELLED | OUTPATIENT
Start: 2023-01-08 | End: 2023-01-09

## 2023-01-08 RX ORDER — 0.9 % SODIUM CHLORIDE 0.9 %
1000 INTRAVENOUS SOLUTION INTRAVENOUS ONCE
Status: COMPLETED | OUTPATIENT
Start: 2023-01-08 | End: 2023-01-08

## 2023-01-08 RX ORDER — SODIUM CHLORIDE, SODIUM LACTATE, POTASSIUM CHLORIDE, CALCIUM CHLORIDE 600; 310; 30; 20 MG/100ML; MG/100ML; MG/100ML; MG/100ML
INJECTION, SOLUTION INTRAVENOUS CONTINUOUS
Status: CANCELLED | OUTPATIENT
Start: 2023-01-08

## 2023-01-08 RX ORDER — SODIUM CHLORIDE 0.9 % (FLUSH) 0.9 %
5-40 SYRINGE (ML) INJECTION EVERY 12 HOURS SCHEDULED
Status: CANCELLED | OUTPATIENT
Start: 2023-01-08

## 2023-01-08 RX ORDER — LEVOFLOXACIN 5 MG/ML
750 INJECTION, SOLUTION INTRAVENOUS
Status: COMPLETED | OUTPATIENT
Start: 2023-01-08 | End: 2023-01-08

## 2023-01-08 RX ORDER — ROPINIROLE 1 MG/1
4 TABLET, FILM COATED ORAL NIGHTLY
Status: DISCONTINUED | OUTPATIENT
Start: 2023-01-08 | End: 2023-01-12 | Stop reason: HOSPADM

## 2023-01-08 RX ORDER — MIDAZOLAM HYDROCHLORIDE 2 MG/2ML
2 INJECTION, SOLUTION INTRAMUSCULAR; INTRAVENOUS
Status: CANCELLED | OUTPATIENT
Start: 2023-01-08 | End: 2023-01-09

## 2023-01-08 RX ORDER — SODIUM CHLORIDE 0.9 % (FLUSH) 0.9 %
5-40 SYRINGE (ML) INJECTION EVERY 12 HOURS SCHEDULED
Status: DISCONTINUED | OUTPATIENT
Start: 2023-01-08 | End: 2023-01-12 | Stop reason: HOSPADM

## 2023-01-08 RX ORDER — LIDOCAINE HYDROCHLORIDE 10 MG/ML
1 INJECTION, SOLUTION INFILTRATION; PERINEURAL
Status: CANCELLED | OUTPATIENT
Start: 2023-01-08 | End: 2023-01-09

## 2023-01-08 RX ORDER — POLYETHYLENE GLYCOL 3350 17 G/17G
17 POWDER, FOR SOLUTION ORAL DAILY PRN
Status: DISCONTINUED | OUTPATIENT
Start: 2023-01-08 | End: 2023-01-12 | Stop reason: HOSPADM

## 2023-01-08 RX ORDER — FENTANYL CITRATE 50 UG/ML
100 INJECTION, SOLUTION INTRAMUSCULAR; INTRAVENOUS
Status: CANCELLED | OUTPATIENT
Start: 2023-01-08 | End: 2023-01-09

## 2023-01-08 RX ADMIN — SODIUM CHLORIDE 100 ML: 9 INJECTION, SOLUTION INTRAVENOUS at 16:55

## 2023-01-08 RX ADMIN — HYDROCODONE BITARTRATE AND ACETAMINOPHEN 1 TABLET: 7.5; 325 TABLET ORAL at 22:08

## 2023-01-08 RX ADMIN — MORPHINE SULFATE 4 MG: 4 INJECTION, SOLUTION INTRAMUSCULAR; INTRAVENOUS at 16:33

## 2023-01-08 RX ADMIN — ONDANSETRON 4 MG: 2 INJECTION INTRAMUSCULAR; INTRAVENOUS at 19:50

## 2023-01-08 RX ADMIN — METRONIDAZOLE 500 MG: 500 INJECTION, SOLUTION INTRAVENOUS at 22:14

## 2023-01-08 RX ADMIN — SODIUM CHLORIDE, PRESERVATIVE FREE 10 ML: 5 INJECTION INTRAVENOUS at 16:56

## 2023-01-08 RX ADMIN — ROPINIROLE HYDROCHLORIDE 4 MG: 1 TABLET, FILM COATED ORAL at 22:08

## 2023-01-08 RX ADMIN — IOPAMIDOL 100 ML: 755 INJECTION, SOLUTION INTRAVENOUS at 16:55

## 2023-01-08 RX ADMIN — LEVOFLOXACIN 750 MG: 5 INJECTION, SOLUTION INTRAVENOUS at 17:58

## 2023-01-08 RX ADMIN — SODIUM CHLORIDE 1000 ML: 9 INJECTION, SOLUTION INTRAVENOUS at 16:33

## 2023-01-08 RX ADMIN — SODIUM CHLORIDE: 9 INJECTION, SOLUTION INTRAVENOUS at 22:07

## 2023-01-08 RX ADMIN — ONDANSETRON 4 MG: 2 INJECTION INTRAMUSCULAR; INTRAVENOUS at 16:33

## 2023-01-08 ASSESSMENT — PAIN SCALES - GENERAL
PAINLEVEL_OUTOF10: 0
PAINLEVEL_OUTOF10: 10
PAINLEVEL_OUTOF10: 7
PAINLEVEL_OUTOF10: 7

## 2023-01-08 ASSESSMENT — ENCOUNTER SYMPTOMS
SHORTNESS OF BREATH: 0
ABDOMINAL PAIN: 1
VOMITING: 1
SORE THROAT: 0
HEMATEMESIS: 0
FLATUS: 0
NAUSEA: 1

## 2023-01-08 ASSESSMENT — PAIN - FUNCTIONAL ASSESSMENT: PAIN_FUNCTIONAL_ASSESSMENT: 0-10

## 2023-01-08 ASSESSMENT — PAIN DESCRIPTION - LOCATION: LOCATION: ABDOMEN

## 2023-01-08 NOTE — ED PROVIDER NOTES
Emergency Department Provider Note                   PCP:                Guy Felix               Age: 75 y.o. Sex: male       ICD-10-CM    1. Right upper quadrant abdominal pain  R10.11       2. Acute cholecystitis  K81.0           DISPOSITION Decision To Admit 01/08/2023 07:20:27 PM        Medical Decision Making  Patient is a 27-year-old male with a history of diverticulosis, back surgery, hypertension who presents with nausea vomiting x2 days with generalized weakness and questionable dehydration. Patient states he has right upper and lower abdominal pain but denies any diarrhea. Patient was seen at urgent care on Monday and prescribed clindamycin for sinus infection. Patient has a history of chronic back pain, arthritis, GERD, gout, hypertension, kidney stones, obesity. Differential diagnoses include dehydration, cholecystitis, cholelithiasis, appendicitis, colitis, diverticulitis. Patient is WBC is 12.7 and CAT scan is suspicious for acute cholecystitis. We have ordered an ultrasound. .  Patient has been given IV antibiotics. Main independent interpretation of patient's laboratory testing reveals a leukocytosis concerning for systemic infection. My independent interpretation of patient's chest x-ray is normal and negative for pneumonia. My independent interpretation of patient is EKG is sinus tachycardia but no STEMI or ectopy. We will admit to medicine given the patient's past medical history and issues as well as consult surgery. Amount and/or Complexity of Data Reviewed  Labs: ordered. Radiology: ordered and independent interpretation performed. ECG/medicine tests: ordered and independent interpretation performed. Risk  Prescription drug management. Decision regarding hospitalization. Complexity of Problem: 1 acute uncomplicated illness requiring admission. (3)  The patients assessment required an independent historian: I spoke with a family member.   I have conducted an independent ordering and review of Labs. I have conducted an independent ordering and review of EKG. I have conducted an independent ordering and review of X-rays. I have conducted an independent ordering and review of CT Scan. I have conducted an independent ordering and review of Ultrasound. I have reviewed records from an external source: provider visit notes from outside specialist.  Considerations: Shared decision making was utilized in the care of this patient. I discussed disposition with another provider. Patient was admitted I have communication with admitting physician. ED Course as of 01/08/23 1923   Carol Guerrier Jan 08, 2023   1807 EKG interpretation: Sinus tachycardia, rate of 107, no STEMI, no ectopy, normal intervals. [SH]      ED Course User Index  [SH] Itzel Hunter MD        Orders Placed This Encounter   Procedures    COVID-19, Rapid    Rapid influenza A/B antigens    CT ABDOMEN PELVIS W IV CONTRAST Additional Contrast? None    US ABDOMEN LIMITED Specify organ? GALLBLADDER    CBC with Diff    CMP    Lipase    Troponin    Diet NPO    POCT Urine Dipstick    EKG 12 Lead    Saline lock IV        Medications   levoFLOXacin (LEVAQUIN) 750 MG/150ML infusion 750 mg (750 mg IntraVENous New Bag 1/8/23 1758)   0.9 % sodium chloride bolus (1,000 mLs IntraVENous New Bag 1/8/23 1633)   ondansetron (ZOFRAN) injection 4 mg (4 mg IntraVENous Given 1/8/23 1633)   morphine sulfate (PF) injection 4 mg (4 mg IntraVENous Given 1/8/23 1633)       New Prescriptions    No medications on file        Mary Peterson is a 75 y.o. male who presents to the Emergency Department with chief complaint of    Chief Complaint   Patient presents with    Abdominal Pain      Patient is a 59-year-old male with a history of diverticulosis, back surgery, hypertension who presents with nausea vomiting x2 days with generalized weakness and questionable dehydration.   Patient states he has right upper and lower abdominal pain but denies any diarrhea. Patient was seen at urgent care on Monday and prescribed clindamycin for sinus infection. Patient has a history of chronic back pain, arthritis, GERD, gout, hypertension, kidney stones, obesity    The history is provided by the patient. Abdominal Pain  Pain location:  RUQ and RLQ  Pain quality: aching and dull    Pain radiates to:  Does not radiate  Pain severity:  Moderate  Onset quality:  Gradual  Duration:  2 days  Timing:  Intermittent  Progression:  Waxing and waning  Chronicity:  New  Context: not alcohol use, not diet changes, not eating, not previous surgeries, not recent illness, not recent sexual activity, not sick contacts and not suspicious food intake    Relieved by:  Nothing  Worsened by:  Nothing  Ineffective treatments:  None tried  Associated symptoms: nausea and vomiting    Associated symptoms: no anorexia, no dysuria, no fatigue, no fever, no flatus, no hematemesis, no melena, no shortness of breath, no sore throat and no vaginal bleeding        Review of Systems   Constitutional:  Negative for fatigue and fever. HENT:  Negative for sore throat. Respiratory:  Negative for shortness of breath. Gastrointestinal:  Positive for abdominal pain, nausea and vomiting. Negative for anorexia, flatus, hematemesis and melena. Genitourinary:  Negative for dysuria and vaginal bleeding. All other systems reviewed and are negative.     Past Medical History:   Diagnosis Date    Arthritis     OA    Chewing tobacco use     Chronic pain 2003    back fracture    CP (cerebral palsy) ([facility] [address]      3 surgeries right leg as child; no other problems     Environmental and seasonal allergies     GERD (gastroesophageal reflux disease)     rare-no medication     Gout     Controlled with medication     History of vertebral fracture 2003    Hypertension     Controlled with medication     Kidney stones     Morbid obesity ([facility])     Rheumatic fever age 14    history-no murmur auscultated on 6/11/18 and 22    Sinus infection     currently on Cefdinir     Status post right knee replacement 2018    Unspecified sleep apnea     can not sarahi c pap Followed by Dr. Arianne Ortega, per last office note (2018) \" home study shows moderate to severe MAURIZIO with AHI of 28.6, worse in supine position. Lowest oxygen desaturation 66%. \"        Past Surgical History:   Procedure Laterality Date    BACK SURGERY      Herniated disc repair     COLONOSCOPY      x2    HIP FRACTURE SURGERY Right     LUMBAR LAMINECTOMY      ORTHOPEDIC SURGERY Right 2014    big toe metatarsophalangeal joint fusion    ORTHOPEDIC SURGERY Right     leg repair x 3 as child ue to C.P.    OTHER SURGICAL HISTORY      skin cancer removed from right ear    TOTAL KNEE ARTHROPLASTY          Family History   Problem Relation Age of Onset    Heart Disease Brother     Lung Disease Mother     Heart Disease Brother         Social History     Socioeconomic History    Marital status:    Tobacco Use    Smoking status: Never    Smokeless tobacco: Current    Tobacco comments:     Quit smokin can of chewing tobacco a day for 40 years   Vaping Use    Vaping Use: Never used   Substance and Sexual Activity    Alcohol use: No    Drug use: No         Penicillins     Previous Medications    ALLOPURINOL (ZYLOPRIM) 300 MG TABLET    Take by mouth daily as needed    ALPRAZOLAM (XANAX) 0.5 MG TABLET    Take 1 po 45 min prior to test    ASPIRIN 81 MG CHEWABLE TABLET    Take by mouth    AZELASTINE (ASTELIN) 0.1 % NASAL SPRAY    USE 1 TO 2 SPRAYS IN EACH NOSTRIL TWICE DAILY    CELECOXIB (CELEBREX) 200 MG CAPSULE    Take 200 mg by mouth daily    CYCLOBENZAPRINE (FLEXERIL) 10 MG TABLET    Take 10 mg by mouth 3 times daily as needed    DICLOFENAC SODIUM (VOLTAREN) 1 % GEL    APPLY 4 GRAMS TOPICALLY TO THE AFFECTED AREA FOUR TIMES DAILY    DULOXETINE (CYMBALTA) 60 MG EXTENDED RELEASE CAPSULE    Take 60 mg by mouth    FLUTICASONE (FLONASE) 50 MCG/ACT NASAL SPRAY    SHAKE LIQUID AND USE 1 TO 2 SPRAYS IN EACH NOSTRIL DAILY    GABAPENTIN (NEURONTIN) 100 MG CAPSULE    Take 200 mg by mouth 2 times daily. HYDROCODONE-ACETAMINOPHEN (NORCO) 7.5-325 MG PER TABLET    TAKE 1 TABLET BY MOUTH EVERY 6 HOURS AS NEEDED    IPRATROPIUM (ATROVENT) 0.06 % NASAL SPRAY    1 spray by Nasal route daily as needed    LOSARTAN (COZAAR) 100 MG TABLET    Take 100 mg by mouth daily    OMEPRAZOLE (PRILOSEC) 20 MG DELAYED RELEASE CAPSULE        ROPINIROLE (REQUIP) 1 MG TABLET    Take 4 mg by mouth nightly    ZTLIDO 1.8 % [facility]            Vitals signs and nursing note reviewed. Patient Vitals for the past 4 hrs:   Pulse Resp BP SpO2   01/08/23 1830 -- -- -- 93 %   01/08/23 1800 (!) 105 18 (!) 167/67 97 %   01/08/23 1647 -- -- -- (!) 88 %          Physical Exam  Vitals and nursing note reviewed. Constitutional:       Appearance: Normal appearance. HENT:      Head: Normocephalic and atraumatic. Nose: Nose normal.      Mouth/Throat:      Mouth: Mucous membranes are moist.   Eyes:      Extraocular Movements: Extraocular movements intact. Conjunctiva/sclera: Conjunctivae normal.      Pupils: Pupils are equal, round, and reactive to light. Cardiovascular:      Rate and Rhythm: Normal rate. Pulses: Normal pulses. Heart sounds: Normal heart sounds. Pulmonary:      Effort: Pulmonary effort is normal.      Breath sounds: Normal breath sounds. Abdominal:      General: Abdomen is flat. Tenderness: There is abdominal tenderness in the right upper quadrant and right lower quadrant. Musculoskeletal:         General: Normal range of motion. Cervical back: Normal range of motion and neck supple. Skin:     General: Skin is warm. Capillary Refill: Capillary refill takes less than 2 seconds. Neurological:      General: No focal deficit present. Mental Status: He is alert and oriented to person, place, and time.    Psychiatric:         Mood and Affect: Mood normal. Behavior: Behavior normal.         Thought Content: Thought content normal.         Judgment: Judgment normal.        Procedures    Results for orders placed or performed during the hospital encounter of 01/08/23   COVID-19, Rapid    Specimen: Nasopharyngeal   Result Value Ref Range    Source Nasopharyngeal      SARS-CoV-2, Rapid Not detected NOTD     Rapid influenza A/B antigens    Specimen: Nasal Washing   Result Value Ref Range    Influenza A Ag Negative NEG      Influenza B Ag Negative NEG      Source Nasopharyngeal     CT ABDOMEN PELVIS W IV CONTRAST Additional Contrast? None    Narrative    CT OF THE ABDOMEN AND PELVIS    INDICATION: Abdominal pain beginning last night. Multiple episodes of vomiting. Multiple axial images were obtained through the abdomen and pelvis. Oral  contrast was not administered. 100mL of Isovue 370 intravenous contrast was  used for better evaluation of solid organs and vascular structures. Radiation  dose reduction techniques were used for this study. All CT scans performed at  this facility use one or all of the following: Automated exposure control,  adjustment of the mA and/or kVp according to patient's size, iterative  reconstruction. COMPARISON: Noncontrast study 01/07/2020    FINDINGS:  - Lung Bases: No infiltrates or masses. - Liver: Unremarkable  - Biliary: There are inflammatory changes surrounding the gallbladder. No  definite stones are appreciated. - Pancreas: Normal..  - Spleen: Not enlarged, No mass. - Adrenals: Normal  - Kidneys/ureters: Several nonobstructing calculi bilaterally. - Bladder: Normal.  - Reproductive organs: No pelvic masses. - Bowel: Normal caliber. No inflammatory changes. There are several sigmoid  diverticula.  There is a diverticulum or ureterocele on the left near the UVJ  measuring approximately 1.6 cm.  - Lymph nodes: No significant retroperitoneal, mesenteric, or pelvic adenopathy.  - Bones: No fracture or significant bone lesion.  - Vasculature: Normal  - Bones: No aggressive osseous lesion. There are remote L1 and L2 compression  fractures. - Other: No ascites. There is stable haziness within the central mesentery  suggesting sclerosing mesenteric right is. Impression    1. Findings concerning for acute cholecystitis. The right upper quadrant  ultrasound may be beneficial if there is further clinical concern. 2. Sigmoid diverticulosis. 3. Bilateral nonobstructing renal calculi.          CBC with Diff   Result Value Ref Range    WBC 12.7 (H) 4.3 - 11.1 K/uL    RBC 4.54 4.23 - 5.6 M/uL    Hemoglobin 13.5 (L) 13.6 - 17.2 g/dL    Hematocrit 42.4 41.1 - 50.3 %    MCV 93.4 82.0 - 102.0 FL    MCH 29.7 26.1 - 32.9 PG    MCHC 31.8 31.4 - 35.0 g/dL    RDW 13.0 11.9 - 14.6 %    Platelets 274 234 - 541 K/uL    MPV 10.3 9.4 - 12.3 FL    nRBC 0.00 0.0 - 0.2 K/uL    Differential Type AUTOMATED      Seg Neutrophils 83 (H) 43 - 78 %    Lymphocytes 9 (L) 13 - 44 %    Monocytes 6 4.0 - 12.0 %    Eosinophils % 1 0.5 - 7.8 %    Basophils 1 0.0 - 2.0 %    Immature Granulocytes 1 0.0 - 5.0 %    Segs Absolute 10.5 (H) 1.7 - 8.2 K/UL    Absolute Lymph # 1.1 0.5 - 4.6 K/UL    Absolute Mono # 0.8 0.1 - 1.3 K/UL    Absolute Eos # 0.2 0.0 - 0.8 K/UL    Basophils Absolute 0.1 0.0 - 0.2 K/UL    Absolute Immature Granulocyte 0.1 0.0 - 0.5 K/UL   CMP   Result Value Ref Range    Sodium 139 133 - 143 mmol/L    Potassium 4.0 3.5 - 5.1 mmol/L    Chloride 104 101 - 110 mmol/L    CO2 27 21 - 32 mmol/L    Anion Gap 8 2 - 11 mmol/L    Glucose 157 (H) 65 - 100 mg/dL    BUN 15 8 - 23 MG/DL    Creatinine 1.08 0.8 - 1.5 MG/DL    Est, Glom Filt Rate >60 >60 ml/min/1.73m2    Calcium 9.4 8.3 - 10.4 MG/DL    Total Bilirubin 0.8 0.2 - 1.1 MG/DL    ALT 25 12 - 65 U/L    AST 28 15 - 37 U/L    Alk Phosphatase 93 50 - 136 U/L    Total Protein 7.4 6.3 - 8.2 g/dL    Albumin 3.4 3.2 - 4.6 g/dL    Globulin 4.0 2.8 - 4.5 g/dL    Albumin/Globulin Ratio 0.9 0.4 - 1.6     Lipase Result Value Ref Range    Lipase 52 (L) 73 - 393 U/L   Troponin   Result Value Ref Range    Troponin, High Sensitivity 11.4 0 - 14 pg/mL        CT ABDOMEN PELVIS W IV CONTRAST Additional Contrast? None   Final Result   1. Findings concerning for acute cholecystitis. The right upper quadrant   ultrasound may be beneficial if there is further clinical concern. 2. Sigmoid diverticulosis. 3. Bilateral nonobstructing renal calculi. US ABDOMEN LIMITED Specify organ? GALLBLADDER    (Results Pending)                       Voice dictation software was used during the making of this note. This software is not perfect and grammatical and other typographical errors may be present. This note has not been completely proofread for errors.       Harvey Adams MD  01/08/23 3067

## 2023-01-08 NOTE — H&P
Hospitalist History and Physical   Admit Date:  2023  3:26 PM   Name:  Shae Hernandez   Age:  75 y.o. Sex:  male  :  1955   MRN:  [id number]   Room:  [facility]/    Presenting Complaint: Abdominal Pain     Reason(s) for Admission: Sepsis ([facility] [A41.9]     History of Present Illness:   Shae Hernandez is a 75 y.o. male with medical history of chronic back pain, OA, GERD, gout, HTN, MAURIZIO but not tolerating CPAP who presented with acute onset of abdominal pain (RUQ and RLQ) with associated nausea and vomiting since last night. Unable to tolerate any PO since last night. Has been having cough and congestion since last week and was tested neg for covid and flu at outside facility. Wife reports that patient also has been feeling chills and felt warm last night. In the ED, patient was tachycardic and noted to be saturating 88% on 2 L O2 nasal cannula. Laboratory work-up was only remarkable for WBC of 12.7. Influenza AMB were negative as well as COVID. CT abdomen pelvis concerning for acute cholecystitis with inflammatory changes surrounding the gallbladder without definite stones. ED provider to contact General surgery. Assessment & Plan:     Sepsis due to likely acute cholecystitis   Elevated WBC with tachycardia. CT with inflammatory changes surrounding the gallbladder without definite stones. - will start cipro and flagyl given penicillin allergy  - anti-emetics  - NPO for now  - IVF and monitor for fluid overload. - U/S of abdomen  - general surgery consulted by ED. Awating result of US Abd  - pain control with IV morphine PRN    Acute respiratory failure with hypoxia  Desaturation to 88% on room air documented in ED with sats improving to >92% with 2L O2 NC.  - check procalc and CXR given cough/congestion and viral symptoms. Covid and flu neg in ED.        OA of left knee   Had elective knee surgery planned with  tomorrow    HTN: takes losartan  GERD: PPI  RLS: on requip    MAURIZIO: not tolerant of CPAP per chart review    Diet: Diet NPO Exceptions are: Ice Chips, Sips of Water with Meds  VTE ppx: SCDs for possible surgery  Code status: FULL    Hospital Problems:  Principal Problem:    Sepsis ([facility]  Active Problems:    Restless leg syndrome    Acute respiratory failure with hypoxia (HCC)    MAURIZIO (obstructive sleep apnea)    HTN (hypertension)    Primary osteoarthritis of left knee  Resolved Problems:    * No resolved hospital problems. *       Past History:     Past Medical History:   Diagnosis Date    Arthritis     OA    Chewing tobacco use     Chronic pain     back fracture    CP (cerebral palsy) ([facility]      3 surgeries right leg as child; no other problems     Environmental and seasonal allergies     GERD (gastroesophageal reflux disease)     rare-no medication     Gout     Controlled with medication     History of vertebral fracture     Hypertension     Controlled with medication     Kidney stones     Morbid obesity ([facility]     Rheumatic fever age 14    history-no murmur auscultated on 17 and 21    Sinus infection     currently on Cefdinir     Status post right knee replacement 2018    Unspecified sleep apnea     can not sarahi c pap Followed by Dr. Salome North, per last office note (2018) \" home study shows moderate to severe MAURIZIO with AHI of 28.6, worse in supine position. Lowest oxygen desaturation 66%. \"       Past Surgical History:   Procedure Laterality Date    BACK SURGERY      Herniated disc repair     COLONOSCOPY      x2    HIP FRACTURE SURGERY Right     LUMBAR LAMINECTOMY      ORTHOPEDIC SURGERY Right 2014    big toe metatarsophalangeal joint fusion    ORTHOPEDIC SURGERY Right     leg repair x 3 as child ue to C.P.    OTHER SURGICAL HISTORY      skin cancer removed from right ear    TOTAL KNEE ARTHROPLASTY          Social History     Tobacco Use    Smoking status: Never    Smokeless tobacco: Current    Tobacco comments:     Quit smokin can of chewing tobacco a day for 40 years   Substance Use Topics    Alcohol use: No      Social History     Substance and Sexual Activity   Drug Use No       Family History   Problem Relation Age of Onset    Heart Disease Brother     Lung Disease Mother     Heart Disease Brother         Immunization History   Administered Date(s) Administered    PPD Test 06/22/2018     Allergies   Allergen Reactions    Penicillins Itching, Nausea Only and Headaches     Other reaction(s): Nausea and/or vomiting-Intolerance  Other reaction(s): Headache       Prior to Admit Medications:  Current Outpatient Medications   Medication Instructions    aspirin 81 MG chewable tablet Oral    azelastine (ASTELIN) 0.1 % nasal spray USE 1 TO 2 SPRAYS IN EACH NOSTRIL TWICE DAILY    celecoxib (CELEBREX) 200 mg, Oral, DAILY    DULoxetine (CYMBALTA) 60 mg, Oral    HYDROcodone-acetaminophen (NORCO) 7.5-325 MG per tablet TAKE 1 TABLET BY MOUTH EVERY 6 HOURS AS NEEDED    ipratropium (ATROVENT) 0.06 % nasal spray 1 spray, DAILY PRN    losartan (COZAAR) 100 mg, Oral, DAILY    omeprazole (PRILOSEC) 20 MG delayed release capsule No dose, route, or frequency recorded. rOPINIRole (REQUIP) 4 mg, Oral, NIGHTLY    ZTLIDO 1.8 % PTCH No dose, route, or frequency recorded. Objective:   Patient Vitals for the past 24 hrs:   Temp Pulse Resp BP SpO2   01/08/23 1943 -- -- -- (!) 108/57 93 %   01/08/23 1830 -- -- -- -- 93 %   01/08/23 1800 -- (!) 105 18 (!) 167/67 97 %   01/08/23 1647 -- -- -- -- (!) 88 %   01/08/23 1523 98.4 °F (36.9 °C) 91 19 137/67 98 %       Oxygen Therapy  SpO2: 93 %  Pulse via Oximetry: 115 beats per minute  O2 Device: None (Room air)  O2 Flow Rate (L/min): 2 L/min    Estimated body mass index is 34.44 kg/m² as calculated from the following:    Height as of this encounter: 5' 10\" (1.778 m). Weight as of this encounter: 240 lb (108.9 kg).   No intake or output data in the 24 hours ending 01/08/23 2021      Physical Exam:    Blood pressure (!) 108/57, pulse (!) 105, temperature 98.4 °F (36.9 °C), temperature source Oral, resp. rate 18, height 5' 10\" (1.778 m), weight 240 lb (108.9 kg), SpO2 93 %. General:    Ill appearing. In acute distress due to back and Right side abdominal pain. Head:  Normocephalic, atraumatic  Eyes:  Sclerae appear normal.  Pupils equally round. ENT:  Nares appear normal.  Moist oral mucosa  Neck:  No restricted ROM. Trachea midline   CV:   RRR. No m/r/g. No jugular venous distension. Lungs:   CTAB. No wheezing,. Symmetric expansion. Abdomen:   Soft, nondistended. Tenderness to RUQ. Extremities: No cyanosis or clubbing. No edema  Skin:     No rashes and normal coloration. Warm and dry. Neuro:  CN II-XII grossly intact. Sensation intact. Psych:  Normal mood and affect.       I have personally reviewed labs and tests showing:  Recent Labs:  Recent Results (from the past 24 hour(s))   Troponin    Collection Time: 01/08/23  3:49 PM   Result Value Ref Range    Troponin, High Sensitivity 11.4 0 - 14 pg/mL   CBC with Diff    Collection Time: 01/08/23  3:50 PM   Result Value Ref Range    WBC 12.7 (H) 4.3 - 11.1 K/uL    RBC 4.54 4.23 - 5.6 M/uL    Hemoglobin 13.5 (L) 13.6 - 17.2 g/dL    Hematocrit 42.4 41.1 - 50.3 %    MCV 93.4 82.0 - 102.0 FL    MCH 29.7 26.1 - 32.9 PG    MCHC 31.8 31.4 - 35.0 g/dL    RDW 13.0 11.9 - 14.6 %    Platelets 595 718 - 424 K/uL    MPV 10.3 9.4 - 12.3 FL    nRBC 0.00 0.0 - 0.2 K/uL    Differential Type AUTOMATED      Seg Neutrophils 83 (H) 43 - 78 %    Lymphocytes 9 (L) 13 - 44 %    Monocytes 6 4.0 - 12.0 %    Eosinophils % 1 0.5 - 7.8 %    Basophils 1 0.0 - 2.0 %    Immature Granulocytes 1 0.0 - 5.0 %    Segs Absolute 10.5 (H) 1.7 - 8.2 K/UL    Absolute Lymph # 1.1 0.5 - 4.6 K/UL    Absolute Mono # 0.8 0.1 - 1.3 K/UL    Absolute Eos # 0.2 0.0 - 0.8 K/UL    Basophils Absolute 0.1 0.0 - 0.2 K/UL    Absolute Immature Granulocyte 0.1 0.0 - 0.5 K/UL   CMP    Collection Time: 01/08/23  3:50 PM Result Value Ref Range    Sodium 139 133 - 143 mmol/L    Potassium 4.0 3.5 - 5.1 mmol/L    Chloride 104 101 - 110 mmol/L    CO2 27 21 - 32 mmol/L    Anion Gap 8 2 - 11 mmol/L    Glucose 157 (H) 65 - 100 mg/dL    BUN 15 8 - 23 MG/DL    Creatinine 1.08 0.8 - 1.5 MG/DL    Est, Glom Filt Rate >60 >60 ml/min/1.73m2    Calcium 9.4 8.3 - 10.4 MG/DL    Total Bilirubin 0.8 0.2 - 1.1 MG/DL    ALT 25 12 - 65 U/L    AST 28 15 - 37 U/L    Alk Phosphatase 93 50 - 136 U/L    Total Protein 7.4 6.3 - 8.2 g/dL    Albumin 3.4 3.2 - 4.6 g/dL    Globulin 4.0 2.8 - 4.5 g/dL    Albumin/Globulin Ratio 0.9 0.4 - 1.6     Lipase    Collection Time: 01/08/23  3:50 PM   Result Value Ref Range    Lipase 52 (L) 73 - 393 U/L   COVID-19, Rapid    Collection Time: 01/08/23  4:40 PM    Specimen: Nasopharyngeal   Result Value Ref Range    Source Nasopharyngeal      SARS-CoV-2, Rapid Not detected NOTD     Rapid influenza A/B antigens    Collection Time: 01/08/23  4:40 PM    Specimen: Nasal Washing   Result Value Ref Range    Influenza A Ag Negative NEG      Influenza B Ag Negative NEG      Source Nasopharyngeal         I have personally reviewed imaging studies showing:  CT ABDOMEN PELVIS W IV CONTRAST Additional Contrast? None    Result Date: 1/8/2023  CT OF THE ABDOMEN AND PELVIS INDICATION: Abdominal pain beginning last night. Multiple episodes of vomiting. Multiple axial images were obtained through the abdomen and pelvis. Oral contrast was not administered. 100mL of Isovue 370 intravenous contrast was used for better evaluation of solid organs and vascular structures. Radiation dose reduction techniques were used for this study. All CT scans performed at this facility use one or all of the following: Automated exposure control, adjustment of the mA and/or kVp according to patient's size, iterative reconstruction. COMPARISON: Noncontrast study 01/07/2020 FINDINGS: - Lung Bases: No infiltrates or masses.  - Liver: Unremarkable - Biliary: There are inflammatory changes surrounding the gallbladder. No definite stones are appreciated. - Pancreas: Normal.. - Spleen: Not enlarged, No mass. - Adrenals: Normal - Kidneys/ureters: Several nonobstructing calculi bilaterally. - Bladder: Normal. - Reproductive organs: No pelvic masses. - Bowel: Normal caliber. No inflammatory changes. There are several sigmoid diverticula. There is a diverticulum or ureterocele on the left near the UVJ measuring approximately 1.6 cm. - Lymph nodes: No significant retroperitoneal, mesenteric, or pelvic adenopathy. - Bones: No fracture or significant bone lesion. - Vasculature: Normal - Bones: No aggressive osseous lesion. There are remote L1 and L2 compression fractures. - Other: No ascites. There is stable haziness within the central mesentery suggesting sclerosing mesenteric right is. 1. Findings concerning for acute cholecystitis. The right upper quadrant ultrasound may be beneficial if there is further clinical concern. 2. Sigmoid diverticulosis. 3. Bilateral nonobstructing renal calculi. Echocardiogram:  No results found for this or any previous visit.         Orders Placed This Encounter   Medications    0.9 % sodium chloride bolus    ondansetron (ZOFRAN) injection 4 mg    morphine sulfate (PF) injection 4 mg    levoFLOXacin (LEVAQUIN) 750 MG/150ML infusion 750 mg     Order Specific Question:   Antimicrobial Indications     Answer:   Intra-Abdominal Infection    sodium chloride flush 0.9 % injection 5-40 mL    sodium chloride flush 0.9 % injection 5-40 mL    0.9 % sodium chloride infusion    OR Linked Order Group     ondansetron (ZOFRAN-ODT) disintegrating tablet 4 mg     ondansetron (ZOFRAN) injection 4 mg    polyethylene glycol (GLYCOLAX) packet 17 g    bisacodyl (DULCOLAX) suppository 10 mg    famotidine (PEPCID) tablet 10 mg    aluminum & magnesium hydroxide-simethicone (MAALOX) 200-200-20 MG/5ML suspension 30 mL    OR Linked Order Group     acetaminophen (TYLENOL) tablet 650 mg     acetaminophen (TYLENOL) suppository 650 mg    DULoxetine (CYMBALTA) extended release capsule 60 mg    losartan (COZAAR) tablet 100 mg    rOPINIRole (REQUIP) tablet 4 mg    HYDROcodone-acetaminophen (NORCO) 7.5-325 MG per tablet 1 tablet    morphine injection 4 mg    ondansetron (ZOFRAN) injection 4 mg    ciprofloxacin (CIPRO) IVPB 400 mg     Order Specific Question:   Antimicrobial Indications     Answer:   Intra-Abdominal Infection    metronidazole (FLAGYL) 500 mg in 0.9% NaCl 100 mL IVPB premix     Order Specific Question:   Antimicrobial Indications     Answer:   Intra-Abdominal Infection    0.9 % sodium chloride infusion    morphine injection 2 mg         Signed:  Saintclair Radar, MD    Part of this note may have been written by using a voice dictation software. The note has been proof read but may still contain some grammatical/other typographical errors.

## 2023-01-08 NOTE — ED TRIAGE NOTES
Patient brought into triage via wheelchair. Patient c\o abdominal pain that began last night. Patient states he has had multiple episodes of emesis since last night. Patient also c\o cough. Patient states he had a negative flu test on Tuesday of this week.

## 2023-01-09 ENCOUNTER — ANESTHESIA EVENT (OUTPATIENT)
Dept: SURGERY | Age: 68
DRG: 853 | End: 2023-01-09
Payer: MEDICARE

## 2023-01-09 ENCOUNTER — APPOINTMENT (OUTPATIENT)
Dept: MRI IMAGING | Age: 68
DRG: 853 | End: 2023-01-09
Payer: MEDICARE

## 2023-01-09 ENCOUNTER — ANESTHESIA (OUTPATIENT)
Dept: SURGERY | Age: 68
DRG: 853 | End: 2023-01-09
Payer: MEDICARE

## 2023-01-09 PROBLEM — E83.42 HYPOMAGNESEMIA: Status: ACTIVE | Noted: 2023-01-09

## 2023-01-09 PROBLEM — K81.0 ACUTE CHOLECYSTITIS: Status: ACTIVE | Noted: 2023-01-09

## 2023-01-09 PROBLEM — E87.6 HYPOKALEMIA: Status: ACTIVE | Noted: 2023-01-09

## 2023-01-09 LAB
ALBUMIN SERPL-MCNC: 3.3 G/DL (ref 3.2–4.6)
ALBUMIN/GLOB SERPL: 1 (ref 0.4–1.6)
ALP SERPL-CCNC: 83 U/L (ref 50–136)
ALT SERPL-CCNC: 25 U/L (ref 12–65)
ANION GAP SERPL CALC-SCNC: 9 MMOL/L (ref 2–11)
AST SERPL-CCNC: 19 U/L (ref 15–37)
B PERT DNA SPEC QL NAA+PROBE: NOT DETECTED
BASOPHILS # BLD: 0.1 K/UL (ref 0–0.2)
BASOPHILS NFR BLD: 0 % (ref 0–2)
BILIRUB DIRECT SERPL-MCNC: 0.4 MG/DL
BILIRUB SERPL-MCNC: 1.4 MG/DL (ref 0.2–1.1)
BORDETELLA PARAPERTUSSIS BY PCR: NOT DETECTED
BUN SERPL-MCNC: 17 MG/DL (ref 8–23)
C PNEUM DNA SPEC QL NAA+PROBE: NOT DETECTED
CALCIUM SERPL-MCNC: 8.8 MG/DL (ref 8.3–10.4)
CHLORIDE SERPL-SCNC: 105 MMOL/L (ref 101–110)
CO2 SERPL-SCNC: 26 MMOL/L (ref 21–32)
CREAT SERPL-MCNC: 1.3 MG/DL (ref 0.8–1.5)
DIFFERENTIAL METHOD BLD: ABNORMAL
EKG ATRIAL RATE: 107 BPM
EKG DIAGNOSIS: NORMAL
EKG P AXIS: 75 DEGREES
EKG P-R INTERVAL: 160 MS
EKG Q-T INTERVAL: 308 MS
EKG QRS DURATION: 86 MS
EKG QTC CALCULATION (BAZETT): 411 MS
EKG R AXIS: 86 DEGREES
EKG T AXIS: 70 DEGREES
EKG VENTRICULAR RATE: 107 BPM
EOSINOPHIL # BLD: 0 K/UL (ref 0–0.8)
EOSINOPHIL NFR BLD: 0 % (ref 0.5–7.8)
ERYTHROCYTE [DISTWIDTH] IN BLOOD BY AUTOMATED COUNT: 13 % (ref 11.9–14.6)
FLUAV SUBTYP SPEC NAA+PROBE: NOT DETECTED
FLUBV RNA SPEC QL NAA+PROBE: NOT DETECTED
GLOBULIN SER CALC-MCNC: 3.4 G/DL (ref 2.8–4.5)
GLUCOSE SERPL-MCNC: 162 MG/DL (ref 65–100)
HADV DNA SPEC QL NAA+PROBE: NOT DETECTED
HCOV 229E RNA SPEC QL NAA+PROBE: NOT DETECTED
HCOV HKU1 RNA SPEC QL NAA+PROBE: NOT DETECTED
HCOV NL63 RNA SPEC QL NAA+PROBE: NOT DETECTED
HCOV OC43 RNA SPEC QL NAA+PROBE: NOT DETECTED
HCT VFR BLD AUTO: 40.1 % (ref 41.1–50.3)
HGB BLD-MCNC: 12.7 G/DL (ref 13.6–17.2)
HMPV RNA SPEC QL NAA+PROBE: NOT DETECTED
HPIV1 RNA SPEC QL NAA+PROBE: NOT DETECTED
HPIV2 RNA SPEC QL NAA+PROBE: NOT DETECTED
HPIV3 RNA SPEC QL NAA+PROBE: NOT DETECTED
HPIV4 RNA SPEC QL NAA+PROBE: NOT DETECTED
IMM GRANULOCYTES # BLD AUTO: 0.1 K/UL (ref 0–0.5)
IMM GRANULOCYTES NFR BLD AUTO: 1 % (ref 0–5)
LYMPHOCYTES # BLD: 0.9 K/UL (ref 0.5–4.6)
LYMPHOCYTES NFR BLD: 5 % (ref 13–44)
M PNEUMO DNA SPEC QL NAA+PROBE: NOT DETECTED
MAGNESIUM SERPL-MCNC: 1.7 MG/DL (ref 1.8–2.4)
MCH RBC QN AUTO: 29.8 PG (ref 26.1–32.9)
MCHC RBC AUTO-ENTMCNC: 31.7 G/DL (ref 31.4–35)
MCV RBC AUTO: 94.1 FL (ref 82–102)
MONOCYTES # BLD: 1.4 K/UL (ref 0.1–1.3)
MONOCYTES NFR BLD: 8 % (ref 4–12)
NEUTS SEG # BLD: 15.5 K/UL (ref 1.7–8.2)
NEUTS SEG NFR BLD: 86 % (ref 43–78)
NRBC # BLD: 0 K/UL (ref 0–0.2)
NT PRO BNP: 479 PG/ML (ref 5–125)
PLATELET # BLD AUTO: 195 K/UL (ref 150–450)
PMV BLD AUTO: 10.5 FL (ref 9.4–12.3)
POTASSIUM SERPL-SCNC: 3.2 MMOL/L (ref 3.5–5.1)
PROT SERPL-MCNC: 6.7 G/DL (ref 6.3–8.2)
RBC # BLD AUTO: 4.26 M/UL (ref 4.23–5.6)
RSV RNA SPEC QL NAA+PROBE: NOT DETECTED
RV+EV RNA SPEC QL NAA+PROBE: DETECTED
SARS-COV-2 RNA RESP QL NAA+PROBE: NOT DETECTED
SODIUM SERPL-SCNC: 140 MMOL/L (ref 133–143)
WBC # BLD AUTO: 18 K/UL (ref 4.3–11.1)

## 2023-01-09 PROCEDURE — 88304 TISSUE EXAM BY PATHOLOGIST: CPT

## 2023-01-09 PROCEDURE — 80076 HEPATIC FUNCTION PANEL: CPT

## 2023-01-09 PROCEDURE — 6360000002 HC RX W HCPCS: Performed by: INTERNAL MEDICINE

## 2023-01-09 PROCEDURE — 2580000003 HC RX 258: Performed by: REGISTERED NURSE

## 2023-01-09 PROCEDURE — 2580000003 HC RX 258: Performed by: INTERNAL MEDICINE

## 2023-01-09 PROCEDURE — 7100000000 HC PACU RECOVERY - FIRST 15 MIN: Performed by: SURGERY

## 2023-01-09 PROCEDURE — 3600000014 HC SURGERY LEVEL 4 ADDTL 15MIN: Performed by: SURGERY

## 2023-01-09 PROCEDURE — 2720000010 HC SURG SUPPLY STERILE: Performed by: SURGERY

## 2023-01-09 PROCEDURE — 2500000003 HC RX 250 WO HCPCS: Performed by: REGISTERED NURSE

## 2023-01-09 PROCEDURE — 3600000004 HC SURGERY LEVEL 4 BASE: Performed by: SURGERY

## 2023-01-09 PROCEDURE — 7100000001 HC PACU RECOVERY - ADDTL 15 MIN: Performed by: SURGERY

## 2023-01-09 PROCEDURE — 2700000000 HC OXYGEN THERAPY PER DAY

## 2023-01-09 PROCEDURE — 2500000003 HC RX 250 WO HCPCS: Performed by: INTERNAL MEDICINE

## 2023-01-09 PROCEDURE — 6370000000 HC RX 637 (ALT 250 FOR IP): Performed by: SURGERY

## 2023-01-09 PROCEDURE — 6360000002 HC RX W HCPCS: Performed by: ANESTHESIOLOGY

## 2023-01-09 PROCEDURE — 0202U NFCT DS 22 TRGT SARS-COV-2: CPT

## 2023-01-09 PROCEDURE — 6360000004 HC RX CONTRAST MEDICATION: Performed by: PHYSICIAN ASSISTANT

## 2023-01-09 PROCEDURE — 6370000000 HC RX 637 (ALT 250 FOR IP): Performed by: INTERNAL MEDICINE

## 2023-01-09 PROCEDURE — 6360000002 HC RX W HCPCS: Performed by: SURGERY

## 2023-01-09 PROCEDURE — 3700000001 HC ADD 15 MINUTES (ANESTHESIA): Performed by: SURGERY

## 2023-01-09 PROCEDURE — 2580000003 HC RX 258: Performed by: SURGERY

## 2023-01-09 PROCEDURE — 94760 N-INVAS EAR/PLS OXIMETRY 1: CPT

## 2023-01-09 PROCEDURE — 74183 MRI ABD W/O CNTR FLWD CNTR: CPT

## 2023-01-09 PROCEDURE — 85025 COMPLETE CBC W/AUTO DIFF WBC: CPT

## 2023-01-09 PROCEDURE — 6360000002 HC RX W HCPCS: Performed by: REGISTERED NURSE

## 2023-01-09 PROCEDURE — 2500000003 HC RX 250 WO HCPCS: Performed by: SURGERY

## 2023-01-09 PROCEDURE — 3700000000 HC ANESTHESIA ATTENDED CARE: Performed by: SURGERY

## 2023-01-09 PROCEDURE — 6360000002 HC RX W HCPCS: Performed by: PHYSICIAN ASSISTANT

## 2023-01-09 PROCEDURE — 83880 ASSAY OF NATRIURETIC PEPTIDE: CPT

## 2023-01-09 PROCEDURE — 83735 ASSAY OF MAGNESIUM: CPT

## 2023-01-09 PROCEDURE — A9579 GAD-BASE MR CONTRAST NOS,1ML: HCPCS | Performed by: PHYSICIAN ASSISTANT

## 2023-01-09 PROCEDURE — 0FT44ZZ RESECTION OF GALLBLADDER, PERCUTANEOUS ENDOSCOPIC APPROACH: ICD-10-PCS | Performed by: SURGERY

## 2023-01-09 PROCEDURE — 80048 BASIC METABOLIC PNL TOTAL CA: CPT

## 2023-01-09 PROCEDURE — 1100000000 HC RM PRIVATE

## 2023-01-09 PROCEDURE — 2709999900 HC NON-CHARGEABLE SUPPLY: Performed by: SURGERY

## 2023-01-09 RX ORDER — SODIUM CHLORIDE 0.9 % (FLUSH) 0.9 %
5-40 SYRINGE (ML) INJECTION EVERY 12 HOURS SCHEDULED
Status: DISCONTINUED | OUTPATIENT
Start: 2023-01-09 | End: 2023-01-09 | Stop reason: HOSPADM

## 2023-01-09 RX ORDER — DIPHENHYDRAMINE HYDROCHLORIDE 50 MG/ML
12.5 INJECTION INTRAMUSCULAR; INTRAVENOUS
Status: DISCONTINUED | OUTPATIENT
Start: 2023-01-09 | End: 2023-01-09 | Stop reason: HOSPADM

## 2023-01-09 RX ORDER — SUCCINYLCHOLINE CHLORIDE 20 MG/ML
INJECTION INTRAMUSCULAR; INTRAVENOUS PRN
Status: DISCONTINUED | OUTPATIENT
Start: 2023-01-09 | End: 2023-01-09 | Stop reason: SDUPTHER

## 2023-01-09 RX ORDER — HYDROMORPHONE HYDROCHLORIDE 1 MG/ML
0.5 INJECTION, SOLUTION INTRAMUSCULAR; INTRAVENOUS; SUBCUTANEOUS EVERY 5 MIN PRN
Status: DISCONTINUED | OUTPATIENT
Start: 2023-01-09 | End: 2023-01-09 | Stop reason: HOSPADM

## 2023-01-09 RX ORDER — SODIUM CHLORIDE 9 MG/ML
INJECTION, SOLUTION INTRAVENOUS PRN
Status: DISCONTINUED | OUTPATIENT
Start: 2023-01-09 | End: 2023-01-09 | Stop reason: HOSPADM

## 2023-01-09 RX ORDER — ROCURONIUM BROMIDE 10 MG/ML
INJECTION, SOLUTION INTRAVENOUS PRN
Status: DISCONTINUED | OUTPATIENT
Start: 2023-01-09 | End: 2023-01-09 | Stop reason: SDUPTHER

## 2023-01-09 RX ORDER — GLYCOPYRROLATE 0.2 MG/ML
INJECTION INTRAMUSCULAR; INTRAVENOUS PRN
Status: DISCONTINUED | OUTPATIENT
Start: 2023-01-09 | End: 2023-01-09 | Stop reason: SDUPTHER

## 2023-01-09 RX ORDER — EPHEDRINE SULFATE/0.9% NACL/PF 50 MG/5 ML
SYRINGE (ML) INTRAVENOUS PRN
Status: DISCONTINUED | OUTPATIENT
Start: 2023-01-09 | End: 2023-01-09 | Stop reason: SDUPTHER

## 2023-01-09 RX ORDER — BENZONATATE 100 MG/1
100 CAPSULE ORAL 3 TIMES DAILY PRN
Status: DISCONTINUED | OUTPATIENT
Start: 2023-01-09 | End: 2023-01-12 | Stop reason: HOSPADM

## 2023-01-09 RX ORDER — ONDANSETRON 2 MG/ML
INJECTION INTRAMUSCULAR; INTRAVENOUS PRN
Status: DISCONTINUED | OUTPATIENT
Start: 2023-01-09 | End: 2023-01-09 | Stop reason: SDUPTHER

## 2023-01-09 RX ORDER — PROCHLORPERAZINE EDISYLATE 5 MG/ML
5 INJECTION INTRAMUSCULAR; INTRAVENOUS
Status: DISCONTINUED | OUTPATIENT
Start: 2023-01-09 | End: 2023-01-09 | Stop reason: HOSPADM

## 2023-01-09 RX ORDER — SODIUM CHLORIDE FOR INHALATION 3 %
4 VIAL, NEBULIZER (ML) INHALATION EVERY 12 HOURS PRN
Status: DISCONTINUED | OUTPATIENT
Start: 2023-01-09 | End: 2023-01-12 | Stop reason: HOSPADM

## 2023-01-09 RX ORDER — BUPIVACAINE HYDROCHLORIDE AND EPINEPHRINE 5; 5 MG/ML; UG/ML
INJECTION, SOLUTION EPIDURAL; INTRACAUDAL; PERINEURAL PRN
Status: DISCONTINUED | OUTPATIENT
Start: 2023-01-09 | End: 2023-01-09 | Stop reason: HOSPADM

## 2023-01-09 RX ORDER — SODIUM CHLORIDE, SODIUM LACTATE, POTASSIUM CHLORIDE, CALCIUM CHLORIDE 600; 310; 30; 20 MG/100ML; MG/100ML; MG/100ML; MG/100ML
INJECTION, SOLUTION INTRAVENOUS CONTINUOUS
Status: DISCONTINUED | OUTPATIENT
Start: 2023-01-09 | End: 2023-01-09

## 2023-01-09 RX ORDER — ROCURONIUM BROMIDE 10 MG/ML
INJECTION, SOLUTION INTRAVENOUS PRN
Status: DISCONTINUED | OUTPATIENT
Start: 2023-01-09 | End: 2023-01-09

## 2023-01-09 RX ORDER — SODIUM CHLORIDE 0.9 % (FLUSH) 0.9 %
20 SYRINGE (ML) INJECTION AS NEEDED
Status: DISCONTINUED | OUTPATIENT
Start: 2023-01-09 | End: 2023-01-12 | Stop reason: HOSPADM

## 2023-01-09 RX ORDER — SODIUM CHLORIDE, SODIUM LACTATE, POTASSIUM CHLORIDE, CALCIUM CHLORIDE 600; 310; 30; 20 MG/100ML; MG/100ML; MG/100ML; MG/100ML
INJECTION, SOLUTION INTRAVENOUS CONTINUOUS PRN
Status: DISCONTINUED | OUTPATIENT
Start: 2023-01-09 | End: 2023-01-09 | Stop reason: SDUPTHER

## 2023-01-09 RX ORDER — ALBUTEROL SULFATE 2.5 MG/3ML
2.5 SOLUTION RESPIRATORY (INHALATION) EVERY 6 HOURS PRN
Status: DISCONTINUED | OUTPATIENT
Start: 2023-01-09 | End: 2023-01-12 | Stop reason: HOSPADM

## 2023-01-09 RX ORDER — LIDOCAINE HYDROCHLORIDE 20 MG/ML
INJECTION, SOLUTION EPIDURAL; INFILTRATION; INTRACAUDAL; PERINEURAL PRN
Status: DISCONTINUED | OUTPATIENT
Start: 2023-01-09 | End: 2023-01-09 | Stop reason: SDUPTHER

## 2023-01-09 RX ORDER — ONDANSETRON 2 MG/ML
4 INJECTION INTRAMUSCULAR; INTRAVENOUS
Status: DISCONTINUED | OUTPATIENT
Start: 2023-01-09 | End: 2023-01-09 | Stop reason: HOSPADM

## 2023-01-09 RX ORDER — POTASSIUM CHLORIDE 20 MEQ/1
40 TABLET, EXTENDED RELEASE ORAL 2 TIMES DAILY WITH MEALS
Status: DISPENSED | OUTPATIENT
Start: 2023-01-09 | End: 2023-01-10

## 2023-01-09 RX ORDER — MAGNESIUM SULFATE IN WATER 40 MG/ML
2000 INJECTION, SOLUTION INTRAVENOUS ONCE
Status: COMPLETED | OUTPATIENT
Start: 2023-01-09 | End: 2023-01-09

## 2023-01-09 RX ORDER — DEXAMETHASONE SODIUM PHOSPHATE 10 MG/ML
INJECTION INTRAMUSCULAR; INTRAVENOUS PRN
Status: DISCONTINUED | OUTPATIENT
Start: 2023-01-09 | End: 2023-01-09 | Stop reason: SDUPTHER

## 2023-01-09 RX ORDER — FLUTICASONE PROPIONATE 50 MCG
1 SPRAY, SUSPENSION (ML) NASAL DAILY
Status: DISCONTINUED | OUTPATIENT
Start: 2023-01-09 | End: 2023-01-12 | Stop reason: HOSPADM

## 2023-01-09 RX ORDER — PROPOFOL 10 MG/ML
INJECTION, EMULSION INTRAVENOUS PRN
Status: DISCONTINUED | OUTPATIENT
Start: 2023-01-09 | End: 2023-01-09 | Stop reason: SDUPTHER

## 2023-01-09 RX ORDER — FENTANYL CITRATE 50 UG/ML
INJECTION, SOLUTION INTRAMUSCULAR; INTRAVENOUS PRN
Status: DISCONTINUED | OUTPATIENT
Start: 2023-01-09 | End: 2023-01-09 | Stop reason: SDUPTHER

## 2023-01-09 RX ORDER — SODIUM CHLORIDE 0.9 % (FLUSH) 0.9 %
5-40 SYRINGE (ML) INJECTION PRN
Status: DISCONTINUED | OUTPATIENT
Start: 2023-01-09 | End: 2023-01-09 | Stop reason: HOSPADM

## 2023-01-09 RX ORDER — NEOSTIGMINE METHYLSULFATE 1 MG/ML
INJECTION, SOLUTION INTRAVENOUS PRN
Status: DISCONTINUED | OUTPATIENT
Start: 2023-01-09 | End: 2023-01-09 | Stop reason: SDUPTHER

## 2023-01-09 RX ORDER — LORAZEPAM 0.5 MG/1
0.5 TABLET ORAL
Status: ACTIVE | OUTPATIENT
Start: 2023-01-09 | End: 2023-01-10

## 2023-01-09 RX ADMIN — ALBUTEROL SULFATE 2.5 MG: 2.5 SOLUTION RESPIRATORY (INHALATION) at 19:43

## 2023-01-09 RX ADMIN — SODIUM CHLORIDE: 9 INJECTION, SOLUTION INTRAVENOUS at 21:00

## 2023-01-09 RX ADMIN — POTASSIUM CHLORIDE 40 MEQ: 1500 TABLET, EXTENDED RELEASE ORAL at 08:46

## 2023-01-09 RX ADMIN — METRONIDAZOLE 500 MG: 500 INJECTION, SOLUTION INTRAVENOUS at 21:57

## 2023-01-09 RX ADMIN — ONDANSETRON 4 MG: 2 INJECTION INTRAMUSCULAR; INTRAVENOUS at 18:17

## 2023-01-09 RX ADMIN — HYDROMORPHONE HYDROCHLORIDE 0.5 MG: 1 INJECTION, SOLUTION INTRAMUSCULAR; INTRAVENOUS; SUBCUTANEOUS at 19:44

## 2023-01-09 RX ADMIN — PROPOFOL 200 MG: 10 INJECTION, EMULSION INTRAVENOUS at 17:53

## 2023-01-09 RX ADMIN — METRONIDAZOLE 500 MG: 500 INJECTION, SOLUTION INTRAVENOUS at 02:30

## 2023-01-09 RX ADMIN — PHENYLEPHRINE HYDROCHLORIDE 100 MCG: 10 INJECTION INTRAVENOUS at 18:11

## 2023-01-09 RX ADMIN — MAGNESIUM SULFATE HEPTAHYDRATE 2000 MG: 40 INJECTION, SOLUTION INTRAVENOUS at 08:47

## 2023-01-09 RX ADMIN — HYDROCODONE BITARTRATE AND ACETAMINOPHEN 1 TABLET: 7.5; 325 TABLET ORAL at 03:37

## 2023-01-09 RX ADMIN — SODIUM CHLORIDE, PRESERVATIVE FREE 10 ML: 5 INJECTION INTRAVENOUS at 09:09

## 2023-01-09 RX ADMIN — CIPROFLOXACIN 400 MG: 2 INJECTION, SOLUTION INTRAVENOUS at 05:21

## 2023-01-09 RX ADMIN — METRONIDAZOLE 500 MG: 500 INJECTION, SOLUTION INTRAVENOUS at 08:47

## 2023-01-09 RX ADMIN — ROCURONIUM BROMIDE 10 MG: 50 INJECTION, SOLUTION INTRAVENOUS at 17:53

## 2023-01-09 RX ADMIN — METRONIDAZOLE 500 MG: 500 INJECTION, SOLUTION INTRAVENOUS at 18:22

## 2023-01-09 RX ADMIN — PHENYLEPHRINE HYDROCHLORIDE 100 MCG: 10 INJECTION INTRAVENOUS at 17:59

## 2023-01-09 RX ADMIN — LOSARTAN POTASSIUM 100 MG: 50 TABLET, FILM COATED ORAL at 08:47

## 2023-01-09 RX ADMIN — HYDROCODONE BITARTRATE AND ACETAMINOPHEN 1 TABLET: 7.5; 325 TABLET ORAL at 09:02

## 2023-01-09 RX ADMIN — DULOXETINE HYDROCHLORIDE 60 MG: 60 CAPSULE, DELAYED RELEASE ORAL at 08:47

## 2023-01-09 RX ADMIN — PIPERACILLIN AND TAZOBACTAM 3375 MG: 3; .375 INJECTION, POWDER, LYOPHILIZED, FOR SOLUTION INTRAVENOUS at 20:55

## 2023-01-09 RX ADMIN — METRONIDAZOLE 500 MG: 500 INJECTION, SOLUTION INTRAVENOUS at 14:00

## 2023-01-09 RX ADMIN — FLUTICASONE PROPIONATE 1 SPRAY: 50 SPRAY, METERED NASAL at 13:55

## 2023-01-09 RX ADMIN — ROPINIROLE HYDROCHLORIDE 4 MG: 1 TABLET, FILM COATED ORAL at 21:01

## 2023-01-09 RX ADMIN — Medication 10 MG: at 18:11

## 2023-01-09 RX ADMIN — Medication 10 MG: at 17:59

## 2023-01-09 RX ADMIN — SODIUM CHLORIDE, SODIUM LACTATE, POTASSIUM CHLORIDE, AND CALCIUM CHLORIDE: 600; 310; 30; 20 INJECTION, SOLUTION INTRAVENOUS at 17:48

## 2023-01-09 RX ADMIN — PHENYLEPHRINE HYDROCHLORIDE 100 MCG: 10 INJECTION INTRAVENOUS at 18:15

## 2023-01-09 RX ADMIN — DEXAMETHASONE SODIUM PHOSPHATE 10 MG: 10 INJECTION INTRAMUSCULAR; INTRAVENOUS at 18:17

## 2023-01-09 RX ADMIN — GLYCOPYRROLATE 0.6 MG: 0.2 INJECTION, SOLUTION INTRAMUSCULAR; INTRAVENOUS at 19:01

## 2023-01-09 RX ADMIN — LIDOCAINE HYDROCHLORIDE 60 MG: 20 INJECTION, SOLUTION EPIDURAL; INFILTRATION; INTRACAUDAL; PERINEURAL at 17:53

## 2023-01-09 RX ADMIN — Medication 5 MG: at 19:01

## 2023-01-09 RX ADMIN — ACETAMINOPHEN 650 MG: 325 TABLET, FILM COATED ORAL at 01:23

## 2023-01-09 RX ADMIN — Medication 140 MG: at 17:54

## 2023-01-09 RX ADMIN — SODIUM CHLORIDE: 9 INJECTION, SOLUTION INTRAVENOUS at 13:55

## 2023-01-09 RX ADMIN — PHENYLEPHRINE HYDROCHLORIDE 100 MCG: 10 INJECTION INTRAVENOUS at 18:24

## 2023-01-09 RX ADMIN — Medication 10 MG: at 18:08

## 2023-01-09 RX ADMIN — FENTANYL CITRATE 100 MCG: 50 INJECTION, SOLUTION INTRAMUSCULAR; INTRAVENOUS at 17:51

## 2023-01-09 RX ADMIN — PHENYLEPHRINE HYDROCHLORIDE 150 MCG: 10 INJECTION INTRAVENOUS at 18:08

## 2023-01-09 RX ADMIN — Medication 2000 MG: at 18:05

## 2023-01-09 RX ADMIN — ONDANSETRON 4 MG: 2 INJECTION INTRAMUSCULAR; INTRAVENOUS at 09:02

## 2023-01-09 RX ADMIN — GADOTERIDOL 20 ML: 279.3 INJECTION, SOLUTION INTRAVENOUS at 11:07

## 2023-01-09 RX ADMIN — ROCURONIUM BROMIDE 30 MG: 50 INJECTION, SOLUTION INTRAVENOUS at 18:08

## 2023-01-09 RX ADMIN — Medication 10 MG: at 18:02

## 2023-01-09 RX ADMIN — PHENYLEPHRINE HYDROCHLORIDE 150 MCG: 10 INJECTION INTRAVENOUS at 18:02

## 2023-01-09 RX ADMIN — ONDANSETRON 4 MG: 2 INJECTION INTRAMUSCULAR; INTRAVENOUS at 03:44

## 2023-01-09 ASSESSMENT — PAIN DESCRIPTION - ONSET: ONSET: ON-GOING

## 2023-01-09 ASSESSMENT — PAIN DESCRIPTION - LOCATION
LOCATION: ABDOMEN;BACK
LOCATION: ABDOMEN
LOCATION: ABDOMEN

## 2023-01-09 ASSESSMENT — PAIN DESCRIPTION - ORIENTATION: ORIENTATION: INNER;LOWER

## 2023-01-09 ASSESSMENT — PAIN DESCRIPTION - DESCRIPTORS
DESCRIPTORS: ACHING;SORE
DESCRIPTORS: SORE
DESCRIPTORS: SORE

## 2023-01-09 ASSESSMENT — PAIN SCALES - GENERAL
PAINLEVEL_OUTOF10: 0
PAINLEVEL_OUTOF10: 8
PAINLEVEL_OUTOF10: 8
PAINLEVEL_OUTOF10: 6
PAINLEVEL_OUTOF10: 8

## 2023-01-09 ASSESSMENT — PAIN DESCRIPTION - PAIN TYPE
TYPE: SURGICAL PAIN
TYPE: SURGICAL PAIN

## 2023-01-09 ASSESSMENT — PAIN - FUNCTIONAL ASSESSMENT: PAIN_FUNCTIONAL_ASSESSMENT: PREVENTS OR INTERFERES SOME ACTIVE ACTIVITIES AND ADLS

## 2023-01-09 NOTE — PERIOP NOTE
TRANSFER - IN REPORT:    Verbal report received from herminia RN on [employer]  being received from [facility] for surgery. Report consisted of patients Situation, Background, Assessment and   Recommendations(SBAR). Information from the following report(s) verbal and kardex was reveiwed with the receiving nurse. Opportunity for questions and clarification was provided. Assessment completed upon patients arrival to unit and care assumed.

## 2023-01-09 NOTE — ED NOTES
TRANSFER - OUT REPORT:    Verbal report given to 341 RN on [employer]  being transferred to [address] [place] [address] for routine progression of patient care       Report consisted of patient's Situation, Background, Assessment and   Recommendations(SBAR). Information from the following report(s) ED SBAR was reviewed with the receiving nurse. [place] Assessment: No data recorded  Lines:   Peripheral IV 01/08/23 Left Antecubital (Active)   Site Assessment Clean, dry & intact 01/08/23 1554   Line Status Blood return noted 01/08/23 1554   Phlebitis Assessment No symptoms 01/08/23 1554   Infiltration Assessment 0 01/08/23 1554   Alcohol Cap Used No 01/08/23 1554   Dressing Status New dressing applied;Clean, dry & intact 01/08/23 1554   Dressing Type Transparent 01/08/23 1554   Dressing Intervention New 01/08/23 1554        Opportunity for questions and clarification was provided.       Patient transported with:  Registered Nurse         Aixa Melendrez RN  01/08/23 2836

## 2023-01-09 NOTE — PROGRESS NOTES
Admitted this evening by Dr. Oscar Lawler  Have spoken with gen surgery NP, Madison Kraft, regarding US finding of cholecystitis/choledocholithiasis. Continue current treatment at  with IV atbx, NPO, IVF, and they will evaluate inf intervention is needed in AM after repeat labs.

## 2023-01-09 NOTE — CARE COORDINATION
SUBJECTIVE:   Gerardo Gonzalez is a 59 year old male who presents to clinic today for the following health issues:      History of Present Illness     Diabetes:     Frequency of checking blood sugars::  2 times a day    Diabetic concerns::  None, Blood sugar frequently over 200 and Low blood sugar, several less than 70 in the past few weeks    Hypoglycemia symptoms::  Shaky    Paraesthesia present::  No    Eye Exam in the last year::  Yes    11 22 2017    Diet:  Diabetic  Taking medications regularly:  Yes    Hyperlipidemia Follow-Up      Rate your low fat/cholesterol diet?: not monitoring fat    Taking statin?  Yes, possible muscle aches from statin    Other lipid medications/supplements?:  none    Anxiety Follow-Up    Status since last visit: No change    Other associated symptoms:None    Complicating factors:   Significant life event: No   Current substance abuse: Alcohol  Depression symptoms: No  NAOMI-7 SCORE 1/2/2012 6/30/2017   Total Score 16 -   Total Score - 15       NAOMI-7  Problem list and histories reviewed & adjusted, as indicated.  Additional history: as documented    Medication Followup of ambien    Taking Medication as prescribed: yes    Side Effects:  None    Medication Helping Symptoms:  yes       Patient Active Problem List   Diagnosis     Psoriatic arthropathy (H)     Impotence of organic origin     HYPERLIPIDEMIA LDL GOAL <100     Anxiety     Psoriasis     Myalgia     Uncontrolled type 2 diabetes mellitus with nephropathy (H)     Elevated liver enzymes     Hyponatremia     Type 2 diabetes mellitus without complication, with long-term current use of insulin (H)     Psoriatic arthritis (H)     Past Surgical History:   Procedure Laterality Date     HC KNEE SCOPE, DIAGNOSTIC      Arthroscopy, Knee- Left       Social History   Substance Use Topics     Smoking status: Never Smoker     Smokeless tobacco: Never Used      Comment: quit in 1978     Alcohol use 3.6 oz/week     6 Cans of beer per week       SW met with patient who confirmed demographic information, uses a cane or walker to ambulate, has had multiple falls, and is independent in his ADLs. Chart reviewed, did not note therapy evaluations which SW will discuss with MD. Anticipate home with [facility] pending therapy recommendations to finalize. If recommended patient has no preference for a [facility] agency. ASSESSMENT NOTE    Attending Physician: Anthony Morris MD  Admit Problem: Acute cholecystitis [K81.0]  Right upper quadrant abdominal pain [R10.11]  Sepsis ([facility] [address] [A41.9]  Date/Time of Admission: 1/8/2023  3:26 PM  Problem List:  Patient Active Problem List   Diagnosis    Spondylolisthesis    HTN (hypertension)    Status post right knee replacement    Osteoarthritis of right knee    Status post lumbar spinal arthrodesis    Lumbar stenosis with neurogenic claudication    Noncompliance with CPAP treatment    Primary osteoarthritis of left knee    Sepsis ([facility] [address]    Restless leg syndrome    Acute respiratory failure with hypoxia (HCC)    MAURIZIO (obstructive sleep apnea)    Acute cholecystitis    Hypokalemia    Hypomagnesemia       Service Assessment  Patient Orientation Person, Place, Situation, Self, Alert and Oriented   Cognition Alert   History Provided By Patient   Primary Caregiver Self   Accompanied By/Relationship     Support Systems Spouse/Significant Other, Children, Family Members   Patient's Healthcare Decision Maker is:     PCP Verified by CM Yes   Last Visit to PCP     Prior Functional Level Independent in ADLs/IADLs   Current Functional Level Independent in ADLs/IADLs   Can patient return to prior living arrangement Yes   Ability to make needs known: Good   Family able to assist with home care needs: Yes   Would you like for me to discuss the discharge plan with any other family members/significant others, and if so, who?  No   Financial Resources [employer] None   CM/SW Referral       Social/Functional History  Lives With Spouse   Type of "Comment: weekly     Family History   Problem Relation Age of Onset     DIABETES Mother      age 55     DIABETES Father      age 64     Family History Negative Sister      x3     Hypertension Brother            ROS:  Constitutional, HEENT, cardiovascular, pulmonary, gi and gu systems are negative, except as otherwise noted.    OBJECTIVE:     /81 (BP Location: Right arm, Patient Position: Chair, Cuff Size: Adult Large)  Pulse 92  Temp 97.4  F (36.3  C) (Oral)  Ht 5' 7\" (1.702 m)  Wt 183 lb (83 kg)  SpO2 97%  BMI 28.66 kg/m2  Body mass index is 28.66 kg/(m^2).  GENERAL APPEARANCE: healthy, alert and no distress  RESP: lungs clear to auscultation - no rales, rhonchi or wheezes  CV: regular rates and rhythm, normal S1 S2, no S3 or S4 and no murmur, click or rub  MS: extremities normal- no gross deformities noted  DIABETIC FOOT EXAM: normal DP and PT pulses, no trophic changes or ulcerative lesions and normal sensory exam        ASSESSMENT/PLAN:             1. Uncontrolled type 2 diabetes mellitus with nephropathy (H)  F/u with endocrinology.  Check sugars.  Await A1c   - COMPREHENSIVE METABOLIC PANEL  - HEMOGLOBIN A1C  - losartan (COZAAR) 50 MG tablet; TAKE 1 TABLET (50 MG) BY MOUTH DAILY  Dispense: 90 tablet; Refill: 0  - metFORMIN (GLUCOPHAGE-XR) 500 MG 24 hr tablet; Take 2 tablets (1,000 mg) by mouth daily (with dinner)  Dispense: 180 tablet; Refill: 1    2. Hyperlipidemia LDL goal <100    - simvastatin (ZOCOR) 80 MG tablet; Take 0.5 tablets (40 mg) by mouth At Bedtime  Dispense: 90 tablet; Refill: 3    3. Psoriasis  Stable.   - clobetasol (TEMOVATE) 0.05 % ointment; Apply sparingly to affected area twice daily as needed.  Do not apply to face.  Dispense: 45 g; Refill: 1    4. Anxiety  Stable.   - FLUoxetine (PROZAC) 40 MG capsule; Take 2 capsules (80 mg) by mouth daily  Dispense: 180 capsule; Refill: 1  - busPIRone (BUSPAR) 15 MG tablet; Take 1 tab in the morning and 2 tabs at bedtimes  Dispense: 270 " Home     Home Layout     Home Access     Entrance Stairs - Number of Steps     Entrance Stairs - Rails     Bathroom Shower/Tub     Bathroom Toilet     Bathroom Equipment     Bathroom Accessibility     Home Equipment     Receives Help From     ADL Assistance     Bath     Dressing     Grooming     Feeding     Toileting     Homemaking Assistance     Meal Prep     Laundry     Vacuuming     Cleaning     Gardening     Yard Work     Driving     Shopping          Other (Comment)     Homemaking Responsibilities     Meal Prep Responsibility     Laundry Responsibility     Cleaning Responsibility     Bill Paying/Finance Responsibility     [address] Management     Other (Comment)     Ambulation Assistance     Transfer Assistance     Active      Patient's  Info     Mode of Transportation     Education     Occupation     Type of Occupation       Discharge Planning   Type of [address]  Spouse/Significant Other, Children, Family Members   Current Services Prior To Admission None   Potential Assistance Needed N/A   DME     DME     DME Ordered? Potential Assistance Purchasing Medications     Meds-to-Beds: Does the patient want to have any new prescriptions delivered to bedside prior to discharge? Type of Home Care Services None   Patient expects to be discharged to: House   Follow Up Appointment: Best Day/Time     One/Two Story Residence:     # of Interior Steps     Height of Each Step (in)     [employer] Available     History of Falls? Services At/After Discharge  Transition of Care Consult (CM Consult):      Internal Home Health     Internal Hospice     Reason Outside Agency [address]     Partner SNF     Reason Why Partner SNF Not Chosen     Internal Comfort Care     Reason Outside [address] Discharge     [facility] Resource Information Provided? Mode of Transport at [facility] Time of Discharge     Confirm Follow Up Transport       Condition of Participation: Discharge Planning  The plan for Transition of Care is related to the following treatment goals: The Patient and/or Patient Representative was provided with a Choice of Provider? Name of the Patient Representative who was provided with the Choice of Provider and agrees with the Discharge Plan? The Patient and/or Patient Representative Agree with the Discharge Plan? Freedom of Choice list was provided with basic dialogue that supports the individualized plan of care/goals, treatment preferences, and shares the quality data associated with the providers?        Documentation for Discharge Appeal  Discharge Appealed by     Date notified by [facility] of appeal request:     Time notified by [facility] of appeal request:     Detailed Notice of Discharge given to:     Date Notice of Discharge given:     Time Notice of Discharge given:     Date records sent to [facility]     Time records sent to Sergio Moffett     Date Notified of Outcome     Time Notified of Outcome     Outcome of appeal           LACI Motta 01/09/23 4:06 PM      [facility], [address] [place] [address] Work   [address] [place] [address] tablet; Refill: 1  - diazepam (VALIUM) 10 MG tablet; TAKE 1 TABLET BY MOUTH EVERY 12 HOURS AS NEEDED FOR ANXIETY OR SLEEP.  Dispense: 180 tablet; Refill: 0    5. Persistent insomnia  Faxed.   - zolpidem (AMBIEN) 10 MG tablet; TAKE 1 TABLET BY MOUTH AS NEEDED FOR SLEEP  Dispense: 90 tablet; Refill: 1    6. Screening for diabetic peripheral neuropathy    - FOOT EXAM  NO CHARGE [42812.114]    7. Need for prophylactic vaccination with tetanus-diphtheria (TD)  Tdap given.       See Patient Instructions    Raisa Calvillo PA-C  [facility]  Answers for HPI/ROS submitted by the patient on 2/8/2018   PHQ-2 Score: 3  If you checked off any problems, how difficult have these problems made it for you to do your work, take care of things at home, or get along with other people?: Somewhat difficult  PHQ9 TOTAL SCORE: 10

## 2023-01-09 NOTE — CONSULTS
General Surgery Consult     [facility]  MRN: [id number]  TOQ:5/70/4009  Age:67 y.o. Chief Complaint:  Abdominal Pain    HPI: Nafisa Morillo is a 75 y.o. male who we are asked by Hospitalist MD  to see for Abdominal Pain. Patient has medical history of chronic back pain, OA, GERD, gout, HTN, MAURIZIO but not tolerating CPAP who presented with acute onset of abdominal pain (RUQ and RLQ) with associated nausea and vomiting since last night. Unable to tolerate any PO since last night. Denies any diarrhea. Patient was seen at urgent care on Monday (1/2/2023) and prescribed clindamycin for sinus infection. Has been having cough and congestion since last week and was tested neg for covid and flu at outside facility. Wife reports that patient also has been feeling chills and felt warm last night. In the ED, patient was tachycardic and noted to be saturating 88% on 2 L O2 nasal cannula. Laboratory work-up was only remarkable for WBC of 12.7. Influenza AMB were negative as well as COVID. CT abdomen pelvis concerning for acute cholecystitis with inflammatory changes surrounding the gallbladder without definite stones. ED provider to contact General surgery. Temperature 1/9/2023 @ 12:48 am was 101.1 (oral) Pulse ranges from 100 to 113 . Patient denies any chest pain or dyspnea at this time. CT OF THE ABDOMEN AND PELVIS 1/8/2023 @17:10       INDICATION: Abdominal pain beginning last night. Multiple episodes of vomiting. Multiple axial images were obtained through the abdomen and pelvis. Oral   contrast was not administered. 100mL of Isovue 370 intravenous contrast was   used for better evaluation of solid organs and vascular structures. Radiation   dose reduction techniques were used for this study.   All CT scans performed at   this facility use one or all of the following: Automated exposure control,   adjustment of the mA and/or kVp according to patient's size, iterative reconstruction. COMPARISON: Noncontrast study 01/07/2020       FINDINGS:   - Lung Bases: No infiltrates or masses. - Liver: Unremarkable   - Biliary: There are inflammatory changes surrounding the gallbladder. No   definite stones are appreciated. - Pancreas: Normal..   - Spleen: Not enlarged, No mass. - Adrenals: Normal   - Kidneys/ureters: Several nonobstructing calculi bilaterally. - Bladder: Normal.   - Reproductive organs: No pelvic masses. - Bowel: Normal caliber. No inflammatory changes. There are several sigmoid   diverticula. There is a diverticulum or ureterocele on the left near the UVJ   measuring approximately 1.6 cm.   - Lymph nodes: No significant retroperitoneal, mesenteric, or pelvic adenopathy.   - Bones: No fracture or significant bone lesion.   - Vasculature: Normal   - Bones: No aggressive osseous lesion. There are remote L1 and L2 compression   fractures. - Other: No ascites. There is stable haziness within the central mesentery   suggesting sclerosing mesenteric right is. Impression   1. Findings concerning for acute cholecystitis. The right upper quadrant   ultrasound may be beneficial if there is further clinical concern. 2. Sigmoid diverticulosis. 3. Bilateral nonobstructing renal calculi. US ABDOMEN LIMITED 1/8/2023 7:13 PM       HISTORY: RUQ abd pain; abnormal CT A/P       COMPARISON: None available. FINDINGS:  Liver is increased in echogenicity without focal lesions. The   gallbladder contains sludge but no shadowing gallstones. Gallbladder appears   incompletely distended but there is wall thickening measuring up to 5 mm. Common   bile duct is dilated at 8 mm. Pancreas is obscured by overlying bowel gas and   shadowing from the fatty liver. Examination of the right kidney demonstrates a   few echogenic foci probably nonobstructing stones. Aorta obscured by overlying   bowel gas. IVC appears patent. Impression   1. Gallbladder sludge is present. No obvious shadowing gallstones. Gallbladder   wall thickening and dilatation of the common bile duct suggests cholecystitis   and choledocholithiasis. 2. Fatty infiltration liver and nonobstructing right renal collecting system   stones. 3. Limited exam due to shadowing from bowel gas and fatty liver. Past Medical History:   Diagnosis Date    Arthritis     OA    Chewing tobacco use     Chronic pain 2003    back fracture    CP (cerebral palsy) ([address]      3 surgeries right leg as child; no other problems     Environmental and seasonal allergies     GERD (gastroesophageal reflux disease)     rare-no medication     Gout     Controlled with medication     History of vertebral fracture 2003    Hypertension     Controlled with medication     Kidney stones     Morbid obesity ([address]     Rheumatic fever age 14    history-no murmur auscultated on 6/11/18 and 12/12/22    Sinus infection     currently on Cefdinir     Status post right knee replacement 6/22/2018    Unspecified sleep apnea     can not sarahi c pap Followed by Dr. Tono Ward, per last office note (1/2018) \" home study shows moderate to severe MAURIZIO with AHI of 28.6, worse in supine position. Lowest oxygen desaturation 66%. \"     Past Surgical History:   Procedure Laterality Date    BACK SURGERY  1989    Herniated disc repair     COLONOSCOPY      x2    HIP FRACTURE SURGERY Right     LUMBAR LAMINECTOMY      ORTHOPEDIC SURGERY Right 01/2014    big toe metatarsophalangeal joint fusion    ORTHOPEDIC SURGERY Right     leg repair x 3 as child ue to C.P.    OTHER SURGICAL HISTORY      skin cancer removed from right ear    TOTAL KNEE ARTHROPLASTY       Current Facility-Administered Medications   Medication Dose Route Frequency    sodium chloride flush 0.9 % injection 5-40 mL  5-40 mL IntraVENous 2 times per day    sodium chloride flush 0.9 % injection 5-40 mL  5-40 mL IntraVENous PRN    0.9 % sodium chloride infusion   IntraVENous PRN ondansetron (ZOFRAN-ODT) disintegrating tablet 4 mg  4 mg Oral Q8H PRN    Or    ondansetron (ZOFRAN) injection 4 mg  4 mg IntraVENous Q6H PRN    polyethylene glycol (GLYCOLAX) packet 17 g  17 g Oral Daily PRN    bisacodyl (DULCOLAX) suppository 10 mg  10 mg Rectal Daily PRN    famotidine (PEPCID) tablet 10 mg  10 mg Oral Daily PRN    aluminum & magnesium hydroxide-simethicone (MAALOX) 200-200-20 MG/5ML suspension 30 mL  30 mL Oral Q6H PRN    acetaminophen (TYLENOL) tablet 650 mg  650 mg Oral Q6H PRN    Or    acetaminophen (TYLENOL) suppository 650 mg  650 mg Rectal Q6H PRN    DULoxetine (CYMBALTA) extended release capsule 60 mg  60 mg Oral Daily    losartan (COZAAR) tablet 100 mg  100 mg Oral Daily    rOPINIRole (REQUIP) tablet 4 mg  4 mg Oral Nightly    HYDROcodone-acetaminophen (NORCO) 7.5-325 MG per tablet 1 tablet  1 tablet Oral Q6H PRN    morphine injection 4 mg  4 mg IntraVENous Once    ciprofloxacin (CIPRO) IVPB 400 mg  400 mg IntraVENous Q12H    metronidazole (FLAGYL) 500 mg in 0.9% NaCl 100 mL IVPB premix  500 mg IntraVENous Q6H    0.9 % sodium chloride infusion   IntraVENous Continuous    morphine injection 2 mg  2 mg IntraVENous Q6H PRN       Allergies:  Penicillins    Social History     Socioeconomic History    Marital status:      Spouse name: None    Number of children: None    Years of education: None    Highest education level: None   Tobacco Use    Smoking status: Never    Smokeless tobacco: Current    Tobacco comments:     Quit smokin can of chewing tobacco a day for 40 years   Vaping Use    Vaping Use: Never used   Substance and Sexual Activity    Alcohol use: No    Drug use: No     Family History   Problem Relation Age of Onset    Heart Disease Brother     Lung Disease Mother     Heart Disease Brother        Review of Systems   Constitutional:  Negative for fatigue. Positive for chills and fever. HENT:  Negative for sore throat. Respiratory:  Negative for shortness of breath. Gastrointestinal:  Positive for abdominal pain, nausea and vomiting. Negative for anorexia, flatus, hematemesis and melena. Genitourinary:  Negative for dysuria and vaginal bleeding  Comprehensive review of systems was otherwise unremarkable except as noted above. Objective:   Physical Exam:   BP (!) 136/52   Pulse (!) 104   Temp 99.1 °F (37.3 °C) (Oral)   Resp 18   Ht 5' 10\" (1.778 m)   Wt 240 lb (108.9 kg)   SpO2 94%   BMI 34.44 kg/m²     Physical Exam  Vitals and nursing note reviewed. Constitutional:       Appearance: Normal appearance. HENT:      Head: Normocephalic and atraumatic. Nose: Nose normal.      Mouth/Throat:      Mouth: Mucous membranes are moist.   Eyes:      Extraocular Movements: Extraocular movements intact. Conjunctiva/sclera: Conjunctivae normal.      Pupils: Pupils are equal, round, and reactive to light. Cardiovascular:      Rate and Rhythm: Mild tachycardia noted. .      Pulses: Normal pulses. Heart sounds: Normal heart sounds. No murmurs, rubs or gallops. Pulmonary:      Effort: Pulmonary effort is normal.      Breath sounds: Normal breath sounds. Abdominal:      General: Abdomen is obese. + Bowel sounds. Minimal distention noted. Tenderness: There is abdominal tenderness in the right upper quadrant and right lower quadrant. No rebound or guarding noted. Negative Horvath's sign. No peritoneal signs. No HSM . Musculoskeletal:         General: Normal range of motion. Cervical back: Normal range of motion and neck supple. Skin:     General: Skin is warm. Capillary Refill: Capillary refill takes less than 2 seconds. Neurological:      General: No focal deficit present. Mental Status: He is alert and oriented to person, place, and time. Psychiatric:         Mood and Affect: Mood normal.         Behavior: Behavior normal.         Thought Content:  Thought content normal.         Judgment: Judgment normal.     Recent vitals (if inpt):  Patient Vitals for the past 24 hrs:   BP Temp Temp src Pulse Resp SpO2 Height Weight   01/09/23 0337 -- -- -- -- 18 -- -- --   01/09/23 0321 (!) 136/52 99.1 °F (37.3 °C) Oral (!) 104 20 94 % -- --   01/09/23 0048 (!) 109/53 (!) 101.1 °F (38.4 °C) Oral (!) 105 20 93 % -- --   01/08/23 2223 -- -- -- 100 18 94 % -- --   01/08/23 2208 -- -- -- -- 18 -- -- --   01/08/23 2131 -- -- -- (!) 106 24 95 % -- --   01/08/23 2127 121/69 99 °F (37.2 °C) -- (!) 113 28 93 % -- --   01/08/23 2123 -- -- -- (!) 113 26 (!) 86 % -- --   01/08/23 1943 (!) 108/57 -- -- -- -- 93 % -- --   01/08/23 1830 -- -- -- -- -- 93 % -- --   01/08/23 1800 (!) 167/67 -- -- (!) 105 18 97 % -- --   01/08/23 1647 -- -- -- -- -- (!) 88 % -- --   01/08/23 1523 137/67 98.4 °F (36.9 °C) Oral 91 19 98 % 5' 10\" (1.778 m) 240 lb (108.9 kg)       Labs:   Latest Reference Range & Units 1/9/23 04:20   WBC 4.3 - 11.1 K/uL 18.0 (H)   RBC 4.23 - 5.6 M/uL 4.26   Hemoglobin Quant 13.6 - 17.2 g/dL 12.7 (L)   Hematocrit 41.1 - 50.3 % 40.1 (L)   MCV 82.0 - 102.0 FL 94.1   MCH 26.1 - 32.9 PG 29.8   MCHC 31.4 - 35.0 g/dL 31.7   MPV 9.4 - 12.3 FL 10.5   RDW 11.9 - 14.6 % 13.0   Platelet Count 558 - 450 K/uL 195   Absolute Mono # 0.1 - 1.3 K/UL 1.4 (H)   Eosinophils % 0.5 - 7.8 % 0 (L)   Basophils Absolute 0.0 - 0.2 K/UL 0.1   Differential Type -   AUTOMATED   Seg Neutrophils 43 - 78 % 86 (H)   Segs Absolute 1.7 - 8.2 K/UL 15.5 (H)   Lymphocytes 13 - 44 % 5 (L)      Latest Reference Range & Units 1/9/23 04:20   Sodium 133 - 143 mmol/L 140   Potassium 3.5 - 5.1 mmol/L 3.2 (L)   Chloride 101 - 110 mmol/L 105   CO2 21 - 32 mmol/L 26   BUN,BUNPL 8 - 23 MG/DL 17   Creatinine 0.8 - 1.5 MG/DL 1.30   Anion Gap 2 - 11 mmol/L 9   Est, Glom Filt Rate >60 ml/min/1.73m2 60 (L)   Magnesium 1.8 - 2.4 mg/dL 1.7 (L)   Glucose, Random 65 - 100 mg/dL 162 (H)   CALCIUM, SERUM, 256246 8.3 - 10.4 MG/DL 8.8   ALBUMIN/GLOBULIN RATIO 0.4 - 1.6   1.0   Total Protein 6.3 - 8.2 g/dL 6.7   Albumin 3.2 - 4.6 g/dL 3.3   Globulin 2.8 - 4.5 g/dL 3.4   Alk Phosphatase 50 - 136 U/L 83   ALT 12 - 65 U/L 25   AST 15 - 37 U/L 19   Bilirubin 0.2 - 1.1 MG/DL 1.4 (H)   Bilirubin, Direct <0.4 MG/DL 0.4 (H)         I reviewed recent labs and recent radiologic studies. Admission date (for inpatients): 1/8/2023   * No surgery date entered *     MRI  MRI Result (most recent):  MRI ABDOMEN W WO CONTRAST 01/09/2023    Narrative  MRI OF THE ABDOMEN    INDICATION: Abdominal pain, possible cholecystitis    Standard MRI sequences were obtained through the abdomen in multiple planes. Images were obtained before and after intravenous injection of 20 mL of  ProHance. COMPARISON: CT dated 01/08/2023    FINDINGS:  - LIVER: Normal in size and appearance. - GALLBLADDER/BILE DUCTS: Common duct measures 5 mm in diameter. No significant  bile duct dilatation. No filling defects. The gallbladder is distended. There  is pericholecystic edema. There is a debris level within the gallbladder lumen. - PANCREAS: Normal.  - SPLEEN: Normal.  - ADRENALS: Normal.  - KIDNEYS/URETERS: No hydronephrosis or significant mass.  - LYMPH NODES: No significant retroperitoneal or mesenteric adenopathy.  - BONES: No fracture or significant bone lesion.  - OTHER: No significant ascites. Impression  1. Pericholecystic edema and gallbladder sludge, concerning for acute  cholecystitis. 2.  No bile duct dilatation or choledocholithiasis. If there are any questions about this report, I can be reached on [employer]  or at [phone]   ASSESSMENT:  Principal Problem:    Sepsis ([address]  Active Problems:    Restless leg syndrome    Acute respiratory failure with hypoxia (HCC)    MAURIZIO (obstructive sleep apnea)    HTN (hypertension)    Primary osteoarthritis of left knee  Resolved Problems:    * No resolved hospital problems. *       Acute cholecystitis, morbid obese. MRCP suggest CBD is clear.      PLAN:  -Continue IV antibiotics  -NPO for now  -IVF's  -Pain control IV PRN  -Antiemetic PRN  -Trend labs  -Admitting team for meds/labs/etc  -GI MD consult has been placed per Admitting team MD    Plan urgent lap jaciel, poss open. Number and Complexity of Problems addressed and   Risks of complications and/or morbidity of management                  Level of MDM (2/3 elements below)  Number and Complexity of Problems Addressed Amount and/or Complexity of Data to be Reviewed and Analyzed  *Each unique test, order, or document contributes to the combination of 2 or combination of 3 in Category 1 below. Risk of Complications and/or Morbidity or Mortality of pt Management     [id number]  63390 SF Minimal  ?1self-limited or minor problem Minimal or none Minimal risk of morbidity from additional diagnostic testing or Rx   [id number]  [id number] Low Low  ? 2or more self-limited or minor problems;    or  ? 1stable chronic illness;    or  ?1acute, uncomplicated illness or injury   Limited  (Must meet the requirements of at least 1 of the 2 categories)  Category 1: Tests and documents   ? Any combination of 2 from the following:  ?Review of prior external note(s) from each unique source*;  ?review of the result(s) of each unique test*;   ?ordering of each unique test*    or   Category 2: Assessment requiring an independent historian(s)  (For the categories of independent interpretation of tests and discussion of management or test interpretation, see moderate or high) Low risk of morbidity from additional diagnostic testing or treatment     82008  76747 Mod Moderate  ? 1or more chronic illnesses with exacerbation, progression, or side effects of treatment;    or  ?2or more stable chronic illnesses;    or  ?1undiagnosed new problem with uncertain prognosis;    or  ?1acute illness with systemic symptoms;    or  ?1acute complicated injury   Moderate  (Must meet the requirements of at least 1 out of 3 categories)  Category 1: Tests, documents, or independent historian(s)  ? Any combination of 3 from the following:   ?Review of prior external note(s) from each unique source*;  ?Review of the result(s) of each unique test*;  ?Ordering of each unique test*;  ?Assessment requiring an independent historian(s)    or  Category 2: Independent interpretation of tests   ? Independent interpretation of a test performed by another physician/other qualified health care professional (not separately reported);     or  Category 3: Discussion of management or test interpretation  ? Discussion of management or test interpretation with external physician/other qualified health care professional/appropriate source (not separately reported)   Moderate risk of morbidity from additional diagnostic testing or treatment  Examples only:  ?Prescription drug management   ? Decision regarding minor surgery with identified patient or procedure risk factors  ? Decision regarding elective major surgery without identified patient or procedure risk factors   ? Diagnosis or treatment significantly limited by social determinants of health       [id number] High High  ? 1or more chronic illnesses with severe exacerbation, progression, or side effects of treatment;    or  ?1 acute or chronic illness or injury that poses a threat to life or bodily function   Extensive  (Must meet the requirements of at least 2 out of 3 categories)  Category 1: Tests, documents, or independent historian(s)  ? Any combination of 3 from the following:   ?Review of prior external note(s) from each unique source*;  ?Review of the result(s) of each unique test*;   ?Ordering of each unique test*;   ?Assessment requiring an independent historian(s)    or   Category 2: Independent interpretation of tests   ? Independent interpretation of a test performed by another physician/other qualified health care professional (not separately reported);     or  Category 3: Discussion of management or test interpretation  ? Discussion of management or test interpretation with external physician/other qualified health care professional/appropriate source (not separately reported)   High risk of morbidity from additional diagnostic testing or treatment  Examples only:  ?Drug therapy requiring intensive monitoring for toxicity  ? Decision regarding elective major surgery with identified patient or procedure risk factors  ? Decision regarding emergency major surgery  ? Decision regarding hospitalization  ? Decision not to resuscitate or to de-escalate care because of poor prognosis             I have personally performed a face-to-face diagnostic evaluation and management  service on this patient. I have independently seen the patient. I have independently obtained the above history from the patient/family. I have independently examined the patient with above findings. I have independently reviewed data/labs for this patient and developed the above plan of care (MDM).   Signed: Salbador Vance MD, FACS

## 2023-01-09 NOTE — PROGRESS NOTES
TRANSFER - IN REPORT:    Verbal report received from [place], RN on [address]  being received from [facility] ED for routine progression of patient care      Report consisted of patient's Situation, Background, Assessment and   Recommendations(SBAR). Information from the following report(s) MAR and Recent Results was reviewed with the receiving nurse. Opportunity for questions and clarification was provided. Assessment completed upon patient's arrival to unit and care assumed.

## 2023-01-09 NOTE — PROGRESS NOTES
Hospitalist Progress Note   Admit Date:  2023  3:26 PM   Name:  Zach Childs   Age:  75 y.o. Sex:  male  :  1955   MRN:  [id number]   Room:  [facility]/    Presenting Complaint: Abdominal Pain     Reason(s) for Admission: Acute cholecystitis [K81.0]  Right upper quadrant abdominal pain [R10.11]  Sepsis [facility]) [A41.9]     Hospital Course:   Please refer to the admission H&P for details of presentation. In summary, Zach Childs is a 75 y.o. male with medical history significant for chronic back pain, OA, GERD, gout, HTN, MAURIZIO but not tolerating CPAP who presented with acute onset of abdominal pain (RUQ and RLQ) with associated nausea and vomiting. In the ED, patient was tachycardic and noted to be saturating 88% on 2 L O2 nasal cannula. Laboratory work-up was only remarkable for WBC of 12.7. Influenza AMB were negative as well as COVID. CT abdomen pelvis concerning for acute cholecystitis with inflammatory changes surrounding the gallbladder without definite stones. US of abdomen with sludge in gallbladder with gallbladder wall thickening and dilatation of CBD suggestive of cholecystitis and choledocholithiasis. Subjective/24 hr Events (23) : Patient is seen and examined at bedside. Family at bedside. Complaining of abdominal pain. No n/v. Also complains of cough and \"spiting out sputum\". Appears uncomfortable. On 2L O2 NC with saturation above 94%. Patient denies fever, chills, chest pains, n/v, pain. Assessment & Plan:   Sepsis due to likely acute cholecystitis   Elevated WBC with tachycardia. CT with inflammatory changes surrounding the gallbladder without definite stones. US of abdomen with sludge in gallbladder with gallbladder wall thickening and dilatation of CBD suggestive of cholecystitis and choledocholithiasis. - GI consulted.  Planned for ERCP  - continue with cipro and flagyl   - anti-emetics and pain control with IV morphine PRN  - NPO for now with ice chips. - IVF and monitor for fluid overload. - General surgery following      Acute respiratory failure with hypoxia  Desaturation to 88% on room air documented in ED with sats improving to >92% with 2L O2 NC.  COVID and Flu neg  01/09/23: procalc is neg. CXR without infiltrate. Congestion appears to be from upper airways. Patient is with cough and spitting but not really sputum.  - Check respiratory viral panel  - flonase for post-nasal drip, NS3% nebulizer, albuterol neb prn, tessalon pearls for cough    Hypokalemia and hypomagnesemia  - replete with IV Mag and IV Kcl  - recheck tomorrow. OA of left knee   Had elective knee surgery planned with  1/9     HTN: takes losartan  GERD: PPI  RLS: on requip     MAURIZIO: not tolerant of CPAP per chart review and per family    Diet:  Diet NPO Exceptions are: Ice Chips, Sips of Water with Meds  DVT PPx: SCDs  Code status: Full Code      Additional Medical Decision Making factors:  Complexity: 2 or more acute complicated illnesses (4)    The patient assessment required an independent historian: a family member. I conducted an independent review of: Labs   I conducted an independent review of: Imaging and/or other diagnostic studies   I conducted an independent review of: EKG and/or telemetry      Shared decision making was utilized in the care of this patient. I discussed patient's case with an interdisciplinary team.  I discussed patient's case with a . I discussed patient's case with a nurse. Hospital Problems:  Principal Problem:    Sepsis ([address]  Active Problems:    Restless leg syndrome    Acute respiratory failure with hypoxia (HCC)    MAURIZIO (obstructive sleep apnea)    Acute cholecystitis    Hypokalemia    Hypomagnesemia    HTN (hypertension)    Primary osteoarthritis of left knee  Resolved Problems:    * No resolved hospital problems.  *      Objective:   Patient Vitals for the past 24 hrs:   Temp Pulse Resp BP SpO2 01/09/23 1125 98.1 °F (36.7 °C) 83 17 119/62 95 %   01/09/23 0800 98.2 °F (36.8 °C) 79 17 128/67 94 %   01/09/23 0337 -- -- 18 -- --   01/09/23 0321 99.1 °F (37.3 °C) (!) 104 20 (!) 136/52 94 %   01/09/23 0048 (!) 101.1 °F (38.4 °C) (!) 105 20 (!) 109/53 93 %   01/08/23 2223 -- 100 18 -- 94 %   01/08/23 2208 -- -- 18 -- --   01/08/23 2131 -- (!) 106 24 -- 95 %   01/08/23 2127 99 °F (37.2 °C) (!) 113 28 121/69 93 %   01/08/23 2123 -- (!) 113 26 -- (!) 86 %   01/08/23 1943 -- -- -- (!) 108/57 93 %   01/08/23 1830 -- -- -- -- 93 %   01/08/23 1800 -- (!) 105 18 (!) 167/67 97 %   01/08/23 1647 -- -- -- -- (!) 88 %   01/08/23 1523 98.4 °F (36.9 °C) 91 19 137/67 98 %       Oxygen Therapy  SpO2: 95 %  Pulse via Oximetry: 115 beats per minute  Pulse Oximeter Device Mode: Intermittent  O2 Device: Nasal cannula  O2 Flow Rate (L/min): 4 L/min    Estimated body mass index is 34.44 kg/m² as calculated from the following:    Height as of this encounter: 5' 10\" (1.778 m). Weight as of this encounter: 240 lb (108.9 kg). Intake/Output Summary (Last 24 hours) at 1/9/2023 1232  Last data filed at 1/8/2023 2113  Gross per 24 hour   Intake --   Output 300 ml   Net -300 ml         Physical Exam:     Blood pressure 119/62, pulse 83, temperature 98.1 °F (36.7 °C), temperature source Oral, resp. rate 17, height 5' 10\" (1.778 m), weight 240 lb (108.9 kg), SpO2 95 %. General:    Ill appearing    Head:  Normocephalic, atraumatic  Eyes:  Sclerae appear normal.  Pupils equally round. ENT:  Nares appear normal.  Moist oral mucosa  Neck:  No restricted ROM. Trachea midline   CV:   RRR. No m/r/g. No jugular venous distension. Lungs:   CTAB. No wheezing, rhonchi in upper airways. Symmetric expansion. Abdomen:   Soft, tender to palpation in RUQ, nondistended. Extremities: No cyanosis or clubbing. No edema  Skin:     No rashes and normal coloration. Warm and dry. Neuro:  CN II-XII grossly intact. Sensation intact. Psych:  Normal mood and affect.       I have personally reviewed labs and tests showing:  Recent Labs:  Recent Results (from the past 48 hour(s))   Troponin    Collection Time: 01/08/23  3:49 PM   Result Value Ref Range    Troponin, High Sensitivity 11.4 0 - 14 pg/mL   CBC with Diff    Collection Time: 01/08/23  3:50 PM   Result Value Ref Range    WBC 12.7 (H) 4.3 - 11.1 K/uL    RBC 4.54 4.23 - 5.6 M/uL    Hemoglobin 13.5 (L) 13.6 - 17.2 g/dL    Hematocrit 42.4 41.1 - 50.3 %    MCV 93.4 82.0 - 102.0 FL    MCH 29.7 26.1 - 32.9 PG    MCHC 31.8 31.4 - 35.0 g/dL    RDW 13.0 11.9 - 14.6 %    Platelets 147 955 - 214 K/uL    MPV 10.3 9.4 - 12.3 FL    nRBC 0.00 0.0 - 0.2 K/uL    Differential Type AUTOMATED      Seg Neutrophils 83 (H) 43 - 78 %    Lymphocytes 9 (L) 13 - 44 %    Monocytes 6 4.0 - 12.0 %    Eosinophils % 1 0.5 - 7.8 %    Basophils 1 0.0 - 2.0 %    Immature Granulocytes 1 0.0 - 5.0 %    Segs Absolute 10.5 (H) 1.7 - 8.2 K/UL    Absolute Lymph # 1.1 0.5 - 4.6 K/UL    Absolute Mono # 0.8 0.1 - 1.3 K/UL    Absolute Eos # 0.2 0.0 - 0.8 K/UL    Basophils Absolute 0.1 0.0 - 0.2 K/UL    Absolute Immature Granulocyte 0.1 0.0 - 0.5 K/UL   CMP    Collection Time: 01/08/23  3:50 PM   Result Value Ref Range    Sodium 139 133 - 143 mmol/L    Potassium 4.0 3.5 - 5.1 mmol/L    Chloride 104 101 - 110 mmol/L    CO2 27 21 - 32 mmol/L    Anion Gap 8 2 - 11 mmol/L    Glucose 157 (H) 65 - 100 mg/dL    BUN 15 8 - 23 MG/DL    Creatinine 1.08 0.8 - 1.5 MG/DL    Est, Glom Filt Rate >60 >60 ml/min/1.73m2    Calcium 9.4 8.3 - 10.4 MG/DL    Total Bilirubin 0.8 0.2 - 1.1 MG/DL    ALT 25 12 - 65 U/L    AST 28 15 - 37 U/L    Alk Phosphatase 93 50 - 136 U/L    Total Protein 7.4 6.3 - 8.2 g/dL    Albumin 3.4 3.2 - 4.6 g/dL    Globulin 4.0 2.8 - 4.5 g/dL    Albumin/Globulin Ratio 0.9 0.4 - 1.6     Lipase    Collection Time: 01/08/23  3:50 PM   Result Value Ref Range    Lipase 52 (L) 73 - 393 U/L   Procalcitonin    Collection Time: 01/08/23 3:50 PM   Result Value Ref Range    Procalcitonin <0.05 0.00 - 0.49 ng/mL   COVID-19, Rapid    Collection Time: 01/08/23  4:40 PM    Specimen: Nasopharyngeal   Result Value Ref Range    Source Nasopharyngeal      SARS-CoV-2, Rapid Not detected NOTD     Rapid influenza A/B antigens    Collection Time: 01/08/23  4:40 PM    Specimen: Nasal Washing   Result Value Ref Range    Influenza A Ag Negative NEG      Influenza B Ag Negative NEG      Source Nasopharyngeal     EKG 12 Lead    Collection Time: 01/08/23  5:21 PM   Result Value Ref Range    Ventricular Rate 107 BPM    Atrial Rate 107 BPM    P-R Interval 160 ms    QRS Duration 86 ms    Q-T Interval 308 ms    QTc Calculation (Bazett) 411 ms    P Axis 75 degrees    R Axis 86 degrees    T Axis 70 degrees    Diagnosis Sinus tachycardia    Basic Metabolic Panel w/ Reflex to MG    Collection Time: 01/09/23  4:20 AM   Result Value Ref Range    Sodium 140 133 - 143 mmol/L    Potassium 3.2 (L) 3.5 - 5.1 mmol/L    Chloride 105 101 - 110 mmol/L    CO2 26 21 - 32 mmol/L    Anion Gap 9 2 - 11 mmol/L    Glucose 162 (H) 65 - 100 mg/dL    BUN 17 8 - 23 MG/DL    Creatinine 1.30 0.8 - 1.5 MG/DL    Est, Glom Filt Rate 60 (L) >60 ml/min/1.73m2    Calcium 8.8 8.3 - 10.4 MG/DL   CBC with Auto Differential    Collection Time: 01/09/23  4:20 AM   Result Value Ref Range    WBC 18.0 (H) 4.3 - 11.1 K/uL    RBC 4.26 4.23 - 5.6 M/uL    Hemoglobin 12.7 (L) 13.6 - 17.2 g/dL    Hematocrit 40.1 (L) 41.1 - 50.3 %    MCV 94.1 82.0 - 102.0 FL    MCH 29.8 26.1 - 32.9 PG    MCHC 31.7 31.4 - 35.0 g/dL    RDW 13.0 11.9 - 14.6 %    Platelets 610 391 - 163 K/uL    MPV 10.5 9.4 - 12.3 FL    nRBC 0.00 0.0 - 0.2 K/uL    Differential Type AUTOMATED      Seg Neutrophils 86 (H) 43 - 78 %    Lymphocytes 5 (L) 13 - 44 %    Monocytes 8 4.0 - 12.0 %    Eosinophils % 0 (L) 0.5 - 7.8 %    Basophils 0 0.0 - 2.0 %    Immature Granulocytes 1 0.0 - 5.0 %    Segs Absolute 15.5 (H) 1.7 - 8.2 K/UL    Absolute Lymph # 0.9 0.5 - 4.6 K/UL    Absolute Mono # 1.4 (H) 0.1 - 1.3 K/UL    Absolute Eos # 0.0 0.0 - 0.8 K/UL    Basophils Absolute 0.1 0.0 - 0.2 K/UL    Absolute Immature Granulocyte 0.1 0.0 - 0.5 K/UL   Magnesium    Collection Time: 01/09/23  4:20 AM   Result Value Ref Range    Magnesium 1.7 (L) 1.8 - 2.4 mg/dL   Hepatic Function Panel    Collection Time: 01/09/23  4:20 AM   Result Value Ref Range    Total Protein 6.7 6.3 - 8.2 g/dL    Albumin 3.3 3.2 - 4.6 g/dL    Globulin 3.4 2.8 - 4.5 g/dL    Albumin/Globulin Ratio 1.0 0.4 - 1.6      Total Bilirubin 1.4 (H) 0.2 - 1.1 MG/DL    Bilirubin, Direct 0.4 (H) <0.4 MG/DL    Alk Phosphatase 83 50 - 136 U/L    AST 19 15 - 37 U/L    ALT 25 12 - 65 U/L   Brain Natriuretic Peptide    Collection Time: 01/09/23  4:20 AM   Result Value Ref Range    NT Pro- (H) 5 - 125 PG/ML       I have personally reviewed imaging studies showing: Other Studies:  MRI ABDOMEN W WO CONTRAST   Final Result   1. Pericholecystic edema and gallbladder sludge, concerning for acute   cholecystitis. 2.  No bile duct dilatation or choledocholithiasis. If there are any questions about this report, I can be reached on [employer]   or at [phone]               XR CHEST PORTABLE   Final Result   Lungs are hypoaerated but otherwise clear. Cardiomegaly. No   pneumothorax. No pleural effusions. US ABDOMEN LIMITED Specify organ? GALLBLADDER   Final Result   1. Gallbladder sludge is present. No obvious shadowing gallstones. Gallbladder   wall thickening and dilatation of the common bile duct suggests cholecystitis   and choledocholithiasis. 2. Fatty infiltration liver and nonobstructing right renal collecting system   stones. 3. Limited exam due to shadowing from bowel gas and fatty liver. CT ABDOMEN PELVIS W IV CONTRAST Additional Contrast? None   Final Result   1. Findings concerning for acute cholecystitis.  The right upper quadrant   ultrasound may be beneficial if there is further clinical concern. 2. Sigmoid diverticulosis. 3. Bilateral nonobstructing renal calculi.                    Current Meds:  Current Facility-Administered Medications   Medication Dose Route Frequency    potassium chloride (KLOR-CON M) extended release tablet 40 mEq  40 mEq Oral BID WC    sodium chloride flush 0.9 % injection 20 mL  20 mL IntraVENous PRN    LORazepam (ATIVAN) tablet 0.5 mg  0.5 mg Oral Once PRN    albuterol (PROVENTIL) nebulizer solution 2.5 mg  2.5 mg Nebulization Q6H PRN    sodium chloride (Inhalant) 3 % nebulizer solution 4 mL  4 mL Nebulization Q12H PRN    benzonatate (TESSALON) capsule 100 mg  100 mg Oral TID PRN    fluticasone (FLONASE) 50 MCG/ACT nasal spray 1 spray  1 spray Each Nostril Daily    sodium chloride flush 0.9 % injection 5-40 mL  5-40 mL IntraVENous 2 times per day    sodium chloride flush 0.9 % injection 5-40 mL  5-40 mL IntraVENous PRN    0.9 % sodium chloride infusion   IntraVENous PRN    ondansetron (ZOFRAN-ODT) disintegrating tablet 4 mg  4 mg Oral Q8H PRN    Or    ondansetron (ZOFRAN) injection 4 mg  4 mg IntraVENous Q6H PRN    polyethylene glycol (GLYCOLAX) packet 17 g  17 g Oral Daily PRN    bisacodyl (DULCOLAX) suppository 10 mg  10 mg Rectal Daily PRN    famotidine (PEPCID) tablet 10 mg  10 mg Oral Daily PRN    aluminum & magnesium hydroxide-simethicone (MAALOX) 200-200-20 MG/5ML suspension 30 mL  30 mL Oral Q6H PRN    acetaminophen (TYLENOL) tablet 650 mg  650 mg Oral Q6H PRN    Or    acetaminophen (TYLENOL) suppository 650 mg  650 mg Rectal Q6H PRN    DULoxetine (CYMBALTA) extended release capsule 60 mg  60 mg Oral Daily    losartan (COZAAR) tablet 100 mg  100 mg Oral Daily    rOPINIRole (REQUIP) tablet 4 mg  4 mg Oral Nightly    HYDROcodone-acetaminophen (NORCO) 7.5-325 MG per tablet 1 tablet  1 tablet Oral Q6H PRN    morphine injection 4 mg  4 mg IntraVENous Once    ciprofloxacin (CIPRO) IVPB 400 mg  400 mg IntraVENous Q12H    metronidazole (FLAGYL) 500 mg in 0.9% NaCl 100 mL IVPB premix  500 mg IntraVENous Q6H    0.9 % sodium chloride infusion   IntraVENous Continuous    morphine injection 2 mg  2 mg IntraVENous Q6H PRN       Signed:  Sherine Irizarry MD    Part of this note may have been written by using a voice dictation software. The note has been proof read but may still contain some grammatical/other typographical errors.

## 2023-01-09 NOTE — CONSULTS
[facility] Consult Note       Primary GI Physician: Dr. Verona Olmstead    Referring Provider:  Digna Novak PA-C    Consulting GI Physician: Dr. Levar Oro Date:  1/9/2023    Admit Date:  1/8/2023    Chief Complaint:  Dilated CBD, likely choledocolithiasis with cholecystitis    Subjective:     History of Present Illness:  Patient is a 75 y.o. male with PMH including but not limited to chronic back pain, OA (had elective surgery planned with Dr. Almita Waller 1/9), GERD, gout, HTN, MAURIZIO but not tolerating CPAP, seen in GI consultation at the request of Dr. Digna Novak PA-C for dilated CBD, likely choledocholithiasis with cholecystitis. He was admitted to [facility] 1/8/23 with sepsis felt most likely to be related to acute cholecystitis. He is currently somnolent. Awakens slightly to conversation. His family is at bedside and confirm that he presented with acute onset right sided abdominal pain with nausea and vomiting, chills, possible fever. He had reportedly been having a cough with congestion x1wk and tested negative for COVID and Flu at an outside facility. In the ED he was reportedly found to be tachycardic and hypoxic (O2 88% on 2L O2 NC). Labs in ED revealed leukocytosis (WBC 12.7) with otherwise unremarkable CBC, CMP, Lipase. Influenza A/B and COVID were negative. CT abdomen & pelvis was concerning for acute cholecystitis with inflammatory changes surrounding the gallbladder without definite stones. RUQ US showed gallbladder sludge with NO obvious shadowing gallstones. GB wall thickening with CBD dilation at 8mm, fatty liver. On admission he was started on Cipro and Flagyl (PCN allergy). General surgery is following. Labs 1/8/23 again reveal normal LFTs with exception of increased Tbili 1.4 with Direct bili 0.4. WBC is increased at 18.0.       PMH:  Past Medical History:   Diagnosis Date    Arthritis     OA    Chewing tobacco use     Chronic pain 2003    back fracture    CP (cerebral palsy) ([facility] [address]      3 surgeries right leg as child; no other problems     Environmental and seasonal allergies     GERD (gastroesophageal reflux disease)     rare-no medication     Gout     Controlled with medication     History of vertebral fracture 2003    Hypertension     Controlled with medication     Kidney stones     Morbid obesity ([facility] [address]     Rheumatic fever age 14    history-no murmur auscultated on 6/11/18 and 12/12/22    Sinus infection     currently on Cefdinir     Status post right knee replacement 6/22/2018    Unspecified sleep apnea     can not sarahi c pap Followed by Dr. Jennifer Tapia, per last office note (1/2018) \" home study shows moderate to severe MAURIZIO with AHI of 28.6, worse in supine position. Lowest oxygen desaturation 66%. \"       PSH:  Past Surgical History:   Procedure Laterality Date    BACK SURGERY  1989    Herniated disc repair     COLONOSCOPY      x2    HIP FRACTURE SURGERY Right     LUMBAR LAMINECTOMY      ORTHOPEDIC SURGERY Right 01/2014    big toe metatarsophalangeal joint fusion    ORTHOPEDIC SURGERY Right     leg repair x 3 as child ue to C.P.    OTHER SURGICAL HISTORY      skin cancer removed from right ear    TOTAL KNEE ARTHROPLASTY         Allergies: Allergies   Allergen Reactions    Penicillins Itching, Nausea Only and Headaches     Other reaction(s): Nausea and/or vomiting-Intolerance  Other reaction(s): Headache         Home Medications:  Prior to Admission medications    Medication Sig Start Date End Date Taking?  Authorizing Provider   celecoxib (CELEBREX) 200 MG capsule Take 200 mg by mouth daily 8/18/22 8/18/23  Historical Provider, MD   aspirin 81 MG chewable tablet Take by mouth    Historical Provider, MD   azelastine (ASTELIN) 0.1 % nasal spray USE 1 TO 2 SPRAYS IN EACH NOSTRIL TWICE DAILY 4/1/21   Historical Provider, MD   HYDROcodone-acetaminophen (Alfonso Tirado) 7.5-325 MG per tablet TAKE 1 TABLET BY MOUTH EVERY 6 HOURS AS NEEDED 8/23/22   Historical Provider, MD ipratropium (ATROVENT) 0.06 % nasal spray 1 spray by Nasal route daily as needed  Patient not taking: No sig reported 10/1/19   Historical Provider, MD   ZTLIDO 1.8 % [facility]  9/5/22   Historical Provider, MD   omeprazole (PRILOSEC) 20 MG delayed release capsule  7/19/22   Historical Provider, MD   DULoxetine (CYMBALTA) 60 MG extended release capsule Take 60 mg by mouth    Ar Automatic Reconciliation   losartan (COZAAR) 100 MG tablet Take 100 mg by mouth daily    Ar Automatic Reconciliation   rOPINIRole (REQUIP) 1 MG tablet Take 4 mg by mouth nightly    Ar Automatic Reconciliation       Hospital Medications:  Current Facility-Administered Medications   Medication Dose Route Frequency    magnesium sulfate 2000 mg in 50 mL IVPB premix  2,000 mg IntraVENous Once    potassium chloride (KLOR-CON M) extended release tablet 40 mEq  40 mEq Oral BID WC    sodium chloride flush 0.9 % injection 5-40 mL  5-40 mL IntraVENous 2 times per day    sodium chloride flush 0.9 % injection 5-40 mL  5-40 mL IntraVENous PRN    0.9 % sodium chloride infusion   IntraVENous PRN    ondansetron (ZOFRAN-ODT) disintegrating tablet 4 mg  4 mg Oral Q8H PRN    Or    ondansetron (ZOFRAN) injection 4 mg  4 mg IntraVENous Q6H PRN    polyethylene glycol (GLYCOLAX) packet 17 g  17 g Oral Daily PRN    bisacodyl (DULCOLAX) suppository 10 mg  10 mg Rectal Daily PRN    famotidine (PEPCID) tablet 10 mg  10 mg Oral Daily PRN    aluminum & magnesium hydroxide-simethicone (MAALOX) 200-200-20 MG/5ML suspension 30 mL  30 mL Oral Q6H PRN    acetaminophen (TYLENOL) tablet 650 mg  650 mg Oral Q6H PRN    Or    acetaminophen (TYLENOL) suppository 650 mg  650 mg Rectal Q6H PRN    DULoxetine (CYMBALTA) extended release capsule 60 mg  60 mg Oral Daily    losartan (COZAAR) tablet 100 mg  100 mg Oral Daily    rOPINIRole (REQUIP) tablet 4 mg  4 mg Oral Nightly    HYDROcodone-acetaminophen (NORCO) 7.5-325 MG per tablet 1 tablet  1 tablet Oral Q6H PRN    morphine injection 4 mg 4 mg IntraVENous Once    ciprofloxacin (CIPRO) IVPB 400 mg  400 mg IntraVENous Q12H    metronidazole (FLAGYL) 500 mg in 0.9% NaCl 100 mL IVPB premix  500 mg IntraVENous Q6H    0.9 % sodium chloride infusion   IntraVENous Continuous    morphine injection 2 mg  2 mg IntraVENous Q6H PRN       Social History:  Social History     Tobacco Use    Smoking status: Never    Smokeless tobacco: Current    Tobacco comments:     Quit smokin can of chewing tobacco a day for 40 years   Substance Use Topics    Alcohol use: No     Family History:  Family History   Problem Relation Age of Onset    Heart Disease Brother     Lung Disease Mother     Heart Disease Brother        Review of Systems:  A detailed 10 system ROS is obtained, with pertinent positives as listed above. All others are negative. Diet:  NPO    Objective:     Physical Exam:  Vitals:  /67   Pulse 79   Temp 98.2 °F (36.8 °C) (Oral)   Resp 17   Ht 5' 10\" (1.778 m)   Wt 240 lb (108.9 kg)   SpO2 94%   BMI 34.44 kg/m²   Gen:  Pt is drowsy (awakens slightly to conversation). no acute distress  Skin:  Face reveal no rashes. HEENT: Sclerae anicteric. Cardiovascular: Regular rate and rhythm. Respiratory:  Comfortable breathing with no accessory muscle use. 4L O2 NC.  GI:  Abdomen Obese. Soft.   +Mild epigastric and RUQ ttp. Normal active bowel sounds. Rectal:  Deferred  Musculoskeletal:  No pitting edema of the lower legs. Laboratory:    Recent Labs     23  1550 23  0420   WBC 12.7* 18.0*   HGB 13.5* 12.7*   HCT 42.4 40.1*    195   MCV 93.4 94.1    140   K 4.0 3.2*    105   CO2 27 26   BUN 15 17   MG  --  1.7*   AST 28 19   ALT 25 25      CT a/p w IV contrast 23  FINDINGS:   - Lung Bases: No infiltrates or masses. - Liver: Unremarkable   - Biliary: There are inflammatory changes surrounding the gallbladder. No definite stones are appreciated. - Pancreas: Normal..   - Spleen: Not enlarged, No mass. - Adrenals: Normal   - Kidneys/ureters: Several nonobstructing calculi bilaterally. - Bladder: Normal.   - Reproductive organs: No pelvic masses. - Bowel: Normal caliber. No inflammatory changes. There are several sigmoid diverticula. There is a diverticulum or ureterocele on the left near the UVJ measuring approximately 1.6 cm.   - Lymph nodes: No significant retroperitoneal, mesenteric, or pelvic adenopathy.   - Bones: No fracture or significant bone lesion.   - Vasculature: Normal   - Bones: No aggressive osseous lesion. There are remote L1 and L2 compression fractures. - Other: No ascites. There is stable haziness within the central mesentery suggesting sclerosing mesenteric right is. IMPRESSION:  1. Findings concerning for acute cholecystitis. The right upper quadrant ultrasound may be beneficial if there is further clinical concern. 2. Sigmoid diverticulosis. 3. Bilateral nonobstructing renal calculi. RUQ US 1/8/23  FINDINGS:  Liver is increased in echogenicity without focal lesions. The gallbladder contains sludge but no shadowing gallstones. Gallbladder appears incompletely distended but there is wall thickening measuring up to 5 mm. Common bile duct is dilated at 8 mm. Pancreas is obscured by overlying bowel gas and shadowing from the fatty liver. Examination of the right kidney demonstrates a few echogenic foci probably nonobstructing stones. Aorta obscured by overlying bowel gas. IVC appears patent. Impression   1. Gallbladder sludge is present. No obvious shadowing gallstones. Gallbladder wall thickening and dilatation of the common bile duct suggests cholecystitis and choledocholithiasis. 2. Fatty infiltration liver and nonobstructing right renal collecting system stones. 3. Limited exam due to shadowing from bowel gas and fatty liver.      Assessment:     Principal Problem:    Sepsis ([address]  Active Problems:    Restless leg syndrome    Acute respiratory failure with hypoxia ([facility] [address]    MAURIZIO (obstructive sleep apnea)    HTN (hypertension)    Primary osteoarthritis of left knee  Resolved Problems:    * No resolved hospital problems. *    75 y.o. male with PMH including but not limited to chronic back pain, OA (had elective surgery planned with Dr. Errol Yang 1/9), GERD, gout, HTN, MAURIZIO but not tolerating CPAP, seen in GI consultation at the request of Dr. Milan Ordaz PA-C for dilated CBD, likely choledocholithiasis with cholecystitis. Pt admitted to [facility] 1/8/23 with right sided abdominal pain, N/V, chills, tachycardia, hypoxia, leukocytosis (WBC 12.7), sepsis ? Related to acute cholecystitis based upon changes noted on CT a/p 1/8. Influenza A/B and COVID were negative. RUQ US showed gallbladder sludge with NO obvious shadowing gallstones. GB wall thickening with CBD dilation at 8mm, fatty liver. LFTs, Lipase initially WNL. On admission he was started on Cipro and Flagyl (PCN allergy). General surgery is following. Labs 1/8/23 Tbili up at 1.4 with Direct bilirubin 0.4 and otherwise normal LFTs. WBC is increased at 18.0. Plan:     - Follow trend of LFTs.  - MRCP to further evaluate for evidence of biliary obstruction. Based upon results may need to consider ERCP. Per family request given hx anxiety, difficulty with prior MRCP- we are ordering low dose Ativan x1 prior to MRCP to be given only if needed. He is currently drowsy and pt family aware that this will only be given if MRI calls saying pt with increased anxiety that is limiting ability to perform MRCP.    - IV antibiotics, per primary team  - Supportive care    Theresa Wade PA-C  [facility]. Patient is seen and examined in collaboration with Dr. Yael Canada. Assessment and plan as per Dr. Yael Canada.

## 2023-01-10 PROBLEM — Z90.49 S/P LAPAROSCOPIC CHOLECYSTECTOMY: Status: ACTIVE | Noted: 2023-01-10

## 2023-01-10 PROBLEM — B34.8 RHINOVIRUS INFECTION: Status: ACTIVE | Noted: 2023-01-10

## 2023-01-10 PROBLEM — R10.11 RIGHT UPPER QUADRANT ABDOMINAL PAIN: Status: ACTIVE | Noted: 2023-01-10

## 2023-01-10 LAB
ALBUMIN SERPL-MCNC: 2.9 G/DL (ref 3.2–4.6)
ALBUMIN/GLOB SERPL: 0.7 (ref 0.4–1.6)
ALP SERPL-CCNC: 81 U/L (ref 50–136)
ALT SERPL-CCNC: 31 U/L (ref 12–65)
ANION GAP SERPL CALC-SCNC: 5 MMOL/L (ref 2–11)
AST SERPL-CCNC: 30 U/L (ref 15–37)
BASOPHILS # BLD: 0 K/UL (ref 0–0.2)
BASOPHILS NFR BLD: 0 % (ref 0–2)
BILIRUB SERPL-MCNC: 0.6 MG/DL (ref 0.2–1.1)
BUN SERPL-MCNC: 20 MG/DL (ref 8–23)
CALCIUM SERPL-MCNC: 8.6 MG/DL (ref 8.3–10.4)
CHLORIDE SERPL-SCNC: 104 MMOL/L (ref 101–110)
CO2 SERPL-SCNC: 27 MMOL/L (ref 21–32)
CREAT SERPL-MCNC: 1.05 MG/DL (ref 0.8–1.5)
DIFFERENTIAL METHOD BLD: ABNORMAL
EOSINOPHIL # BLD: 0 K/UL (ref 0–0.8)
EOSINOPHIL NFR BLD: 0 % (ref 0.5–7.8)
ERYTHROCYTE [DISTWIDTH] IN BLOOD BY AUTOMATED COUNT: 12.8 % (ref 11.9–14.6)
GLOBULIN SER CALC-MCNC: 4 G/DL (ref 2.8–4.5)
GLUCOSE SERPL-MCNC: 283 MG/DL (ref 65–100)
HCT VFR BLD AUTO: 36.5 % (ref 41.1–50.3)
HGB BLD-MCNC: 11.8 G/DL (ref 13.6–17.2)
IMM GRANULOCYTES # BLD AUTO: 0.2 K/UL (ref 0–0.5)
IMM GRANULOCYTES NFR BLD AUTO: 2 % (ref 0–5)
LYMPHOCYTES # BLD: 0.3 K/UL (ref 0.5–4.6)
LYMPHOCYTES NFR BLD: 3 % (ref 13–44)
MCH RBC QN AUTO: 29.9 PG (ref 26.1–32.9)
MCHC RBC AUTO-ENTMCNC: 32.3 G/DL (ref 31.4–35)
MCV RBC AUTO: 92.4 FL (ref 82–102)
MONOCYTES # BLD: 0.3 K/UL (ref 0.1–1.3)
MONOCYTES NFR BLD: 3 % (ref 4–12)
NEUTS SEG # BLD: 10.5 K/UL (ref 1.7–8.2)
NEUTS SEG NFR BLD: 92 % (ref 43–78)
NRBC # BLD: 0 K/UL (ref 0–0.2)
PLATELET # BLD AUTO: 175 K/UL (ref 150–450)
PMV BLD AUTO: 10.3 FL (ref 9.4–12.3)
POTASSIUM SERPL-SCNC: 3.7 MMOL/L (ref 3.5–5.1)
PROT SERPL-MCNC: 6.9 G/DL (ref 6.3–8.2)
RBC # BLD AUTO: 3.95 M/UL (ref 4.23–5.6)
SODIUM SERPL-SCNC: 136 MMOL/L (ref 133–143)
WBC # BLD AUTO: 11.3 K/UL (ref 4.3–11.1)

## 2023-01-10 PROCEDURE — 80053 COMPREHEN METABOLIC PANEL: CPT

## 2023-01-10 PROCEDURE — 6370000000 HC RX 637 (ALT 250 FOR IP): Performed by: SURGERY

## 2023-01-10 PROCEDURE — 2500000003 HC RX 250 WO HCPCS: Performed by: SURGERY

## 2023-01-10 PROCEDURE — 97161 PT EVAL LOW COMPLEX 20 MIN: CPT

## 2023-01-10 PROCEDURE — 2580000003 HC RX 258: Performed by: SURGERY

## 2023-01-10 PROCEDURE — 6360000002 HC RX W HCPCS: Performed by: SURGERY

## 2023-01-10 PROCEDURE — 1100000000 HC RM PRIVATE

## 2023-01-10 PROCEDURE — 97530 THERAPEUTIC ACTIVITIES: CPT

## 2023-01-10 PROCEDURE — 36415 COLL VENOUS BLD VENIPUNCTURE: CPT

## 2023-01-10 PROCEDURE — 97165 OT EVAL LOW COMPLEX 30 MIN: CPT

## 2023-01-10 PROCEDURE — 85025 COMPLETE CBC W/AUTO DIFF WBC: CPT

## 2023-01-10 RX ORDER — LORAZEPAM 2 MG/ML
5 INJECTION INTRAMUSCULAR ONCE
Status: DISCONTINUED | OUTPATIENT
Start: 2023-01-10 | End: 2023-01-10

## 2023-01-10 RX ORDER — LORAZEPAM 2 MG/ML
0.5 INJECTION INTRAMUSCULAR ONCE
Status: DISCONTINUED | OUTPATIENT
Start: 2023-01-10 | End: 2023-01-12 | Stop reason: HOSPADM

## 2023-01-10 RX ADMIN — METRONIDAZOLE 500 MG: 500 INJECTION, SOLUTION INTRAVENOUS at 14:38

## 2023-01-10 RX ADMIN — HYDROCODONE BITARTRATE AND ACETAMINOPHEN 1 TABLET: 7.5; 325 TABLET ORAL at 02:29

## 2023-01-10 RX ADMIN — FLUTICASONE PROPIONATE 1 SPRAY: 50 SPRAY, METERED NASAL at 07:45

## 2023-01-10 RX ADMIN — ALUMINUM HYDROXIDE, MAGNESIUM HYDROXIDE, DIMETHICONE 30 ML: 200; 200; 20 LIQUID ORAL at 13:27

## 2023-01-10 RX ADMIN — LOSARTAN POTASSIUM 100 MG: 50 TABLET, FILM COATED ORAL at 07:41

## 2023-01-10 RX ADMIN — CIPROFLOXACIN 400 MG: 2 INJECTION, SOLUTION INTRAVENOUS at 17:38

## 2023-01-10 RX ADMIN — DULOXETINE HYDROCHLORIDE 60 MG: 60 CAPSULE, DELAYED RELEASE ORAL at 07:41

## 2023-01-10 RX ADMIN — METRONIDAZOLE 500 MG: 500 INJECTION, SOLUTION INTRAVENOUS at 02:33

## 2023-01-10 RX ADMIN — METRONIDAZOLE 500 MG: 500 INJECTION, SOLUTION INTRAVENOUS at 20:37

## 2023-01-10 RX ADMIN — FAMOTIDINE 10 MG: 20 TABLET, FILM COATED ORAL at 02:29

## 2023-01-10 RX ADMIN — SODIUM CHLORIDE, PRESERVATIVE FREE 10 ML: 5 INJECTION INTRAVENOUS at 20:34

## 2023-01-10 RX ADMIN — ONDANSETRON 4 MG: 2 INJECTION INTRAMUSCULAR; INTRAVENOUS at 02:55

## 2023-01-10 RX ADMIN — METRONIDAZOLE 500 MG: 500 INJECTION, SOLUTION INTRAVENOUS at 07:42

## 2023-01-10 RX ADMIN — ROPINIROLE HYDROCHLORIDE 4 MG: 1 TABLET, FILM COATED ORAL at 20:33

## 2023-01-10 RX ADMIN — CIPROFLOXACIN 400 MG: 2 INJECTION, SOLUTION INTRAVENOUS at 05:45

## 2023-01-10 RX ADMIN — BENZONATATE 100 MG: 100 CAPSULE ORAL at 20:33

## 2023-01-10 RX ADMIN — SODIUM CHLORIDE, PRESERVATIVE FREE 10 ML: 5 INJECTION INTRAVENOUS at 07:45

## 2023-01-10 ASSESSMENT — PAIN SCALES - GENERAL
PAINLEVEL_OUTOF10: 6
PAINLEVEL_OUTOF10: 0
PAINLEVEL_OUTOF10: 0

## 2023-01-10 ASSESSMENT — PAIN DESCRIPTION - LOCATION: LOCATION: ABDOMEN

## 2023-01-10 NOTE — PROGRESS NOTES
Hospitalist Progress Note   Admit Date:  2023  3:26 PM   Name:  Jose Montana   Age:  75 y.o. Sex:  male  :  1955   MRN:  [id number]   Room:  [facility]/    Presenting Complaint: Abdominal Pain     Reason(s) for Admission: Acute cholecystitis [K81.0]  Right upper quadrant abdominal pain [R10.11]  Sepsis [facility]) [A41.9]     Hospital Course:   Please refer to the admission H&P for details of presentation. In summary, Jose Montana is a 75 y.o. male with medical history significant for chronic back pain, OA, GERD, gout, HTN, MAURIZIO but not tolerating CPAP who presented with acute onset of abdominal pain (RUQ and RLQ) with associated nausea and vomiting. In the ED, patient was tachycardic and noted to be saturating 88% on 2 L O2 nasal cannula. Laboratory work-up was only remarkable for WBC of 12.7. Influenza AMB were negative as well as COVID. CT abdomen pelvis concerning for acute cholecystitis with inflammatory changes surrounding the gallbladder without definite stones. US of abdomen with sludge in gallbladder with gallbladder wall thickening and dilatation of CBD suggestive of cholecystitis and choledocholithiasis. MRCP with pericholecystic edema and gallbladder surgically for acute cholecystitis without bile duct dilatation or choledocholithiasis. Underwent laparoscopic cholecystectomy on  and tolerated procedure well. Subjective/24 hr Events (01/10/23) : Patient is seen and examined at bedside. No acute events noted overnight. Patient underwent laparoscopic cholecystectomy on  by general surgery. Family is at bedside this morning. Patient is sitting up in a recliner. On 5 L O2 nasal cannula with saturation 98%  Patient complaining of hiccups that started recently. Complains of right upper quad abdominal pain during hiccups and cough. Breathing has improved as well as congestion. Patient denies fever, chills, chest pains, n/v, pain.     Assessment & Plan: Sepsis due to likely acute cholecystitis   S/p laparoscopic cholecystectomy  Imaging finding noted. - GI recommendations appreciated  - General surgery following  - continue with cipro and flagyl   - anti-emetics and pain control with IV morphine PRN  - DC fluids. Start diet per GI and surgery      Acute respiratory failure with hypoxia due to rhinovirus  Desaturation to 88% on room air documented in ED with sats improving to >92% with 2L O2 NC.  COVID and Flu neg  procalc is neg. CXR without infiltrate. Congestion appears to be from upper airways. 01/10/23: Respiratory viral panel with rhinovirus infection. - Symptomatic management with tessalon pearls  - albuterol neb prn, tessalon pearls for cough    Hypokalemia and hypomagnesemia  -Now resolved after repletion    OA of left knee   Had elective knee surgery planned with  1/9  -POD     HTN: takes losartan  GERD: PPI  RLS: on requip     MAURIZIO: not tolerant of CPAP per chart review and per family    Diet:  ADULT DIET; Regular; Low Fat (less than or equal to 50 gm/day)  DVT PPx: SCDs  Code status: Full Code      Additional Medical Decision Making factors:  Complexity: 2 or more acute complicated illnesses (4)    The patient assessment required an independent historian: a family member. I conducted an independent review of: Labs   I conducted an independent review of: Imaging and/or other diagnostic studies   I conducted an independent review of: EKG and/or telemetry      Shared decision making was utilized in the care of this patient. I discussed patient's case with an interdisciplinary team.  I discussed patient's case with a . I discussed patient's case with a nurse.       Hospital Problems:  Principal Problem:    Sepsis ([address]  Active Problems:    Restless leg syndrome    Acute respiratory failure with hypoxia (HCC)    MAURIZIO (obstructive sleep apnea)    Acute cholecystitis    Hypokalemia    Hypomagnesemia    Right upper quadrant abdominal pain    S/P laparoscopic cholecystectomy    Rhinovirus infection    HTN (hypertension)    Primary osteoarthritis of left knee  Resolved Problems:    * No resolved hospital problems. *      Objective:   Patient Vitals for the past 24 hrs:   Temp Pulse Resp BP SpO2   01/10/23 1108 98.2 °F (36.8 °C) 92 17 125/64 98 %   01/10/23 0850 -- -- -- -- 93 %   01/10/23 0736 98.2 °F (36.8 °C) 98 18 138/69 98 %   01/10/23 0411 98 °F (36.7 °C) (!) 102 16 116/71 92 %   01/10/23 0229 -- -- 18 -- --   01/10/23 0013 98.6 °F (37 °C) 100 16 (!) 119/55 94 %   01/09/23 2247 -- 95 20 -- 95 %   01/09/23 2116 97.9 °F (36.6 °C) 93 20 110/60 93 %   01/09/23 2015 -- 100 -- -- 93 %   01/09/23 2010 -- (!) 101 18 (!) 114/56 92 %   01/09/23 2005 -- 97 18 (!) 119/56 94 %   01/09/23 2000 -- 95 18 (!) 108/53 93 %   01/09/23 1955 -- 100 18 (!) 106/58 95 %   01/09/23 1950 -- 98 18 (!) 123/58 98 %   01/09/23 1945 -- 95 18 (!) 137/54 98 %   01/09/23 1940 -- (!) 102 20 (!) 114/59 93 %   01/09/23 1935 -- (!) 103 18 133/60 94 %   01/09/23 1930 -- 99 18 (!) 134/56 96 %   01/09/23 1925 -- 98 16 134/63 96 %   01/09/23 1924 98.6 °F (37 °C) 100 16 133/63 95 %   01/09/23 1920 -- -- 18 139/63 --   01/09/23 1523 97.5 °F (36.4 °C) 84 17 (!) 124/53 96 %       Oxygen Therapy  SpO2: 98 %  Pulse via Oximetry: 100 beats per minute  Pulse Oximeter Device Mode: Continuous  Pulse Oximeter Device Location: Left, Finger  O2 Device: Nasal cannula  O2 Flow Rate (L/min): 5 L/min    Estimated body mass index is 34.44 kg/m² as calculated from the following:    Height as of this encounter: 5' 10\" (1.778 m). Weight as of this encounter: 240 lb (108.9 kg). Intake/Output Summary (Last 24 hours) at 1/10/2023 1224  Last data filed at 1/10/2023 1006  Gross per 24 hour   Intake 1350 ml   Output 880 ml   Net 470 ml         Physical Exam:     Blood pressure 125/64, pulse 92, temperature 98.2 °F (36.8 °C), temperature source Oral, resp.  rate 17, height 5' 10\" (1.778 m), weight 240 lb (108.9 kg), SpO2 98 %. General:    Ill appearing    Head:  Normocephalic, atraumatic  Eyes:  Sclerae appear normal.  Pupils equally round. ENT:  Nares appear normal.  Moist oral mucosa  Neck:  No restricted ROM. Trachea midline   CV:   RRR. No m/r/g. No jugular venous distension. Lungs:   CTAB. No wheezing, rhonchi in upper airways. Symmetric expansion. Abdomen:   Soft, tender to palpation in RUQ, nondistended. Extremities: No cyanosis or clubbing. No edema  Skin:     No rashes and normal coloration. Warm and dry. Neuro:  CN II-XII grossly intact. Sensation intact. Psych:  Normal mood and affect.       I have personally reviewed labs and tests showing:  Recent Labs:  Recent Results (from the past 48 hour(s))   Troponin    Collection Time: 01/08/23  3:49 PM   Result Value Ref Range    Troponin, High Sensitivity 11.4 0 - 14 pg/mL   CBC with Diff    Collection Time: 01/08/23  3:50 PM   Result Value Ref Range    WBC 12.7 (H) 4.3 - 11.1 K/uL    RBC 4.54 4.23 - 5.6 M/uL    Hemoglobin 13.5 (L) 13.6 - 17.2 g/dL    Hematocrit 42.4 41.1 - 50.3 %    MCV 93.4 82.0 - 102.0 FL    MCH 29.7 26.1 - 32.9 PG    MCHC 31.8 31.4 - 35.0 g/dL    RDW 13.0 11.9 - 14.6 %    Platelets 450 096 - 847 K/uL    MPV 10.3 9.4 - 12.3 FL    nRBC 0.00 0.0 - 0.2 K/uL    Differential Type AUTOMATED      Seg Neutrophils 83 (H) 43 - 78 %    Lymphocytes 9 (L) 13 - 44 %    Monocytes 6 4.0 - 12.0 %    Eosinophils % 1 0.5 - 7.8 %    Basophils 1 0.0 - 2.0 %    Immature Granulocytes 1 0.0 - 5.0 %    Segs Absolute 10.5 (H) 1.7 - 8.2 K/UL    Absolute Lymph # 1.1 0.5 - 4.6 K/UL    Absolute Mono # 0.8 0.1 - 1.3 K/UL    Absolute Eos # 0.2 0.0 - 0.8 K/UL    Basophils Absolute 0.1 0.0 - 0.2 K/UL    Absolute Immature Granulocyte 0.1 0.0 - 0.5 K/UL   CMP    Collection Time: 01/08/23  3:50 PM   Result Value Ref Range    Sodium 139 133 - 143 mmol/L    Potassium 4.0 3.5 - 5.1 mmol/L    Chloride 104 101 - 110 mmol/L    CO2 27 21 - 32 mmol/L    Anion Gap 8 2 - 11 mmol/L    Glucose 157 (H) 65 - 100 mg/dL    BUN 15 8 - 23 MG/DL    Creatinine 1.08 0.8 - 1.5 MG/DL    Est, Glom Filt Rate >60 >60 ml/min/1.73m2    Calcium 9.4 8.3 - 10.4 MG/DL    Total Bilirubin 0.8 0.2 - 1.1 MG/DL    ALT 25 12 - 65 U/L    AST 28 15 - 37 U/L    Alk Phosphatase 93 50 - 136 U/L    Total Protein 7.4 6.3 - 8.2 g/dL    Albumin 3.4 3.2 - 4.6 g/dL    Globulin 4.0 2.8 - 4.5 g/dL    Albumin/Globulin Ratio 0.9 0.4 - 1.6     Lipase    Collection Time: 01/08/23  3:50 PM   Result Value Ref Range    Lipase 52 (L) 73 - 393 U/L   Procalcitonin    Collection Time: 01/08/23  3:50 PM   Result Value Ref Range    Procalcitonin <0.05 0.00 - 0.49 ng/mL   COVID-19, Rapid    Collection Time: 01/08/23  4:40 PM    Specimen: Nasopharyngeal   Result Value Ref Range    Source Nasopharyngeal      SARS-CoV-2, Rapid Not detected NOTD     Rapid influenza A/B antigens    Collection Time: 01/08/23  4:40 PM    Specimen: Nasal Washing   Result Value Ref Range    Influenza A Ag Negative NEG      Influenza B Ag Negative NEG      Source Nasopharyngeal     EKG 12 Lead    Collection Time: 01/08/23  5:21 PM   Result Value Ref Range    Ventricular Rate 107 BPM    Atrial Rate 107 BPM    P-R Interval 160 ms    QRS Duration 86 ms    Q-T Interval 308 ms    QTc Calculation (Bazett) 411 ms    P Axis 75 degrees    R Axis 86 degrees    T Axis 70 degrees    Diagnosis Sinus tachycardia    Basic Metabolic Panel w/ Reflex to MG    Collection Time: 01/09/23  4:20 AM   Result Value Ref Range    Sodium 140 133 - 143 mmol/L    Potassium 3.2 (L) 3.5 - 5.1 mmol/L    Chloride 105 101 - 110 mmol/L    CO2 26 21 - 32 mmol/L    Anion Gap 9 2 - 11 mmol/L    Glucose 162 (H) 65 - 100 mg/dL    BUN 17 8 - 23 MG/DL    Creatinine 1.30 0.8 - 1.5 MG/DL    Est, Glom Filt Rate 60 (L) >60 ml/min/1.73m2    Calcium 8.8 8.3 - 10.4 MG/DL   CBC with Auto Differential    Collection Time: 01/09/23  4:20 AM   Result Value Ref Range    WBC 18.0 (H) 4.3 - 11.1 K/uL    RBC 4. 26 4.23 - 5.6 M/uL    Hemoglobin 12.7 (L) 13.6 - 17.2 g/dL    Hematocrit 40.1 (L) 41.1 - 50.3 %    MCV 94.1 82.0 - 102.0 FL    MCH 29.8 26.1 - 32.9 PG    MCHC 31.7 31.4 - 35.0 g/dL    RDW 13.0 11.9 - 14.6 %    Platelets 262 310 - 804 K/uL    MPV 10.5 9.4 - 12.3 FL    nRBC 0.00 0.0 - 0.2 K/uL    Differential Type AUTOMATED      Seg Neutrophils 86 (H) 43 - 78 %    Lymphocytes 5 (L) 13 - 44 %    Monocytes 8 4.0 - 12.0 %    Eosinophils % 0 (L) 0.5 - 7.8 %    Basophils 0 0.0 - 2.0 %    Immature Granulocytes 1 0.0 - 5.0 %    Segs Absolute 15.5 (H) 1.7 - 8.2 K/UL    Absolute Lymph # 0.9 0.5 - 4.6 K/UL    Absolute Mono # 1.4 (H) 0.1 - 1.3 K/UL    Absolute Eos # 0.0 0.0 - 0.8 K/UL    Basophils Absolute 0.1 0.0 - 0.2 K/UL    Absolute Immature Granulocyte 0.1 0.0 - 0.5 K/UL   Magnesium    Collection Time: 01/09/23  4:20 AM   Result Value Ref Range    Magnesium 1.7 (L) 1.8 - 2.4 mg/dL   Hepatic Function Panel    Collection Time: 01/09/23  4:20 AM   Result Value Ref Range    Total Protein 6.7 6.3 - 8.2 g/dL    Albumin 3.3 3.2 - 4.6 g/dL    Globulin 3.4 2.8 - 4.5 g/dL    Albumin/Globulin Ratio 1.0 0.4 - 1.6      Total Bilirubin 1.4 (H) 0.2 - 1.1 MG/DL    Bilirubin, Direct 0.4 (H) <0.4 MG/DL    Alk Phosphatase 83 50 - 136 U/L    AST 19 15 - 37 U/L    ALT 25 12 - 65 U/L   Brain Natriuretic Peptide    Collection Time: 01/09/23  4:20 AM   Result Value Ref Range    NT Pro- (H) 5 - 125 PG/ML   Respiratory Panel, Molecular, with COVID-19 (Restricted: peds pts or suitable admitted adults)    Collection Time: 01/09/23  1:53 PM    Specimen: Nasopharyngeal   Result Value Ref Range    Adenovirus by PCR NOT DETECTED NOTDET      Coronavirus 229E by PCR NOT DETECTED NOTDET      Coronavirus HKU1 by PCR NOT DETECTED NOTDET      Coronavirus NL63 by PCR NOT DETECTED NOTDET      Coronavirus OC43 by PCR NOT DETECTED NOTDET      SARS-CoV-2, PCR NOT DETECTED NOTDET      Human Metapneumovirus by PCR NOT DETECTED NOTDET      Rhinovirus Enterovirus PCR Detected (A) NOTDET      Influenza A by PCR NOT DETECTED NOTDET      Influenza B PCR NOT DETECTED NOTDET      Parainfluenza 1 PCR NOT DETECTED NOTDET      Parainfluenza 2 PCR NOT DETECTED NOTDET      Parainfluenza 3 PCR NOT DETECTED NOTDET      Parainfluenza 4 PCR NOT DETECTED NOTDET      Respiratory Syncytial Virus by PCR NOT DETECTED NOTDET      Bordetella parapertussis by PCR NOT DETECTED NOTDET      Bordetella pertussis by PCR NOT DETECTED NOTDET      Chlamydophila Pneumonia PCR NOT DETECTED NOTDET      Mycoplasma pneumo by PCR NOT DETECTED NOTDET     CBC with Auto Differential    Collection Time: 01/10/23  6:27 AM   Result Value Ref Range    WBC 11.3 (H) 4.3 - 11.1 K/uL    RBC 3.95 (L) 4.23 - 5.6 M/uL    Hemoglobin 11.8 (L) 13.6 - 17.2 g/dL    Hematocrit 36.5 (L) 41.1 - 50.3 %    MCV 92.4 82.0 - 102.0 FL    MCH 29.9 26.1 - 32.9 PG    MCHC 32.3 31.4 - 35.0 g/dL    RDW 12.8 11.9 - 14.6 %    Platelets 954 376 - 863 K/uL    MPV 10.3 9.4 - 12.3 FL    nRBC 0.00 0.0 - 0.2 K/uL    Differential Type AUTOMATED      Seg Neutrophils 92 (H) 43 - 78 %    Lymphocytes 3 (L) 13 - 44 %    Monocytes 3 (L) 4.0 - 12.0 %    Eosinophils % 0 (L) 0.5 - 7.8 %    Basophils 0 0.0 - 2.0 %    Immature Granulocytes 2 0.0 - 5.0 %    Segs Absolute 10.5 (H) 1.7 - 8.2 K/UL    Absolute Lymph # 0.3 (L) 0.5 - 4.6 K/UL    Absolute Mono # 0.3 0.1 - 1.3 K/UL    Absolute Eos # 0.0 0.0 - 0.8 K/UL    Basophils Absolute 0.0 0.0 - 0.2 K/UL    Absolute Immature Granulocyte 0.2 0.0 - 0.5 K/UL   Comprehensive Metabolic Panel w/ Reflex to MG    Collection Time: 01/10/23  6:27 AM   Result Value Ref Range    Sodium 136 133 - 143 mmol/L    Potassium 3.7 3.5 - 5.1 mmol/L    Chloride 104 101 - 110 mmol/L    CO2 27 21 - 32 mmol/L    Anion Gap 5 2 - 11 mmol/L    Glucose 283 (H) 65 - 100 mg/dL    BUN 20 8 - 23 MG/DL    Creatinine 1.05 0.8 - 1.5 MG/DL    Est, Glom Filt Rate >60 >60 ml/min/1.73m2    Calcium 8.6 8.3 - 10.4 MG/DL    Total Bilirubin 0.6 0.2 - 1.1 MG/DL    ALT 31 12 - 65 U/L    AST 30 15 - 37 U/L    Alk Phosphatase 81 50 - 136 U/L    Total Protein 6.9 6.3 - 8.2 g/dL    Albumin 2.9 (L) 3.2 - 4.6 g/dL    Globulin 4.0 2.8 - 4.5 g/dL    Albumin/Globulin Ratio 0.7 0.4 - 1.6         I have personally reviewed imaging studies showing: Other Studies:  MRI ABDOMEN W WO CONTRAST   Final Result   1. Pericholecystic edema and gallbladder sludge, concerning for acute   cholecystitis. 2.  No bile duct dilatation or choledocholithiasis. If there are any questions about this report, I can be reached on [facility]   or at [phone]               XR CHEST PORTABLE   Final Result   Lungs are hypoaerated but otherwise clear. Cardiomegaly. No   pneumothorax. No pleural effusions. US ABDOMEN LIMITED Specify organ? GALLBLADDER   Final Result   1. Gallbladder sludge is present. No obvious shadowing gallstones. Gallbladder   wall thickening and dilatation of the common bile duct suggests cholecystitis   and choledocholithiasis. 2. Fatty infiltration liver and nonobstructing right renal collecting system   stones. 3. Limited exam due to shadowing from bowel gas and fatty liver. CT ABDOMEN PELVIS W IV CONTRAST Additional Contrast? None   Final Result   1. Findings concerning for acute cholecystitis. The right upper quadrant   ultrasound may be beneficial if there is further clinical concern. 2. Sigmoid diverticulosis. 3. Bilateral nonobstructing renal calculi.                    Current Meds:  Current Facility-Administered Medications   Medication Dose Route Frequency    LORazepam (ATIVAN) injection 0.5 mg  0.5 mg IntraVENous Once    sodium chloride flush 0.9 % injection 20 mL  20 mL IntraVENous PRN    albuterol (PROVENTIL) nebulizer solution 2.5 mg  2.5 mg Nebulization Q6H PRN    sodium chloride (Inhalant) 3 % nebulizer solution 4 mL  4 mL Nebulization Q12H PRN    benzonatate (TESSALON) capsule 100 mg  100 mg Oral TID PRN    fluticasone (FLONASE) 50 MCG/ACT nasal spray 1 spray  1 spray Each Nostril Daily    sodium chloride flush 0.9 % injection 5-40 mL  5-40 mL IntraVENous 2 times per day    sodium chloride flush 0.9 % injection 5-40 mL  5-40 mL IntraVENous PRN    0.9 % sodium chloride infusion   IntraVENous PRN    ondansetron (ZOFRAN-ODT) disintegrating tablet 4 mg  4 mg Oral Q8H PRN    Or    ondansetron (ZOFRAN) injection 4 mg  4 mg IntraVENous Q6H PRN    polyethylene glycol (GLYCOLAX) packet 17 g  17 g Oral Daily PRN    bisacodyl (DULCOLAX) suppository 10 mg  10 mg Rectal Daily PRN    famotidine (PEPCID) tablet 10 mg  10 mg Oral Daily PRN    aluminum & magnesium hydroxide-simethicone (MAALOX) 200-200-20 MG/5ML suspension 30 mL  30 mL Oral Q6H PRN    acetaminophen (TYLENOL) tablet 650 mg  650 mg Oral Q6H PRN    Or    acetaminophen (TYLENOL) suppository 650 mg  650 mg Rectal Q6H PRN    DULoxetine (CYMBALTA) extended release capsule 60 mg  60 mg Oral Daily    losartan (COZAAR) tablet 100 mg  100 mg Oral Daily    rOPINIRole (REQUIP) tablet 4 mg  4 mg Oral Nightly    HYDROcodone-acetaminophen (NORCO) 7.5-325 MG per tablet 1 tablet  1 tablet Oral Q6H PRN    morphine injection 4 mg  4 mg IntraVENous Once    ciprofloxacin (CIPRO) IVPB 400 mg  400 mg IntraVENous Q12H    metronidazole (FLAGYL) 500 mg in 0.9% NaCl 100 mL IVPB premix  500 mg IntraVENous Q6H    morphine injection 2 mg  2 mg IntraVENous Q6H PRN       Signed:  Ladonna Olea MD    Part of this note may have been written by using a voice dictation software. The note has been proof read but may still contain some grammatical/other typographical errors.

## 2023-01-10 NOTE — PROGRESS NOTES
ACUTE OCCUPATIONAL THERAPY GOALS:   (Developed with and agreed upon by patient and/or caregiver.)  1. Patient will perform grooming with supervision. 2. Patient will perform upper body dressing with supervision. 3. Patient will perform lower body dressing with supervision. 4. Patient will perform bathing with supervision. 5. Patient will perform toileting and toilet transfer with supervision. 6. Patient will perform ADL functional mobility and tranfers in room with supervision. 7. Patient/family to demonstrate knowledge of home safety and DME recommendations. Goals to be achieved in 7 days. OCCUPATIONAL THERAPY Initial Assessment and AM       OT Visit Days: 1  Acknowledge Orders  Time  OT Charge Capture  Rehab Caseload Tracker      Tiffani Flores is a 75 y.o. male   PRIMARY DIAGNOSIS: Sepsis ([facility] [address]  Acute cholecystitis [K81.0]  Right upper quadrant abdominal pain [R10.11]  Sepsis ([address] [A41.9]  Procedure(s) (LRB):  CHOLECYSTECTOMY LAPAROSCOPIC (N/A)  1 Day Post-Op  Reason for Referral: Generalized Muscle Weakness (M62.81)  Other lack of cordination (R27.8)  Inpatient: Payor: Michelle Michele / Plan: [employer] / Product Type: *No Product type* /     ASSESSMENT:     REHAB RECOMMENDATIONS:   Recommendation to date pending progress:  Setting:  Home Health Therapy    Equipment:    To Be Determined     ASSESSMENT:  Mr. Ray Calderon was admitted with above diagnosis. Pt was scheduled for a total knee replacement and was admitted with abdominal pain and then with a cholecystectomy. 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 Pt moves fairly well but does need his Rolator to get around. Pt is noted with mild generalized weakness, decreased balance, and decreased activity tolerance. Pt might benefit from OT to maximize safety and independence with self care and functional mobility. Pt might also benefit from OT at home in preparation for rescheduled knee replacement surgery.  OT will follow.        [address] [facility] [address] [address] [facility] [facility] Clicks Daily Activity Inpatient Short Form:    AM-PAC Daily Activity Inpatient   How much help for putting on and taking off regular lower body clothing?: A Little  How much help for Bathing?: A Little  How much help for Toileting?: A Little  How much help for putting on and taking off regular upper body clothing?: A Little  How much help for taking care of personal grooming?: A Little  How much help for eating meals?: None  [facility] Inpatient Daily Activity Raw Score: 19  AM-PAC Inpatient ADL T-Scale Score : 40.22  ADL Inpatient CMS 0-100% Score: 42.8  ADL Inpatient CMS G-Code Modifier : CK           SUBJECTIVE:     Mr. Panfilo Kim states, he was disappointed he could not have his knee surgery      Social/Functional Lives With: Spouse  Type of Home: House  Home Layout: One level  Home Access: Stairs to enter with rails  Entrance Stairs - Number of Steps: 3  Bathroom Equipment: Tub transfer bench, 3-in-1 commode  Home Equipment: Cane, Rollator    OBJECTIVE:     Teri Youngblood / Carole Storm / Jacqueline Newton: IV    RESTRICTIONS/PRECAUTIONS:       PAIN: VITALS / O2:   Pre Treatment:          Post Treatment: none       Vitals          Oxygen            GROSS EVALUATION: INTACT IMPAIRED   (See Comments)   UE AROM [x] []   UE PROM [x] []   Strength []    Generalized weakness    Posture / Balance []  Decreased standing balance    Sensation [x]     Coordination [x]       Tone [x]       Edema []    Activity Tolerance []  Fair      Hand Dominance R [] L []      COGNITION/  PERCEPTION: INTACT IMPAIRED   (See Comments)   Orientation [x]     Vision [x]     Hearing [x]     Cognition  [x]     Perception [x]       MOBILITY: I Mod I S SBA CGA Min Mod Max Total  NT x2 Comments:   Bed Mobility    Rolling [] [] [] [] [] [] [] [] [] [] []    Supine to Sit [] [] [] [] [] [] [] [] [] [] []    Scooting [] [] [] [] [] [] [] [] [] [] []    Sit to Supine [] [] [] [] [] [] [] [] [] [] []    Transfers    Sit to Stand [] [] [] [] [] [x] [] [] [] [] []    Bed to Chair [] [] [] [] [] [x] [] [] [] [] []    Stand to Sit [] [] [] [] [] [x] [] [] [] [] []    Tub/Shower [] [] [] [] [] [x] [] [] [] [] []     Toilet [] [] [] [] [] [x] [] [] [] [] []      [] [] [] [] [] [] [] [] [] [] []    I=Independent, Mod I=Modified Independent, S=Supervision/Setup, SBA=Standby Assistance, CGA=Contact Guard Assistance, Min=Minimal Assistance, Mod=Moderate Assistance, Max=Maximal Assistance, Total=Total Assistance, NT=Not Tested    ACTIVITIES OF DAILY LIVING: I Mod I S SBA CGA Min Mod Max Total NT Comments   BASIC ADLs:              Upper Body Bathing  [] [] [] [] [x] [] [] [] [] []    Lower Body Bathing [] [] [] [] [] [x] [] [] [] []    Toileting [] [] [] [] [] [x] [] [] [] []    Upper Body Dressing [] [] [] [] [x] [] [] [] [] []    Lower Body Dressing [] [] [] [] [] [x] [] [] [] []    Feeding [x] [] [] [] [] [] [] [] [] []    Grooming [] [] [] [] [x] [] [] [] [] []    Personal Device Care [] [] [] [] [] [] [] [] [] []    Functional Mobility [] [] [] [] [x] [] [] [] [] []    I=Independent, Mod I=Modified Independent, S=Supervision/Setup, SBA=Standby Assistance, CGA=Contact Guard Assistance, Min=Minimal Assistance, Mod=Moderate Assistance, Max=Maximal Assistance, Total=Total Assistance, NT=Not Tested    PLAN:   FREQUENCY/DURATION   OT Plan of Care: 2 times/week for duration of hospital stay or until stated goals are met, whichever comes first.    PROBLEM LIST:   (Skilled intervention is medically necessary to address:)  Decreased ADL/Functional Activities  Decreased Activity Tolerance  Decreased Balance  Decreased Strength   INTERVENTIONS PLANNED:  (Benefits and precautions of occupational therapy have been discussed with the patient.)  Self Care Training  Therapeutic Activity  Therapeutic Exercise/HEP  Education         TREATMENT:     EVALUATION: LOW COMPLEXITY: (Untimed Charge)    TREATMENT:   Evaluation only     TREATMENT GRID:  N/A    AFTER TREATMENT PRECAUTIONS: Bed/Chair Locked, Call light within reach, Chair, Needs within reach, and Visitors at bedside    INTERDISCIPLINARY COLLABORATION:  RN/ PCT, PT/ PTA, and OT/ FIELDS    EDUCATION:  Education Given To: Patient; Family  Education Provided: Plan of Care;Role of Therapy; Fall Prevention Strategies    TOTAL TREATMENT DURATION AND TIME:  Time In: [address]  Time Out: [address]  Minutes: [address], OT

## 2023-01-10 NOTE — PROGRESS NOTES
ACUTE PHYSICAL THERAPY GOALS:   (Developed with and agreed upon by patient and/or caregiver. )    LTG:  (1.)Mr. Tatum will move from supine to sit and sit to supine  in bed with INDEPENDENT within 5 treatment day(s). (2.)Mr. Tatum will transfer from bed to chair and chair to bed with SUPERVISION using the least restrictive device within 5 treatment day(s). (3.)Mr. Tatum will ambulate with SUPERVISION for 100 feet with the least restrictive device within 5 treatment day(s). 4.  Mr. Marysue Bloch will ambulate up/down 3 steps with a handrail and supervision within 5 treatment days. ________________________________________________________________________________________________      PHYSICAL THERAPY Initial Assessment and AM  (Link to Caseload Tracking: PT Visit Days : 1  Acknowledge Orders  Time In/Out  PT Charge Capture  Rehab Caseload Tracker    Orlando Lesches Boling is a 75 y.o. male   PRIMARY DIAGNOSIS: Sepsis ([address]  Acute cholecystitis [K81.0]  Right upper quadrant abdominal pain [R10.11]  Sepsis ([address] [A41.9]  Procedure(s) (LRB):  CHOLECYSTECTOMY LAPAROSCOPIC (N/A)  1 Day Post-Op  Reason for Referral: Generalized Muscle Weakness (M62.81)  Difficulty in walking, Not elsewhere classified (R26.2)  Inpatient: Payor: Batsheva Spearsllor / Plan: [facility] HMO / Product Type: *No Product type* /     ASSESSMENT:     REHAB RECOMMENDATIONS:   Recommendation to date pending progress:  Setting:  Possibly no therapy needs. Equipment:    None     ASSESSMENT:  Mr. Marysue Bloch admitted with above diagnoses. He has post-op day 1 from laparoscopic cholecystectomy. He demonstrates generalized weakness affecting his mobility and independence. Patient was scheduled for a L TKA on 1/9/23. He uses a rollator to ambulate. Patient has a history of CP and his R LE is significantly smaller than the L. Patient has had falls. He would benefit from therapy to improve his strength and mobility. Patient should return home at d/c. Unsure if he will have any need for therapy at d/c but will monitor. Patient participated well with assessment. He was sitting on the edge of  the bed when therapy arrived. He only required CGA-SBA for transfers and mobility with a RW. Patient was on 5L O2 with sats at 93%. He was positioned in the chair at the end of today's session. [facility] 6 Clicks Basic Mobility Inpatient Short Form  AM-PAC Mobility Inpatient   How much difficulty turning over in bed?: A Little  How much difficulty sitting down on / standing up from a chair with arms?: A Little  How much difficulty moving from lying on back to sitting on side of bed?: A Little  How much help from another person moving to and from a bed to a chair?: A Little  How much help from another person needed to walk in hospital room?: A Little  How much help from another person for climbing 3-5 steps with a railing?: A Little  [facility] Inpatient Mobility Raw Score : 18  AM-PAC Inpatient T-Scale Score : 43.63  Mobility Inpatient CMS 0-100% Score: 46.58  Mobility Inpatient CMS G-Code Modifier : CK    SUBJECTIVE:   Mr. Marysue Bloch agreeable.     Social/Functional Lives With: Spouse  Type of Home: House  Home Layout: One level  Home Access: Stairs to enter with rails  Entrance Stairs - Number of Steps: 3  Bathroom Equipment: Tub transfer bench, 3-in-1 commode  Home Equipment: Cane, Rollator    OBJECTIVE:     PAIN: Rhunette Began / O2: PRECAUTION / Alida Lightning / DRAINS:   Pre Treatment:   Pain Assessment: 0-10  Pain Level: 0      Post Treatment: 0 Vitals        Oxygen  O2 Device: Nasal cannula  O2 Flow Rate (L/min): 5 L/min  SpO2: 93 %   IV    RESTRICTIONS/PRECAUTIONS:                    GROSS EVALUATION: Intact Impaired (Comments):   AROM []  Decreased but functional   PROM []    Strength []  Decreased but functional   Balance [] Sitting - Static: Good  Sitting - Dynamic: Good  Standing - Static: Fair  Standing - Dynamic: Fair   Posture [] Forward Head  Rounded Shoulders  R LE smaller than L LE   Sensation []     Coordination [x]      Tone [x]     Edema []    Activity Tolerance []  Decreased but functional    []      COGNITION/  PERCEPTION: Intact Impaired (Comments):   Orientation [x]     Vision [x]     Hearing [x]     Cognition  [x]       MOBILITY: I Mod I S SBA CGA Min Mod Max Total  NT x2 Comments:   Bed Mobility    Rolling [] [] [] [] [] [] [] [] [] [] []    Supine to Sit [] [] [] [] [] [] [] [] [] [] []    Scooting [] [] [] [] [] [] [] [] [] [] []    Sit to Supine [] [] [] [] [] [] [] [] [] [] []    Transfers    Sit to Stand [] [] [] [] [x] [] [] [] [] [] []    Bed to Chair [] [] [] [x] [] [] [] [] [] [] []    Stand to Sit [] [] [] [x] [] [] [] [] [] [] []     [] [] [] [] [] [] [] [] [] [] []    I=Independent, Mod I=Modified Independent, S=Supervision, SBA=Standby Assistance, CGA=Contact Guard Assistance,   Min=Minimal Assistance, Mod=Moderate Assistance, Max=Maximal Assistance, Total=Total Assistance, NT=Not Tested    GAIT: I Mod I S SBA CGA Min Mod Max Total  NT x2 Comments:   Level of Assistance [] [] [] [x] [] [] [] [] [] [] []    Distance 30 feet    DME Rolling Walker    Gait Quality Decreased adelso , Decreased step length, and Trunk sway increased    Weightbearing Status      Stairs      I=Independent, Mod I=Modified Independent, S=Supervision, SBA=Standby Assistance, CGA=Contact Guard Assistance,   Min=Minimal Assistance, Mod=Moderate Assistance, Max=Maximal Assistance, Total=Total Assistance, NT=Not Tested    PLAN:   FREQUENCY AND DURATION: 5 times/week for duration of hospital stay or until stated goals are met, whichever comes first.    THERAPY PROGNOSIS: Good    PROBLEM LIST:   (Skilled intervention is medically necessary to address:)  Decreased ADL/Functional Activities  Decreased Activity Tolerance  Decreased AROM/PROM  Decreased Balance  Decreased Gait Ability  Decreased Strength  Decreased Transfer Abilities INTERVENTIONS PLANNED:   (Benefits and precautions of physical therapy have been discussed with the patient.)  Therapeutic Activity  Therapeutic Exercise/HEP  Gait Training  Education       TREATMENT:   EVALUATION: LOW COMPLEXITY: (Untimed Charge)    TREATMENT:   Therapeutic Activity (25 Minutes): Therapeutic activity included Transfer Training, Ambulation on level ground, Sitting balance , and Standing balance to improve functional Activity tolerance, Balance, Coordination, Mobility, and Strength.     TREATMENT GRID:  N/A    AFTER TREATMENT PRECAUTIONS: Bed/Chair Locked, Call light within reach, Chair, Needs within reach, and Visitors at bedside    INTERDISCIPLINARY COLLABORATION:  RN/ PCT    EDUCATION: Education Given To: Patient  Education Provided: Role of Therapy;Plan of Care;Transfer Training  Education Method: Verbal  Education Outcome: Verbalized understanding    TIME IN/OUT:  Time In: 0825  Time Out: [address]  Minutes: Karlo Lee PT

## 2023-01-10 NOTE — BRIEF OP NOTE
Brief Postoperative Note      Patient: Tori Wynn  YOB: 1955  MRN: [id number]    Date of Procedure: 1/9/2023    Pre-Op Diagnosis: Acute Cholecystitis    Post-Op Diagnosis: Same       Procedure(s):  CHOLECYSTECTOMY LAPAROSCOPIC    Surgeon(s):  Indira Beal MD    Assistant:  Jayshree Sandra    Anesthesia: General    Estimated Blood Loss (mL): less than 50     Complications: None    Specimens:   ID Type Source Tests Collected by Time Destination   A : gallbladder Tissue Gallbladder SURGICAL PATHOLOGY Indira Beal MD 1/9/2023 1846        Implants:  * No implants in log *      Drains:   [REMOVED] Urinary Catheter 01/09/23 2 Way (Removed)       Findings: wild pus in gallbladder, which is very edematous and inflamed; morbid obesity    Electronically signed by Indira Beal MD on 1/9/2023 at 7:31 PM

## 2023-01-10 NOTE — PERIOP NOTE
Dr. Nataly Falcon notified of patient's pain 8/10 and respiratory status. MD recommends a breathing treatment and IV dilaudid for pain.

## 2023-01-10 NOTE — PERIOP NOTE
TRANSFER - OUT REPORT:    Verbal report given to [facility], RN on [address]  being transferred to [facility] for routine post-op       Report consisted of patient's Situation, Background, Assessment and   Recommendations(SBAR). Information from the following report(s) Nurse Handoff Report and Cardiac Rhythm SR  was reviewed with the receiving nurse. [place] Assessment: No data recorded  Lines:   Peripheral IV 01/08/23 Left Antecubital (Active)   Site Assessment Clean, dry & intact 01/09/23 1920   Line Status Infusing 01/09/23 1920   Line Care Connections checked and tightened 01/08/23 2113   Phlebitis Assessment No symptoms 01/09/23 1920   Infiltration Assessment 0 01/09/23 1920   Alcohol Cap Used No 01/09/23 1920   Dressing Status Clean, dry & intact 01/09/23 1920   Dressing Type Transparent 01/09/23 1920   Dressing Intervention New 01/08/23 5084        Opportunity for questions and clarification was provided.       Patient transported with:  O2 @ 5lpm

## 2023-01-10 NOTE — PROGRESS NOTES
TRANSFER - IN REPORT:    Verbal report received from [employer], RN on [employer]  being received from PACU for routine progression of patient care      Report consisted of patient's Situation, Background, Assessment and   Recommendations(SBAR). Information from the following report(s) Surgery Report, Intake/Output, and MAR was reviewed with the receiving nurse. Opportunity for questions and clarification was provided. Assessment completed upon patient's arrival to unit and care assumed.

## 2023-01-10 NOTE — PROGRESS NOTES
General Surgery Progress Note    1/10/2023    Admit Date: 2023    Zach Childs  MRN: [id number]  :1955  Age:67 y.o. Post OP Day # 1: LAPAROSCOPIC CHOLECYSTECTOMY done per DR. HALE  Findings: Mikel pus in gallbladder, which is very edematous and inflamed; morbid obesity     Chief Complaint:  Abdominal Pain     HPI: Zach Childs is a 75 y.o. male who we are asked by Hospitalist MD  to see for Abdominal Pain. Patient has medical history of chronic back pain, OA, GERD, gout, HTN, MAURIZIO but not tolerating CPAP who presented with acute onset of abdominal pain (RUQ and RLQ) with associated nausea and vomiting since last night. Unable to tolerate any PO since last night. Denies any diarrhea. Patient was seen at urgent care on Monday (2023) and prescribed clindamycin for sinus infection. Has been having cough and congestion since last week and was tested neg for covid and flu at outside facility. Wife reports that patient also has been feeling chills and felt warm last night. In the ED, patient was tachycardic and noted to be saturating 88% on 2 L O2 nasal cannula. Laboratory work-up was only remarkable for WBC of 12.7. Influenza AMB were negative as well as COVID. CT abdomen pelvis concerning for acute cholecystitis with inflammatory changes surrounding the gallbladder without definite stones. ED provider to contact General surgery. Temperature 2023 @ 12:48 am was 101.1 (oral) Pulse ranges from 100 to 113 . Patient denies any chest pain or dyspnea at this time. CT OF THE ABDOMEN AND PELVIS 2023 @17:10       INDICATION: Abdominal pain beginning last night. Multiple episodes of vomiting. Multiple axial images were obtained through the abdomen and pelvis. Oral   contrast was not administered. 100mL of Isovue 370 intravenous contrast was   used for better evaluation of solid organs and vascular structures.   Radiation   dose reduction techniques were used for this study. All CT scans performed at   this facility use one or all of the following: Automated exposure control,   adjustment of the mA and/or kVp according to patient's size, iterative   reconstruction. COMPARISON: Noncontrast study 01/07/2020       FINDINGS:   - Lung Bases: No infiltrates or masses. - Liver: Unremarkable   - Biliary: There are inflammatory changes surrounding the gallbladder. No   definite stones are appreciated. - Pancreas: Normal..   - Spleen: Not enlarged, No mass. - Adrenals: Normal   - Kidneys/ureters: Several nonobstructing calculi bilaterally. - Bladder: Normal.   - Reproductive organs: No pelvic masses. - Bowel: Normal caliber. No inflammatory changes. There are several sigmoid   diverticula. There is a diverticulum or ureterocele on the left near the UVJ   measuring approximately 1.6 cm.   - Lymph nodes: No significant retroperitoneal, mesenteric, or pelvic adenopathy.   - Bones: No fracture or significant bone lesion.   - Vasculature: Normal   - Bones: No aggressive osseous lesion. There are remote L1 and L2 compression   fractures. - Other: No ascites. There is stable haziness within the central mesentery   suggesting sclerosing mesenteric right is. Impression   1. Findings concerning for acute cholecystitis. The right upper quadrant   ultrasound may be beneficial if there is further clinical concern. 2. Sigmoid diverticulosis. 3. Bilateral nonobstructing renal calculi. US ABDOMEN LIMITED 1/8/2023 7:13 PM       HISTORY: RUQ abd pain; abnormal CT A/P       COMPARISON: None available. FINDINGS:  Liver is increased in echogenicity without focal lesions. The   gallbladder contains sludge but no shadowing gallstones. Gallbladder appears   incompletely distended but there is wall thickening measuring up to 5 mm. Common   bile duct is dilated at 8 mm. Pancreas is obscured by overlying bowel gas and   shadowing from the fatty liver. Examination of the right kidney demonstrates a   few echogenic foci probably nonobstructing stones. Aorta obscured by overlying   bowel gas. IVC appears patent. Impression   1. Gallbladder sludge is present. No obvious shadowing gallstones. Gallbladder   wall thickening and dilatation of the common bile duct suggests cholecystitis   and choledocholithiasis. 2. Fatty infiltration liver and nonobstructing right renal collecting system   stones. 3. Limited exam due to shadowing from bowel gas and fatty liver. Subjective:     Patient reports mild nausea post op. Patient Oxygen saturation 95% and he is currently on 5L/min Oxygen via Nasal Cannula. Denies any chest pain or dyspnea at this time. Patient denies any flatus or BM since post op. Patient is currently on a Regular low fat diet which began post op last evening. Patient reports pain 5 on scale of 10 to trochar sites. Patient reports he is still coughing up \"phlegm\" which has been ongoing since last week. Patient remains on IV Cipro and Flaygl and he has tessalon pearls for cough. Objective:     /71   Pulse (!) 102   Temp 98 °F (36.7 °C) (Oral)   Resp 16   Ht 5' 10\" (1.778 m)   Wt 240 lb (108.9 kg)   SpO2 92%   BMI 34.44 kg/m²       Intake/Output Summary (Last 24 hours) at 1/10/2023 0639  Last data filed at 1/10/2023 0544  Gross per 24 hour   Intake 1250 ml   Output 880 ml   Net 370 ml      Physical Exam  Vitals and nursing note reviewed. Constitutional:       Appearance: Normal appearance. HENT:      Head: Normocephalic and atraumatic. Nose: Nose normal.      Mouth/Throat: no oral lesions     Mouth: Mucous membranes are moist.   Eyes:      Extraocular Movements: Extraocular movements intact. Conjunctiva/sclera: Conjunctivae normal.      Pupils: Pupils are equal, round, and reactive to light. Cardiovascular:      Rate and Rhythm: Mild tachycardia noted. .      Pulses: Normal pulses. Heart sounds: Normal heart sounds. No murmurs, rubs or gallops. Pulmonary:      Effort: Pulmonary effort is normal.   PRN uses Oxygen @ 5 L/min via nasal cannula per notes     Breath sounds: Normal breath sounds. No wheezing, rales or rhonchi noted. Abdominal:      General: Abdomen is obese. Hypoactive Bowel sounds. Mild  distention noted. Tenderness: There is appropriate incisional tenderness noted to the 5 trochar sites which are approximated with steri-strips and no drainage noted. No rebound or guarding noted. Musculoskeletal:         General: Normal range of motion. Cervical back: Normal range of motion and neck supple. Skin:     General: Skin is warm. Capillary Refill: Capillary refill takes less than 2 seconds. Neurological:      General: No focal deficit present. Mental Status: He is alert and oriented to person, place, and time. Psychiatric:         Mood and Affect: Mood normal.         Behavior: Behavior normal.         Thought Content:  Thought content normal.         Judgment: Judgment normal.        Data Review :     Latest Reference Range & Units 1/10/23 06:27   WBC 4.3 - 11.1 K/uL 11.3 (H)   RBC 4.23 - 5.6 M/uL 3.95 (L)   Hemoglobin Quant 13.6 - 17.2 g/dL 11.8 (L)   Hematocrit 41.1 - 50.3 % 36.5 (L)   MCV 82.0 - 102.0 FL 92.4   MCH 26.1 - 32.9 PG 29.9   MCHC 31.4 - 35.0 g/dL 32.3   MPV 9.4 - 12.3 FL 10.3   RDW 11.9 - 14.6 % 12.8   Platelet Count 541 - 450 K/uL 175      Latest Reference Range & Units 1/10/23 06:27   Sodium 133 - 143 mmol/L 136   Potassium 3.5 - 5.1 mmol/L 3.7   Chloride 101 - 110 mmol/L 104   CO2 21 - 32 mmol/L 27   BUN,BUNPL 8 - 23 MG/DL 20   Creatinine 0.8 - 1.5 MG/DL 1.05   Anion Gap 2 - 11 mmol/L 5   Est, Glom Filt Rate >60 ml/min/1.73m2 >60   Glucose, Random 65 - 100 mg/dL 283 (H)   CALCIUM, SERUM, [id number] 8.3 - 10.4 MG/DL 8.6   ALBUMIN/GLOBULIN RATIO 0.4 - 1.6   0.7   Total Protein 6.3 - 8.2 g/dL 6.9   Albumin 3.2 - 4.6 g/dL 2.9 (L)   Globulin 2.8 - 4.5 g/dL 4.0   Alk Phosphatase 50 - 136 U/L 81   ALT 12 - 65 U/L 31   AST 15 - 37 U/L 30   Bilirubin 0.2 - 1.1 MG/DL 0.6        Assessment:  Post OP Day # 1: LAPAROSCOPIC CHOLECYSTECTOMY   Principal Problem:    Sepsis ([address]  Active Problems:    Restless leg syndrome    Acute respiratory failure with hypoxia (HCC)    MAURIZIO (obstructive sleep apnea)    Acute cholecystitis    Hypokalemia    Hypomagnesemia    Right upper quadrant abdominal pain    HTN (hypertension)    Primary osteoarthritis of left knee  Resolved Problems:    * No resolved hospital problems.  *        PLAN:  -Further input per Surgeon, Dr. Kassy Finn as tolerated  -Pain meds PRN  -Ambulate in rosales PRN  -Encourage use of Incentive spirometer  -Admitting team for meds/labs/etc  -GI MD also following for medical management

## 2023-01-10 NOTE — PROGRESS NOTES
[facility] Progress Note         Admit Date:  1/8/2023    Today's Date:  1/10/2023    CC:  RUQ pain, elevated LFTS, cholecystitis. Subjective:     Patient sitting up in chair. Feels better. S/p cholecystectomy 1/9 and reports some discomfort around incisions but stable. No N/V. No BM or flatus yet.       Medications:   Current Facility-Administered Medications   Medication Dose Route Frequency    LORazepam (ATIVAN) injection 0.5 mg  0.5 mg IntraVENous Once    sodium chloride flush 0.9 % injection 20 mL  20 mL IntraVENous PRN    albuterol (PROVENTIL) nebulizer solution 2.5 mg  2.5 mg Nebulization Q6H PRN    sodium chloride (Inhalant) 3 % nebulizer solution 4 mL  4 mL Nebulization Q12H PRN    benzonatate (TESSALON) capsule 100 mg  100 mg Oral TID PRN    fluticasone (FLONASE) 50 MCG/ACT nasal spray 1 spray  1 spray Each Nostril Daily    sodium chloride flush 0.9 % injection 5-40 mL  5-40 mL IntraVENous 2 times per day    sodium chloride flush 0.9 % injection 5-40 mL  5-40 mL IntraVENous PRN    0.9 % sodium chloride infusion   IntraVENous PRN    ondansetron (ZOFRAN-ODT) disintegrating tablet 4 mg  4 mg Oral Q8H PRN    Or    ondansetron (ZOFRAN) injection 4 mg  4 mg IntraVENous Q6H PRN    polyethylene glycol (GLYCOLAX) packet 17 g  17 g Oral Daily PRN    bisacodyl (DULCOLAX) suppository 10 mg  10 mg Rectal Daily PRN    famotidine (PEPCID) tablet 10 mg  10 mg Oral Daily PRN    aluminum & magnesium hydroxide-simethicone (MAALOX) 200-200-20 MG/5ML suspension 30 mL  30 mL Oral Q6H PRN    acetaminophen (TYLENOL) tablet 650 mg  650 mg Oral Q6H PRN    Or    acetaminophen (TYLENOL) suppository 650 mg  650 mg Rectal Q6H PRN    DULoxetine (CYMBALTA) extended release capsule 60 mg  60 mg Oral Daily    losartan (COZAAR) tablet 100 mg  100 mg Oral Daily    rOPINIRole (REQUIP) tablet 4 mg  4 mg Oral Nightly    HYDROcodone-acetaminophen (NORCO) 7.5-325 MG per tablet 1 tablet  1 tablet Oral Q6H PRN    morphine injection 4 mg  4 mg IntraVENous Once    ciprofloxacin (CIPRO) IVPB 400 mg  400 mg IntraVENous Q12H    metronidazole (FLAGYL) 500 mg in 0.9% NaCl 100 mL IVPB premix  500 mg IntraVENous Q6H    0.9 % sodium chloride infusion   IntraVENous Continuous    morphine injection 2 mg  2 mg IntraVENous Q6H PRN       Review of Systems:  ROS was obtained, with pertinent positives as listed above. No chest pain or SOB. Diet:  NPO    Objective:   Vitals:  /64   Pulse 92   Temp 98.2 °F (36.8 °C) (Oral)   Resp 17   Ht 5' 10\" (1.778 m)   Wt 240 lb (108.9 kg)   SpO2 98%   BMI 34.44 kg/m²   Intake/Output:  01/10 0701 - 01/10 1900  In: 100 [P.O.:100]  Out: -   01/08 1901 - 01/10 0700  In: 1250 [P.O.:150; I.V.:1000]  Out: 1180 [Urine:1150]  Exam:  General appearance: alert, cooperative, no distress  Lungs: clear to auscultation bilaterally anteriorly  Heart: regular rate and rhythm  Abdomen: soft, obese, incisions c/d/I.  +Mild ttp over incisions. Hypoactive bowel sounds. Neuro:  alert and oriented    Data Review (Labs):    Recent Labs     01/08/23  1550 01/09/23  0420 01/10/23  0627   WBC 12.7* 18.0* 11.3*   HGB 13.5* 12.7* 11.8*   HCT 42.4 40.1* 36.5*    195 175   MCV 93.4 94.1 92.4    140 136   K 4.0 3.2* 3.7    105 104   CO2 27 26 27   BUN 15 17 20   MG  --  1.7*  --    AST 28 19 30   ALT 25 25 31     CT a/p w IV contrast 1/8/23  FINDINGS:   - Lung Bases: No infiltrates or masses. - Liver: Unremarkable   - Biliary: There are inflammatory changes surrounding the gallbladder. No definite stones are appreciated. - Pancreas: Normal..   - Spleen: Not enlarged, No mass. - Adrenals: Normal   - Kidneys/ureters: Several nonobstructing calculi bilaterally. - Bladder: Normal.   - Reproductive organs: No pelvic masses. - Bowel: Normal caliber. No inflammatory changes. There are several sigmoid diverticula.  There is a diverticulum or ureterocele on the left near the UVJ measuring approximately 1.6 cm.   - Lymph nodes: No significant retroperitoneal, mesenteric, or pelvic adenopathy.   - Bones: No fracture or significant bone lesion.   - Vasculature: Normal   - Bones: No aggressive osseous lesion. There are remote L1 and L2 compression fractures. - Other: No ascites. There is stable haziness within the central mesentery suggesting sclerosing mesenteric right is. IMPRESSION:  1. Findings concerning for acute cholecystitis. The right upper quadrant ultrasound may be beneficial if there is further clinical concern. 2. Sigmoid diverticulosis. 3. Bilateral nonobstructing renal calculi. RUQ US 1/8/23  FINDINGS:  Liver is increased in echogenicity without focal lesions. The gallbladder contains sludge but no shadowing gallstones. Gallbladder appears incompletely distended but there is wall thickening measuring up to 5 mm. Common bile duct is dilated at 8 mm. Pancreas is obscured by overlying bowel gas and shadowing from the fatty liver. Examination of the right kidney demonstrates a few echogenic foci probably nonobstructing stones. Aorta obscured by overlying bowel gas. IVC appears patent. Impression   1. Gallbladder sludge is present. No obvious shadowing gallstones. Gallbladder wall thickening and dilatation of the common bile duct suggests cholecystitis and choledocholithiasis. 2. Fatty infiltration liver and nonobstructing right renal collecting system stones. 3. Limited exam due to shadowing from bowel gas and fatty liver. MRCP 1/9/23   1. Pericholecystic edema and gallbladder sludge, concerning for acute   cholecystitis. 2.  No bile duct dilatation or choledocholithiasis.          Lap Edith with Dr. Jigra Dasilva 1/9/23 Findings: wild pus in gallbladder, which is very edematous and inflamed; morbid obesity    Assessment:     Principal Problem:    Sepsis ([address]  Active Problems:    Restless leg syndrome    Acute respiratory failure with hypoxia (HCC)    MAURIZIO (obstructive sleep apnea)    Acute cholecystitis    Hypokalemia    Hypomagnesemia    Right upper quadrant abdominal pain    HTN (hypertension)    Primary osteoarthritis of left knee  Resolved Problems:    * No resolved hospital problems. *     75 y.o. male with PMH including but not limited to chronic back pain, OA (had elective surgery planned with Dr. Jill Lubin 1/9), GERD, gout, HTN, MAURIZIO but not tolerating CPAP, seen in GI consultation 1/9 at the request of Dr. Marisol Polanco PA-C for dilated CBD, likely choledocholithiasis with cholecystitis. Pt admitted to [facility] 1/8/23 with right sided abdominal pain, N/V, chills, tachycardia, hypoxia, leukocytosis (WBC 12.7), sepsis ? Related to acute cholecystitis based upon changes noted on CT a/p 1/8. Influenza A/B and COVID were negative. RUQ US showed gallbladder sludge with NO obvious shadowing gallstones. GB wall thickening with CBD dilation at 8mm, fatty liver. LFTs, Lipase initially WNL. On admission he was started on Cipro and Flagyl (PCN allergy). General surgery is following. Labs 1/8/23 Tbili up at 1.4 with Direct bilirubin 0.4 and otherwise normal LFTs. WBC is increased at 18.0.      1/10/23: LFTS WNL. WBC improving. MRCP 1/9 with only a 5 mm without intraluminal abnormalities. Pt s/p lap jaciel 1/9 with Dr. Ramu Huff with findings outlined above. Plan:     -Post op management per surgery. No further GI recommendations at this time. GI will sign off. Taryn Justice PA-C  [facility]. Patient is seen and examined in collaboration with Dr. Omero Khan. Assessment and plan as per Dr. Omero Khan.

## 2023-01-10 NOTE — PROGRESS NOTES
01/09/23 2247   Oxygen Therapy/Pulse Ox   O2 Therapy Oxygen humidified   $Oxygen $Daily Charge   O2 Device Nasal cannula   O2 Flow Rate (L/min) 5 L/min   Heart Rate 95   Resp 20   SpO2 95 %   $Pulse Oximeter $Spot check (single)     Patient refuses CPAP.

## 2023-01-10 NOTE — ANESTHESIA PRE PROCEDURE
Department of Anesthesiology  Preprocedure Note       Name:  Emil Lawson   Age:  75 y.o.  :  1955                                          MRN:  [id number]         Date:  2023      Surgeon: India Urena):  Antonia Tinajero MD    Procedure: Procedure(s):  CHOLECYSTECTOMY LAPAROSCOPIC    Medications prior to admission:   Prior to Admission medications    Medication Sig Start Date End Date Taking?  Authorizing Provider   celecoxib (CELEBREX) 200 MG capsule Take 200 mg by mouth daily 22  Historical Provider, MD   aspirin 81 MG chewable tablet Take by mouth    Historical Provider, MD   azelastine (ASTELIN) 0.1 % nasal spray USE 1 TO 2 SPRAYS IN EACH NOSTRIL TWICE DAILY 21   Historical Provider, MD   HYDROcodone-acetaminophen (Tequila Alexander) 7.5-325 MG per tablet TAKE 1 TABLET BY MOUTH EVERY 6 HOURS AS NEEDED 22   Historical Provider, MD   ipratropium (ATROVENT) 0.06 % nasal spray 1 spray by Nasal route daily as needed  Patient not taking: No sig reported 10/1/19   Historical Provider, MD   ZTLIDO 1.8 % [facility]  22   Historical Provider, MD   omeprazole (PRILOSEC) 20 MG delayed release capsule  22   Historical Provider, MD   DULoxetine (CYMBALTA) 60 MG extended release capsule Take 60 mg by mouth    Ar Automatic Reconciliation   losartan (COZAAR) 100 MG tablet Take 100 mg by mouth daily    Ar Automatic Reconciliation   rOPINIRole (REQUIP) 1 MG tablet Take 4 mg by mouth nightly    Ar Automatic Reconciliation       Current medications:    Current Facility-Administered Medications   Medication Dose Route Frequency Provider Last Rate Last Admin    potassium chloride (KLOR-CON M) extended release tablet 40 mEq  40 mEq Oral BID  Brittany Floyd MD   40 mEq at 23 0846    sodium chloride flush 0.9 % injection 20 mL  20 mL IntraVENous PRN Antonia Tinajero MD        LORazepam (ATIVAN) tablet 0.5 mg  0.5 mg Oral Once PRN Antonia Tinajero MD        albuterol (PROVENTIL) nebulizer solution 2.5 mg  2.5 mg Nebulization Q6H PRN Jace Deras MD   2.5 mg at 01/09/23 1943    sodium chloride (Inhalant) 3 % nebulizer solution 4 mL  4 mL Nebulization Q12H PRN Jace Deras MD        benzonatate (TESSALON) capsule 100 mg  100 mg Oral TID PRN Jace Deras MD        fluticasone (FLONASE) 50 MCG/ACT nasal spray 1 spray  1 spray Each Nostril Daily Jace Deras MD   1 spray at 01/09/23 1355    sodium chloride flush 0.9 % injection 5-40 mL  5-40 mL IntraVENous 2 times per day Jace Deras MD   10 mL at 01/09/23 0909    sodium chloride flush 0.9 % injection 5-40 mL  5-40 mL IntraVENous PRN Jace Deras MD        0.9 % sodium chloride infusion   IntraVENous PRN Jace Deras MD        ondansetron (ZOFRAN-ODT) disintegrating tablet 4 mg  4 mg Oral Q8H PRN Jace Deras MD        Or    ondansetron [facility]) injection 4 mg  4 mg IntraVENous Q6H PRN Jace Deras MD   4 mg at 01/09/23 0902    polyethylene glycol (GLYCOLAX) packet 17 g  17 g Oral Daily PRN Jace Deras MD        bisacodyl (DULCOLAX) suppository 10 mg  10 mg Rectal Daily PRN Jace Deras MD        famotidine (PEPCID) tablet 10 mg  10 mg Oral Daily PRN Jace Deras MD        aluminum & magnesium hydroxide-simethicone (MAALOX) 200-200-20 MG/5ML suspension 30 mL  30 mL Oral Q6H PRN Jace Deras MD        acetaminophen (TYLENOL) tablet 650 mg  650 mg Oral Q6H PRN Jace Deras MD   650 mg at 01/09/23 0123    Or    acetaminophen (TYLENOL) suppository 650 mg  650 mg Rectal Q6H PRN Jace Deras MD        DULoxetine (CYMBALTA) extended release capsule 60 mg  60 mg Oral Daily Jace Deras MD   60 mg at 01/09/23 0847    losartan (COZAAR) tablet 100 mg  100 mg Oral Daily Jace Deras MD   100 mg at 01/09/23 0847    rOPINIRole (REQUIP) tablet 4 mg  4 mg Oral Nightly Jace Deras MD   4 mg at 01/09/23 2101    HYDROcodone-acetaminophen (Berna De Dios) 7.5-325 MG per tablet 1 tablet  1 tablet Oral Q6H PRN Jace Deras MD   1 tablet at 01/09/23 0902    morphine injection 4 mg  4 mg IntraVENous Once Jace Deras MD        ciprofloxacin (CIPRO) IVPB 400 mg  400 mg IntraVENous Q12H Villa Kee MD   Stopped at 01/09/23 4087    metronidazole (FLAGYL) 500 mg in 0.9% NaCl 100 mL IVPB premix  500 mg IntraVENous Q6H Villa Kee  mL/hr at 01/09/23 2157 500 mg at 01/09/23 2157    0.9 % sodium chloride infusion   IntraVENous Continuous Villa Kee MD 75 mL/hr at 01/09/23 2100 New Bag at 01/09/23 2100    morphine injection 2 mg  2 mg IntraVENous Q6H PRN Villa Kee MD           Allergies:     Allergies   Allergen Reactions    Penicillins Itching, Nausea Only and Headaches     Other reaction(s): Nausea and/or vomiting-Intolerance  Other reaction(s): Headache         Problem List:    Patient Active Problem List   Diagnosis Code    Spondylolisthesis M43.10    HTN (hypertension) I10    Status post right knee replacement Z96.651    Osteoarthritis of right knee M17.11    Status post lumbar spinal arthrodesis Z98.1    Lumbar stenosis with neurogenic claudication M48.062    Noncompliance with CPAP treatment Z91.14    Primary osteoarthritis of left knee M17.12    Sepsis ([address] A41.9    Restless leg syndrome G25.81    Acute respiratory failure with hypoxia (HCC) J96.01    MAURIZIO (obstructive sleep apnea) G47.33    Acute cholecystitis K81.0    Hypokalemia E87.6    Hypomagnesemia E83.42       Past Medical History:        Diagnosis Date    Arthritis     OA    Chewing tobacco use     Chronic pain 2003    back fracture    CP (cerebral palsy) ([address]      3 surgeries right leg as child; no other problems     Environmental and seasonal allergies     GERD (gastroesophageal reflux disease)     rare-no medication     Gout     Controlled with medication     History of vertebral fracture 2003    Hypertension     Controlled with medication     Kidney stones     Morbid obesity ([address]     Rheumatic fever age 14    history-no murmur auscultated on 6/11/18 and 12/12/22    Sinus infection     currently on Cefdinir     Status post right knee replacement 6/22/2018    Unspecified sleep apnea can not sarahi c pap Followed by Dr. Ovi Olvera, per last office note (2018) \" home study shows moderate to severe MAURIZIO with AHI of 28.6, worse in supine position. Lowest oxygen desaturation 66%. \"       Past Surgical History:        Procedure Laterality Date    BACK SURGERY      Herniated disc repair     COLONOSCOPY      x2    HIP FRACTURE SURGERY Right     LUMBAR LAMINECTOMY      ORTHOPEDIC SURGERY Right 2014    big toe metatarsophalangeal joint fusion    ORTHOPEDIC SURGERY Right     leg repair x 3 as child ue to C.P.    OTHER SURGICAL HISTORY      skin cancer removed from right ear    TOTAL KNEE ARTHROPLASTY         Social History:    Social History     Tobacco Use    Smoking status: Never    Smokeless tobacco: Current    Tobacco comments:     Quit smokin can of chewing tobacco a day for 40 years   Substance Use Topics    Alcohol use: No                                Ready to quit: Not Answered  Counseling given: Not Answered  Tobacco comments: Quit smokin can of chewing tobacco a day for 40 years      Vital Signs (Current):   Vitals:    23 2116   BP: (!) 119/56 (!) 114/56  110/60   Pulse: 97 (!) 101 100 93   Resp: 18 18  20   Temp:    97.9 °F (36.6 °C)   TempSrc:    Oral   SpO2: 94% 92% 93% 93%   Weight:       Height:                                                  BP Readings from Last 3 Encounters:   23 110/60   22 133/75       NPO Status: Time of last liquid consumption:                         Time of last solid consumption:                         Date of last liquid consumption: 23                        Date of last solid food consumption: 23    BMI:   Wt Readings from Last 3 Encounters:   23 240 lb (108.9 kg)   23 240 lb (108.9 kg)   22 245 lb (111.1 kg)     Body mass index is 34.44 kg/m².     CBC:   Lab Results   Component Value Date/Time    WBC 18.0 2023 04:20 AM    RBC 4.26 01/09/2023 04:20 AM    HGB 12.7 01/09/2023 04:20 AM    HCT 40.1 01/09/2023 04:20 AM    MCV 94.1 01/09/2023 04:20 AM    RDW 13.0 01/09/2023 04:20 AM     01/09/2023 04:20 AM       CMP:   Lab Results   Component Value Date/Time     01/09/2023 04:20 AM    K 3.2 01/09/2023 04:20 AM     01/09/2023 04:20 AM    CO2 26 01/09/2023 04:20 AM    BUN 17 01/09/2023 04:20 AM    CREATININE 1.30 01/09/2023 04:20 AM    GFRAA >60 02/10/2021 01:24 PM    AGRATIO 0.9 01/06/2020 07:40 PM    LABGLOM 60 01/09/2023 04:20 AM    GLUCOSE 162 01/09/2023 04:20 AM    PROT 6.7 01/09/2023 04:20 AM    CALCIUM 8.8 01/09/2023 04:20 AM    BILITOT 1.4 01/09/2023 04:20 AM    ALKPHOS 83 01/09/2023 04:20 AM    ALKPHOS 132 01/06/2020 07:40 PM    AST 19 01/09/2023 04:20 AM    ALT 25 01/09/2023 04:20 AM       POC Tests: No results for input(s): POCGLU, POCNA, POCK, POCCL, POCBUN, POCHEMO, POCHCT in the last 72 hours. Coags:   Lab Results   Component Value Date/Time    PROTIME 13.7 12/12/2022 08:11 AM    INR 1.1 12/12/2022 08:11 AM    APTT 26.3 12/12/2022 08:11 AM       HCG (If Applicable): No results found for: PREGTESTUR, PREGSERUM, HCG, HCGQUANT     ABGs: No results found for: PHART, PO2ART, ATQ1BAK, JTF5MKS, BEART, G3ECPIQY     Type & Screen (If Applicable):  No results found for: LABABO, LABRH    Drug/Infectious Status (If Applicable):  No results found for: HIV, HEPCAB    COVID-19 Screening (If Applicable):   Lab Results   Component Value Date/Time    COVID19 NOT DETECTED 01/09/2023 01:53 PM           Anesthesia Evaluation  Patient summary reviewed and Nursing notes reviewed no history of anesthetic complications:   Airway: Mallampati: II  TM distance: >3 FB   Neck ROM: full  Mouth opening: > = 3 FB   Dental:    (+) upper dentures      Pulmonary:   (+) sleep apnea: on noncompliant,  wheezes: left           Patient did not smoke on day of surgery.                  Cardiovascular:    (+) hypertension:,     (-) CAD and no hyperlipidemia      Rhythm: regular  Rate: normal                    Neuro/Psych:   Negative Neuro/Psych ROS              GI/Hepatic/Renal:   (+) GERD: well controlled, liver disease (Elevated LFTs related to obstructive gall bladder disease):,           Endo/Other:    (+) Diabetes (\"Borderline\"  no meds), . Abdominal:   (+) obese,           Vascular: negative vascular ROS. Other Findings:           Anesthesia Plan      general     ASA 3     (Admitted yesterday with abdominal pain, low O2 saturation, fever --sepsis. BP stable today, O2 sat acceptable but remains on nasal O2)  Induction: intravenous. MIPS: Postoperative opioids intended and Prophylactic antiemetics administered. Anesthetic plan and risks discussed with patient. Use of blood products discussed with patient whom consented to blood products.                      Brenna Raymond MD   1/9/2023

## 2023-01-10 NOTE — ANESTHESIA POSTPROCEDURE EVALUATION
Department of Anesthesiology  Postprocedure Note    Patient: Casimiro Louis  MRN: [id number]  YOB: 1955  Date of evaluation: 1/9/2023      Procedure Summary     Date: 01/09/23 Room / Location: [facility] MAIN OR 01 / [facility] MAIN OR    Anesthesia Start: 1744 Anesthesia Stop: 1925    Procedure: CHOLECYSTECTOMY LAPAROSCOPIC (Abdomen) Diagnosis:       Cholecystitis      (Cholecystitis)    Surgeons: Michelle Harding MD Responsible Provider: Blanca Cade MD    Anesthesia Type: Not recorded ASA Status: Not recorded          Anesthesia Type: No value filed. Lauren Phase I: Lauren Score: 9    Lauren Phase II:        Anesthesia Post Evaluation    Patient location during evaluation: PACU  Patient participation: complete - patient participated  Level of consciousness: awake and alert  Airway patency: patent  Nausea & Vomiting: no nausea and no vomiting  Complications: no  Cardiovascular status: hemodynamically stable  Respiratory status: acceptable, spontaneous ventilation and face mask (O2 sat aceeptable but does appear to have some increased work of breathing although states he feels his breathing is fine.   We have given a nebulizer treatmen and suggested pulse oximerty overnight adpossible CPAP to hospitalist team)  Hydration status: euvolemic  Comments: /60   Pulse 93   Temp 97.9 °F (36.6 °C) (Oral)   Resp 20   Ht 5' 10\" (1.778 m)   Wt 240 lb (108.9 kg)   SpO2 93%   BMI 34.44 kg/m² --    Multimodal analgesia pain management approach

## 2023-01-11 LAB
ALBUMIN SERPL-MCNC: 2.9 G/DL (ref 3.2–4.6)
ALBUMIN/GLOB SERPL: 0.8 (ref 0.4–1.6)
ALP SERPL-CCNC: 66 U/L (ref 50–136)
ALT SERPL-CCNC: 31 U/L (ref 12–65)
ANION GAP SERPL CALC-SCNC: 4 MMOL/L (ref 2–11)
AST SERPL-CCNC: 21 U/L (ref 15–37)
BASOPHILS # BLD: 0.1 K/UL (ref 0–0.2)
BASOPHILS NFR BLD: 0 % (ref 0–2)
BILIRUB SERPL-MCNC: 0.5 MG/DL (ref 0.2–1.1)
BUN SERPL-MCNC: 17 MG/DL (ref 8–23)
CALCIUM SERPL-MCNC: 8.6 MG/DL (ref 8.3–10.4)
CHLORIDE SERPL-SCNC: 104 MMOL/L (ref 101–110)
CO2 SERPL-SCNC: 30 MMOL/L (ref 21–32)
CREAT SERPL-MCNC: 0.93 MG/DL (ref 0.8–1.5)
DIFFERENTIAL METHOD BLD: ABNORMAL
EOSINOPHIL # BLD: 0 K/UL (ref 0–0.8)
EOSINOPHIL NFR BLD: 0 % (ref 0.5–7.8)
ERYTHROCYTE [DISTWIDTH] IN BLOOD BY AUTOMATED COUNT: 12.7 % (ref 11.9–14.6)
GLOBULIN SER CALC-MCNC: 3.8 G/DL (ref 2.8–4.5)
GLUCOSE SERPL-MCNC: 151 MG/DL (ref 65–100)
HCT VFR BLD AUTO: 36 % (ref 41.1–50.3)
HGB BLD-MCNC: 11.8 G/DL (ref 13.6–17.2)
IMM GRANULOCYTES # BLD AUTO: 0.1 K/UL (ref 0–0.5)
IMM GRANULOCYTES NFR BLD AUTO: 1 % (ref 0–5)
LYMPHOCYTES # BLD: 0.7 K/UL (ref 0.5–4.6)
LYMPHOCYTES NFR BLD: 6 % (ref 13–44)
MCH RBC QN AUTO: 30.3 PG (ref 26.1–32.9)
MCHC RBC AUTO-ENTMCNC: 32.8 G/DL (ref 31.4–35)
MCV RBC AUTO: 92.3 FL (ref 82–102)
MONOCYTES # BLD: 0.7 K/UL (ref 0.1–1.3)
MONOCYTES NFR BLD: 6 % (ref 4–12)
NEUTS SEG # BLD: 10.5 K/UL (ref 1.7–8.2)
NEUTS SEG NFR BLD: 87 % (ref 43–78)
NRBC # BLD: 0 K/UL (ref 0–0.2)
PLATELET # BLD AUTO: 189 K/UL (ref 150–450)
PMV BLD AUTO: 10.6 FL (ref 9.4–12.3)
POTASSIUM SERPL-SCNC: 3.6 MMOL/L (ref 3.5–5.1)
PROT SERPL-MCNC: 6.7 G/DL (ref 6.3–8.2)
RBC # BLD AUTO: 3.9 M/UL (ref 4.23–5.6)
SODIUM SERPL-SCNC: 138 MMOL/L (ref 133–143)
WBC # BLD AUTO: 12 K/UL (ref 4.3–11.1)

## 2023-01-11 PROCEDURE — 2700000000 HC OXYGEN THERAPY PER DAY

## 2023-01-11 PROCEDURE — 80053 COMPREHEN METABOLIC PANEL: CPT

## 2023-01-11 PROCEDURE — 2580000003 HC RX 258: Performed by: SURGERY

## 2023-01-11 PROCEDURE — 6360000002 HC RX W HCPCS: Performed by: INTERNAL MEDICINE

## 2023-01-11 PROCEDURE — 6370000000 HC RX 637 (ALT 250 FOR IP): Performed by: SURGERY

## 2023-01-11 PROCEDURE — 97530 THERAPEUTIC ACTIVITIES: CPT

## 2023-01-11 PROCEDURE — 6370000000 HC RX 637 (ALT 250 FOR IP): Performed by: INTERNAL MEDICINE

## 2023-01-11 PROCEDURE — 85025 COMPLETE CBC W/AUTO DIFF WBC: CPT

## 2023-01-11 PROCEDURE — 6360000002 HC RX W HCPCS: Performed by: SURGERY

## 2023-01-11 PROCEDURE — 94761 N-INVAS EAR/PLS OXIMETRY MLT: CPT

## 2023-01-11 PROCEDURE — 2500000003 HC RX 250 WO HCPCS: Performed by: SURGERY

## 2023-01-11 PROCEDURE — 1100000000 HC RM PRIVATE

## 2023-01-11 PROCEDURE — 36415 COLL VENOUS BLD VENIPUNCTURE: CPT

## 2023-01-11 RX ORDER — PROMETHAZINE HYDROCHLORIDE 25 MG/ML
6.25 INJECTION, SOLUTION INTRAMUSCULAR; INTRAVENOUS EVERY 6 HOURS PRN
Status: DISCONTINUED | OUTPATIENT
Start: 2023-01-11 | End: 2023-01-11

## 2023-01-11 RX ORDER — PROCHLORPERAZINE EDISYLATE 5 MG/ML
10 INJECTION INTRAMUSCULAR; INTRAVENOUS EVERY 6 HOURS PRN
Status: DISCONTINUED | OUTPATIENT
Start: 2023-01-11 | End: 2023-01-12 | Stop reason: HOSPADM

## 2023-01-11 RX ORDER — GUAIFENESIN/DEXTROMETHORPHAN 100-10MG/5
5 SYRUP ORAL EVERY 4 HOURS PRN
Status: DISCONTINUED | OUTPATIENT
Start: 2023-01-11 | End: 2023-01-12 | Stop reason: HOSPADM

## 2023-01-11 RX ADMIN — METRONIDAZOLE 500 MG: 500 INJECTION, SOLUTION INTRAVENOUS at 16:11

## 2023-01-11 RX ADMIN — LOSARTAN POTASSIUM 100 MG: 50 TABLET, FILM COATED ORAL at 09:59

## 2023-01-11 RX ADMIN — GUAIFENESIN SYRUP AND DEXTROMETHORPHAN 5 ML: 100; 10 SYRUP ORAL at 01:48

## 2023-01-11 RX ADMIN — GUAIFENESIN SYRUP AND DEXTROMETHORPHAN 5 ML: 100; 10 SYRUP ORAL at 22:31

## 2023-01-11 RX ADMIN — METRONIDAZOLE 500 MG: 500 INJECTION, SOLUTION INTRAVENOUS at 20:39

## 2023-01-11 RX ADMIN — CIPROFLOXACIN 400 MG: 2 INJECTION, SOLUTION INTRAVENOUS at 05:24

## 2023-01-11 RX ADMIN — CIPROFLOXACIN 400 MG: 2 INJECTION, SOLUTION INTRAVENOUS at 17:31

## 2023-01-11 RX ADMIN — ROPINIROLE HYDROCHLORIDE 4 MG: 1 TABLET, FILM COATED ORAL at 20:39

## 2023-01-11 RX ADMIN — DULOXETINE HYDROCHLORIDE 60 MG: 60 CAPSULE, DELAYED RELEASE ORAL at 09:59

## 2023-01-11 RX ADMIN — SODIUM CHLORIDE, PRESERVATIVE FREE 10 ML: 5 INJECTION INTRAVENOUS at 10:00

## 2023-01-11 RX ADMIN — ONDANSETRON 4 MG: 2 INJECTION INTRAMUSCULAR; INTRAVENOUS at 03:46

## 2023-01-11 RX ADMIN — GUAIFENESIN SYRUP AND DEXTROMETHORPHAN 5 ML: 100; 10 SYRUP ORAL at 16:21

## 2023-01-11 RX ADMIN — METRONIDAZOLE 500 MG: 500 INJECTION, SOLUTION INTRAVENOUS at 03:42

## 2023-01-11 RX ADMIN — PROCHLORPERAZINE EDISYLATE 10 MG: 5 INJECTION INTRAMUSCULAR; INTRAVENOUS at 04:42

## 2023-01-11 RX ADMIN — FLUTICASONE PROPIONATE 1 SPRAY: 50 SPRAY, METERED NASAL at 10:00

## 2023-01-11 RX ADMIN — METRONIDAZOLE 500 MG: 500 INJECTION, SOLUTION INTRAVENOUS at 10:00

## 2023-01-11 ASSESSMENT — PAIN SCALES - GENERAL
PAINLEVEL_OUTOF10: 0

## 2023-01-11 NOTE — PROGRESS NOTES
01/11/23 1508   Oxygen Therapy/Pulse Ox   O2 Device None (Room air)   Heart Rate 79   SpO2 93 %       Patient's sat while resting was 93% on room air before ambulation. While ambulating patient required no supplemental oxygen to maintain sat above protocol. Patient denied SOB, and no distress noted while ambulating. Medicare has specific guidelines for documentation of ambulatory oxygen saturation testing, therefore all test results must be documented in the patient's EMR as outlined below. Room air: SpO2 with O2 and liter flow   Resting SpO2  93%  N/A   Ambulating SpO2  93% RESULTS:    While ambulating patient required no supplemental oxygen to maintain sat above protocol. Respiratory Care Services     Policy Number: [id number]    Title: Home Oxygen Assessment Protocol    Effective Date:  4/9/14    Reviewed Date: 5/28/2018, 11/2020    Revised: 59/4050       Policy: The Respiratory Care Practitioner (RCP) shall assess all patients who meet Medicare's Home Oxygen Diagnostic Requirements. If a patient is unable to wean to room air within 48 hours of discharge, the RCP shall order a 3 step ambulatory oxygen saturation (SpO2) per protocol and instruct the patient in completing the test. The RCP shall document results of the test as outlined in this protocol. Purpose: Oxygen is used for short-term hospitalized patients and long-term therapy in patients with chronic lung disease and recurring congestive heart failure. [employer] (CMS) has very specific guidelines and indications for its short-term use in the acute care setting and the long-term use in the home or other facility. This protocol will address the rationale and requirements for long-term oxygen therapy. Long-term oxygen therapy is delivered to reduce long-term complications of chronic hypoxemia, particularly cor pulmonale.  Oxygen supplementation in patients with chronic hypoxemia has been shown to improve survival, pulmonary hemodynamics, exercise capacity and neuropsychological performance. 1    Responsibility: Director of [address] [place] [address] and Respiratory Care Practitioners. Home Oxygen Diagnostic Requirements:   Covered health conditions:    Severe primary lung disease   Chronic obstructive pulmonary disease  Diffuse interstitial lung disease  Cystic fibrosis  Bronchiectasis  Pulmonary neoplasm, primary or metastatic  Chronic bronchitis  Emphysema  Hypoxia-related symptoms or conditions that may improve with oxygen therapy such as  Pulmonary hypertension  Recurring congestive heart failure due to chronic cor pulmonale  Erythocytosis/erythrocythemia  Qualifying testing requirements  Testing must be performed within two days prior to discharge. Test results must be documented in patient's medical record in approved format. Testing can be done under three conditions  A test during rest.   Oxygen is considered medically necessary if SpO2 is less than or equal to 88%. If SpO2 is greater than 88%, proceed to step 2. A test taken on room air during exercise  If patient meets qualifying threshold of SpO2 less than or equal to 88% while exercising, all three of the required tests below must be performed within same testing session and be recorded in the patient's medical record. A test taken during rest while patient breathes room air. A test during exercise while patient breathes room air. A test taken during exercise with oxygen applied. (to demonstrate improvement of hypoxia). A test taken during sleep. If patient is tested during sleep, test must have at least two hours of recorded time. Test must indicate arterial oxygen saturation of 88% or less for at least 5 minutes of   testing period. A patient tested during sleep will not qualify for portable oxygen.   A physician order will be required for an overnight oximetry test.  Regardless of test condition, the following values apply to all:  Group I: (Requires annual recertification)  Patient is on room air while at rest (awake) when tested. Arterial oxygen saturation (SaO2) is at or below 88% or   PaO2 is at or below <55mm Hg   Group II: (Recertification and retesting are required 61-90 days after initial start)  Patient is on room air at rest while awake when tested. PaO2 = 56-59 mmHg or  SaO2 89% acceptable only with secondary diagnosis of  Edema suggesting congestive heart failure  Pulmonary hypertension/cor pulmonale with P wave > 3mm in lead II, III, or AVF  Erythrocythemia with Hct >56%  Group III:   If PaO2 > 60 mmHg or   SaO2 >90% there is a presumption of noncoverage. If liter flow is greater than 4LPM, patient must meet Group 1 or Group II criteria while patient is receiving oxygen at a rate of 4LPM or higher  All patients must be tested in a stable state including those discharged from the hospital.  All coexisting diseases or conditions that can cause hypoxia must be treated and patient must be tested in a chronic stable state before oxygen therapy is qualified. Procedure for SpO2 testing at rest.  Obtain a 30 second room air SpO2 check while patient is on room air, rested and awake. If SpO2 is 88% increase O2 by 1 L to maintain SpO2 of 90%. Document results in patient's EMR. Procedure for ambulatory SpO2 testing  For patient who meets the Covered Health Conditions and is unable to wean to room air within 48 hours of discharge, complete an ambulatory SaO2. Preparation  Write an order for 3 step ambulatory oxygen saturation test per protocol. Obtain pulse oximeter and insure that it is working accurately. Perform hand hygiene per hospital policy utilizing Standard Precautions for all patients and following transmission-based isolation as indicated per hospital policy. Identify patient, verify name and birth date via ID bracelet. Introduce self and identify department.   Explain procedure to patient and family and confirm understanding. Assessment  Assure that patient is on room air prior to test.  Fingernail polish if worn by patient must be removed before testing. Ask patient to sit in an upright position. Resting SaO2 assessment   Perform a 30 second resting room air SaO2. If SaO2 is 88% or less Medicare considers oxygen is medically necessary   Document findings in patient EMR. If SaO2 is greater than 88%, proceed to the next step. Ambulatory Room Air SaO2 check  Ask patient to walk for 5 minutes on room air or as long as tolerated. If patients ordinarily use a cane, walker or rollator, they should be used during test.  Instruct patient to walk at a comfortable pace and to breathe naturally during test.  Monitor and record patient's heart rate, SaO2 and exercise endurance. If SaO2 is 88% or less post exercise, an ambulatory test on oxygen must be performed. Ambulatory SaO2 on Oxygen  The P shall place the patient on oxygen to achieve a SaO2 of 90%. Instruct patient to walk for 5 minutes or as long as tolerated. If patients ordinarily use a cane, walker or rollator, they should be used during test  Monitor and record patient's heart rate, SaO2 and exercise endurance. If Sa02 decreases to the range of 80% to 84% the flow shall be increased by 2LPM.   If SaO2 decreases to the range of 85%-89% increased oxygen by 1 LPM.  If SaO2 is less than 80%, the patient is not appropriate for ambulation. Documentation   Medicare has specific guidelines for documentation of ambulatory oxygen saturation testing, therefore all test results must be documented in the patient's EMR as outlined below. Room air: SpO2 with O2 and liter flow   Resting SpO2  93%  N/A   Ambulating SpO2  93% RESULTS:    While ambulating patient required no supplemental oxygen to maintain sat above protocol. Reference:       Natalie Brito [address] for [facility] Medicaid Services. Home Oxygen Therapy.  Medicare        Part B- Oxygen Coverage

## 2023-01-11 NOTE — PROGRESS NOTES
ACUTE PHYSICAL THERAPY GOALS:   (Developed with and agreed upon by patient and/or caregiver. )    LTG:  (1.)Mr. Tatum will move from supine to sit and sit to supine  in bed with INDEPENDENT within 5 treatment day(s). (2.)Mr. Tatum will transfer from bed to chair and chair to bed with SUPERVISION using the least restrictive device within 5 treatment day(s). GOAL MET 1/11/2023  (3.)Mr. Tatum will ambulate with SUPERVISION for 100 feet with the least restrictive device within 5 treatment day(s). GOAL MET 1/11/2023  4. Mr. Kandi Carroll will ambulate up/down 3 steps with a handrail and supervision within 5 treatment days. GOAL MET 1/11/2023    ________________________________________________________________________________________________      PHYSICAL THERAPY Daily Note, Discharge, and PM  (Link to Caseload Tracking: PT Visit Days : 2  Acknowledge Orders  Time In/Out  PT Charge Capture  Rehab Caseload Tracker    [facility] Deepti is a 75 y.o. male   PRIMARY DIAGNOSIS: Sepsis ([facility] [address]  Acute cholecystitis [K81.0]  Right upper quadrant abdominal pain [R10.11]  Sepsis ([address] [A41.9]  Procedure(s) (LRB):  CHOLECYSTECTOMY LAPAROSCOPIC (N/A)  2 Days Post-Op  Reason for Referral: Generalized Muscle Weakness (M62.81)  Difficulty in walking, Not elsewhere classified (R26.2)  Inpatient: Pay[facility] Edi Thompson [facility] / Product Type: *No Product type* /     ASSESSMENT:     REHAB RECOMMENDATIONS:   Recommendation to date pending progress:  Setting:  No further skilled therapy after discharge from hospital    Equipment:    None     ASSESSMENT:  Mr. Kandi Carroll admitted with above diagnoses. He has post-op day 1 from laparoscopic cholecystectomy. He demonstrates generalized weakness affecting his mobility and independence. Patient was scheduled for a L TKA on 1/9/23. He uses a rollator to ambulate. Patient has a history of CP and his R LE is significantly smaller than the L. Patient has had falls.   He would benefit from therapy to improve his strength and mobility. Patient should return home at d/c. Unsure if he will have any need for therapy at d/c but will monitor. Patient participated well this afternoon. He had a shower  earlier today and walked out in the rosales with his spouse. Patient ambulated with therapist in the rosales and ambulated up/down 5 steps with B rails. Patient anxious to d/c home-hopefully tomorrow. He does not have any additional acute therapy needs and will be discharged at this time. No d/c therapy needs either. [facility] [address] [address] AM-PAC 6 Clicks Basic Mobility Inpatient Short Form  AM-PAC Mobility Inpatient   How much difficulty turning over in bed?: A Little  How much difficulty sitting down on / standing up from a chair with arms?: A Little  How much difficulty moving from lying on back to sitting on side of bed?: A Little  How much help from another person moving to and from a bed to a chair?: A Little  How much help from another person needed to walk in hospital room?: A Little  How much help from another person for climbing 3-5 steps with a railing?: A Little  [facility] Inpatient Mobility Raw Score : 18  AM-PAC Inpatient T-Scale Score : 43.63  Mobility Inpatient CMS 0-100% Score: 46.58  Mobility Inpatient CMS G-Code Modifier : CK    SUBJECTIVE:   Mr. Vilma Cole agreeable.     Social/Functional Lives With: Spouse  Type of Home: House  Home Layout: One level  Home Access: Stairs to enter with rails  Entrance Stairs - Number of Steps: 3  Bathroom Equipment: Tub transfer bench, 3-in-1 commode  Home Equipment: Cane, Rollator    OBJECTIVE:     PAIN: Iraida Sprout / O2: PRECAUTION / Vallarie Bannister / DRAINS:   Pre Treatment:   Pain Assessment: 0-10  Pain Level: 0      Post Treatment: 0 Vitals        Oxygen  O2 Therapy: Room air   None    RESTRICTIONS/PRECAUTIONS:                    GROSS EVALUATION: Intact Impaired (Comments):   AROM []  Decreased but functional   PROM []    Strength []  Decreased but functional   Balance [] Sitting - Static: Good  Sitting - Dynamic: Good  Standing - Static: Fair  Standing - Dynamic: Fair   Posture [] Forward Head  Rounded Shoulders  R LE smaller than L LE   Sensation []     Coordination [x]      Tone [x]     Edema []    Activity Tolerance []  Decreased but functional    []      COGNITION/  PERCEPTION: Intact Impaired (Comments):   Orientation [x]     Vision [x]     Hearing [x]     Cognition  [x]       MOBILITY: I Mod I S SBA CGA Min Mod Max Total  NT x2 Comments:   Bed Mobility    Rolling [] [] [] [] [] [] [] [] [] [] []    Supine to Sit [] [] [] [] [] [] [] [] [] [] []    Scooting [] [] [] [] [] [] [] [] [] [] []    Sit to Supine [] [] [] [] [] [] [] [] [] [] []    Transfers    Sit to Stand [] [] [x] [] [] [] [] [] [] [] []    Bed to Chair [] [] [x] [] [] [] [] [] [] [] []    Stand to Sit [] [] [x] [] [] [] [] [] [] [] []     [] [] [] [] [] [] [] [] [] [] []    I=Independent, Mod I=Modified Independent, S=Supervision, SBA=Standby Assistance, CGA=Contact Guard Assistance,   Min=Minimal Assistance, Mod=Moderate Assistance, Max=Maximal Assistance, Total=Total Assistance, NT=Not Tested    GAIT: I Mod I S SBA CGA Min Mod Max Total  NT x2 Comments:   Level of Assistance [] [] [x] [] [] [] [] [] [] [] []    Distance 150 feet    DME Rolling Walker    Gait Quality Decreased adelso , Decreased step length, and Trunk sway increased    Weightbearing Status      Stairs Stairs/Curb  Stairs?: Yes  Stairs  # Steps : 5  Rails: Bilateral  Assistance: Supervision    I=Independent, Mod I=Modified Independent, S=Supervision, SBA=Standby Assistance, CGA=Contact Guard Assistance,   Min=Minimal Assistance, Mod=Moderate Assistance, Max=Maximal Assistance, Total=Total Assistance, NT=Not Tested    PLAN:   FREQUENCY AND DURATION: d/c acute therapy.     THERAPY PROGNOSIS: Good    PROBLEM LIST:   (Skilled intervention is medically necessary to address:)  Decreased ADL/Functional Activities  Decreased Activity Tolerance  Decreased AROM/PROM  Decreased Balance  Decreased Gait Ability  Decreased Strength  Decreased Transfer Abilities INTERVENTIONS PLANNED:   (Benefits and precautions of physical therapy have been discussed with the patient.)  Therapeutic Activity  Therapeutic Exercise/HEP  Gait Training  Education       TREATMENT:       TREATMENT:   Therapeutic Activity (25 Minutes): Therapeutic activity included Transfer Training, Ambulation on level ground, Stair Training, Sitting balance , and Standing balance to improve functional Activity tolerance, Balance, Coordination, Mobility, and Strength.     TREATMENT GRID:  N/A    AFTER TREATMENT PRECAUTIONS: Bed/Chair Locked, Call light within reach, Needs within reach, Visitors at bedside, and sitting edge of bed    INTERDISCIPLINARY COLLABORATION:  RN/ PCT and RT    EDUCATION: Education Given To: Patient  Education Provided: Plan of Care  Education Method: Verbal  Education Outcome: Verbalized understanding    TIME IN/OUT:  Time In: 5127  Time Out: 1500  Minutes: Karlo Coe, PT

## 2023-01-11 NOTE — CARE COORDINATION
Patient care plan reviewed in interdisciplinary rounds with the following disciplines: MD, nursing, case management, therapy, and nutrition services. Pt will likely be ready for discharge home tomorrow. No home health therapy needs. Walk test completed this afternoon. Pt does not need home O2. No additional discharge planning needs anticipated, however CM will continue to follow.

## 2023-01-11 NOTE — PROGRESS NOTES
Hospitalist Progress Note   Admit Date:  2023  3:26 PM   Name:  Robbin Lee   Age:  75 y.o. Sex:  male  :  1955   MRN:  [id number]   Room:  [facility]/01    Presenting Complaint: Abdominal Pain     Reason(s) for Admission: Acute cholecystitis [K81.0]  Right upper quadrant abdominal pain [R10.11]  Sepsis [facility]) [A41.9]     Hospital Course:   Please refer to the admission H&P for details of presentation. In summary, Robbin Lee is a 75 y.o. male with medical history significant for chronic back pain, OA, GERD, gout, HTN, MAURIZIO but not tolerating CPAP who presented with acute onset of abdominal pain (RUQ and RLQ) with associated nausea and vomiting. In the ED, patient was tachycardic and noted to be saturating 88% on 2 L O2 nasal cannula. Laboratory work-up was only remarkable for WBC of 12.7. Influenza AMB were negative as well as COVID. CT abdomen pelvis concerning for acute cholecystitis with inflammatory changes surrounding the gallbladder without definite stones. US of abdomen with sludge in gallbladder with gallbladder wall thickening and dilatation of CBD suggestive of cholecystitis and choledocholithiasis. MRCP with pericholecystic edema and gallbladder surgically for acute cholecystitis without bile duct dilatation or choledocholithiasis. Underwent laparoscopic cholecystectomy on  and tolerated procedure well. Subjective/24 hr Events (23) : Patient is seen and examined at bedside. No acute events noted overnight. Patient tolerated clear liquid diet yesterday and now is advanced to easy to chew. Reports minimal pain in the right upper quadrant. Breathing has improved and down to 2LO2. Still with some intermittent cough and sputum production but drastically improved. Patient denies fever, chills, chest pains, n/v, pain. Assessment & Plan:   Sepsis due to likely acute cholecystitis   S/p laparoscopic cholecystectomy  Imaging finding noted.   - GI recommendations appreciated  - General surgery following  - continue with cipro and flagyl  - anti-emetics and pain control with IV morphine PRN  - now on easy chew diet per surgery. Acute respiratory failure with hypoxia due to rhinovirus  Desaturation to 88% on room air documented in ED with sats improving to >92% with 2L O2 NC.  COVID and Flu neg  procalc is neg. CXR without infiltrate. Congestion appears to be from upper airways. +rhinovirus infection  01/11/23: improved but still oon 2L. Will get 6mwt today. - Symptomatic management with tessalon pearls  - albuterol neb prn, tessalon pearls for cough    Hypokalemia and hypomagnesemia  -Now resolved after repletion    OA of left knee   Had elective knee surgery planned with  1/9  -PT/OT recommending HHT     HTN: takes losartan  GERD: PPI  RLS: on requip     MAURIZIO: not tolerant of CPAP per chart review and per family. Will get overnight oximetry to see if he needs O2 at night time. Order placed. Diet:  ADULT DIET; Easy to Chew; 3 carb choices (45 gm/meal); Low Fat/Low Chol/High Fiber/AMAYA; Low Sodium (2 gm); Low Fat (less than or equal to 50 gm/day)  DVT PPx: SCDs  Code status: Full Code    DISPO: HHT when cleared by surgery. Additional Medical Decision Making factors:  Complexity: 2 or more acute complicated illnesses (4)  Complexity: 2 or more stable chronic illnesses. (4)    The patient assessment required an independent historian: a family member. I conducted an independent review of: Labs   I conducted an independent review of: Imaging and/or other diagnostic studies   I conducted an independent review of: EKG and/or telemetry      Shared decision making was utilized in the care of this patient. I discussed patient's case with an interdisciplinary team.  I discussed patient's case with a . I discussed patient's case with a nurse.       Hospital Problems:  Principal Problem:    Sepsis [facility])  Active Problems: Restless leg syndrome    Acute respiratory failure with hypoxia (HCC)    MAURIZIO (obstructive sleep apnea)    Acute cholecystitis    Hypokalemia    Hypomagnesemia    Right upper quadrant abdominal pain    S/P laparoscopic cholecystectomy    Rhinovirus infection    HTN (hypertension)    Primary osteoarthritis of left knee  Resolved Problems:    * No resolved hospital problems. *      Objective:   Patient Vitals for the past 24 hrs:   Temp Pulse Resp BP SpO2   01/11/23 1104 98.4 °F (36.9 °C) 77 17 115/78 95 %   01/11/23 0731 98.2 °F (36.8 °C) 97 18 107/69 94 %   01/11/23 0432 98.1 °F (36.7 °C) 94 18 139/78 91 %   01/11/23 0035 98.2 °F (36.8 °C) 80 18 125/65 --   01/10/23 1943 98.6 °F (37 °C) 86 18 125/67 96 %   01/10/23 1615 98.2 °F (36.8 °C) (!) 103 17 129/67 94 %       Oxygen Therapy  SpO2: 95 %  Pulse via Oximetry: 100 beats per minute  Pulse Oximeter Device Mode: Continuous  Pulse Oximeter Device Location: Left, Finger  O2 Device: Nasal cannula  O2 Flow Rate (L/min): 2 L/min    Estimated body mass index is 34.44 kg/m² as calculated from the following:    Height as of this encounter: 5' 10\" (1.778 m). Weight as of this encounter: 240 lb (108.9 kg). Intake/Output Summary (Last 24 hours) at 1/11/2023 1436  Last data filed at 1/11/2023 1200  Gross per 24 hour   Intake 340 ml   Output 500 ml   Net -160 ml         Physical Exam:     Blood pressure 115/78, pulse 77, temperature 98.4 °F (36.9 °C), temperature source Oral, resp. rate 17, height 5' 10\" (1.778 m), weight 240 lb (108.9 kg), SpO2 95 %. General:    Alert and awake  Head:  Normocephalic, atraumatic  Eyes:  Sclerae appear normal.  Pupils equally round. ENT:  Nares appear normal.  Moist oral mucosa  Neck:  No restricted ROM. Trachea midline   CV:   RRR. No m/r/g. No jugular venous distension. Lungs:   CTAB. No wheezing, rhonchi in upper airways. Symmetric expansion. Abdomen:   Soft, tender to palpation in RUQ, nondistended.   Extremities: No cyanosis or clubbing. No edema  Skin:     No rashes and normal coloration. Warm and dry. Neuro:  CN II-XII grossly intact. Sensation intact. Psych:  Normal mood and affect.       I have personally reviewed labs and tests showing:  Recent Labs:  Recent Results (from the past 48 hour(s))   CBC with Auto Differential    Collection Time: 01/10/23  6:27 AM   Result Value Ref Range    WBC 11.3 (H) 4.3 - 11.1 K/uL    RBC 3.95 (L) 4.23 - 5.6 M/uL    Hemoglobin 11.8 (L) 13.6 - 17.2 g/dL    Hematocrit 36.5 (L) 41.1 - 50.3 %    MCV 92.4 82.0 - 102.0 FL    MCH 29.9 26.1 - 32.9 PG    MCHC 32.3 31.4 - 35.0 g/dL    RDW 12.8 11.9 - 14.6 %    Platelets 116 082 - 141 K/uL    MPV 10.3 9.4 - 12.3 FL    nRBC 0.00 0.0 - 0.2 K/uL    Differential Type AUTOMATED      Seg Neutrophils 92 (H) 43 - 78 %    Lymphocytes 3 (L) 13 - 44 %    Monocytes 3 (L) 4.0 - 12.0 %    Eosinophils % 0 (L) 0.5 - 7.8 %    Basophils 0 0.0 - 2.0 %    Immature Granulocytes 2 0.0 - 5.0 %    Segs Absolute 10.5 (H) 1.7 - 8.2 K/UL    Absolute Lymph # 0.3 (L) 0.5 - 4.6 K/UL    Absolute Mono # 0.3 0.1 - 1.3 K/UL    Absolute Eos # 0.0 0.0 - 0.8 K/UL    Basophils Absolute 0.0 0.0 - 0.2 K/UL    Absolute Immature Granulocyte 0.2 0.0 - 0.5 K/UL   Comprehensive Metabolic Panel w/ Reflex to MG    Collection Time: 01/10/23  6:27 AM   Result Value Ref Range    Sodium 136 133 - 143 mmol/L    Potassium 3.7 3.5 - 5.1 mmol/L    Chloride 104 101 - 110 mmol/L    CO2 27 21 - 32 mmol/L    Anion Gap 5 2 - 11 mmol/L    Glucose 283 (H) 65 - 100 mg/dL    BUN 20 8 - 23 MG/DL    Creatinine 1.05 0.8 - 1.5 MG/DL    Est, Glom Filt Rate >60 >60 ml/min/1.73m2    Calcium 8.6 8.3 - 10.4 MG/DL    Total Bilirubin 0.6 0.2 - 1.1 MG/DL    ALT 31 12 - 65 U/L    AST 30 15 - 37 U/L    Alk Phosphatase 81 50 - 136 U/L    Total Protein 6.9 6.3 - 8.2 g/dL    Albumin 2.9 (L) 3.2 - 4.6 g/dL    Globulin 4.0 2.8 - 4.5 g/dL    Albumin/Globulin Ratio 0.7 0.4 - 1.6     CBC with Auto Differential    Collection Time: 01/11/23  3:33 AM   Result Value Ref Range    WBC 12.0 (H) 4.3 - 11.1 K/uL    RBC 3.90 (L) 4.23 - 5.6 M/uL    Hemoglobin 11.8 (L) 13.6 - 17.2 g/dL    Hematocrit 36.0 (L) 41.1 - 50.3 %    MCV 92.3 82.0 - 102.0 FL    MCH 30.3 26.1 - 32.9 PG    MCHC 32.8 31.4 - 35.0 g/dL    RDW 12.7 11.9 - 14.6 %    Platelets 141 167 - 087 K/uL    MPV 10.6 9.4 - 12.3 FL    nRBC 0.00 0.0 - 0.2 K/uL    Differential Type AUTOMATED      Seg Neutrophils 87 (H) 43 - 78 %    Lymphocytes 6 (L) 13 - 44 %    Monocytes 6 4.0 - 12.0 %    Eosinophils % 0 (L) 0.5 - 7.8 %    Basophils 0 0.0 - 2.0 %    Immature Granulocytes 1 0.0 - 5.0 %    Segs Absolute 10.5 (H) 1.7 - 8.2 K/UL    Absolute Lymph # 0.7 0.5 - 4.6 K/UL    Absolute Mono # 0.7 0.1 - 1.3 K/UL    Absolute Eos # 0.0 0.0 - 0.8 K/UL    Basophils Absolute 0.1 0.0 - 0.2 K/UL    Absolute Immature Granulocyte 0.1 0.0 - 0.5 K/UL   Comprehensive Metabolic Panel w/ Reflex to MG    Collection Time: 01/11/23  3:33 AM   Result Value Ref Range    Sodium 138 133 - 143 mmol/L    Potassium 3.6 3.5 - 5.1 mmol/L    Chloride 104 101 - 110 mmol/L    CO2 30 21 - 32 mmol/L    Anion Gap 4 2 - 11 mmol/L    Glucose 151 (H) 65 - 100 mg/dL    BUN 17 8 - 23 MG/DL    Creatinine 0.93 0.8 - 1.5 MG/DL    Est, Glom Filt Rate >60 >60 ml/min/1.73m2    Calcium 8.6 8.3 - 10.4 MG/DL    Total Bilirubin 0.5 0.2 - 1.1 MG/DL    ALT 31 12 - 65 U/L    AST 21 15 - 37 U/L    Alk Phosphatase 66 50 - 136 U/L    Total Protein 6.7 6.3 - 8.2 g/dL    Albumin 2.9 (L) 3.2 - 4.6 g/dL    Globulin 3.8 2.8 - 4.5 g/dL    Albumin/Globulin Ratio 0.8 0.4 - 1.6         I have personally reviewed imaging studies showing: Other Studies:  MRI ABDOMEN W WO CONTRAST   Final Result   1. Pericholecystic edema and gallbladder sludge, concerning for acute   cholecystitis. 2.  No bile duct dilatation or choledocholithiasis.          If there are any questions about this report, I can be reached on [employer]   or at [phone]               XR CHEST PORTABLE Final Result   Lungs are hypoaerated but otherwise clear. Cardiomegaly. No   pneumothorax. No pleural effusions. US ABDOMEN LIMITED Specify organ? GALLBLADDER   Final Result   1. Gallbladder sludge is present. No obvious shadowing gallstones. Gallbladder   wall thickening and dilatation of the common bile duct suggests cholecystitis   and choledocholithiasis. 2. Fatty infiltration liver and nonobstructing right renal collecting system   stones. 3. Limited exam due to shadowing from bowel gas and fatty liver. CT ABDOMEN PELVIS W IV CONTRAST Additional Contrast? None   Final Result   1. Findings concerning for acute cholecystitis. The right upper quadrant   ultrasound may be beneficial if there is further clinical concern. 2. Sigmoid diverticulosis. 3. Bilateral nonobstructing renal calculi.                    Current Meds:  Current Facility-Administered Medications   Medication Dose Route Frequency    guaiFENesin-dextromethorphan (ROBITUSSIN DM) 100-10 MG/5ML syrup 5 mL  5 mL Oral Q4H PRN    prochlorperazine (COMPAZINE) injection 10 mg  10 mg IntraVENous Q6H PRN    LORazepam (ATIVAN) injection 0.5 mg  0.5 mg IntraVENous Once    sodium chloride flush 0.9 % injection 20 mL  20 mL IntraVENous PRN    albuterol (PROVENTIL) nebulizer solution 2.5 mg  2.5 mg Nebulization Q6H PRN    sodium chloride (Inhalant) 3 % nebulizer solution 4 mL  4 mL Nebulization Q12H PRN    benzonatate (TESSALON) capsule 100 mg  100 mg Oral TID PRN    fluticasone (FLONASE) 50 MCG/ACT nasal spray 1 spray  1 spray Each Nostril Daily    sodium chloride flush 0.9 % injection 5-40 mL  5-40 mL IntraVENous 2 times per day    sodium chloride flush 0.9 % injection 5-40 mL  5-40 mL IntraVENous PRN    0.9 % sodium chloride infusion   IntraVENous PRN    ondansetron (ZOFRAN-ODT) disintegrating tablet 4 mg  4 mg Oral Q8H PRN    Or    ondansetron (ZOFRAN) injection 4 mg  4 mg IntraVENous Q6H PRN    polyethylene glycol (GLYCOLAX) packet 17 g  17 g Oral Daily PRN    bisacodyl (DULCOLAX) suppository 10 mg  10 mg Rectal Daily PRN    famotidine (PEPCID) tablet 10 mg  10 mg Oral Daily PRN    aluminum & magnesium hydroxide-simethicone (MAALOX) 200-200-20 MG/5ML suspension 30 mL  30 mL Oral Q6H PRN    acetaminophen (TYLENOL) tablet 650 mg  650 mg Oral Q6H PRN    Or    acetaminophen (TYLENOL) suppository 650 mg  650 mg Rectal Q6H PRN    DULoxetine (CYMBALTA) extended release capsule 60 mg  60 mg Oral Daily    losartan (COZAAR) tablet 100 mg  100 mg Oral Daily    rOPINIRole (REQUIP) tablet 4 mg  4 mg Oral Nightly    HYDROcodone-acetaminophen (NORCO) 7.5-325 MG per tablet 1 tablet  1 tablet Oral Q6H PRN    morphine injection 4 mg  4 mg IntraVENous Once    ciprofloxacin (CIPRO) IVPB 400 mg  400 mg IntraVENous Q12H    metronidazole (FLAGYL) 500 mg in 0.9% NaCl 100 mL IVPB premix  500 mg IntraVENous Q6H    morphine injection 2 mg  2 mg IntraVENous Q6H PRN       Signed:  Sherine Irizarry MD    Part of this note may have been written by using a voice dictation software. The note has been proof read but may still contain some grammatical/other typographical errors.

## 2023-01-11 NOTE — PROGRESS NOTES
General Surgery Progress Note    2023    Admit Date: 2023    Yaron Figueredo  MRN: [id number]  :1955  Age:67 y.o. Post OP Day # 2: LAPAROSCOPIC CHOLECYSTECTOMY done per DR. HALE  Findings: Mikel pus in gallbladder, which is very edematous and inflamed; morbid obesity     Chief Complaint:  Abdominal Pain     HPI: Yaron Figueredo is a 75 y.o. male who we are asked by Hospitalist MD  to see for Abdominal Pain. Patient has medical history of chronic back pain, OA, GERD, gout, HTN, MAURIZIO but not tolerating CPAP who presented with acute onset of abdominal pain (RUQ and RLQ) with associated nausea and vomiting since last night. Unable to tolerate any PO since last night. Denies any diarrhea. Patient was seen at urgent care on Monday (2023) and prescribed clindamycin for sinus infection. Has been having cough and congestion since last week and was tested neg for covid and flu at outside facility. Wife reports that patient also has been feeling chills and felt warm last night. In the ED, patient was tachycardic and noted to be saturating 88% on 2 L O2 nasal cannula. Laboratory work-up was only remarkable for WBC of 12.7. Influenza AMB were negative as well as COVID. CT abdomen pelvis concerning for acute cholecystitis with inflammatory changes surrounding the gallbladder without definite stones. ED provider to contact General surgery. Temperature 2023 @ 12:48 am was 101.1 (oral) Pulse ranges from 100 to 113 . Patient denies any chest pain or dyspnea at this time. CT OF THE ABDOMEN AND PELVIS 2023 @17:10       INDICATION: Abdominal pain beginning last night. Multiple episodes of vomiting. Multiple axial images were obtained through the abdomen and pelvis. Oral   contrast was not administered. 100mL of Isovue 370 intravenous contrast was   used for better evaluation of solid organs and vascular structures.   Radiation   dose reduction techniques were used for this study. All CT scans performed at   this facility use one or all of the following: Automated exposure control,   adjustment of the mA and/or kVp according to patient's size, iterative   reconstruction. COMPARISON: Noncontrast study 01/07/2020       FINDINGS:   - Lung Bases: No infiltrates or masses. - Liver: Unremarkable   - Biliary: There are inflammatory changes surrounding the gallbladder. No   definite stones are appreciated. - Pancreas: Normal..   - Spleen: Not enlarged, No mass. - Adrenals: Normal   - Kidneys/ureters: Several nonobstructing calculi bilaterally. - Bladder: Normal.   - Reproductive organs: No pelvic masses. - Bowel: Normal caliber. No inflammatory changes. There are several sigmoid   diverticula. There is a diverticulum or ureterocele on the left near the UVJ   measuring approximately 1.6 cm.   - Lymph nodes: No significant retroperitoneal, mesenteric, or pelvic adenopathy.   - Bones: No fracture or significant bone lesion.   - Vasculature: Normal   - Bones: No aggressive osseous lesion. There are remote L1 and L2 compression   fractures. - Other: No ascites. There is stable haziness within the central mesentery   suggesting sclerosing mesenteric right is. Impression   1. Findings concerning for acute cholecystitis. The right upper quadrant   ultrasound may be beneficial if there is further clinical concern. 2. Sigmoid diverticulosis. 3. Bilateral nonobstructing renal calculi. US ABDOMEN LIMITED 1/8/2023 7:13 PM       HISTORY: RUQ abd pain; abnormal CT A/P       COMPARISON: None available. FINDINGS:  Liver is increased in echogenicity without focal lesions. The   gallbladder contains sludge but no shadowing gallstones. Gallbladder appears   incompletely distended but there is wall thickening measuring up to 5 mm. Common   bile duct is dilated at 8 mm. Pancreas is obscured by overlying bowel gas and   shadowing from the fatty liver. Examination of the right kidney demonstrates a   few echogenic foci probably nonobstructing stones. Aorta obscured by overlying   bowel gas. IVC appears patent. Impression   1. Gallbladder sludge is present. No obvious shadowing gallstones. Gallbladder   wall thickening and dilatation of the common bile duct suggests cholecystitis   and choledocholithiasis. 2. Fatty infiltration liver and nonobstructing right renal collecting system   stones. 3. Limited exam due to shadowing from bowel gas and fatty liver. MRCP done 1/9/2023   LIVER: Normal in size and appearance. - GALLBLADDER/BILE DUCTS: Common duct measures 5 mm in diameter. No significant   bile duct dilatation. No filling defects. The gallbladder is distended. There   is pericholecystic edema. There is a debris level within the gallbladder lumen. - PANCREAS: Normal.   - SPLEEN: Normal.   - ADRENALS: Normal.   - KIDNEYS/URETERS: No hydronephrosis or significant mass.   - LYMPH NODES: No significant retroperitoneal or mesenteric adenopathy.   - BONES: No fracture or significant bone lesion.   - OTHER: No significant ascites. Impression   1. Pericholecystic edema and gallbladder sludge, concerning for acute   cholecystitis. Subjective:     Patient resting in bed. Patient reports he had some bouts of nausea last night and received Zofran IV PRN. He reports he had BM and is passing flatus. Patient did not tolerate a regular low fat diet so he was placed back on full liquid diet . Denies any chest pain or dyspnea at this time. Patient reports pain 5 on scale of 10 to trochar sites. Patient reports he is still coughing up \"phlegm\" which has been ongoing since last week. Patient remains on IV Cipro and Flaygl and he has tessalon pearls for cough. He is using incentive spirometer at 1000 to 1500 cc PRN    Patient has been instructed to ambulate in rosales PRN .  We have ordered PT/OT for evaluation of any home therapy/DME needs upon discharge. Objective:     /78   Pulse 94   Temp 98.1 °F (36.7 °C)   Resp 18   Ht 5' 10\" (1.778 m)   Wt 240 lb (108.9 kg)   SpO2 91%   BMI 34.44 kg/m²       Intake/Output Summary (Last 24 hours) at 1/11/2023 0765  Last data filed at 1/11/2023 0355  Gross per 24 hour   Intake 200 ml   Output 500 ml   Net -300 ml      Physical Exam  Vitals and nursing note reviewed. Constitutional:       Appearance: Normal appearance. HENT:      Head: Normocephalic and atraumatic. Nose: Nose normal.      Mouth/Throat: no oral lesions     Mouth: Mucous membranes are moist.   Eyes:      Extraocular Movements: Extraocular movements intact. Conjunctiva/sclera: Conjunctivae normal.      Pupils: Pupils are equal, round, and reactive to light. Cardiovascular:      Rate and Rhythm: Mild tachycardia noted. .      Pulses: Normal pulses. Heart sounds: Normal heart sounds. No murmurs, rubs or gallops. Pulmonary:      Effort: Pulmonary effort is normal.   PRN uses Oxygen @  2 to 5  L/min via nasal cannula per notes     Breath sounds: Normal breath sounds. No wheezing, rales or rhonchi noted. Abdominal:      General: Abdomen is obese. Hypoactive Bowel sounds. Mild  distention noted. Tenderness: There is appropriate incisional tenderness noted to the 5 trochar sites which are approximated with steri-strips and no drainage noted. No rebound or guarding noted. Musculoskeletal:         General: Normal range of motion. Cervical back: Normal range of motion and neck supple. Skin:     General: Skin is warm. Capillary Refill: Capillary refill takes less than 2 seconds. Neurological:      General: No focal deficit present. Mental Status: He is alert and oriented to person, place, and time. Psychiatric:         Mood and Affect: Mood normal.         Behavior: Behavior normal.         Thought Content:  Thought content normal.         Judgment: Judgment normal.        Data Review    Recent Results (from the past 24 hour(s))   CBC with Auto Differential    Collection Time: 01/11/23  3:33 AM   Result Value Ref Range    WBC 12.0 (H) 4.3 - 11.1 K/uL    RBC 3.90 (L) 4.23 - 5.6 M/uL    Hemoglobin 11.8 (L) 13.6 - 17.2 g/dL    Hematocrit 36.0 (L) 41.1 - 50.3 %    MCV 92.3 82.0 - 102.0 FL    MCH 30.3 26.1 - 32.9 PG    MCHC 32.8 31.4 - 35.0 g/dL    RDW 12.7 11.9 - 14.6 %    Platelets 827 199 - 232 K/uL    MPV 10.6 9.4 - 12.3 FL    nRBC 0.00 0.0 - 0.2 K/uL    Differential Type AUTOMATED      Seg Neutrophils 87 (H) 43 - 78 %    Lymphocytes 6 (L) 13 - 44 %    Monocytes 6 4.0 - 12.0 %    Eosinophils % 0 (L) 0.5 - 7.8 %    Basophils 0 0.0 - 2.0 %    Immature Granulocytes 1 0.0 - 5.0 %    Segs Absolute 10.5 (H) 1.7 - 8.2 K/UL    Absolute Lymph # 0.7 0.5 - 4.6 K/UL    Absolute Mono # 0.7 0.1 - 1.3 K/UL    Absolute Eos # 0.0 0.0 - 0.8 K/UL    Basophils Absolute 0.1 0.0 - 0.2 K/UL    Absolute Immature Granulocyte 0.1 0.0 - 0.5 K/UL   Comprehensive Metabolic Panel w/ Reflex to MG    Collection Time: 01/11/23  3:33 AM   Result Value Ref Range    Sodium 138 133 - 143 mmol/L    Potassium 3.6 3.5 - 5.1 mmol/L    Chloride 104 101 - 110 mmol/L    CO2 30 21 - 32 mmol/L    Anion Gap 4 2 - 11 mmol/L    Glucose 151 (H) 65 - 100 mg/dL    BUN 17 8 - 23 MG/DL    Creatinine 0.93 0.8 - 1.5 MG/DL    Est, Glom Filt Rate >60 >60 ml/min/1.73m2    Calcium 8.6 8.3 - 10.4 MG/DL    Total Bilirubin 0.5 0.2 - 1.1 MG/DL    ALT 31 12 - 65 U/L    AST 21 15 - 37 U/L    Alk Phosphatase 66 50 - 136 U/L    Total Protein 6.7 6.3 - 8.2 g/dL    Albumin 2.9 (L) 3.2 - 4.6 g/dL    Globulin 3.8 2.8 - 4.5 g/dL    Albumin/Globulin Ratio 0.8 0.4 - 1.6        Assessment:  Post OP Day # 2: LAPAROSCOPIC CHOLECYSTECTOMY   Principal Problem:    Sepsis (HCC)  Active Problems:    Restless leg syndrome    Acute respiratory failure with hypoxia (HCC)    MAURIZIO (obstructive sleep apnea)    Acute cholecystitis    Hypokalemia Hypomagnesemia    Right upper quadrant abdominal pain    S/P laparoscopic cholecystectomy    Rhinovirus infection    HTN (hypertension)    Primary osteoarthritis of left knee  Resolved Problems:    * No resolved hospital problems. *      PLAN:  --Further input per Surgeon, Dr. Pushpa Mcclendon as tolerated  -Pain meds PRN  -Ambulate in rosales PRN  -Encourage use of Incentive spirometer  -Admitting team for meds/labs/etc  -Patient remains on IV Cipro/Flagyl at this time  -GI MD was following for medical management but they have signed off at this time.

## 2023-01-12 VITALS
HEIGHT: 70 IN | TEMPERATURE: 98.3 F | WEIGHT: 240 LBS | OXYGEN SATURATION: 95 % | RESPIRATION RATE: 20 BRPM | DIASTOLIC BLOOD PRESSURE: 81 MMHG | SYSTOLIC BLOOD PRESSURE: 129 MMHG | HEART RATE: 88 BPM | BODY MASS INDEX: 34.36 KG/M2

## 2023-01-12 PROBLEM — K81.0 ACUTE CHOLECYSTITIS: Status: RESOLVED | Noted: 2023-01-09 | Resolved: 2023-01-12

## 2023-01-12 PROBLEM — A41.9 SEPSIS (HCC): Status: RESOLVED | Noted: 2023-01-08 | Resolved: 2023-01-12

## 2023-01-12 PROBLEM — J96.01 ACUTE RESPIRATORY FAILURE WITH HYPOXIA (HCC): Status: RESOLVED | Noted: 2023-01-08 | Resolved: 2023-01-12

## 2023-01-12 PROBLEM — E87.6 HYPOKALEMIA: Status: RESOLVED | Noted: 2023-01-09 | Resolved: 2023-01-12

## 2023-01-12 PROBLEM — E83.42 HYPOMAGNESEMIA: Status: RESOLVED | Noted: 2023-01-09 | Resolved: 2023-01-12

## 2023-01-12 LAB
ALBUMIN SERPL-MCNC: 2.9 G/DL (ref 3.2–4.6)
ALBUMIN/GLOB SERPL: 0.8 (ref 0.4–1.6)
ALP SERPL-CCNC: 65 U/L (ref 50–136)
ALT SERPL-CCNC: 30 U/L (ref 12–65)
ANION GAP SERPL CALC-SCNC: 6 MMOL/L (ref 2–11)
AST SERPL-CCNC: 16 U/L (ref 15–37)
BASOPHILS # BLD: 0.1 K/UL (ref 0–0.2)
BASOPHILS NFR BLD: 1 % (ref 0–2)
BILIRUB SERPL-MCNC: 0.5 MG/DL (ref 0.2–1.1)
BUN SERPL-MCNC: 14 MG/DL (ref 8–23)
CALCIUM SERPL-MCNC: 9 MG/DL (ref 8.3–10.4)
CHLORIDE SERPL-SCNC: 106 MMOL/L (ref 101–110)
CO2 SERPL-SCNC: 28 MMOL/L (ref 21–32)
CREAT SERPL-MCNC: 1.01 MG/DL (ref 0.8–1.5)
DIFFERENTIAL METHOD BLD: ABNORMAL
EOSINOPHIL # BLD: 0.1 K/UL (ref 0–0.8)
EOSINOPHIL NFR BLD: 1 % (ref 0.5–7.8)
ERYTHROCYTE [DISTWIDTH] IN BLOOD BY AUTOMATED COUNT: 12.6 % (ref 11.9–14.6)
GLOBULIN SER CALC-MCNC: 3.7 G/DL (ref 2.8–4.5)
GLUCOSE SERPL-MCNC: 130 MG/DL (ref 65–100)
HCT VFR BLD AUTO: 37.6 % (ref 41.1–50.3)
HGB BLD-MCNC: 12.1 G/DL (ref 13.6–17.2)
IMM GRANULOCYTES # BLD AUTO: 0.2 K/UL (ref 0–0.5)
IMM GRANULOCYTES NFR BLD AUTO: 2 % (ref 0–5)
LYMPHOCYTES # BLD: 1 K/UL (ref 0.5–4.6)
LYMPHOCYTES NFR BLD: 14 % (ref 13–44)
MAGNESIUM SERPL-MCNC: 1.9 MG/DL (ref 1.8–2.4)
MCH RBC QN AUTO: 29.7 PG (ref 26.1–32.9)
MCHC RBC AUTO-ENTMCNC: 32.2 G/DL (ref 31.4–35)
MCV RBC AUTO: 92.2 FL (ref 82–102)
MONOCYTES # BLD: 0.8 K/UL (ref 0.1–1.3)
MONOCYTES NFR BLD: 10 % (ref 4–12)
NEUTS SEG # BLD: 5.4 K/UL (ref 1.7–8.2)
NEUTS SEG NFR BLD: 72 % (ref 43–78)
NRBC # BLD: 0 K/UL (ref 0–0.2)
PLATELET # BLD AUTO: 209 K/UL (ref 150–450)
PMV BLD AUTO: 10.3 FL (ref 9.4–12.3)
POTASSIUM SERPL-SCNC: 3.3 MMOL/L (ref 3.5–5.1)
PROT SERPL-MCNC: 6.6 G/DL (ref 6.3–8.2)
RBC # BLD AUTO: 4.08 M/UL (ref 4.23–5.6)
SODIUM SERPL-SCNC: 140 MMOL/L (ref 133–143)
WBC # BLD AUTO: 7.5 K/UL (ref 4.3–11.1)

## 2023-01-12 PROCEDURE — 85025 COMPLETE CBC W/AUTO DIFF WBC: CPT

## 2023-01-12 PROCEDURE — 2580000003 HC RX 258: Performed by: SURGERY

## 2023-01-12 PROCEDURE — 6370000000 HC RX 637 (ALT 250 FOR IP): Performed by: SURGERY

## 2023-01-12 PROCEDURE — 83735 ASSAY OF MAGNESIUM: CPT

## 2023-01-12 PROCEDURE — 2500000003 HC RX 250 WO HCPCS: Performed by: SURGERY

## 2023-01-12 PROCEDURE — 80053 COMPREHEN METABOLIC PANEL: CPT

## 2023-01-12 PROCEDURE — 94762 N-INVAS EAR/PLS OXIMTRY CONT: CPT

## 2023-01-12 PROCEDURE — 94761 N-INVAS EAR/PLS OXIMETRY MLT: CPT

## 2023-01-12 PROCEDURE — 2700000000 HC OXYGEN THERAPY PER DAY

## 2023-01-12 PROCEDURE — 36415 COLL VENOUS BLD VENIPUNCTURE: CPT

## 2023-01-12 PROCEDURE — 6360000002 HC RX W HCPCS: Performed by: SURGERY

## 2023-01-12 RX ORDER — CIPROFLOXACIN 500 MG/1
500 TABLET, FILM COATED ORAL 2 TIMES DAILY
Qty: 6 TABLET | Refills: 0 | Status: SHIPPED | OUTPATIENT
Start: 2023-01-12 | End: 2023-01-15

## 2023-01-12 RX ORDER — METRONIDAZOLE 500 MG/1
500 TABLET ORAL 3 TIMES DAILY
Qty: 9 TABLET | Refills: 0 | Status: SHIPPED | OUTPATIENT
Start: 2023-01-12 | End: 2023-01-15

## 2023-01-12 RX ADMIN — METRONIDAZOLE 500 MG: 500 INJECTION, SOLUTION INTRAVENOUS at 10:14

## 2023-01-12 RX ADMIN — LOSARTAN POTASSIUM 100 MG: 50 TABLET, FILM COATED ORAL at 10:14

## 2023-01-12 RX ADMIN — CIPROFLOXACIN 400 MG: 2 INJECTION, SOLUTION INTRAVENOUS at 05:42

## 2023-01-12 RX ADMIN — DULOXETINE HYDROCHLORIDE 60 MG: 60 CAPSULE, DELAYED RELEASE ORAL at 10:14

## 2023-01-12 RX ADMIN — METRONIDAZOLE 500 MG: 500 INJECTION, SOLUTION INTRAVENOUS at 04:13

## 2023-01-12 RX ADMIN — SODIUM CHLORIDE, PRESERVATIVE FREE 10 ML: 5 INJECTION INTRAVENOUS at 03:14

## 2023-01-12 RX ADMIN — FLUTICASONE PROPIONATE 1 SPRAY: 50 SPRAY, METERED NASAL at 10:14

## 2023-01-12 NOTE — DISCHARGE SUMMARY
Hospitalist Discharge Summary   Admit Date:  2023  3:26 PM   DC Note date: 2023  Name:  Kaylene Chapa [facility]   Age:  70 y.o. Sex:  male  :  1955   MRN:  [id number]   Room:  [facility]  PCP:  Sabine Salas    Presenting Complaint: Abdominal Pain       Problem List for this Hospitalization (present on admission):    Principal Problem (Resolved):    Sepsis ([address]  Active Problems:    Restless leg syndrome    MAURIZIO (obstructive sleep apnea)    Right upper quadrant abdominal pain    S/P laparoscopic cholecystectomy    Rhinovirus infection    HTN (hypertension)    Primary osteoarthritis of left knee  Resolved Problems:    Acute respiratory failure with hypoxia ([address]    Acute cholecystitis    Hypokalemia    Hypomagnesemia      Hospital Course:  75 y.o. male with medical history significant for chronic back pain, OA, GERD, gout, HTN, MAURIZIO but not tolerating CPAP who presented with acute onset of abdominal pain (RUQ and RLQ) with associated nausea and vomiting. In the ED, patient was tachycardic and noted to be saturating 88% on 2 L O2 nasal cannula. Laboratory work-up was only remarkable for WBC of 12.7. Influenza AMB were negative as well as COVID. CT abdomen pelvis concerning for acute cholecystitis with inflammatory changes surrounding the gallbladder without definite stones. US of abdomen with sludge in gallbladder with gallbladder wall thickening and dilatation of CBD suggestive of cholecystitis and choledocholithiasis. MRCP with pericholecystic edema and gallbladder surgically for acute cholecystitis without bile duct dilatation or choledocholithiasis. Underwent laparoscopic cholecystectomy on  and tolerated procedure well. He remained stable and was discharged home. Disposition: Home  Diet: ADULT DIET; Easy to Chew; 3 carb choices (45 gm/meal); Low Fat/Low Chol/High Fiber/AMAYA; Low Sodium (2 gm);  Low Fat (less than or equal to 50 gm/day)  Code Status: Full Code    Follow Ups:   Follow-up Dayne Newby MD. Schedule an appointment as soon as possible for a visit in 1   week(s). Specialty: General Surgery  Why: Follow up with DR Radha Marshall for post op visit per Dr Radha Marshall notes. Contact information:  Sapna Herbert Follow up in 1 week(s). Specialty: ADULT HEALTH  Contact information:  [address] [place] [address]  [phone]                       Time spent in patient discharge and coordination 36 minutes. Follow up labs/diagnostics (ultimately defer to outpatient provider):  None    Plan was discussed with patient, nursing, and case management. All questions answered. Patient was stable at time of discharge. Instructions given to call a physician or return if any concerns.     Current Discharge Medication List        START taking these medications    Details   ciprofloxacin (CIPRO) 500 MG tablet Take 1 tablet by mouth 2 times daily for 3 days  Qty: 6 tablet, Refills: 0      metroNIDAZOLE (FLAGYL) 500 MG tablet Take 1 tablet by mouth 3 times daily for 3 days  Qty: 9 tablet, Refills: 0           CONTINUE these medications which have NOT CHANGED    Details   celecoxib (CELEBREX) 200 MG capsule Take 200 mg by mouth daily      aspirin 81 MG chewable tablet Take by mouth      azelastine (ASTELIN) 0.1 % nasal spray USE 1 TO 2 SPRAYS IN EACH NOSTRIL TWICE DAILY      HYDROcodone-acetaminophen (NORCO) 7.5-325 MG per tablet TAKE 1 TABLET BY MOUTH EVERY 6 HOURS AS NEEDED      omeprazole (PRILOSEC) 20 MG delayed release capsule       DULoxetine (CYMBALTA) 60 MG extended release capsule Take 60 mg by mouth      losartan (COZAAR) 100 MG tablet Take 100 mg by mouth daily      rOPINIRole (REQUIP) 1 MG tablet Take 4 mg by mouth nightly           STOP taking these medications       diclofenac sodium (VOLTAREN) 1 % GEL Comments:   Reason for Stopping:         fluticasone (FLONASE) 50 MCG/ACT nasal spray Comments:   Reason for Stopping:         ipratropium (ATROVENT) 0.06 % nasal spray Comments:   Reason for Stopping:         ZTLIDO 1.8 % PTCH Comments:   Reason for Stopping:         allopurinol (ZYLOPRIM) 300 MG tablet Comments:   Reason for Stopping:         ALPRAZolam (XANAX) 0.5 MG tablet Comments:   Reason for Stopping:         cyclobenzaprine (FLEXERIL) 10 MG tablet Comments:   Reason for Stopping:         gabapentin (NEURONTIN) 100 MG capsule Comments:   Reason for Stopping:               Procedures done this admission:  Procedure(s):  CHOLECYSTECTOMY LAPAROSCOPIC    Consults this admission:  IP CONSULT TO GI    Echocardiogram results:  No results found for this or any previous visit. Diagnostic Imaging/Tests:   MRI ABDOMEN W WO CONTRAST    Result Date: 1/9/2023  1. Pericholecystic edema and gallbladder sludge, concerning for acute cholecystitis. 2.  No bile duct dilatation or choledocholithiasis. If there are any questions about this report, I can be reached on Njini or at [phone]     CT ABDOMEN PELVIS W IV CONTRAST Additional Contrast? None    Result Date: 1/8/2023  1. Findings concerning for acute cholecystitis. The right upper quadrant ultrasound may be beneficial if there is further clinical concern. 2. Sigmoid diverticulosis. 3. Bilateral nonobstructing renal calculi. XR CHEST PORTABLE    Result Date: 1/8/2023  Lungs are hypoaerated but otherwise clear. Cardiomegaly. No pneumothorax. No pleural effusions. US ABDOMEN LIMITED Specify organ? GALLBLADDER    Result Date: 1/8/2023  1. Gallbladder sludge is present. No obvious shadowing gallstones. Gallbladder wall thickening and dilatation of the common bile duct suggests cholecystitis and choledocholithiasis. 2. Fatty infiltration liver and nonobstructing right renal collecting system stones. 3. Limited exam due to shadowing from bowel gas and fatty liver.        Labs: Results:       BMP, Mg, Phos Recent Labs     01/10/23  0627 01/11/23  0333 01/12/23  9170  138 140   K 3.7 3.6 3.3*    104 106   CO2 27 30 28   ANIONGAP 5 4 6   BUN 20 17 14   CREATININE 1.05 0.93 1.01   LABGLOM >60 >60 >60   CALCIUM 8.6 8.6 9.0   GLUCOSE 283* 151* 130*   MG  --   --  1.9      CBC Recent Labs     01/10/23  0627 01/11/23  0333 01/12/23  0605   WBC 11.3* 12.0* 7.5   RBC 3.95* 3.90* 4.08*   HGB 11.8* 11.8* 12.1*   HCT 36.5* 36.0* 37.6*   MCV 92.4 92.3 92.2   MCH 29.9 30.3 29.7   MCHC 32.3 32.8 32.2   RDW 12.8 12.7 12.6    189 209   MPV 10.3 10.6 10.3   NRBC 0.00 0.00 0.00   SEGS 92* 87* 72   LYMPHOPCT 3* 6* 14   EOSRELPCT 0* 0* 1   MONOPCT 3* 6 10   BASOPCT 0 0 1   IMMGRAN 2 1 2   SEGSABS 10.5* 10.5* 5.4   LYMPHSABS 0.3* 0.7 1.0   EOSABS 0.0 0.0 0.1   MONOSABS 0.3 0.7 0.8   BASOSABS 0.0 0.1 0.1   ABSIMMGRAN 0.2 0.1 0.2      LFT Recent Labs     01/10/23  0627 01/11/23  0333 01/12/23  0605   BILITOT 0.6 0.5 0.5   ALKPHOS 81 66 65   AST 30 21 16   ALT 31 31 30   PROT 6.9 6.7 6.6   LABALBU 2.9* 2.9* 2.9*   GLOB 4.0 3.8 3.7      Cardiac  Lab Results   Component Value Date/Time    NTPROBNP 479 01/09/2023 04:20 AM    TROPHS 11.4 01/08/2023 03:49 PM      Coags Lab Results   Component Value Date/Time    PROTIME 13.7 12/12/2022 08:11 AM    INR 1.1 12/12/2022 08:11 AM    APTT 26.3 12/12/2022 08:11 AM      A1c Lab Results   Component Value Date/Time    LABA1C 6.8 12/12/2022 08:11 AM     12/12/2022 08:11 AM      Lipids No results found for: CHOL, LDLCALC, LABVLDL, HDL, CHOLHDLRATIO, TRIG   Thyroid  No results found for: Gila Eaton     Most Recent UA Lab Results   Component Value Date/Time    COLORU YELLOW 02/10/2021 01:11 PM    SPECGRAV 1.016 02/10/2021 01:11 PM    PROTEINU Negative 02/10/2021 01:11 PM    GLUCOSEU Negative 02/10/2021 01:11 PM    KETUA Negative 02/10/2021 01:11 PM    BILIRUBINUR Negative 02/10/2021 01:11 PM    BLOODU Negative 02/10/2021 01:11 PM    NITRU Negative 02/10/2021 01:11 PM    LEUKOCYTESUR TRACE 02/10/2021 01:11 PM    WBCUA 0-3 02/10/2021 01:11 PM    RBCUA 0-3 02/10/2021 01:11 PM    BACTERIA 0 02/10/2021 01:11 PM    MUCUS 0 01/07/2020 01:19 AM        No results for input(s): CULTURE in the last 720 hours.     All Labs from Last 24 Hrs:  Recent Results (from the past 24 hour(s))   CBC with Auto Differential    Collection Time: 01/12/23  6:05 AM   Result Value Ref Range    WBC 7.5 4.3 - 11.1 K/uL    RBC 4.08 (L) 4.23 - 5.6 M/uL    Hemoglobin 12.1 (L) 13.6 - 17.2 g/dL    Hematocrit 37.6 (L) 41.1 - 50.3 %    MCV 92.2 82.0 - 102.0 FL    MCH 29.7 26.1 - 32.9 PG    MCHC 32.2 31.4 - 35.0 g/dL    RDW 12.6 11.9 - 14.6 %    Platelets 698 730 - 222 K/uL    MPV 10.3 9.4 - 12.3 FL    nRBC 0.00 0.0 - 0.2 K/uL    Differential Type AUTOMATED      Seg Neutrophils 72 43 - 78 %    Lymphocytes 14 13 - 44 %    Monocytes 10 4.0 - 12.0 %    Eosinophils % 1 0.5 - 7.8 %    Basophils 1 0.0 - 2.0 %    Immature Granulocytes 2 0.0 - 5.0 %    Segs Absolute 5.4 1.7 - 8.2 K/UL    Absolute Lymph # 1.0 0.5 - 4.6 K/UL    Absolute Mono # 0.8 0.1 - 1.3 K/UL    Absolute Eos # 0.1 0.0 - 0.8 K/UL    Basophils Absolute 0.1 0.0 - 0.2 K/UL    Absolute Immature Granulocyte 0.2 0.0 - 0.5 K/UL   Comprehensive Metabolic Panel w/ Reflex to MG    Collection Time: 01/12/23  6:05 AM   Result Value Ref Range    Sodium 140 133 - 143 mmol/L    Potassium 3.3 (L) 3.5 - 5.1 mmol/L    Chloride 106 101 - 110 mmol/L    CO2 28 21 - 32 mmol/L    Anion Gap 6 2 - 11 mmol/L    Glucose 130 (H) 65 - 100 mg/dL    BUN 14 8 - 23 MG/DL    Creatinine 1.01 0.8 - 1.5 MG/DL    Est, Glom Filt Rate >60 >60 ml/min/1.73m2    Calcium 9.0 8.3 - 10.4 MG/DL    Total Bilirubin 0.5 0.2 - 1.1 MG/DL    ALT 30 12 - 65 U/L    AST 16 15 - 37 U/L    Alk Phosphatase 65 50 - 136 U/L    Total Protein 6.6 6.3 - 8.2 g/dL    Albumin 2.9 (L) 3.2 - 4.6 g/dL    Globulin 3.7 2.8 - 4.5 g/dL    Albumin/Globulin Ratio 0.8 0.4 - 1.6     Magnesium    Collection Time: 01/12/23  6:05 AM   Result Value Ref Range    Magnesium 1.9 1.8 - 2.4 mg/dL       Allergies Allergen Reactions    Penicillins Itching, Nausea Only and Headaches     Other reaction(s): Nausea and/or vomiting-Intolerance  Other reaction(s): Headache       Immunization History   Administered Date(s) Administered    PPD Test 06/22/2018       Recent Vital Data:  Patient Vitals for the past 24 hrs:   Temp Pulse Resp BP SpO2   01/12/23 0753 -- 91 -- -- 92 %   01/12/23 0749 -- 81 -- -- 95 %   01/12/23 0724 98.4 °F (36.9 °C) 94 18 111/86 95 %   01/12/23 0421 -- 94 18 -- 97 %   01/12/23 0322 98.1 °F (36.7 °C) 95 18 135/77 98 %   01/12/23 0054 -- 99 18 -- 91 %   01/12/23 0023 -- (!) 104 18 -- 90 %   01/11/23 2345 97.2 °F (36.2 °C) 100 18 126/68 92 %   01/11/23 1916 98.6 °F (37 °C) 92 16 121/73 95 %   01/11/23 1608 97.7 °F (36.5 °C) 73 17 121/69 96 %   01/11/23 1508 -- 79 -- -- 93 %   01/11/23 1120 -- 89 -- -- 96 %   01/11/23 1104 98.4 °F (36.9 °C) 77 17 115/78 95 %       Oxygen Therapy  SpO2: 92 %  Pulse via Oximetry: 100 beats per minute  Pulse Oximeter Device Mode: Continuous  Pulse Oximeter Device Location: Left, Finger  O2 Device: None (Room air)  O2 Flow Rate (L/min): 0 L/min    Estimated body mass index is 34.44 kg/m² as calculated from the following:    Height as of this encounter: 5' 10\" (1.778 m). Weight as of this encounter: 240 lb (108.9 kg). Intake/Output Summary (Last 24 hours) at 1/12/2023 1036  Last data filed at 1/12/2023 0908  Gross per 24 hour   Intake 1020 ml   Output 300 ml   Net 720 ml         Physical Exam:  General:    Well nourished. No overt distress  Head:  Normocephalic, atraumatic  Eyes:  Sclerae appear normal.  Pupils equally round. HENT:  Nares appear normal, no drainage. Moist mucous membranes  Neck:  No restricted ROM. Trachea midline  CV:   RRR. No m/r/g. No JVD  Lungs:   CTAB. No wheezing, rhonchi, or rales. Respirations even, unlabored  Abdomen:   Soft, nontender, nondistended. Extremities: Warm and dry. No cyanosis or clubbing. No edema.     Skin:     No rashes. Normal coloration  Neuro:  CN II-XII grossly intact. Psych:  Normal mood and affect.     Signed:  Arline Giposn,   1/12/2023

## 2023-01-12 NOTE — RT PROTOCOL NOTE
Respiratory Care Services     Policy Number: [id number]    Title: Oxygen Protocol    Effective Date: 01/1996    Revised Date: 06/2013, 02/29/2016, 4/2018, 7/2019    Reviewed Date: 05/2014, 03/2015, 06/2017, 11/2020        I. Policy: The Oxygen Protocol will be initiated for all patients upon written order from physician for administration of oxygen therapy or if a patient is found to have an oxygen saturation of 88% or less. Special consideration: the goal of oxygen therapy for COPD patients is to maintain oxygen saturation between 88% - 92% to comply with GOLD Guidelines. II. Purpose: To provide protocol driven respiratory therapy for the administration of oxygen at concentrations greater than that in ambient air with the intent of treating or preventing the symptoms and manifestations of hypoxia. III. Responsibility: Director Respiratory Care Services, all Respiratory Care Practitioners     IV. Indications:   Implement this protocol for patients when physician orders oxygen to be administered or when patient is found to have an oxygen saturation of 88% or less. To assure routine monitoring of patient's oxygen saturation b.i.d. and to make appropriate adjustments in accordance with ordered oxygen saturation parameters. To assure continuity of respiratory care that meets [facility] Clinical Practice Guidelines and GOLD Guidelines. Hb < 8  Sickle Cell anemia crisis    V. Assessment:  Assess the following parameters to determine the need to adjust oxygen:  Measurement of patient's oxygen saturation via pulse oximetry. Observation of patient's color, respiratory effort, and responsiveness. Measurement of heart rate and respiratory rate. Complete a three-step ambulatory oxygen saturation when ordered. VI. Initiation:  Upon receipt of an order for oxygen, the RCP will:   Verify order in the patient's EMR, which should include the desired oxygen saturation to be maintained.   The patient shall be placed on oxygen with humidity (except for those oxygen delivery devices that do not require humidity, i.e. venturi masks and non-rebreather masks) as ordered by the physician to achieve the prescribed oxygen saturation. In the event that no saturation is specified, a saturation of 90% will be maintained. Patients, who are found to have a SaO2 of 88% or less, may be started on supplemental oxygen as described above. Patients admitted with cardiac problems/disease shall be maintained at 92% per Chest Committee recommendation. The patient will be informed of the \"no smoking policy\" and instructed in the proper use of oxygen therapy. Once desired oxygen saturation has been achieved, the RCP will document FIO2 and oxygen saturation in the respiratory section of the patient's EMR. VII. Maintenance:   30-second oxygen saturation check will be taken to maintain the saturation ordered by the physician each day. Patients will be assessed each shift and as needed by pulse oximetry to determine if oxygen needs to be decreased, increased or discontinued. If changes in FIO2 are indicated, all changes will be documented in the respiratory section of the patient's EMR. If no changes in FIO2 are required, the patient's oxygen flow rate and saturation will be recorded in the respiratory section of the patient's EMR. Per [facility] Pulmonary, patients who are receiving oxygen therapy but are not on oxygen at home, should be weaned off oxygen as soon as possible or when anticipated discharge becomes evident. Oxygen will be discontinued after oxygen saturation has been maintained for 24 hours on room air and documented in the patient's EMR. Patients on the Inpatient Rehabilitation area on 9th floor will be exempt from having their oxygen discontinued per protocol. Oxygen may be weaned but will be changed to prn to meet the needs of the patient when exercising and participating in therapy.   The goal of oxygen therapy is to maintain patients with a diagnosis of COPD at oxygen saturation between 88% - 92% to comply with GOLD Guidelines. VIII. Safety: RCP will address the following safety issues:  Identify patient using the two patient identifiers name and birth date via ID bracelet. Perform hand hygiene per hospital policy utilizing Standard Precautions for all patients and following transmission-based isolation as indicated per hospital policy. Cardiac patients will be maintained at an oxygen saturation of 92%. If a patient's FIO2 requirements necessitate changing oxygen delivery devices to a high concentration of oxygen, documentation indicating the change must be included in the progress notes, as well as in the respiratory flowsheet. If a patient has a hemoglobin level <8 mg. RCP will consult physician before discontinuing oxygen. IX. Interventions:   RCP will assess patient for signs of respiratory distress or suspicion of CO2 retention. An ABG may be obtained for patients exhibiting respiratory distress or sickle cell crisis. An order should be entered into patient's EMR for ABG under per protocol. X. Documentation  Document assessment findings in the respiratory section of the patient's EMR. Document changes in therapy per protocol in the respiratory orders section and in the care plan section of the patient's EMR. Document patient education in the patient education section of the patient's EMR. XI. Reportable Conditions:  Report to the physician immediately:  Acute changes in patient's respiratory status. An oxygen saturation <85%. A change in oxygen delivery device to provide a high concentration of oxygen. XII.  Patient Instructions: Review with Patient  Purpose of oxygen therapy  Proper technique for using oxygen  No smoking policy    Approval: Pulmonary Committee (1-25-96)  Revision: Chest Committee (4-28-05)    L - Respiratory Care Department Policy, Procedure and Protocol Guideline Manual, 1995, Jermaine MANNING - Therapist Driven Respiratory Care Protocols - A Practitioner's Guide for Criteria-Based       Respiratory Care by Dirk Sorensen M.D., and LYDIA Lucia - The rationale for therapist-driven protocols: an update. Respiratory Care 1998. [facility] Clinical Practice Guidelines. Respiratory Care Services       Policy Number: [id number]    Title: Patient Care Assessment Protocol    Effective Date: 01/1999    Revised Date: 05/2014, 04/2018, 12/2018, 07/2019    Reviewed Date: 06/2013/ 03/2015, 03/2016, 06/2017, 11/2020        Overview  In an effort to improve quality and reduce costs of respiratory care at [facility], the Respiratory Department has developed a number of Patient Care Protocols. These protocols have been developed according to Ethel 3 and are utilized for those patients who are ordered respiratory therapy using therapeutic indications and standardized approaches for accomplishing objectives. Patient Care Protocols are intended to improve care by:  Defining the indications and standards of care agreed upon by the Pulmonary Medicine and [address] [place] [address] of [facility]. Training respiratory care practitioners to apply those criteria to individual patients and modify therapy as indicated by the protocols. Documenting the indication and care plan as part of the initial ordering process. Tapering or discontinuing treatments once the indication for therapy changes. The Patient Care Protocols shall be universally applied throughout the hospital as determined by   the Pulmonary Medicine and [address] [place] [address].     Rationale for Patient Care Assessment Protocols:  Continuous Quality Improvement  Cost containment  Standardization of care  Enhanced continuity of care  Utilization review  Timely intervention    The following patient care assessment protocols have been developed:  Aerosolized Medication Protocol   Bronchial Hygiene Protocol   Oxygen Protocol  CVRU Fast Track Weaning Protocol   Asthma Treatment Protocol ER  Pediatric Asthma Treatment Protocol ER  Alpha-1 Antitrypsin Deficiency Protocol  Prone Positioning Protocol   COPD Protocol   Home Oxygen Assessment Protocol  Ventilator Weaning Protocol   Lung Volume Expansion Protocol    The Director of Respiratory Care Services oversees the Patient Care Assessment Program. The Respiratory Educator is responsible for protocol development and training. The Supervisor is responsible for implementation and  activities. Each patient with an order for respiratory treatments will receive an evaluation. Respiratory Care Practitioners (RCP's) will perform the evaluations. The same evaluation tool will be utilized for initial and follow-up assessments. If the patient does not meet criteria for ordered therapy, the therapy will be discontinued. If the patient demonstrates an adverse response to initially ordered therapy, the therapy will be discontinued and the physician will be contacted. Specific physician's orders that deviate from protocols and are deemed \"inappropriate\" or \"unsafe\" will be addressed with ordering physician and/or medical director as required. Respiratory Patient Care Assessment Protocols    I. Policy: In an effort to provide quality patient care and effective utilization of services, physicians who order respiratory therapy will have their patients treated via the protocols established (see attached) Respiratory Care Practitioners (RCP's) will complete the initial assessment which will indicate patient needs,  the care plan developed and will performed within 24 hours of admission. Frequency of the therapy will be set according to the results of the respiratory therapy evaluation and frequency guidelines policy.  Reassessment will be continued every 48 hours and more frequently as needed for the individual patient. II. Purpose: To provide a process that will allow for ongoing assessment and care plan modification for patients receiving respiratory services based on both objective and or subjective patient responses to interventions. This process of protocol utilization will assist in patient care progression while eliminating the need for the physician to continually update respiratory therapy orders. To assure continuity of respiratory care that meets [facility] Clinical Practice Guidelines. III. Initiation:  Implement Respiratory Care Protocols for patients who are ordered by physician          to receive respiratory therapy procedures or for ventilator management. IV. Protocol:  Upon receiving an order for therapy the RCP will review the patient's EMR (electronic medical record) for all pertinent information including:  [de-identified] order for therapy  Patient history and physical examination  Physician progress notes  Diagnostic. X-rays, PFT's, arterial blood gases etc.  The RCP will perform a respiratory assessment in the following manner:  General observations: color, pattern and effort of breathing, chest expansion, (symmetrical and bilateral), level of consciousness and the ability to ambulate. The RCP will assess patient's cough ability and determine if bronchial hygiene is needed. If patient is unable to produce sputum, at that time, the RCP should question the patient with regard to their sputum: production, color consistency, frequency and amount. Auscultation: Using a stethoscope, the RCP will listen and note quality of breath sounds and presence or absence of adventitious breath sounds in all lung fields, both anteriorly and posteriorly. Upon completing the EMR review and physical assessment, the RCP will document findings in the RT Assessment section of the EMR.  The score level will be provided and will be used to determine the frequency of therapy. V. Indications:   A. Bronchial Hygiene Protocol indications:   Potential for or presence of atelectasis. Need for hydration and removal of retained secretions. Need for improvement of cough effectiveness. Presence of conditions associated with disorder of pulmonary clearance:  Cystic fibrosis  Bronchiectasis  Neuromuscular disease  Obstructive lung diseases  Restrictive lung diseases   Aerosolized Medication(s) Protocol indications:Treatment of bronchospasm/wheezing  Improvement of mucociliary clearance  Treatment of stridor  History of Bronchiectasis, Asthma or COPD  Oxygen Therapy Protocol indications:   Documented hypoxemia  Severe trauma  Acute myocardial infarction  Short-term therapy (e.g. post anesthesia recovery)  CVRU Ventilator Weaning Protocol indications:  1. All mechanically ventilated surgical patients unless they have a no wean order. E. Asthma Treatment Protocol ER indications:  1. Patients 15years of age and older that have been triaged or diagnosed with   asthma exacerbation shall be indicated for the ER Asthma Treatment Protocol. A physician order will be required to initiate the protocol. F. Pediatric Asthma protocol in the ER indications:  1. Patients less than 15years old that have been triaged or diagnosed with asthma exacerbation shall be indicated for the Pediatric Asthma protocol. A physician order will be required to initiate the protocol. G. Alpha-1 Antitrypsin Deficiency Testing protocol indications:         1. Patients admitted and diagnosed with COPD. H. Prone Positioning Protocol indications:         1. Acute lung injury         2. Acute respiratory distress syndrome (ARDS)   I. Respiratory Care COPD Protocol indications:         1. History of COPD in patient's records         2. Smoking history   J. Home Oxygen Assessment Protocol indications:         1. Chronic lung disease         2. Cor pulmonale         3.  Unable to wean to room air 48 hours prior to anticipated discharge. K.  Ventilator Weaning Protocol indications:         1. Patient's mechanically ventilated          2. Managed by intensivist  L. Lung Volume Expansion Protocol indications:        1. Any patient at risk for pulmonary complications. VI. Maintenance:    Timely patient assessment is an integral part of this protocol therefore the following will be applied: All non- critical care patients will be evaluated upon receiving initial respiratory care orders within 24 hours and re-evaluated within 48 hours (or more as needed). Orders requesting a Respiratory Consult will be responded to in the following manner: In patient emergency situations, the RCP assigned to the floor will respond immediately to the patient, provide an initial respiratory assessment, and contact the patient's physician as necessary for appropriate orders. In non-emergent situations, the RCP assigned to the floor will respond to the patient within 90 minutes and provide an initial respiratory assessment and contact patient's physician as necessary for appropriate orders. An RCP will provide a comprehensive assessment as soon as possible. Upon completion of an evaluation, the RCP will complete documentation in the patient's EMR in the RT Assessment section. The RCP who completes the assessment will document orders for therapy in the orders section of the patient's EMR selecting new order. Next, per protocol should be selected indicating it is a protocol order and sign orders should be selected to complete the process. The applicable protocol must be added to the progress note per Joint Commission guidelines. The Pharmacy and Therapeutics (P&T) Committee has mandated that the medication Xopenex may be changed to unit dose albuterol without an order, except for those patients receiving Xopenex due to cardiac arrhythmias.    The dosage for these patients should be 0.63 mg. and may be changed from 1.25 mg. to 0.63 mg per P & T Committee by the RCP completing the assessment. Patients who are not experiencing cardiac arrhythmias, and are ordered Xopenex and Atrovent may be changed to Duoneb. VII. Safety: The following safety issues shall be monitored: The RCP will perform hand hygiene per hospital policy utilizing Standard Precautions for all patients and following transmission-based isolation as indicated per hospital policy. The RCP must exercise professional judgment in classifying the patient for frequency of therapy. Appropriate classification of the patient will require an evaluation utilizing the Therapy Assessment Protocol Guidelines. The RCP will confer with the physician concerning the care of the patient at any time questions or problems arise. If during therapy, the patient exhibits no improvement, or deterioration in clinical status the RCP will notify the physician and the patient's nurse. VIII. Interventions: The patient's nurse is responsible concerning all items related to his/her care. Ongoing communication with nursing is essential to successful protocol management. The RCP recognizes the value of the team approach in meeting the patient's needs. Nursing input regarding the patient's pulmonary condition will be sought as needed. IX. Reportable conditions: The RCP will inform the physician if:  There are acute changes in patient's respiratory status. The therapist is unable to determine appropriate care plan upon assessment. The patient fails to reach therapeutic objective. A change or additional medication is needed. X.  Patient Education:    Patient will receive instruction on the following: The treatment modality, including objectives and proper technique of therapy  Respiratory medications  Documentation shall occur in the patient education section of the patient's EMR. XI. Documentation: Record all findings as described above in the patient's EMR.     Related Protocols: A. Aerosolized Medication Protocol  Bronchial Hygiene   Oxygen Protocol   [facility] Fast Track Weaning Protocol  Asthma Treatment protocol ER  Alpha-1 antitrypsin Deficiency Protocol  Prone Positioning Protocol  Respiratory Care COPD Protocol  Home Oxygen assessment Protocol  Ventilator Weaning Protocols   Volume Expansion protocol    Indications      Frequency          Level  A. Aerosol therapy   1.  q4h     Severe SOB, wheezing, unable to sleep 1   2.  qid, q4 wa or q6h   Moderate SOB, wheezing   2   3.  tid      Hx of asthma, or COPD mild wheezing,         or facilitate secretion removal              3   4.  bid      Asthma, or COPD, Intermittent wheezing 4   5. PRN, i.e. tid PRN, qid PRN Asthma, or COPD, occasional wheezing 5       B. Bronchopulmonary Hygiene    1. q4h             Copious secretions, SOB, unable to sleep 1   2. qid & PRN            Moderate amounts of secretions   2   3. tid           Small amounts of secretions and poor cough,               history of secretions    3    4. PRN, i.e. tid PRN, qid PRN     Breathing exercises, encourage cough only 4      C. Oxygen Therapy     Follow hospital approved Oxygen Protocol      Note:  qid treatments are due 0800, 1200, 1600, and 2000. tid treatments are due 0800, 1400, and 2000  Q6h treatments are due 0800, 1400, 2000, 0200  Q4 wa teatments are due 0800, 1200, 1600, and 2000. Q4h treatments are due  0800, 1200, 1600, 2000, 0000, and 0400. The Level 1-5 rating system is only to be used as criteria for determining appropriate frequency of therapy. References:   N   Joint Commission Consolidated Julius Standard   L    Respiratory Care Department Policy, Procedure and Protocol Guideline Manual, 1995, SABRINA Hill. L  Therapist Driven Respiratory Care Protocols - A Practitioner's Guide for Criteria-Based Respiratory Care by Marquez Quezada M.D., and SABRINA Carrington, NASREEN. L  The rationale for therapist-driven protocols: an update.  Respiratory Care 1998; [id number]   L Therapist Driven Respiratory Care Protocols - A Practitioner's Guide for Criteria-Based Respiratory Care by Pedro Medrano M.D., and LYDIA Peraza The rationale for therapist-driven protocols: an update. Respiratory Care 1998; [id number]. N   [facility] Clinical Practice Guidelines. The RCP will perform a respiratory assessment in the following manner:  Perform hand hygiene per hospital policy utilizing Standard Precautions for all patients and following transmission-based isolation as indicated per policy. Identify patient via ID bracelet verifying patient name and birth date. General observations: color, pattern and effort of breathing, chest expansion, (symmetrical and bilateral), level of consciousness and the ability to ambulate. The RCP will assess patients cough ability and determine if Nasotracheal suctioning is needed. If patient is unable to produce sputum, at that time, the RCP should question the patient with regard to their sputum: production, color consistency, frequency and amount. Auscultation: Using a stethoscope, the RCP will listen and note quality of breath sounds and presence or absence of adventitious breath sounds in all lung fields, both anteriorly and posteriorly. Upon completing the EMR review and physical assessment, the RCP will document findings in the RT Assessment section of the EMR. The score level will be provided and will be used to determine the frequency of therapy. V. Indications:   A. Indications for Bronchial Hygiene Protocol will include:  Potential for or presence of atelectasis. Need for hydration and removal of retained secretions. Need for improvement of cough effectiveness.   Presence of conditions associated with disorder of pulmonary clearance:  Cystic fibrosis  Bronchiectasis   Indications for Aerosolized Medication(s) Protocol should include:  Treatment of bronchospasm/wheezing  Improvement of mucociliary clearance  Treatment of stridor  History of Asthma or COPD             C.  Indications for Oxygen Therapy Protocol should include:  Documented hypoxemia  Severe trauma  Acute myocardial infarction  Short-term therapy (e.g. post anesthesia recovery)    VI. Maintenance:    Timely patient assessment is an integral part of this protocol therefore the following will be applied: All non- critical care patients will be evaluated upon receiving initial respiratory care orders within 24 hours and re-evaluated within 48 hours (or more as needed). Orders requesting a Respiratory Consult will be responded to in the following manner: In patient emergency situations, the RCP assigned to the floor will respond immediately to the patient, provide an initial respiratory assessment, and contact the patients physician as necessary for appropriate orders. In non-emergent situations, the RCP assigned to the floor will respond to the patient within 90 minutes and provide an initial respiratory assessment and contact patients physician as necessary for appropriate orders. An RCP will provide a comprehensive assessment as soon as possible. Upon completion of an evaluation, the RCP will complete documentation in the patients EMR in the RT Assessment section. The RCP who completes the assessment will document orders for therapy in the orders section of the patients EMR selecting new order. Next, per protocol should be selected indicating it is a protocol order and sign orders should be selected to complete the process. The Pharmacy and Therapeutics (P&T) Committee has mandated that the medication Xopenex may be changed to unit dose albuterol without an order, except for those patients receiving Xopenex due to cardiac arrhythmias. The dosage for these patients should be 0.63 mg. and may be changed from 1.25 mg. to 0.63 mg per P & T Committee by the RCP completing the assessment.   Patients who are not experiencing cardiac arrhythmias, and are ordered Xopenex and Atrovent may be changed to Duoneb. VII. Safety: The following safety issues shall be monitored: The RCP will perform hand hygiene per hospital policy utilizing Standard Precautions for all patients and following transmission-based isolation as indicated per hospital policy. The RCP must exercise professional judgment in classifying the patient for frequency of therapy. Appropriate classification of the patient will require an evaluation utilizing the Therapy Assessment Protocol Guidelines. The RCP will confer with the physician concerning the care of the patient at any time questions or problems arise. If during therapy, the patient exhibits no improvement or deterioration in clinical status the RCP will notify the physician and the patients nurse. VIII. Interventions: The patients nurse is responsible concerning all items related to his/her care. Ongoing communication with nursing is essential to successful protocol management. The RCP recognizes the value of the team approach in meeting the patients needs. Nursing input regarding the patients pulmonary condition will be sought as needed. IX. Reportable conditions: The RCP will inform the physician if:  There are acute changes in patients respiratory status. The therapist is unable to determine appropriate care plan upon assessment. The patient fails to reach therapeutic objective. A change or additional medication is needed. X.  Patient Education:    Patient will receive instruction on the following: The treatment modality, including objectives and proper technique of therapy  Respiratory medications  Documentation shall occur in the patient education section of the patients EMR. XI. Documentation: Record all findings as described above in the patients EMR. Related Protocols: A.  Aerosolized Medication Protocol  Bronchial Hygiene  Oxygen Protocol   Volume Expansion/Secretion Clearance  Ventilator Weaning Protocols    References:  N   Joint Commission Consolidated Julius Standard   L    Respiratory Care Department Policy, Procedure and Protocol Guideline Manual, 1995, SABRINA MANNING  Therapist Driven Respiratory Care Protocols - A Practitioners Guide for Criteria-Based Respiratory Care by Adrien Loredo M.D., and SABRINA June, NARSEEN. L  The rationale for therapist-driven protocols: an update. Respiratory Care 1998; [facility] [facility] Guidelines. Respiratory Care Services     Policy Number: [id number]    Title: Bronchial Hygiene Protocol    Effective Date: 01/1999    Revised Date: 12/2014, 11/2017, 7/2019    Reviewed Date: 06/2013, 05/2014, 03/2015, 03/2016, 06/2017, 4/2018, 11/2020     I. Purpose: The Respiratory Care Practitioner (RCP) will utilize the following protocol to select and initiate bronchial hygiene therapy to open and maintain obstructed airways when indicated. II. Patients: All patients who are ordered bronchial hygiene therapy. III. Clinical Area: All general patient floors     IV. Protocol: The following conditions or diseases are indications for bronchial hygiene                           therapy. Oscillating PEP Therapy        Indications should include: Atelectasis caused by mucus plugging or foreign body  Chronic mucociliary clearance disorders  Retained secretions which may be associated with the following conditions:  Bronchitis  Bronchiectasis  Pneumonia  PAP- Positive airway pressure therapy  Indications include:  Patients with post-operative atelectasis or to prevent post operative atelectasis. Patients who cannot perform deep breathing exercises due to pain. Patients requiring lung expansion therapy who cannot follow instructions. Patients requiring lung expansion therapy with poor inspiratory capacity <10cc/kg.   Patients requiring aerosol therapy in conjunction with opening their airways. Vibratory / Acoustical Airway Clearance Therapy (ACT)- i.e. (Vibralung, Vest, or Percussor)  Indications should include  Patient conditions  that involve retained secretions, increased mucus production and defective mucociliary clearance such as:  Cystic fibrosis  Chronic bronchitis  Bronchiectasis  Pneumonia  Asthma  Muscular dystrophy  Post-operative atelectasis  Neuromuscular respiratory impairments  ACT may be considered in patients with COPD with  symptomatic secretion retention, guided by patient preference,  toleration, and effectiveness of therapy Adrian Mcgee et al., 2013). Nasotracheal suctioning indications should include:  Inability to cough effectively  Excessive secretions  Artificial airway      V. Equipment:   A. PEP therapy device   B. Vest therapy equipment   Bernice Mean   D. AccuPap   Juju Lento NT suction equipment       VI. Guidelines:   Monitor patient's vital signs and evaluate patient's clinical status. The need to                                change therapy modality may be indicated by:  Change in patient's sensorium (patient now confused or obtunded, and unable to follow directions). A significant deterioration is evident on patient's chest radiograph or increased sputum production. Increased thickening of secretions (e.g. mucolytic therapy may be indicated.)  Development of wheezing  Decrease in oxygen saturation  Development of chest pain. VII. Clinical Responsibility: The Therapy Assessment Protocol guidelines will be used to                re-evaluate all patients on bronchial hygiene therapy (See Therapy Assessment Protocol). RCP's will perform changes in therapy according to protocol. .  Bronchial hygiene therapy may be discontinued when goals of therapy are met, e.g., secretions easily expectorated for 48 hours, atelectasis is resolved, etc.  PAP Therapy may be utilized in place of IPPB therapy per discretion of the RCP, as approved by the [facility]monary [facility] and [address] [place] [address]. VIII. Outcome Criteria:  Outcome criteria for bronchial hygiene therapy should include:  Decrease in sputum production  Improved breath sounds  Improved arterial oxygen tension and/or SaO2  Improved chest X-ray  Subjective response to therapy    IX. Documentation  Document assessment findings in the respiratory assessment section of the patient's EMR. Document changes in therapy per protocol in the respiratory orders section and in the care plan section of the patient's EMR. Document patient education in the patient education section of the patient's EMR. X. Related Protocols:  Respiratory Patient Care Assessment Protocols  Aerosolized Medication Protocol  Oxygen Therapy Protocol        Reference:    L - Respiratory Care Department Policy, Procedure and Protocol Guideline Manual, 1995, SABRINA Hill. L - Therapist Driven Respiratory Care Protocols - A Practitioner's Guide for Criteria-Based         Respiratory Care by Garrison Zarate M.D., and SABRINA Caban, NASREEN. N - [facility] Clinical Practice Guidelines. Misael Sandra., Asia Alves., Debora Argueta., Arron Councilman., Mario Redd., . . . ISHAAN Lees (2013, December). [facility] Clinical Practice Guideline: Effectiveness of Nonpharmacologic Airway Clearance Therapies in Hospitalized Patients. Respiratory Care, 58(12), 8592-7411. Retrieved June 28, 2019          Respiratory Care Services     Policy Number: [id number]    Title: Noninvasive Positive Pressure Ventilation, (NIPPV)    Effective Date: 06/2005, 06/2017    Revised Date: 9/2012, 07/2014, 07/2015, 1/2016, 5/2018, 4/2019    Reviewed: 09/2019, 09/2020   Description: Non Invasive Ventilation (NIV) is a ventilatory assist technique used as an alternative to endotracheal intubation.  NIV is pressure ventilation delivered as BIPAP® (Bi-level Positive Airway Pressure) which is a low pressure electronically driven device intended for use as a ventilatory support system for patients who have an intact respiratory drive. The device provides non-invasive ventilatory assistance through the use of a nasal or full face mask. Bi-Level  (two levels of ventilatory support) known as inspired positive airway pressure (IPAP) and  positive airway support (EPAP). One level of support can also be delivered which is delivered as EPAP or CPAP which is delivered throughout the respiratory cycle. The aim is to maintain adequate ventilation and minimize the effort of breathing. The BREAS Vivo 50, BIPAP® Vision System and the Respironics V60 are the systems available for non-invasive ventilation. These devices are also capable of being used for invasive ventilation in the critical care units. BIPAP Vision: This device uses an electronic pressure control sensing monitor pressure differential in the patient circuit. This feedback allows for adjustment of the flow and pressure output  to assist in inhalation or exhalation through the administration at two distinct levels of positive pressure. During inspiration, the level is variably positive and is always higher than the expiratory level. During exhalation, pressure is variably positive or near ambient. In addition, this device has the ability to compensate for leaks through automatic adjustment of the trigger threshold. This capability allows for the application of BIPAP®  for mask-applied ventilation assistance. Respironics V60  The Respironics V60 uses Auto-Trak technology to help ensure patient synchrony and therapy acceptance and is designed to address the specific challenges of NIV. By providing auto-adaptive leak compensation, inspiratory triggering, and expiratory cycling, Auto-Trak delivers optimal synchrony in the face of dynamic leak and changing patient demand.  The Respironics V60 is designed to include pediatric use and is equipped with several modes, which allows the practitioner to meet the specific needs of the patients. See Appendix 1 for definitions of settings. ENEDELIA Vivo 50   The Vivo 50 ventilator (with or without the SpO2 and CO2 sensor) is intended to provide continuous or intermittent ventilatory support for the care of individuals who require mechanical ventilation. The Vivo 50 with the SpO2 sensor is intended to measure functional oxygen saturation of arterial hemoglobin (%SpO2) and pulse rate. The Vivo 50 with the CO2 sensor is intended to measure CO2 in the inspiratory and expiratory gas. The device is intended to be used in home, institution, hospitals and portable applications such as wheelchairs and gurneys. It may be used for both invasive and non-invasive ventilation. Suitable for a wide range of pathologies and for patients with changing requirements over time. Multiple circuits: dual limb with exhaled volume measurements, single limb with exhalation valve or leakage port. Multiple modes - Pressure and Volume Modes with Target Volume and SIMV  Policy: The administration of non-invasive positive pressure ventilation (NPPV) will be the responsibility of the Respiratory Care Practitioner (RCP). Upon receipt of a physician order, proper patient instruction, set up and monitoring will be provided to ensure patient understanding, compliance and proper utilization of prescribed therapy. A patient may be allowed to use their own NPPV machine after having their equipment checked and approved by [employer]. A consent and Release for Home Ventilator form must be signed by the patient and witnessed by a health care provider if the patient chooses to use his home ventilator. A patient that is being treated for acute respiratory failure and placed on non-invasive ventilation must be placed in a critical care unit, per Medical Director. D.  A patient who is being treated for sleep apnea may be treated on the floors. E.   A patient has the option of using a hospital owned NPPV unit.    F.   A patient may use a hospital unit and their own mask if they choose. G. Patients may be placed on full face mask with NPPV units on the hospital floors under the following conditions:  Patient has an end stage disease process. Patient was placed on full face mask and NPPV unit in the Critical Care Units and it has been established as safe and effective therapy. Patient is ordered full face mask with NPPV unit for home use. In the event patient is to be set up or transitioned to home on a NPPV unit, the Respironics V60 (in the AVAPS mode) shall be utilized while the patient is in the hospital. If a Dorsie Lansdowne is unavailable, a home NPPV unit may be provided by the DME provider for no more than 48 hours. III. Responsibility: Director, Silvino Reed, and all Respiratory Care Practitioners with documented competency. IV. Indications for non-invasive ventilation:  Acute respiratory failure (Patient must be in Critical Care Unit)  Chronic respiratory failure  Alveolar hypoventilation  Documented sleep apnea  V. Contraindications:  Patients with severe respiratory failure without a spontaneous respiratory drive  Noninvasive ventilation may be contraindicated for patients with the following:  Inability to maintain a patent airway or adequately clear secretions  Acute sinusitis or otitis media  Risk for aspiration of gastric contents  Hypotension  Pre-existing pneumothorax or pneumomediastinum  Epistaxis  Recent facial, oral or skull surgery or trauma  history of allergy or sensitivity to mask materials where the risk from allergic reaction outweighs the benefit of ventilatory assistance  C. Potential Complications:  Cardiovascular compromise  Skin breakdown and discomfort from mask  Gastric distention  Increased intracranial pressure  Pulmonary barotraumas    VI. Mask fit  It is essential that the patient be correctly fitted with an appropriate size mask.   Choose mask according to sizing template on disposable setups. The mask should fit comfortably on the patient's nose, not occluding the nares and the base of the mask should fit comfortably between the chin and bottom lip see picture on setup guide. Headgear should fit comfortably not tight. The bottom edge of the headgear should sit at the base of the nape of the neck with side straps underneath the earlobes. The face masks are better for patients who are dyspneic and tachypneic as they tend to mouth breathe with a nasal mask and reduce the effectiveness of the ventilation. Masks that are too tight are uncomfortable and may cause pressure areas. Masks that are too loose leak which reduce the efficiency of the system. When using a nasal mask the patient must keep their mouth closed to obtain the desired effect of the ventilation. May place an Allevyn Gentle Border dressing over bridge of nose to avoid skin breakdown. VII. Procedure for Non-invasive ventilation via Vivo 50:   Refer to 's recommendations. VIII. Procedure for non-invasive ventilation using the V60 or BiPAP ® Vision:            Refer to [facility] recommendations. IX. Monitoring:  While in use, the RCP should check the patient and non-invasive unit no less than every four hours. Failure of NIV should be suspected if:   The patient is unable to maintain adequate oxygenation, decreasing 02 saturations despite increases in 02. There is a reduction in neurological or conscious state. The patient has excessive secretions or increasing respiratory rate. Failure of PaCO2 or PH to improve on ABG sample. The patient has poorly compliant lungs. X. Weaning               A. Weaning or cessation of non-invasive ventilation should occur under the following                        conditions:  PaC02 returns to normal and patient maintains O2 saturations with minimal                oxygen.   Respiratory rate is returned within normal limits. Patient is unable to tolerate non-invasive ventilation. Patient's dyspnea is reduced. Patient exhibits normal overnight ventilation. Patient is receiving palliative treatment. XI. Documentation:  Documentation should include the following:  Ventilator settings comply with physician order. Ventilator is functioning properly as evidenced by a check of measured volumes, rates, pressures and FIO2. Alarms are set appropriately. Measured inspired gas temperature (invasive mode only)  Vital signs (pulse, respiratory rate, oxygen saturation, breath sounds)  Patient tolerance to therapy. Manual resuscitator and appropriate size mask at patient bedside      REFERENCES  Respironics V60 user Manual .. \. Harry Alvarez \Equipment\[id number]-Manual.pdf    VIVO 50 clinical Guide . Harry Espinosam \. Harry Salm \Equipment\VIVO 50 [id number]. pdf     [address] [place] and Respiratory Care Section. RADHA Lubin 2001. Introducing non-invasive positive pressure ventilation. Nursing Standard. 15,26, 42-45. Kevin Kumar. 2003. Using non-invasive ventilation in acute wards: part 2. Nursing Standard. 18,1, 41-44. Shelley 47. Use of continuous positive airway pressure (CPAP) in the critically ill-physiological principles. Critical Care 12 [phone]) [phone]     OLGA Hanna2002. Bi-level positive airway pressure (BiPAP) and acute cardiogenicpulmonary edema   ( ACPO) in the emergency department. Critical Care 15 (2):51-63        DEFINITIONS AND USUAL SETTINGS                                                            APPENDIX 1                      Settings   Settings in  Active   CPAP Settings in  Active   BiPAP Description Range Usual Setting  Used in NIV         CPAP Mode                     Continuous Positive Airway  Pressure   4-24 cmH20 8-14     ST or  BiPAP MODE                           Spontaneous Timed or BiLevel Positive Airway Pressure Ventilation.  Two level system of alternating during non- invasive ventilation in sync with breathing-set IPAP and EPAP      __     __   AVAPS Mode    (only available  on V60)          The volume-targeted AVAPS (average volume-assured pressure support) mode combines the attributes of pressure-controlled and volume-targeted ventilation. __     __       EPAP                                       Positive Airway Pressure. Recruits under ventilated alveoli to remain open during expiration by providing a constant pressure throughout resp. cycle. Must be less than or equal to IPAP    4-25 cmH20 5-14                 Settings Settings in Active CPAP Settings in Active BiPAP Description Range Usual Setting Used in NIV     IPAP                           Inspired Positive Airway Pressure provides pressure throughout the inspiratory phase to support patient ventilation  4-40 cmH20 10-20               I-time                    Inspiratory time- time taken to inspire in seconds  0.3 - 3.0 sec 1:3   FIO2                   Oxygen delivered  21- 100% As ordered         Ramp time                                      The Ramp Time function helps your patient adapt to ventilation by gradually increasing inspiratory and expiratory pressure over a set interval (minutes). This time gradually delivers pressures so to reduce patient anxiety and increases comfort. Off  or  5-45 minutes 10 minutes                   Rate (RR)          Patient's respiratory rate 4- 60 BPM 4     Rise time          Speed at which the inspiratory pressure rises to the set pressure. 1-5  (1 is the fastest) Set at 3         Patient Data  Data Description Range Usual setting in NIV   Breath phase/trigger Indicator  Bar in left hand corner. Colored according to breath trigger.   n/a n/a   PIP Positive Inspiratory pressure  0-50 n/a   Patient total leak Est. or unintentional leak  0-200 n/a   Patient trigger Patient triggered breaths as a percentage  0-100% Should be 100%   Respiratory rate Respiratory rate 0-90 n/a   Ti/Ttot Inspiratory duty cycle or inspiratory time divided by total cycle time  0-91% n/a   Minute volume Est. minute ventilation. TV x rate=MV  0-99 LPM n/a   Tidal volume Est.  tidal volume  0-3000 ml n/a     Alarms  Alarm Description Range Set  at   [facility] Rate High respiratory rate 5-90 10 breaths above patient's breath rate   Low Rate Low respiratory rate alarm 1-89 BPM 5 breaths below patient's breath rate   Hi VT High tidal volume alarm 200-2500 ml 200 ml above patient's own   Low VT Low tidal volume alarm OFF - 1500 ml OFF   HIP High Inspiratory pressure alarm 5-50 cm H20 10 cm above IPAP   LIP Low inspiratory pressure alarm OFF, 1-40 cmH20 OFF   Lo VE Low minute vent alarm OFF, 0.1 to 99L/min OFF   LIP T Low inspiratory delay time 5-60 seconds 5 seconds       CPAP Settings BiPAP Settings     Set CPAP level as ordered Set breath rate as ordered     Set FIO2 as ordered Set I-time as 1.3     Set Ramp time as ordered Set EPAP as ordered     Set C-Flex at 3 Set IPAP as ordered      Set Rise time as ordered      Set FIO2 as ordered           Respiratory Care Services  Consent and Release for Home Ventilator      Patient Name: _________________________________________ Room: ___________    SSN: _______________________________           Account Number:  __________________    Use and care of my personal home ventilator, (invasive or non-invasive) mechanical life support device while at [facility] system has been discussed with me by my physician and I have been provided with an opportunity to ask questions which have been answered to my satisfaction. I also understand a respiratory care practitioner is not expected to remain in my room or general vicinity at all times. It is understood that every precaution consistent with the best medical practice will be taken and I give Respiratory Care Services permission to monitor my ventilator during my hospital stay.     I hereby release [facility] system, it's agents, employees and medical staff from liability arising from any and all claims, demands, losses and damages to person and/or property as a result of the above mentioned requests. I certify that I have read and understand this consent agreement and release and that I am legally qualified to cathy this authorization. ___________________  Date    _____________________________________        ________________________  Signature of Patient or Parent (of minor patient,                  Relationship (if other than Patient)  [facility] parent) if applicable. Closest Relative,  Guardian or legal Representative    _____________________________________  Witness  Legal representative is defined as person named by the court as a guardian, executor or , or having power of  specifically set forth to authorize this action.     [phone]3/[facility] 7/14[facility]   Consent and Release for Home Ventilator

## 2023-01-12 NOTE — PROGRESS NOTES
General Surgery Progress Note    2023    Admit Date: 2023      Veronika Trammell  MRN: [id number]  :1955  Age:67 y.o. Post OP Day # 3: LAPAROSCOPIC CHOLECYSTECTOMY done per DR. HALE  Findings: Mikel pus in gallbladder, which is very edematous and inflamed; morbid obesity     Chief Complaint:  Abdominal Pain     HPI: Veronika Trammell is a 75 y.o. male who we are asked by Hospitalist MD  to see for Abdominal Pain. Patient has medical history of chronic back pain, OA, GERD, gout, HTN, MAURIZIO but not tolerating CPAP who presented with acute onset of abdominal pain (RUQ and RLQ) with associated nausea and vomiting since last night. Unable to tolerate any PO since last night. Denies any diarrhea. Patient was seen at urgent care on Monday (2023) and prescribed clindamycin for sinus infection. Has been having cough and congestion since last week and was tested neg for covid and flu at outside facility. Wife reports that patient also has been feeling chills and felt warm last night. In the ED, patient was tachycardic and noted to be saturating 88% on 2 L O2 nasal cannula. Laboratory work-up was only remarkable for WBC of 12.7. Influenza AMB were negative as well as COVID. CT abdomen pelvis concerning for acute cholecystitis with inflammatory changes surrounding the gallbladder without definite stones. ED provider to contact General surgery. Temperature 2023 @ 12:48 am was 101.1 (oral) Pulse ranges from 100 to 113 . Patient denies any chest pain or dyspnea at this time. CT OF THE ABDOMEN AND PELVIS 2023 @17:10       INDICATION: Abdominal pain beginning last night. Multiple episodes of vomiting. Multiple axial images were obtained through the abdomen and pelvis. Oral   contrast was not administered. 100mL of Isovue 370 intravenous contrast was   used for better evaluation of solid organs and vascular structures.   Radiation   dose reduction techniques were used for this study. All CT scans performed at   this facility use one or all of the following: Automated exposure control,   adjustment of the mA and/or kVp according to patient's size, iterative   reconstruction. COMPARISON: Noncontrast study 01/07/2020       FINDINGS:   - Lung Bases: No infiltrates or masses. - Liver: Unremarkable   - Biliary: There are inflammatory changes surrounding the gallbladder. No   definite stones are appreciated. - Pancreas: Normal..   - Spleen: Not enlarged, No mass. - Adrenals: Normal   - Kidneys/ureters: Several nonobstructing calculi bilaterally. - Bladder: Normal.   - Reproductive organs: No pelvic masses. - Bowel: Normal caliber. No inflammatory changes. There are several sigmoid   diverticula. There is a diverticulum or ureterocele on the left near the UVJ   measuring approximately 1.6 cm.   - Lymph nodes: No significant retroperitoneal, mesenteric, or pelvic adenopathy.   - Bones: No fracture or significant bone lesion.   - Vasculature: Normal   - Bones: No aggressive osseous lesion. There are remote L1 and L2 compression   fractures. - Other: No ascites. There is stable haziness within the central mesentery   suggesting sclerosing mesenteric right is. Impression   1. Findings concerning for acute cholecystitis. The right upper quadrant   ultrasound may be beneficial if there is further clinical concern. 2. Sigmoid diverticulosis. 3. Bilateral nonobstructing renal calculi. US ABDOMEN LIMITED 1/8/2023 7:13 PM       HISTORY: RUQ abd pain; abnormal CT A/P       COMPARISON: None available. FINDINGS:  Liver is increased in echogenicity without focal lesions. The   gallbladder contains sludge but no shadowing gallstones. Gallbladder appears   incompletely distended but there is wall thickening measuring up to 5 mm. Common   bile duct is dilated at 8 mm.  Pancreas is obscured by overlying bowel gas and   shadowing from the fatty liver. Examination of the right kidney demonstrates a   few echogenic foci probably nonobstructing stones. Aorta obscured by overlying   bowel gas. IVC appears patent. Impression   1. Gallbladder sludge is present. No obvious shadowing gallstones. Gallbladder   wall thickening and dilatation of the common bile duct suggests cholecystitis   and choledocholithiasis. 2. Fatty infiltration liver and nonobstructing right renal collecting system   stones. 3. Limited exam due to shadowing from bowel gas and fatty liver. MRCP done 1/9/2023   LIVER: Normal in size and appearance. - GALLBLADDER/BILE DUCTS: Common duct measures 5 mm in diameter. No significant   bile duct dilatation. No filling defects. The gallbladder is distended. There   is pericholecystic edema. There is a debris level within the gallbladder lumen. - PANCREAS: Normal.   - SPLEEN: Normal.   - ADRENALS: Normal.   - KIDNEYS/URETERS: No hydronephrosis or significant mass.   - LYMPH NODES: No significant retroperitoneal or mesenteric adenopathy.   - BONES: No fracture or significant bone lesion.   - OTHER: No significant ascites. Impression   1. Pericholecystic edema and gallbladder sludge, concerning for acute   cholecystitis. Path Report:  Date Obtained:   1/9/2023   DIAGNOSIS        A:  \" Zulema Jayashree":         ACUTE AND CHRONIC CHOLECYSTITIS   Microscopic Description   Microscopic examination reveals gallbladder with focal chronic   inflammation. . Focal mucosal ulceration is noted with focal acute   inflammation. Dissection of the gallbladder and   straining of its contents reveals no stones. Subjective:     Patient reports he is feeling better today. Patient reports he tolerated a soft diet with no nausea or vomiting. He is passing flatus and having normal BM's. Reports minimal pain at incisional trochar sites PRN. Patient has been evaluated per Physical therapy and per notes.  (See PT notes)    Patient reports he is still coughing up \"phlegm\" which has been ongoing since last week. He reports \"it has improved\" since he was admitted. Patient remains on IV Cipro and Flagyl  and he has tessalon pearls for cough. Patient has been evaluated per RT and per notes \"Patient's sat while resting was 93% on room air before ambulation. While ambulating patient required no supplemental oxygen to maintain sat above protocol. Patient denied SOB, and no distress noted while ambulating. \"    Patient denies any chest pain or dyspnea. Objective:  /77   Pulse 94   Temp 98.1 °F (36.7 °C) (Oral)   Resp 18   Ht 5' 10\" (1.778 m)   Wt 240 lb (108.9 kg)   SpO2 97%   BMI 34.44 kg/m²       Intake/Output Summary (Last 24 hours) at 1/12/2023 0656  Last data filed at 1/11/2023 1621  Gross per 24 hour   Intake 720 ml   Output 300 ml   Net 420 ml        Physical Exam  Vitals and nursing note reviewed. Constitutional:       Appearance: Normal appearance. HENT:      Head: Normocephalic and atraumatic. Nose: Nose normal.      Mouth/Throat: no oral lesions     Mouth: Mucous membranes are moist.   Eyes:      Extraocular Movements: Extraocular movements intact. Conjunctiva/sclera: Conjunctivae normal.      Pupils: Pupils are equal, round, and reactive to light. Cardiovascular:      Rate and Rhythm: Mild tachycardia noted. .      Pulses: Normal pulses. Heart sounds: Normal heart sounds. No murmurs, rubs or gallops. Pulmonary:      Effort: Pulmonary effort is normal.       Breath sounds: Normal breath sounds. No wheezing, rales or rhonchi noted. Abdominal:      General: Abdomen is obese. Hypoactive Bowel sounds. Mild  distention noted. Tenderness: There is appropriate incisional tenderness noted to the 5 trochar sites which are approximated with steri-strips and no drainage noted. No rebound or guarding noted. Musculoskeletal:         General: Normal range of motion. Cervical back: Normal range of motion and neck supple. Skin:     General: Skin is warm. Capillary Refill: Capillary refill takes less than 2 seconds. Neurological:      General: No focal deficit present. Mental Status: He is alert and oriented to person, place, and time. Psychiatric:         Mood and Affect: Mood normal.         Behavior: Behavior normal.         Thought Content:  Thought content normal.         Judgment: Judgment normal.        Data Review    Recent Results (from the past 24 hour(s))   CBC with Auto Differential    Collection Time: 01/12/23  6:05 AM   Result Value Ref Range    WBC 7.5 4.3 - 11.1 K/uL    RBC 4.08 (L) 4.23 - 5.6 M/uL    Hemoglobin 12.1 (L) 13.6 - 17.2 g/dL    Hematocrit 37.6 (L) 41.1 - 50.3 %    MCV 92.2 82.0 - 102.0 FL    MCH 29.7 26.1 - 32.9 PG    MCHC 32.2 31.4 - 35.0 g/dL    RDW 12.6 11.9 - 14.6 %    Platelets 555 127 - 791 K/uL    MPV 10.3 9.4 - 12.3 FL    nRBC 0.00 0.0 - 0.2 K/uL    Differential Type AUTOMATED      Seg Neutrophils 72 43 - 78 %    Lymphocytes 14 13 - 44 %    Monocytes 10 4.0 - 12.0 %    Eosinophils % 1 0.5 - 7.8 %    Basophils 1 0.0 - 2.0 %    Immature Granulocytes 2 0.0 - 5.0 %    Segs Absolute 5.4 1.7 - 8.2 K/UL    Absolute Lymph # 1.0 0.5 - 4.6 K/UL    Absolute Mono # 0.8 0.1 - 1.3 K/UL    Absolute Eos # 0.1 0.0 - 0.8 K/UL    Basophils Absolute 0.1 0.0 - 0.2 K/UL    Absolute Immature Granulocyte 0.2 0.0 - 0.5 K/UL       Latest Reference Range & Units 1/12/23 06:05   Sodium 133 - 143 mmol/L 140   Potassium 3.5 - 5.1 mmol/L 3.3 (L)   Chloride 101 - 110 mmol/L 106   CO2 21 - 32 mmol/L 28   BUN,BUNPL 8 - 23 MG/DL 14   Creatinine 0.8 - 1.5 MG/DL 1.01   Anion Gap 2 - 11 mmol/L 6   Est, Glom Filt Rate >60 ml/min/1.73m2 >60   Magnesium 1.8 - 2.4 mg/dL 1.9   Glucose, Random 65 - 100 mg/dL 130 (H)   CALCIUM, SERUM, 816744 8.3 - 10.4 MG/DL 9.0   ALBUMIN/GLOBULIN RATIO 0.4 - 1.6   0.8   Total Protein 6.3 - 8.2 g/dL 6.6   Albumin 3.2 - 4.6 g/dL 2.9 (L) Globulin 2.8 - 4.5 g/dL 3.7   Alk Phosphatase 50 - 136 U/L 65   ALT 12 - 65 U/L 30   AST 15 - 37 U/L 16   Bilirubin 0.2 - 1.1 MG/DL 0.5     Assessment:  Post OP Day # 3: LAPAROSCOPIC CHOLECYSTECTOMY   Principal Problem:    Sepsis (HCC)  Active Problems:    Restless leg syndrome    Acute respiratory failure with hypoxia (HCC)    MAURIZIO (obstructive sleep apnea)    Acute cholecystitis    Hypokalemia    Hypomagnesemia    Right upper quadrant abdominal pain    S/P laparoscopic cholecystectomy    Rhinovirus infection    HTN (hypertension)    Primary osteoarthritis of left knee  Resolved Problems:    * No resolved hospital problems.  *    PLAN:  --Further input per Surgeon, Dr. Pushpa Mcclendon as tolerated  -Pain meds PRN  -Ambulate in rosales PRN  -Encourage use of Incentive spirometer  -Admitting team for meds/labs/electrolyte replacements  -Patient remains on IV Cipro/Flagyl at this time per admitting team and WBC has normalized at this time  -GI MD was following for medical management but they have signed off at this time  -From surgical standpoint, patient can be discharged once admitting team has cleared patient and he will need to follow up with Dr Edwina Wharton in 7 days as per his note    Matthew Ballesteros [facility]-BC

## 2023-01-16 ENCOUNTER — TELEPHONE (OUTPATIENT)
Dept: ORTHOPEDIC SURGERY | Age: 68
End: 2023-01-16

## 2023-01-16 NOTE — OP NOTE
[facility]  OPERATIVE REPORT    Name:  Kyrie Grey  MR#:  [id number]  :  1955  ACCOUNT #:  [de-identified]  DATE OF SERVICE:  2023      AMENDED DOCUMENT - corrected account # and dates; 2023; ruelas    PREOPERATIVE DIAGNOSIS:  Acute cholecystitis. POSTOPERATIVE DIAGNOSIS:  Acute cholecystitis. PROCEDURE PERFORMED:  Laparoscopic cholecystectomy. SURGEON:  Roseanna Bolanos MD    ASSISTANTBooker Car. ANESTHESIA:  General.    COMPLICATIONS:  None    SPECIMENS REMOVED:  As above. IMPLANTS:  None    ESTIMATED BLOOD LOSS:  Less than 50 mL. INDICATION:  This is a 79-year-old male presented to the emergency room with acute onset of right upper quadrant pain over two days. He was found to have elevated liver enzyme and elevated white count. He did have MRCP that suggested no common bile duct stone, bile duct dilatations, except acute cholecystitis. Emergent surgery is offered to him. He understood risks and benefits, and agreed to proceed. FINDINGS:  His gallbladder is full of pus, which is very edematous and inflamed. He is morbidly obese. PROCEDURE:  After informed consent was obtained, the patient was brought into the operating room, left in supine position. General anesthesia was administered. The patient's abdomen was prepped and draped in the usual routine fashion. We took a supraumbilical incision. Charanjit cannula was inserted. Pneumoperitoneum was created and then three 5 mm trocars were placed in the right upper quadrant in routine fashion. The gallbladder was identified. This was very edematous, and this was aspirated first and the white infected bile was aspirated and then the gallbladder was able to be retracted and dissection carried around the infundibulum and cystic duct area. The tissue was very edematous and fragile.   Great precaution was used, and I was able to isolate the cystic artery first.  This was clipped and divided and then careful dissection revealed a small cystic duct and the duct was able to be carefully dissected and then clipped and divided and then gallbladder was carefully dissected from the liver bed. The tissue was very edematous and inflamed. With careful dissection, the gallbladder was able to be completely dissected and then the Endo bag was used to remove the gallbladder from the peritoneal cavity. Copious irrigation was used to irrigate all the fluid, which eventually turned into clear. No blood and no bile identified at the end of the case. Good hemostasis was obtained on the liver surface, and his liver did look fatty. Then, all the trocars were removed. Pneumoperitoneum was evacuated. The supraumbilical incision was closed on the fascia with an 0 Vicryl stitch. All skin incision was closed with a 4-0 Vicryl stitch in a subcuticular fashion. The patient tolerated the procedure well, was transferred to recovery room in stable condition. All the instrument count and lap count were correct. Estimated blood loss was less than 50 mL.       Taye Dillon MD      [id number]  D:  01/09/2023 19:38  T:  01/10/2023 2:15  JOB #:  [id number]

## 2023-01-20 ENCOUNTER — OFFICE VISIT (OUTPATIENT)
Dept: SURGERY | Age: 68
End: 2023-01-20

## 2023-01-20 VITALS — WEIGHT: 240 LBS | BODY MASS INDEX: 34.36 KG/M2 | HEIGHT: 70 IN

## 2023-01-20 DIAGNOSIS — Z48.89 AFTERCARE FOLLOWING SURGERY: Primary | ICD-10-CM

## 2023-01-21 NOTE — PROGRESS NOTES
[facility]  [facility]. [address] [place] [address]  [phone]      SUBJECTIVE: Matt Cao is a 70 y.o. male is seen for a routine postop check. He had emergency lap jaciel for acute cholecystitis. Today, he reports no problems with the wound or other issues. Activity, diet and bowels are normal. No pain. OBJECTIVE: Appears well. Wound is well healed without complications or infection. PATH: \" [place]":  ACUTE AND CHRONIC CHOLECYSTITIS     ASSESSMENT: normal postoperative course, doing well. PLAN: Return PRN.     Maribell Bean MD

## 2023-02-07 ENCOUNTER — TELEPHONE (OUTPATIENT)
Dept: ORTHOPEDIC SURGERY | Age: 68
End: 2023-02-07

## 2023-02-07 NOTE — TELEPHONE ENCOUNTER
Spoke with patient and let him know that I will put him on the cx list but has to be 12 weeks from gallbladder surgery

## 2023-03-10 DIAGNOSIS — M17.12 PRIMARY OSTEOARTHRITIS OF LEFT KNEE: Primary | ICD-10-CM

## 2023-04-19 ENCOUNTER — TELEPHONE (OUTPATIENT)
Dept: ORTHOPEDIC SURGERY | Age: 68
End: 2023-04-19

## 2023-04-19 DIAGNOSIS — M17.12 PRIMARY OSTEOARTHRITIS OF LEFT KNEE: Primary | ICD-10-CM

## 2023-04-19 NOTE — TELEPHONE ENCOUNTER
Confirmed with patient and his wife a new order has been updated and faxed to [address] for them to get approval, I also confirmed with innervision, the patient & the patients wife his CT date is for [address] [place] [address] Drive 1:30 PM, at [facility], [address] [place] [address]

## 2023-04-20 ENCOUNTER — HOSPITAL ENCOUNTER (OUTPATIENT)
Dept: SURGERY | Age: 68
Discharge: HOME | End: 2023-04-20
Payer: MEDICARE

## 2023-04-20 VITALS
HEART RATE: 79 BPM | SYSTOLIC BLOOD PRESSURE: 123 MMHG | BODY MASS INDEX: 34.77 KG/M2 | OXYGEN SATURATION: 95 % | HEIGHT: 70 IN | TEMPERATURE: 98.8 F | WEIGHT: 242.9 LBS | DIASTOLIC BLOOD PRESSURE: 68 MMHG

## 2023-04-20 DIAGNOSIS — M17.12 PRIMARY OSTEOARTHRITIS OF LEFT KNEE: Primary | ICD-10-CM

## 2023-04-20 LAB
ANION GAP SERPL CALC-SCNC: 4 MMOL/L (ref 2–11)
BACTERIA SPEC CULT: NORMAL
BUN SERPL-MCNC: 16 MG/DL (ref 8–23)
CALCIUM SERPL-MCNC: 8.8 MG/DL (ref 8.3–10.4)
CHLORIDE SERPL-SCNC: 105 MMOL/L (ref 101–110)
CO2 SERPL-SCNC: 26 MMOL/L (ref 21–32)
CREAT SERPL-MCNC: 0.96 MG/DL (ref 0.8–1.5)
ERYTHROCYTE [DISTWIDTH] IN BLOOD BY AUTOMATED COUNT: 13 % (ref 11.9–14.6)
GLUCOSE SERPL-MCNC: 140 MG/DL (ref 65–100)
HCT VFR BLD AUTO: 38.2 % (ref 41.1–50.3)
HGB BLD-MCNC: 12.6 G/DL (ref 13.6–17.2)
MCH RBC QN AUTO: 28.9 PG (ref 26.1–32.9)
MCHC RBC AUTO-ENTMCNC: 33 G/DL (ref 31.4–35)
MCV RBC AUTO: 87.6 FL (ref 82–102)
NRBC # BLD: 0 K/UL (ref 0–0.2)
PLATELET # BLD AUTO: 183 K/UL (ref 150–450)
PMV BLD AUTO: 11 FL (ref 9.4–12.3)
POTASSIUM SERPL-SCNC: 3.3 MMOL/L (ref 3.5–5.1)
RBC # BLD AUTO: 4.36 M/UL (ref 4.23–5.6)
SERVICE CMNT-IMP: NORMAL
SODIUM SERPL-SCNC: 135 MMOL/L (ref 133–143)
WBC # BLD AUTO: 7.5 K/UL (ref 4.3–11.1)

## 2023-04-20 PROCEDURE — 87641 MR-STAPH DNA AMP PROBE: CPT

## 2023-04-20 PROCEDURE — 85027 COMPLETE CBC AUTOMATED: CPT

## 2023-04-20 PROCEDURE — 80048 BASIC METABOLIC PNL TOTAL CA: CPT

## 2023-04-20 RX ORDER — SODIUM CHLORIDE 9 MG/ML
INJECTION, SOLUTION INTRAVENOUS PRN
Status: CANCELLED | OUTPATIENT
Start: 2023-04-20

## 2023-04-20 RX ORDER — SODIUM CHLORIDE 0.9 % (FLUSH) 0.9 %
5-40 SYRINGE (ML) INJECTION PRN
Status: CANCELLED | OUTPATIENT
Start: 2023-04-20

## 2023-04-20 RX ORDER — ACETAMINOPHEN 500 MG
1000 TABLET ORAL ONCE
Status: CANCELLED | OUTPATIENT
Start: 2023-04-20 | End: 2023-04-20

## 2023-04-20 RX ORDER — SODIUM CHLORIDE 0.9 % (FLUSH) 0.9 %
5-40 SYRINGE (ML) INJECTION EVERY 12 HOURS SCHEDULED
Status: CANCELLED | OUTPATIENT
Start: 2023-04-20

## 2023-04-20 NOTE — PERIOP NOTE
PLEASE CONTINUE TAKING ALL PRESCRIPTION MEDICATIONS UP TO THE DAY OF SURGERY UNLESS OTHERWISE DIRECTED BELOW. DISCONTINUE all vitamins, herbals and supplements 3 weeks prior to surgery. DISCONTINUE Non-Steriodal Anti-Inflammatory (NSAIDS) such as Advil, Ibuprofen, Motrin, Naproxen and Aleve 5 days prior to surgery. Home Medications to take  the day of surgery    Nasal spray, Celebrex, Duloxetine, Hydrocodone if needed,    Omeprazole        Home Medications   to Hold   Hold Aspirin for 5 days before surgery. Comments        Drink Ensure Pre-Surgery 2 bottles the night before surgery and 1 bottles   2 hours prior to your arrival time. Bring Dynahex wash and Incentive Spirometer with you to hospital on the day of surgery. Please do not bring home medications with you on the day of surgery unless otherwise directed by your nurse. If you are instructed to bring home medications, please give them to your nurse as they will be administered by the nursing staff. If you have any questions, please call Adilene Palmer [phone]. A copy of this note was provided to the patient for reference.

## 2023-04-20 NOTE — PERIOP NOTE
Patient verified name and . Order for consent IS NOT found in EHR; patient verified. Type 3 surgery, walk in joint assessment complete. Labs per surgeon: MRSA swab  ; results processing  Labs per anesthesia protocol: CBC, BMP; results processing  EKG: dated 23 in EHR is within anesthesia protocols. MRSA/MSSA swab collected; pharmacy to review and dose antibiotic as appropriate. Hospital approved surgical skin cleanser and instructions to return bottle on DOS given per hospital policy. Patient provided with handouts including Guide to Surgery, Pain Management, Hand Hygiene, Blood Transfusion Education, and [facility] Brochure. Patient answered medical/surgical history questions at their best of ability. All prior to admission medications documented in [facility]. Original medication prescription bottles were not visualized during patient appointment. Patient instructed to hold all vitamins 3 weeks prior to surgery and NSAIDS 5 days prior to surgery. Patient teach back successful and patient demonstrates knowledge of instruction.

## 2023-04-20 NOTE — PERIOP NOTE
Lab results within anesthesia guidelines, no follow-up required. Labs automatically routed to ordering provider via Epic documentation. MRSA swab still processing.        Latest Reference Range & Units 04/20/23 10:34   Sodium 133 - 143 mmol/L 135   Potassium 3.5 - 5.1 mmol/L 3.3 (L)   Chloride 101 - 110 mmol/L 105   CO2 21 - 32 mmol/L 26   BUN,BUNPL 8 - 23 MG/DL 16   Creatinine 0.8 - 1.5 MG/DL 0.96   Anion Gap 2 - 11 mmol/L 4   Est, Glom Filt Rate >60 ml/min/1.73m2 >60   Glucose, Random 65 - 100 mg/dL 140 (H)   CALCIUM, SERUM, [id number] 8.3 - 10.4 MG/DL 8.8   WBC 4.3 - 11.1 K/uL 7.5   RBC 4.23 - 5.6 M/uL 4.36   Hemoglobin Quant 13.6 - 17.2 g/dL 12.6 (L)   Hematocrit 41.1 - 50.3 % 38.2 (L)   MCV 82.0 - 102.0 FL 87.6   MCH 26.1 - 32.9 PG 28.9   MCHC 31.4 - 35.0 g/dL 33.0   MPV 9.4 - 12.3 FL 11.0   RDW 11.9 - 14.6 % 13.0   Platelet Count 363 - 450 K/uL 183   Nucleated Red Blood Cells 0.0 - 0.2 K/uL 0.00   MSSA/MRSA SCREEN BY PCR  Rpt   (L): Data is abnormally low  (H): Data is abnormally high  Rpt: View report in Results Review for more information

## 2023-04-21 DIAGNOSIS — M17.12 PRIMARY OSTEOARTHRITIS OF LEFT KNEE: ICD-10-CM

## 2023-04-24 ENCOUNTER — OFFICE VISIT (OUTPATIENT)
Dept: ORTHOPEDIC SURGERY | Age: 68
End: 2023-04-24

## 2023-04-24 VITALS — BODY MASS INDEX: 34.65 KG/M2 | WEIGHT: 242 LBS | HEIGHT: 70 IN

## 2023-04-24 DIAGNOSIS — M17.12 PRIMARY OSTEOARTHRITIS OF LEFT KNEE: Primary | ICD-10-CM

## 2023-04-24 DIAGNOSIS — Z01.818 ENCOUNTER FOR PRE-OPERATIVE EXAMINATION: ICD-10-CM

## 2023-04-24 PROCEDURE — PREOPEXAM PRE-OP EXAM: Performed by: PHYSICIAN ASSISTANT

## 2023-04-24 RX ORDER — OXYCODONE HYDROCHLORIDE 5 MG/1
5-10 TABLET ORAL
Qty: 60 TABLET | Refills: 0 | Status: SHIPPED | OUTPATIENT
Start: 2023-04-24 | End: 2023-04-29

## 2023-04-24 RX ORDER — CELECOXIB 200 MG/1
200 CAPSULE ORAL 2 TIMES DAILY
Qty: 60 CAPSULE | Refills: 0 | Status: SHIPPED | OUTPATIENT
Start: 2023-04-24 | End: 2023-05-24

## 2023-04-24 RX ORDER — METHOCARBAMOL 750 MG/1
750 TABLET, FILM COATED ORAL 3 TIMES DAILY PRN
Qty: 30 TABLET | Refills: 0 | Status: SHIPPED | OUTPATIENT
Start: 2023-04-24 | End: 2023-05-04

## 2023-04-24 RX ORDER — ASPIRIN 81 MG/1
81 TABLET ORAL EVERY 12 HOURS
Qty: 70 TABLET | Refills: 0 | Status: SHIPPED | OUTPATIENT
Start: 2023-04-24 | End: 2023-05-29

## 2023-04-24 RX ORDER — PROMETHAZINE HYDROCHLORIDE 12.5 MG/1
12.5 TABLET ORAL 4 TIMES DAILY PRN
Qty: 20 TABLET | Refills: 2 | Status: SHIPPED | OUTPATIENT
Start: 2023-04-24

## 2023-04-24 NOTE — PROGRESS NOTES
[address] [facility]  Pre Operative History and Physical Exam    Patient ID:  Aayush Diego  [id number]  70 y.o.  1955    Today: April 24, 2023           CC: Left knee pain    HPI:   The patient has end stage arthritis of the left knee. The patient was evaluated and examined during a consultation prior to this office visit. There have been no changes to the patient's orthopedic condition since the initial consultation. The patient has failed previous conservative treatment for this condition including antiinflammatories , and lifestyle modifications. The necessity for joint replacement is present. The patient will be admitted the day of surgery for left knee replacement    Past Medical/Surgical History:  Past Medical History:   Diagnosis Date    Arthritis     OA    Chewing tobacco use     Chronic pain 2003    back fracture    CP (cerebral palsy) ([address]      3 surgeries right leg as child; no other problems     Environmental and seasonal allergies     GERD (gastroesophageal reflux disease)     rare-no medication     Gout     Controlled with medication     History of vertebral fracture 2003    Hypertension     Controlled with medication     Kidney stones     Morbid obesity ([address]     Rheumatic fever age 14    history-no murmur auscultated on 6/11/18 and 12/12/22    Sinus infection     currently on Cefdinir     Status post right knee replacement 6/22/2018    Unspecified sleep apnea     can not sarahi c pap Followed by Dr. Tika Pham, per last office note (1/2018) \" home study shows moderate to severe MAURIZIO with AHI of 28.6, worse in supine position. Lowest oxygen desaturation 66%. \"     Past Surgical History:   Procedure Laterality Date    BACK SURGERY  1989    Herniated disc repair     CHOLECYSTECTOMY, LAPAROSCOPIC N/A 1/9/2023    CHOLECYSTECTOMY LAPAROSCOPIC performed by Christin Reaves MD at [address] [place] [address] Dr      x2    HIP FRACTURE SURGERY Right     LUMBAR LAMINECTOMY      ORTHOPEDIC SURGERY Right 01/2014

## 2023-04-24 NOTE — PROGRESS NOTES
Total Joint Surgery Preoperative Chart Review      Patient ID:  Edgardo Pang  [id number]  88 y.o.  1955  Surgeon: Dr. Charlie Doshi  Date of Surgery: 4/26/2023  Procedure: Total Left Knee Arthroplasty  Primary Care Physician: Jaimee Garza (Inactive) [phone]  Specialty Physician(s):      Subjective:   Zev Zamudio is a 70 y.o. White (non-) male who presents for preoperative evaluation for Total Left Knee arthroplasty. This is a preoperative chart review note based on data collected by the nurse at the surgical Pre-Assessment visit. Past Medical History:   Diagnosis Date    Arthritis     OA    Chewing tobacco use     Chronic pain 2003    back fracture    CP (cerebral palsy) ([address]      3 surgeries right leg as child; no other problems     Environmental and seasonal allergies     GERD (gastroesophageal reflux disease)     rare-no medication     Gout     Controlled with medication     History of vertebral fracture 2003    Hypertension     Controlled with medication     Kidney stones     Morbid obesity ([address]     Rheumatic fever age 14    history-no murmur auscultated on 6/11/18 and 12/12/22    Sinus infection     currently on Cefdinir     Status post right knee replacement 6/22/2018    Unspecified sleep apnea     can not sarahi c pap Followed by Dr. Brenda Locke, per last office note (1/2018) \" home study shows moderate to severe MAURIZIO with AHI of 28.6, worse in supine position. Lowest oxygen desaturation 66%. \"      Past Surgical History:   Procedure Laterality Date    BACK SURGERY  1989    Herniated disc repair     CHOLECYSTECTOMY, LAPAROSCOPIC N/A 1/9/2023    CHOLECYSTECTOMY LAPAROSCOPIC performed by Jordyn Delong MD at [address] [place] [address] Dr      x2    HIP FRACTURE SURGERY Right     LUMBAR LAMINECTOMY      ORTHOPEDIC SURGERY Right 01/2014    big toe metatarsophalangeal joint fusion    ORTHOPEDIC SURGERY Right     leg repair x 3 as child ue to C.P.    OTHER SURGICAL HISTORY      skin cancer removed

## 2023-04-24 NOTE — H&P (VIEW-ONLY)
treatment    Primary osteoarthritis of left knee    Restless leg syndrome    MAURIZIO (obstructive sleep apnea)    Right upper quadrant abdominal pain    S/P laparoscopic cholecystectomy    Rhinovirus infection         Assessment:   1. Arthritis of the left knee      Plan:    1. Proceed with scheduled left knee replacement       All material risks, benefits, and reasonable alternatives including postponing the procedure were discussed. The patient does wish to proceed with the procedure at this time.          Signed By: ADNA Barragan  April 24, 2023

## 2023-04-25 ENCOUNTER — TELEPHONE (OUTPATIENT)
Dept: ORTHOPEDIC SURGERY | Age: 68
End: 2023-04-25

## 2023-04-25 NOTE — TELEPHONE ENCOUNTER
The pharmacy is calling regarding the oxycodone that was sent in for surgery.  The patient is already taking Norco 10

## 2023-04-25 NOTE — DISCHARGE INSTRUCTIONS
[id number] [facility]   Patient Discharge Instructions    Callie Lerma / [id number] : 1955    Admitted (Not on file) Discharged: 2023     IF YOU HAVE ANY PROBLEMS ONCE YOU ARE AT HOME CALL THE FOLLOWING NUMBERS:   Nurse's line: [phone]  Main office number: [phone]      Medications    The medications you are to continue on are listed on the medication reconciliation sheet. Narcotic pain medications as well as supplemental iron can cause constipation. If this occurs, try over the counter stool softeners or try stopping the narcotic pain medication and/or the iron. It is important that you take the medication exactly as they are prescribed. Medications which increase your risk of blood clots are listed to stop for 5 weeks after surgery as well as medications or supplements which increase your risk of bleeding complications. Keep your medication in the bottles provided by the pharmacist and keep a list of the medication names, dosages, and times to be taken in your wallet. Do not take other medications without consulting your doctor. If you need a refill on your pain medication, please note our office is closed over the weekend. It is our office policy that on-call providers cannot refill narcotic pain medications over the weekend. If you will need a refill over the weekend, please call our office before 12pm on Friday or first thing Monday morning. Important Information    Do NOT smoke as this will greatly increase your risk of infection! Resume your prehospital diet. If you have excessive nausea or vomitting call your doctor's office     Leg swelling and warmth is normal for 6 months after surgery. If you experience swelling in your leg, elevate your leg while laying down with your toes above your heart. If you have sudden onset severe swelling with leg pain call our office.  Use Óscar Hose stockings until we see you in the office for your follow up

## 2023-04-26 ENCOUNTER — ANESTHESIA EVENT (OUTPATIENT)
Dept: SURGERY | Age: 68
End: 2023-04-26
Payer: MEDICARE

## 2023-04-26 ENCOUNTER — HOSPITAL ENCOUNTER (OUTPATIENT)
Age: 68
Discharge: HOME | End: 2023-04-27
Attending: ORTHOPAEDIC SURGERY | Admitting: ORTHOPAEDIC SURGERY
Payer: MEDICARE

## 2023-04-26 ENCOUNTER — ANESTHESIA (OUTPATIENT)
Dept: SURGERY | Age: 68
End: 2023-04-26
Payer: MEDICARE

## 2023-04-26 PROBLEM — Z96.652 STATUS POST LEFT KNEE REPLACEMENT: Status: ACTIVE | Noted: 2023-04-26

## 2023-04-26 LAB
APTT PPP: 31.1 SEC (ref 24.5–34.2)
INR PPP: 1.1
POTASSIUM SERPL-SCNC: 3.5 MMOL/L (ref 3.5–5.1)
PROTHROMBIN TIME: 14.3 SEC (ref 12.6–14.3)

## 2023-04-26 PROCEDURE — 2709999900 HC NON-CHARGEABLE SUPPLY: Performed by: ORTHOPAEDIC SURGERY

## 2023-04-26 PROCEDURE — 6370000000 HC RX 637 (ALT 250 FOR IP): Performed by: ORTHOPAEDIC SURGERY

## 2023-04-26 PROCEDURE — 97530 THERAPEUTIC ACTIVITIES: CPT

## 2023-04-26 PROCEDURE — 6360000002 HC RX W HCPCS: Performed by: ANESTHESIOLOGY

## 2023-04-26 PROCEDURE — 3600000015 HC SURGERY LEVEL 5 ADDTL 15MIN: Performed by: ORTHOPAEDIC SURGERY

## 2023-04-26 PROCEDURE — 6360000002 HC RX W HCPCS: Performed by: PHYSICIAN ASSISTANT

## 2023-04-26 PROCEDURE — 6370000000 HC RX 637 (ALT 250 FOR IP): Performed by: ANESTHESIOLOGY

## 2023-04-26 PROCEDURE — C1776 JOINT DEVICE (IMPLANTABLE): HCPCS | Performed by: ORTHOPAEDIC SURGERY

## 2023-04-26 PROCEDURE — 97165 OT EVAL LOW COMPLEX 30 MIN: CPT

## 2023-04-26 PROCEDURE — 2500000003 HC RX 250 WO HCPCS: Performed by: ORTHOPAEDIC SURGERY

## 2023-04-26 PROCEDURE — 97161 PT EVAL LOW COMPLEX 20 MIN: CPT

## 2023-04-26 PROCEDURE — 97535 SELF CARE MNGMENT TRAINING: CPT

## 2023-04-26 PROCEDURE — 84132 ASSAY OF SERUM POTASSIUM: CPT

## 2023-04-26 PROCEDURE — C1713 ANCHOR/SCREW BN/BN,TIS/BN: HCPCS | Performed by: ORTHOPAEDIC SURGERY

## 2023-04-26 PROCEDURE — 2580000003 HC RX 258: Performed by: ANESTHESIOLOGY

## 2023-04-26 PROCEDURE — 2500000003 HC RX 250 WO HCPCS: Performed by: NURSE ANESTHETIST, CERTIFIED REGISTERED

## 2023-04-26 PROCEDURE — 2500000003 HC RX 250 WO HCPCS: Performed by: ANESTHESIOLOGY

## 2023-04-26 PROCEDURE — 6360000002 HC RX W HCPCS: Performed by: NURSE ANESTHETIST, CERTIFIED REGISTERED

## 2023-04-26 PROCEDURE — 6360000002 HC RX W HCPCS: Performed by: ORTHOPAEDIC SURGERY

## 2023-04-26 PROCEDURE — 6370000000 HC RX 637 (ALT 250 FOR IP): Performed by: PHYSICIAN ASSISTANT

## 2023-04-26 PROCEDURE — 7100000000 HC PACU RECOVERY - FIRST 15 MIN: Performed by: ORTHOPAEDIC SURGERY

## 2023-04-26 PROCEDURE — 64447 NJX AA&/STRD FEMORAL NRV IMG: CPT | Performed by: ANESTHESIOLOGY

## 2023-04-26 PROCEDURE — 7100000001 HC PACU RECOVERY - ADDTL 15 MIN: Performed by: ORTHOPAEDIC SURGERY

## 2023-04-26 PROCEDURE — 94760 N-INVAS EAR/PLS OXIMETRY 1: CPT

## 2023-04-26 PROCEDURE — 3700000000 HC ANESTHESIA ATTENDED CARE: Performed by: ORTHOPAEDIC SURGERY

## 2023-04-26 PROCEDURE — 85610 PROTHROMBIN TIME: CPT

## 2023-04-26 PROCEDURE — 3600000005 HC SURGERY LEVEL 5 BASE: Performed by: ORTHOPAEDIC SURGERY

## 2023-04-26 PROCEDURE — 20985 CPTR-ASST DIR MS PX: CPT | Performed by: ORTHOPAEDIC SURGERY

## 2023-04-26 PROCEDURE — 27447 TOTAL KNEE ARTHROPLASTY: CPT | Performed by: ORTHOPAEDIC SURGERY

## 2023-04-26 PROCEDURE — 2580000003 HC RX 258: Performed by: ORTHOPAEDIC SURGERY

## 2023-04-26 PROCEDURE — 3700000001 HC ADD 15 MINUTES (ANESTHESIA): Performed by: ORTHOPAEDIC SURGERY

## 2023-04-26 PROCEDURE — 94761 N-INVAS EAR/PLS OXIMETRY MLT: CPT

## 2023-04-26 PROCEDURE — 2720000010 HC SURG SUPPLY STERILE: Performed by: ORTHOPAEDIC SURGERY

## 2023-04-26 PROCEDURE — 2580000003 HC RX 258: Performed by: PHYSICIAN ASSISTANT

## 2023-04-26 PROCEDURE — 85730 THROMBOPLASTIN TIME PARTIAL: CPT

## 2023-04-26 RX ORDER — ACETAMINOPHEN 500 MG
1000 TABLET ORAL ONCE
Status: DISCONTINUED | OUTPATIENT
Start: 2023-04-26 | End: 2023-04-26 | Stop reason: HOSPADM

## 2023-04-26 RX ORDER — DIPHENHYDRAMINE HYDROCHLORIDE 50 MG/ML
25 INJECTION INTRAMUSCULAR; INTRAVENOUS EVERY 6 HOURS PRN
Status: DISCONTINUED | OUTPATIENT
Start: 2023-04-26 | End: 2023-04-27 | Stop reason: HOSPADM

## 2023-04-26 RX ORDER — ACETAMINOPHEN 500 MG
1000 TABLET ORAL EVERY 6 HOURS
Status: DISCONTINUED | OUTPATIENT
Start: 2023-04-26 | End: 2023-04-27 | Stop reason: HOSPADM

## 2023-04-26 RX ORDER — ROPINIROLE 1 MG/1
4 TABLET, FILM COATED ORAL NIGHTLY
Status: DISCONTINUED | OUTPATIENT
Start: 2023-04-26 | End: 2023-04-27 | Stop reason: HOSPADM

## 2023-04-26 RX ORDER — LIDOCAINE HYDROCHLORIDE 10 MG/ML
1 INJECTION, SOLUTION INFILTRATION; PERINEURAL
Status: DISCONTINUED | OUTPATIENT
Start: 2023-04-26 | End: 2023-04-26 | Stop reason: HOSPADM

## 2023-04-26 RX ORDER — PROPOFOL 10 MG/ML
INJECTION, EMULSION INTRAVENOUS PRN
Status: DISCONTINUED | OUTPATIENT
Start: 2023-04-26 | End: 2023-04-26 | Stop reason: SDUPTHER

## 2023-04-26 RX ORDER — ONDANSETRON 2 MG/ML
4 INJECTION INTRAMUSCULAR; INTRAVENOUS
Status: DISCONTINUED | OUTPATIENT
Start: 2023-04-26 | End: 2023-04-26 | Stop reason: HOSPADM

## 2023-04-26 RX ORDER — OXYCODONE HYDROCHLORIDE 5 MG/1
5 TABLET ORAL
Status: COMPLETED | OUTPATIENT
Start: 2023-04-26 | End: 2023-04-26

## 2023-04-26 RX ORDER — HYDROMORPHONE HYDROCHLORIDE 1 MG/ML
1 INJECTION, SOLUTION INTRAMUSCULAR; INTRAVENOUS; SUBCUTANEOUS
Status: DISCONTINUED | OUTPATIENT
Start: 2023-04-26 | End: 2023-04-27 | Stop reason: HOSPADM

## 2023-04-26 RX ORDER — DIPHENHYDRAMINE HCL 25 MG
25 CAPSULE ORAL EVERY 6 HOURS PRN
Status: DISCONTINUED | OUTPATIENT
Start: 2023-04-26 | End: 2023-04-27 | Stop reason: HOSPADM

## 2023-04-26 RX ORDER — EPHEDRINE SULFATE/0.9% NACL/PF 50 MG/5 ML
SYRINGE (ML) INTRAVENOUS PRN
Status: DISCONTINUED | OUTPATIENT
Start: 2023-04-26 | End: 2023-04-26 | Stop reason: SDUPTHER

## 2023-04-26 RX ORDER — METHOCARBAMOL 750 MG/1
750 TABLET, FILM COATED ORAL 3 TIMES DAILY PRN
Status: DISCONTINUED | OUTPATIENT
Start: 2023-04-26 | End: 2023-04-27 | Stop reason: HOSPADM

## 2023-04-26 RX ORDER — ONDANSETRON 2 MG/ML
4 INJECTION INTRAMUSCULAR; INTRAVENOUS EVERY 6 HOURS PRN
Status: DISCONTINUED | OUTPATIENT
Start: 2023-04-26 | End: 2023-04-27 | Stop reason: HOSPADM

## 2023-04-26 RX ORDER — ASPIRIN 81 MG/1
81 TABLET ORAL 2 TIMES DAILY
Status: DISCONTINUED | OUTPATIENT
Start: 2023-04-26 | End: 2023-04-27 | Stop reason: HOSPADM

## 2023-04-26 RX ORDER — SODIUM CHLORIDE 0.9 % (FLUSH) 0.9 %
5-40 SYRINGE (ML) INJECTION PRN
Status: DISCONTINUED | OUTPATIENT
Start: 2023-04-26 | End: 2023-04-26 | Stop reason: HOSPADM

## 2023-04-26 RX ORDER — OXYCODONE HYDROCHLORIDE 5 MG/1
10 TABLET ORAL EVERY 4 HOURS PRN
Status: DISCONTINUED | OUTPATIENT
Start: 2023-04-26 | End: 2023-04-27 | Stop reason: HOSPADM

## 2023-04-26 RX ORDER — SENNA AND DOCUSATE SODIUM 50; 8.6 MG/1; MG/1
1 TABLET, FILM COATED ORAL 2 TIMES DAILY
Status: DISCONTINUED | OUTPATIENT
Start: 2023-04-26 | End: 2023-04-27 | Stop reason: HOSPADM

## 2023-04-26 RX ORDER — FENTANYL CITRATE 50 UG/ML
100 INJECTION, SOLUTION INTRAMUSCULAR; INTRAVENOUS
Status: COMPLETED | OUTPATIENT
Start: 2023-04-26 | End: 2023-04-26

## 2023-04-26 RX ORDER — SODIUM CHLORIDE 0.9 % (FLUSH) 0.9 %
5-40 SYRINGE (ML) INJECTION EVERY 12 HOURS SCHEDULED
Status: DISCONTINUED | OUTPATIENT
Start: 2023-04-26 | End: 2023-04-27 | Stop reason: HOSPADM

## 2023-04-26 RX ORDER — SODIUM CHLORIDE 9 MG/ML
INJECTION, SOLUTION INTRAVENOUS PRN
Status: DISCONTINUED | OUTPATIENT
Start: 2023-04-26 | End: 2023-04-27 | Stop reason: HOSPADM

## 2023-04-26 RX ORDER — TRANEXAMIC ACID 100 MG/ML
INJECTION, SOLUTION INTRAVENOUS PRN
Status: DISCONTINUED | OUTPATIENT
Start: 2023-04-26 | End: 2023-04-26 | Stop reason: SDUPTHER

## 2023-04-26 RX ORDER — SODIUM CHLORIDE, SODIUM LACTATE, POTASSIUM CHLORIDE, CALCIUM CHLORIDE 600; 310; 30; 20 MG/100ML; MG/100ML; MG/100ML; MG/100ML
INJECTION, SOLUTION INTRAVENOUS CONTINUOUS
Status: DISCONTINUED | OUTPATIENT
Start: 2023-04-26 | End: 2023-04-27 | Stop reason: HOSPADM

## 2023-04-26 RX ORDER — SODIUM CHLORIDE 0.9 % (FLUSH) 0.9 %
5-40 SYRINGE (ML) INJECTION EVERY 12 HOURS SCHEDULED
Status: DISCONTINUED | OUTPATIENT
Start: 2023-04-26 | End: 2023-04-26 | Stop reason: HOSPADM

## 2023-04-26 RX ORDER — SODIUM CHLORIDE, SODIUM LACTATE, POTASSIUM CHLORIDE, CALCIUM CHLORIDE 600; 310; 30; 20 MG/100ML; MG/100ML; MG/100ML; MG/100ML
INJECTION, SOLUTION INTRAVENOUS CONTINUOUS
Status: DISCONTINUED | OUTPATIENT
Start: 2023-04-26 | End: 2023-04-26 | Stop reason: HOSPADM

## 2023-04-26 RX ORDER — ROPIVACAINE HYDROCHLORIDE 2 MG/ML
INJECTION, SOLUTION EPIDURAL; INFILTRATION; PERINEURAL PRN
Status: DISCONTINUED | OUTPATIENT
Start: 2023-04-26 | End: 2023-04-26 | Stop reason: HOSPADM

## 2023-04-26 RX ORDER — MIDAZOLAM HYDROCHLORIDE 2 MG/2ML
4 INJECTION, SOLUTION INTRAMUSCULAR; INTRAVENOUS
Status: COMPLETED | OUTPATIENT
Start: 2023-04-26 | End: 2023-04-26

## 2023-04-26 RX ORDER — SODIUM CHLORIDE 0.9 % (FLUSH) 0.9 %
5-40 SYRINGE (ML) INJECTION PRN
Status: DISCONTINUED | OUTPATIENT
Start: 2023-04-26 | End: 2023-04-27 | Stop reason: HOSPADM

## 2023-04-26 RX ORDER — SODIUM CHLORIDE 9 MG/ML
INJECTION, SOLUTION INTRAVENOUS PRN
Status: DISCONTINUED | OUTPATIENT
Start: 2023-04-26 | End: 2023-04-26 | Stop reason: HOSPADM

## 2023-04-26 RX ORDER — ONDANSETRON 4 MG/1
4 TABLET, ORALLY DISINTEGRATING ORAL EVERY 8 HOURS PRN
Status: DISCONTINUED | OUTPATIENT
Start: 2023-04-26 | End: 2023-04-27 | Stop reason: HOSPADM

## 2023-04-26 RX ORDER — KETOROLAC TROMETHAMINE 30 MG/ML
INJECTION, SOLUTION INTRAMUSCULAR; INTRAVENOUS PRN
Status: DISCONTINUED | OUTPATIENT
Start: 2023-04-26 | End: 2023-04-26 | Stop reason: HOSPADM

## 2023-04-26 RX ADMIN — OXYCODONE 10 MG: 5 TABLET ORAL at 11:54

## 2023-04-26 RX ADMIN — ROPIVACAINE HYDROCHLORIDE 20 ML: 2 INJECTION, SOLUTION EPIDURAL; INFILTRATION at 07:15

## 2023-04-26 RX ADMIN — DEXAMETHASONE SODIUM PHOSPHATE 4 MG: 4 INJECTION, SOLUTION INTRAMUSCULAR; INTRAVENOUS at 07:15

## 2023-04-26 RX ADMIN — PHENYLEPHRINE HYDROCHLORIDE 100 MCG: 0.1 INJECTION, SOLUTION INTRAVENOUS at 08:55

## 2023-04-26 RX ADMIN — ACETAMINOPHEN 1000 MG: 500 TABLET, FILM COATED ORAL at 16:48

## 2023-04-26 RX ADMIN — SENNOSIDES AND DOCUSATE SODIUM 1 TABLET: 50; 8.6 TABLET ORAL at 16:48

## 2023-04-26 RX ADMIN — ASPIRIN 81 MG: 81 TABLET, COATED ORAL at 20:39

## 2023-04-26 RX ADMIN — METHOCARBAMOL TABLETS 750 MG: 750 TABLET, COATED ORAL at 20:43

## 2023-04-26 RX ADMIN — ROPINIROLE HYDROCHLORIDE 4 MG: 1 TABLET, FILM COATED ORAL at 22:54

## 2023-04-26 RX ADMIN — SODIUM CHLORIDE, SODIUM LACTATE, POTASSIUM CHLORIDE, AND CALCIUM CHLORIDE: 600; 310; 30; 20 INJECTION, SOLUTION INTRAVENOUS at 08:40

## 2023-04-26 RX ADMIN — TRANEXAMIC ACID 1000 MG: 100 INJECTION, SOLUTION INTRAVENOUS at 08:11

## 2023-04-26 RX ADMIN — PROPOFOL 80 MCG/KG/MIN: 10 INJECTION, EMULSION INTRAVENOUS at 08:14

## 2023-04-26 RX ADMIN — Medication 10 MG: at 09:00

## 2023-04-26 RX ADMIN — PHENYLEPHRINE HYDROCHLORIDE 100 MCG: 0.1 INJECTION, SOLUTION INTRAVENOUS at 08:40

## 2023-04-26 RX ADMIN — OXYCODONE 5 MG: 5 TABLET ORAL at 10:35

## 2023-04-26 RX ADMIN — SODIUM CHLORIDE, SODIUM LACTATE, POTASSIUM CHLORIDE, AND CALCIUM CHLORIDE: 600; 310; 30; 20 INJECTION, SOLUTION INTRAVENOUS at 09:30

## 2023-04-26 RX ADMIN — OXYCODONE 10 MG: 5 TABLET ORAL at 20:39

## 2023-04-26 RX ADMIN — Medication 2000 MG: at 08:13

## 2023-04-26 RX ADMIN — ACETAMINOPHEN 1000 MG: 500 TABLET, FILM COATED ORAL at 11:54

## 2023-04-26 RX ADMIN — SODIUM CHLORIDE, SODIUM LACTATE, POTASSIUM CHLORIDE, AND CALCIUM CHLORIDE: 600; 310; 30; 20 INJECTION, SOLUTION INTRAVENOUS at 06:50

## 2023-04-26 RX ADMIN — SENNOSIDES AND DOCUSATE SODIUM 1 TABLET: 50; 8.6 TABLET ORAL at 20:38

## 2023-04-26 RX ADMIN — SODIUM CHLORIDE, PRESERVATIVE FREE 10 ML: 5 INJECTION INTRAVENOUS at 20:39

## 2023-04-26 RX ADMIN — PHENYLEPHRINE HYDROCHLORIDE 100 MCG: 0.1 INJECTION, SOLUTION INTRAVENOUS at 08:34

## 2023-04-26 RX ADMIN — OXYCODONE 10 MG: 5 TABLET ORAL at 16:49

## 2023-04-26 RX ADMIN — FENTANYL CITRATE 50 MCG: 50 INJECTION INTRAMUSCULAR; INTRAVENOUS at 07:15

## 2023-04-26 RX ADMIN — Medication 10 MG: at 09:40

## 2023-04-26 RX ADMIN — Medication 10 MG: at 09:17

## 2023-04-26 RX ADMIN — MEPIVACAINE HYDROCHLORIDE 60 MG: 20 INJECTION, SOLUTION EPIDURAL; INFILTRATION at 08:06

## 2023-04-26 RX ADMIN — CEFAZOLIN 2000 MG: 10 INJECTION, POWDER, FOR SOLUTION INTRAVENOUS at 16:50

## 2023-04-26 RX ADMIN — PHENYLEPHRINE HYDROCHLORIDE 100 MCG: 0.1 INJECTION, SOLUTION INTRAVENOUS at 09:17

## 2023-04-26 RX ADMIN — SODIUM CHLORIDE, SODIUM LACTATE, POTASSIUM CHLORIDE, AND CALCIUM CHLORIDE: 600; 310; 30; 20 INJECTION, SOLUTION INTRAVENOUS at 07:50

## 2023-04-26 RX ADMIN — MIDAZOLAM HYDROCHLORIDE 3 MG: 1 INJECTION, SOLUTION INTRAMUSCULAR; INTRAVENOUS at 07:15

## 2023-04-26 RX ADMIN — PROPOFOL 50 MG: 10 INJECTION, EMULSION INTRAVENOUS at 08:13

## 2023-04-26 RX ADMIN — PHENYLEPHRINE HYDROCHLORIDE 100 MCG: 0.1 INJECTION, SOLUTION INTRAVENOUS at 09:40

## 2023-04-26 RX ADMIN — METHOCARBAMOL TABLETS 750 MG: 750 TABLET, COATED ORAL at 13:23

## 2023-04-26 ASSESSMENT — PAIN SCALES - GENERAL
PAINLEVEL_OUTOF10: 0
PAINLEVEL_OUTOF10: 4
PAINLEVEL_OUTOF10: 6
PAINLEVEL_OUTOF10: 6
PAINLEVEL_OUTOF10: 5
PAINLEVEL_OUTOF10: 8
PAINLEVEL_OUTOF10: 10

## 2023-04-26 ASSESSMENT — KOOS JR
STANDING UPRIGHT: 2
STRAIGHTENING KNEE FULLY: 2
TWISING OR PIVOTING ON KNEE: 2
GOING UP OR DOWN STAIRS: 2
BENDING TO THE FLOOR TO PICK UP OBJECT: 2
KOOS JR TOTAL INTERVAL SCORE: 52.465
RISING FROM SITTING: 2
HOW SEVERE IS YOUR KNEE STIFFNESS AFTER FIRST WAKING IN MORNING: 2

## 2023-04-26 ASSESSMENT — PAIN DESCRIPTION - ORIENTATION
ORIENTATION: LEFT
ORIENTATION: LEFT

## 2023-04-26 ASSESSMENT — PAIN DESCRIPTION - LOCATION
LOCATION: KNEE
LOCATION: KNEE

## 2023-04-26 NOTE — PERIOP NOTE
TRANSFER - OUT REPORT:    Verbal report given to Linda WARREN on [employer]  being transferred to [place] [address] for routine progression of patient care       Report consisted of patient's Situation, Background, Assessment and   Recommendations(SBAR). Information from the following report(s) Nurse Handoff Report, Adult Overview, Surgery Report, MAR, and Cardiac Rhythm SR  was reviewed with the receiving nurse. [place] Assessment: No data recorded  Lines:   Peripheral IV 04/26/23 Right;Posterior Hand (Active)   Site Assessment Clean, dry & intact 04/26/23 1045   Line Status Infusing 04/26/23 [facility] Connections checked and tightened 04/26/23 1045   Phlebitis Assessment No symptoms 04/26/23 1045   Infiltration Assessment 0 04/26/23 1045   Alcohol Cap Used No 04/26/23 0649   Dressing Status Clean, dry & intact 04/26/23 1045   Dressing Type Transparent 04/26/23 1045   Dressing Intervention New 04/26/23 0649        Opportunity for questions and clarification was provided.       Patient transported with:  O2 @ 0lpm

## 2023-04-26 NOTE — ANESTHESIA POSTPROCEDURE EVALUATION
Department of Anesthesiology  Postprocedure Note    Patient: Tori Wynn  MRN: [id number]  YOB: 1955  Date of evaluation: 4/26/2023      Procedure Summary     Date: 04/26/23 [place] [facility] [place] [facility] [place] [facility] [place]    Anesthesia Start: 0750 Anesthesia Stop: 4879    Procedure: lt KNEE TOTAL ARTHROPLASTY ROBOTIC/ SDD (Left: Knee) Diagnosis:       Primary osteoarthritis of left knee      (Primary osteoarthritis of left knee [M17.12])    Surgeons: Regina Palacios MD Responsible Provider: Elizabeth Chew MD    Anesthesia Type: Spinal ASA Status: 3          Anesthesia Type: Spinal    Lauren Phase I: Lauren Score: 10    Lauren Phase II:        Anesthesia Post Evaluation    Patient location during evaluation: PACU  Patient participation: complete - patient participated  Level of consciousness: awake  Pain score: 0  Airway patency: patent  Nausea & Vomiting: no nausea and no vomiting  Complications: no  Cardiovascular status: blood pressure returned to baseline and hemodynamically stable  Respiratory status: acceptable, spontaneous ventilation and nonlabored ventilation  Hydration status: euvolemic  Multimodal analgesia pain management approach

## 2023-04-26 NOTE — ANESTHESIA PROCEDURE NOTES
Peripheral Block    Patient location during procedure: procedure area  Reason for block: post-op pain management and at surgeon's request  Start time: 4/26/2023 7:15 AM  End time: 4/26/2023 7:17 AM  Staffing  Performed: anesthesiologist   Anesthesiologist: Sid Hurd MD  Preanesthetic Checklist  Completed: patient identified, IV checked, site marked, risks and benefits discussed, surgical/procedural consents, equipment checked, pre-op evaluation, timeout performed, anesthesia consent given, oxygen available, monitors applied/VS acknowledged, fire risk safety assessment completed and verbalized and blood product R/B/A discussed and consented  Peripheral Block   Patient position: supine  Prep: ChloraPrep  Provider prep: mask and sterile gloves  Patient monitoring: cardiac monitor, continuous pulse ox, frequent blood pressure checks, IV access, oxygen and responsive to questions  Block type: Femoral  Adductor canal  Laterality: left  Injection technique: single-shot  Guidance: ultrasound guided    Needle   Needle type: insulated echogenic nerve stimulator needle   Needle gauge: 20 G  Needle localization: ultrasound guidance  Needle insertion depth: 4 cm  Needle length: 8 cm  Assessment   Injection assessment: negative aspiration for heme, no paresthesia on injection, local visualized surrounding nerve on ultrasound and no intravascular symptoms  Slow fractionated injection: yes  Hemodynamics: stable  Real-time US image taken/store: yes  Outcomes: uncomplicated    Additional Notes  Potential access sites were examined with ultrasound and the acceptable patent access site was selected (site noted above). The needle path and vein access were visualized in real time using ultrasonography, and an image was recorded for permanent record.      0.2 % Ropivacaine with 4 mg decadron + epi  Medications Administered  dexamethasone (DECADRON) injection 4 mg/mL - Perineural   4 mg - 4/26/2023 7:15:00 AM  ropivacaine 0.2%

## 2023-04-26 NOTE — ANESTHESIA PRE PROCEDURE
Not Answered  Counseling given: Not Answered  Tobacco comments: Quit smokin can of chewing tobacco a day for 40 years      Vital Signs (Current): There were no vitals filed for this visit. BP Readings from Last 3 Encounters:   23 123/81   23 123/68   23 129/81       NPO Status:                                                                                 BMI:   Wt Readings from Last 3 Encounters:   23 236 lb (107 kg)   23 242 lb (109.8 kg)   23 242 lb 14.4 oz (110.2 kg)     There is no height or weight on file to calculate BMI.    CBC:   Lab Results   Component Value Date/Time    WBC 7.5 2023 10:34 AM    RBC 4.36 2023 10:34 AM    HGB 12.6 2023 10:34 AM    HCT 38.2 2023 10:34 AM    MCV 87.6 2023 10:34 AM    RDW 13.0 2023 10:34 AM     2023 10:34 AM       CMP:   Lab Results   Component Value Date/Time     2023 10:34 AM    K 3.3 2023 10:34 AM     2023 10:34 AM    CO2 26 2023 10:34 AM    BUN 16 2023 10:34 AM    CREATININE 0.96 2023 10:34 AM    GFRAA >60 02/10/2021 01:24 PM    AGRATIO 0.9 2020 07:40 PM    LABGLOM >60 2023 10:34 AM    GLUCOSE 140 2023 10:34 AM    PROT 6.6 2023 06:05 AM    CALCIUM 8.8 2023 10:34 AM    BILITOT 0.5 2023 06:05 AM    ALKPHOS 65 2023 06:05 AM    ALKPHOS 132 2020 07:40 PM    AST 16 2023 06:05 AM    ALT 30 2023 06:05 AM       POC Tests: No results for input(s): POCGLU, POCNA, POCK, POCCL, POCBUN, POCHEMO, POCHCT in the last 72 hours.     Coags:   Lab Results   Component Value Date/Time    PROTIME 13.7 2022 08:11 AM    INR 1.1 2022 08:11 AM    APTT 26.3 2022 08:11 AM       HCG (If Applicable): No results found for: PREGTESTUR, PREGSERUM, HCG, HCGQUANT     ABGs: No results found for: PHART, PO2ART, DOA8AGZ, ZAN2ETU, BEART, M6QQZSQX     Type &

## 2023-04-26 NOTE — ANESTHESIA PROCEDURE NOTES
Spinal Block    Patient location during procedure: OR  End time: 4/26/2023 8:10 AM  Reason for block: primary anesthetic  Staffing  Performed: anesthesiologist   Anesthesiologist: Nikole Puente MD  Spinal Block  Patient position: sitting  Prep: ChloraPrep  Patient monitoring: cardiac monitor, continuous pulse ox, frequent blood pressure checks and oxygen  Approach: midline  Location: L2/L3  Provider prep: mask and sterile gloves  Local infiltration: lidocaine  Needle  Needle type: Quincke   Needle gauge: 22 G  Needle length: 4 in  Needle insertion depth: 3 cm  Assessment  Sensory level: T6  Swirl obtained: Yes  CSF: clear  Attempts: 1  Hemodynamics: stable  Preanesthetic Checklist  Completed: patient identified, IV checked, site marked, risks and benefits discussed, surgical/procedural consents, equipment checked, pre-op evaluation, timeout performed, anesthesia consent given, oxygen available, monitors applied/VS acknowledged, fire risk safety assessment completed and verbalized and blood product R/B/A discussed and consented

## 2023-04-26 NOTE — OP NOTE
[facility] Robotic Assisted Total Knee Arthroplasty: Posterior Cruciate Retaining       [address] [place] [address]   : 1955  Medical Record Number:[id number]  Pre-operative Diagnosis:  Primary osteoarthritis of left knee [M17.12]  Post-operative Diagnosis: Primary osteoarthritis of left knee [M17.12]  Location: [address] [place] [address]  Surgeon: Ciarra Adams MD   Assistant: Memo Perez    Anesthesia: Spinal and ACB    Indications: Patient has end stage arthritis. They have tried and failed conservative management. Procedure:Procedure(s) (LRB):  lt KNEE TOTAL ARTHROPLASTY ROBOTIC/ SDD (Left)            CPT- 23115- Total knee arthroplasty           02020- Other procedures on musculoskeletal system            0055T- Computer assisted surgical navigation   The complexity of the total joint surgery requires the use of a first assistant for positioning, retraction and expertise in closure. Tourniquet Time: 0 minutes  EBL: 250 cc  Findings: severe degenerative arthritis of anterior compartment  patellar osteophytes with loss of patella cartilage, posterior femoral osteophytes. Patella koffi  BMI: Body mass index is 34.35 kg/m². Camilla Tatum was brought to the operating room and positioned on the operating table. He was anesthestized with anesthesia. IV antibiotics were administered. Prior to the incision being made a timeout was called identifying the patient, procedure ,operative side and surgeon The operative leg was prepped and draped in the usual sterile manner. An anterior longitudinal incision was accomplished just medial to the tibial tubercle and extending approximal 6 centimeters proximal to the superior pole of the patella. A medial parapatellar capsular incision was performed. The medial capsular flap was elevated around to the insertion of the semimembranous tendon. The patella was everted and the knee flexed and externally rotated.   The medial and

## 2023-04-26 NOTE — CARE COORDINATION
HHOT and aide order received per therapist recommendation. Updated Order sent to [facility] and Nida with their office notified of d/c and change in order. Patient is a 70y.o. year old male admitted for Left TKA . Patient plans to return home on discharge. Order received to arrange home health. Patient requested Hortencia troy/ Brandyn  Referral sent to [facility]. . Patient denies any equipment needs as patient has a walker. Will follow until discharge. 04/26/23 5277   Service Assessment   Patient Orientation Alert and Oriented   Cognition Alert   History Provided By Patient   Primary [address] [place] [address] is: Legal Next of Kin   PCP Verified by CM No   Prior Functional Level Independent in ADLs/IADLs   Current Functional Level Independent in ADLs/IADLs   Can patient return to prior living arrangement No   Ability to make needs known: Fair   Family able to assist with home care needs: Yes   Would you like for me to discuss the discharge plan with any other family members/significant others, and if so, who? Yes   Financial Resources None   Community Resources None   Social/Functional History   Lives With Spouse   Type of [facility] One level   Services At/After Discharge   Transition of Care Consult (CM Consult) [facility] No   Reason Outside [address] used patient chose alternate agency  (prior positive use)   Services At/After Discharge Home Health;PT   Mode of Transport at Discharge Self   Condition of Participation: Discharge [address] for Transition of Care is related to the following treatment goals: improve mobility   The Patient and/or Patient Representative was provided with a Choice of Provider? Patient   The Patient and/Or Patient Representative agree with the Discharge Plan?  Yes   Freedom of Choice list was provided with basic dialogue that supports the

## 2023-04-26 NOTE — INTERVAL H&P NOTE
Update History & Physical    The patient's History and Physical of April 24, H&P was reviewed with the patient and I examined the patient. There was no change. The surgical site was confirmed by the patient and me. Plan: The risks, benefits, expected outcome, and alternative to the recommended procedure have been discussed with the patient. Patient understands and wants to proceed with the procedure.      Electronically signed by Wesley Cohen MD on 4/26/2023 at 6:43 AM

## 2023-04-27 VITALS
OXYGEN SATURATION: 96 % | DIASTOLIC BLOOD PRESSURE: 84 MMHG | HEART RATE: 97 BPM | SYSTOLIC BLOOD PRESSURE: 176 MMHG | BODY MASS INDEX: 33.79 KG/M2 | HEIGHT: 70 IN | RESPIRATION RATE: 18 BRPM | TEMPERATURE: 98.2 F | WEIGHT: 236 LBS

## 2023-04-27 LAB
HCT VFR BLD AUTO: 34.7 % (ref 41.1–50.3)
HGB BLD-MCNC: 11.2 G/DL (ref 13.6–17.2)

## 2023-04-27 PROCEDURE — 97535 SELF CARE MNGMENT TRAINING: CPT

## 2023-04-27 PROCEDURE — 2580000003 HC RX 258: Performed by: PHYSICIAN ASSISTANT

## 2023-04-27 PROCEDURE — 85018 HEMOGLOBIN: CPT

## 2023-04-27 PROCEDURE — 99024 POSTOP FOLLOW-UP VISIT: CPT | Performed by: ORTHOPAEDIC SURGERY

## 2023-04-27 PROCEDURE — 36415 COLL VENOUS BLD VENIPUNCTURE: CPT

## 2023-04-27 PROCEDURE — 97530 THERAPEUTIC ACTIVITIES: CPT

## 2023-04-27 PROCEDURE — 6370000000 HC RX 637 (ALT 250 FOR IP): Performed by: PHYSICIAN ASSISTANT

## 2023-04-27 PROCEDURE — 85014 HEMATOCRIT: CPT

## 2023-04-27 PROCEDURE — 6360000002 HC RX W HCPCS: Performed by: PHYSICIAN ASSISTANT

## 2023-04-27 RX ADMIN — ASPIRIN 81 MG: 81 TABLET, COATED ORAL at 09:03

## 2023-04-27 RX ADMIN — OXYCODONE 10 MG: 5 TABLET ORAL at 04:54

## 2023-04-27 RX ADMIN — OXYCODONE 10 MG: 5 TABLET ORAL at 09:03

## 2023-04-27 RX ADMIN — ACETAMINOPHEN 1000 MG: 500 TABLET, FILM COATED ORAL at 04:54

## 2023-04-27 RX ADMIN — CEFAZOLIN 2000 MG: 10 INJECTION, POWDER, FOR SOLUTION INTRAVENOUS at 00:23

## 2023-04-27 RX ADMIN — OXYCODONE 10 MG: 5 TABLET ORAL at 00:23

## 2023-04-27 RX ADMIN — ACETAMINOPHEN 1000 MG: 500 TABLET, FILM COATED ORAL at 00:23

## 2023-04-27 RX ADMIN — SENNOSIDES AND DOCUSATE SODIUM 1 TABLET: 50; 8.6 TABLET ORAL at 09:03

## 2023-04-27 ASSESSMENT — PAIN SCALES - GENERAL: PAINLEVEL_OUTOF10: 5

## 2023-04-27 NOTE — PROGRESS NOTES
04/26/23 1621   Oxygen Therapy/Pulse Ox   O2 Device None (Room air)   Heart Rate (!) 109   SpO2 94 %       [facility] notes reviewed- continuous sat ordered HS    Patient does not have a home CPAP - 3L nasal cannula HS
04/26/23 2101   Oxygen Therapy/Pulse Ox   O2 Therapy Room air   O2 Device None (Room air)   Heart Rate (!) 105   SpO2 97 %   Pulse Oximeter Device Mode Continuous   Pulse Oximeter Device Location Left;Finger   $Pulse Oximeter $Spot check (multiple/continuous)     Pt on continuous monitor for HS. Alarm limits set. Pt working on IS. Pt refused to wear any oxygen for tonight.  Will monitor closely
2023         Post Op day: 1 Day Post-Op     Admit Date: 2023    Admit Diagnosis: Primary osteoarthritis of left knee [M17.12]        Subjective: Patient stable. No acute events.       Objective:     Vitals:    23 0700   BP: (!) 176/84   Pulse: 97   Resp: 20   Temp: 98.2 °F (36.8 °C)   SpO2: 96%    Temp (24hrs), Av.1 °F (36.7 °C), Min:97.8 °F (36.6 °C), Max:98.6 °F (37 °C)      Lab Results   Component Value Date/Time    HGB 11.2 2023 03:27 AM       Patient Active Problem List   Diagnosis    Spondylolisthesis    HTN (hypertension)    Status post right knee replacement    Osteoarthritis of right knee    Status post lumbar spinal arthrodesis    Lumbar stenosis with neurogenic claudication    Noncompliance with CPAP treatment    Primary osteoarthritis of left knee    Restless leg syndrome    MAURIZIO (obstructive sleep apnea)    Right upper quadrant abdominal pain    S/P laparoscopic cholecystectomy    Rhinovirus infection    Status post left knee replacement       Current Facility-Administered Medications   Medication Dose Route Frequency    sodium chloride flush 0.9 % injection 5-40 mL  5-40 mL IntraVENous 2 times per day    sodium chloride flush 0.9 % injection 5-40 mL  5-40 mL IntraVENous PRN    0.9 % sodium chloride infusion   IntraVENous PRN    acetaminophen (TYLENOL) tablet 1,000 mg  1,000 mg Oral Q6H    oxyCODONE (ROXICODONE) immediate release tablet 10 mg  10 mg Oral Q4H PRN    HYDROmorphone HCl PF (DILAUDID) injection 1 mg  1 mg IntraVENous Q3H PRN    ondansetron (ZOFRAN-ODT) disintegrating tablet 4 mg  4 mg Oral Q8H PRN    Or    ondansetron (ZOFRAN) injection 4 mg  4 mg IntraVENous Q6H PRN    sennosides-docusate sodium (SENOKOT-S) 8.6-50 MG tablet 1 tablet  1 tablet Oral BID    aspirin EC tablet 81 mg  81 mg Oral BID    diphenhydrAMINE (BENADRYL) capsule 25 mg  25 mg Oral Q6H PRN    Or    diphenhydrAMINE (BENADRYL) injection 25 mg  25 mg IntraVENous Q6H PRN    lactated ringers IV
ACUTE PHYSICAL THERAPY GOALS:   (Developed with and agreed upon by patient and/or caregiver.)  GOALS (1-4 days):  (1.)Mr. Tatum will move from supine to sit and sit to supine  in bed with INDEPENDENT. (2.)Mr. Tatum will transfer from bed to chair and chair to bed with INDEPENDENT using the least restrictive device. (3.)Mr. Tatum will ambulate with INDEPENDENT for 250 feet with the least restrictive device. (4.)Mr. Tatum will ambulate up/down 3 steps with left railing with MINIMAL ASSIST.know how, so review just review  (5.)Mr. Tatum will increase left knee ROM to 0-90°. 1/2 met   ________________________________________________________________________________________________            PHYSICAL THERAPY [facility]: TOTAL KNEE ARTHROPLASTY Daily Note and AM  (Link to Caseload Tracking: PT Visit Days : 2  Acknowledge Orders  Time In/Out  PT Charge Capture  Rehab Caseload Tracker  Episode   Aayush Diego is a 70 y.o. male   PRIMARY DIAGNOSIS: Primary osteoarthritis of left knee  Primary osteoarthritis of left knee [M17.12]  Procedure(s) (LRB):  lt KNEE TOTAL ARTHROPLASTY ROBOTIC/ SDD (Left)  1 Day Post-Op  Reason for Referral: Pain in Left Knee (M25.562)  Stiffness of Left Knee, Not elsewhere classified (M25.662)  Difficulty in walking, Not elsewhere classified (R26.2)  Other abnormalities of gait and mobility (R26.89)  Outpatient in a bed: Payor: [employer]ARE / Plan: [employer] HMO / Product Type: *No Product type* /     REHAB RECOMMENDATIONS:   Recommendation to date pending progress:  Setting:  Home Health Therapy    Equipment:    None     RANGE OF MOTION:   Left Knee Flexion: L Knee Flexion (0-145): 9190  Left Knee Extension: L Knee Extension (0): 1010     GAIT: I Mod I S SBA CGA Min Mod Max Total  NT x2 Comments:   Level of Assistance [] [] [] [] [x] [] [] [] [] [] []            Weightbearing Status  Left Lower Extremity Weight Bearing: Weight Bearing As Tolerated    Distance  25 (+ 25)
Discharge instructions reviewed and copy provided for pt. Opportunity provided for questions. Iv was removed without difficulty.
OCCUPATIONAL THERAPY Daily Note, Discharge, and AM      (Link to Caseload Tracking: OT Visit Days: 1  OT Orders   Time  OT Charge Capture  Rehab Caseload Tracker  Episode     Demetrius Rodriguez is a 70 y.o. male   PRIMARY DIAGNOSIS: Primary osteoarthritis of left knee  Primary osteoarthritis of left knee [M17.12]  Procedure(s) (LRB):  lt KNEE TOTAL ARTHROPLASTY ROBOTIC/ SDD (Left)  1 Day Post-Op  Reason for Referral: Pain in Left Knee (M25.562)  Stiffness of Left Knee, Not elsewhere classified (M25.662)  Generalized Muscle Weakness (M62.81)  Other lack of cordination (R27.8)  Difficulty in walking, Not elsewhere classified (R26.2)  Other abnormalities of gait and mobility (R26.89)  History of falling (Z91.81)  Outpatient in a bed: Payor: [employer] MEDICARE / Plan: [employer] HMO / Product Type: *No Product type* /     ASSESSMENT:     REHAB RECOMMENDATIONS:   Recommendation to date pending progress:  Setting:  Home Health Therapy    Equipment:    None     ASSESSMENT:   Mr. Gianluca Calloway is s/p left TKA. Patient completed shower and dressing as charted below in ADL grid and is ambulating with rolling walker and cga assist.  Patient has met 4/4 goals and plans to return home with good family support. Family able to provide patient with appropriate level of assistance at this time. OT reviewed safety precautions throughout session and therapy schedule for the remainder of today. Patient instructed to call for assistance when needing to get up from recliner and all needs in reach. Patient verbalized understanding of call light. He ambulated to the restroom with Cga and transferred to shower onto shower chair. He showered with min assist. He has a shower chair at home. He donned clean gown and returned to his recliner. He dressed with assist for distal Le. He stood with Cga to complete clothing management. He plans to discharge home this am. Recommend HH TO and aide. Will discharge acute care OT.      Pal Shearer
OCCUPATIONAL THERAPY Initial Assessment and PM      (Link to Caseload Tracking: OT Visit Days: 1  OT Orders   Time  OT Charge Capture  Rehab Caseload Tracker  Episode     Drea Streeter is a 70 y.o. male   PRIMARY DIAGNOSIS: Primary osteoarthritis of left knee  Primary osteoarthritis of left knee [M17.12]  Procedure(s) (LRB):  lt KNEE TOTAL ARTHROPLASTY ROBOTIC/ SDD (Left)  Day of Surgery  Reason for Referral: Pain in Left Knee (M25.562)  Stiffness of Left Knee, Not elsewhere classified (M25.662)  Generalized Muscle Weakness (M62.81)  Other lack of cordination (R27.8)  Difficulty in walking, Not elsewhere classified (R26.2)  Other abnormalities of gait and mobility (R26.89)  History of falling (Z91.81)  Outpatient in a bed: Payor: [employer] MEDICARE / Plan: [employer] G[employer]US HMO / Product Type: *No Product type* /     ASSESSMENT:     REHAB RECOMMENDATIONS:   Recommendation to date pending progress:  Setting:  Home Health Therapy    Equipment:    None     ASSESSMENT:  Mr. Herve Johnson is s/p left TKA and presents with decreased independence with functional mobility and activities of daily living as compared to baseline level of function and safety. Patient would benefit from skilled Occupational Therapy to maximize independence and safety with self-care task and functional mobility. Patient having a lot of pain. He was mod assist supine to sit. He stood with min assist and felt better. He took steps then ambulated around the room with min assist. He reported he has cerebral palsy on the right side. He recently had gallbladder surgery in January. He uses a rollator at home. He returned to recliner and felt better. He is working with PT . OT to see him in the am for full ADL session. Spouse and daughter present. Will follow.        [employer] AM-PAC 6 Clicks Daily Activity Inpatient Short Form:    AM-PAC Daily Activity - Inpatient   How much help is needed for putting on and taking off regular lower body
Pt received to room. Pt alert and oriented. Pt oriented to room and bed controls. Family at bedside.
TRANSFER - IN REPORT:    Verbal report received from BRIANA Boyd on Aayush Diego  being received from PACU for routine post-op      Report consisted of patient's Situation, Background, Assessment and   Recommendations(SBAR). Information from the following report(s) Nurse Handoff Report was reviewed with the receiving nurse. Opportunity for questions and clarification was provided. Assessment completed upon patient's arrival to unit and care assumed.
Motion [x] []        LEFT LE Within Functional Limits   Abnormal   Comments   Strength [x] []     Range of Motion [] []  10-90 [email or web address]     UPPER EXTREMITY GROSS EVALUATION:     Within Functional Limits   Abnormal   Comments   Strength [x] []    Range of Motion [x] []      SENSATION  Sensation [x]       COGNITION/  PERCEPTION: Intact Impaired (Comments):   Orientation [x]     Vision [x]     Hearing [x]     Cognition  [x]       MOBILITY: I Mod I S SBA CGA Min Mod Max Total  NT x2 Comments:   Bed Mobility    Rolling [] [] [] [] [] [] [] [] [] [] []    Supine to Sit [] [] [] [] [] [] [] [] [] [] []    Scooting [] [] [] [] [] [] [] [] [] [] []    Sit to Supine [] [] [] [] [] [] [] [] [] [] []    Transfers    Sit to Stand [] [] [] [] [] [x] [] [] [] [] []    Bed to Chair [] [] [] [] [] [] [] [] [] [] []    Stand to Sit [] [] [] [] [] [x] [] [] [] [] []    Stand Pivot [] [] [] [] [] [] [] [] [] [] []    Toilet [] [] [] [] [] [] [] [] [] [] []     [] [] [] [] [] [] [] [] [] [] []    I=Independent, Mod I=Modified Independent, S=Supervision, SBA=Standby Assistance, CGA=Contact Guard Assistance,   Min=Minimal Assistance, Mod=Moderate Assistance, Max=Maximal Assistance, Total=Total Assistance, NT=Not Tested    BALANCE: Good Fair+ Fair Fair- Poor NT Comments   Sitting Static [x] [] [] [] [] []    Sitting Dynamic [x] [] [] [] [] []              Standing Static [x] [] [] [] [] []    Standing Dynamic [] [x] [] [] [] []      PLAN:   FREQUENCY AND DURATION:   for duration of hospital stay or until stated goals are met, whichever comes first.    THERAPY PROGNOSIS: Good    PROBLEM LIST:   (Skilled intervention is medically necessary to address:)  Decreased ADL/Functional Activities, Decreased Activity Tolerance, Decreased AROM/PROM, Decreased Gait Ability, Decreased Strength, Decreased Transfer Abilities, and Increased Pain   INTERVENTIONS PLANNED:   (Benefits and precautions of physical therapy have been discussed with the

## 2023-04-28 ENCOUNTER — TELEPHONE (OUTPATIENT)
Dept: ORTHOPEDIC SURGERY | Age: 68
End: 2023-04-28

## 2023-04-28 NOTE — TELEPHONE ENCOUNTER
Left a VM tried to return call to discuss incision orders
They need clarification on his incision care. Please give her a call.
Detail Level: Simple
Instructions: This plan will send the code FBSE to the PM system.  DO NOT or CHANGE the price.
Price (Do Not Change): 0.00

## 2023-05-30 ENCOUNTER — OFFICE VISIT (OUTPATIENT)
Dept: ORTHOPEDIC SURGERY | Age: 68
End: 2023-05-30

## 2023-05-30 DIAGNOSIS — M17.12 PRIMARY OSTEOARTHRITIS OF LEFT KNEE: Primary | ICD-10-CM

## 2023-05-30 DIAGNOSIS — Z96.652 PRESENCE OF LEFT ARTIFICIAL KNEE JOINT: ICD-10-CM

## 2023-05-30 PROCEDURE — 99024 POSTOP FOLLOW-UP VISIT: CPT | Performed by: PHYSICIAN ASSISTANT

## 2023-05-30 NOTE — PROGRESS NOTES
Post-op TKA Visit      05/30/23     Allergies   Allergen Reactions    Morphine Other (See Comments)     Agitation    Penicillin G     Penicillins Itching, Nausea Only and Headaches     Other reaction(s): Nausea and/or vomiting-Intolerance  Other reaction(s): Headache       Current Outpatient Medications on File Prior to Visit   Medication Sig Dispense Refill    aspirin EC 81 MG EC tablet Take 1 tablet by mouth in the morning and 1 tablet in the evening. 70 tablet 0    celecoxib (CELEBREX) 200 MG capsule Take 1 capsule by mouth 2 times daily 60 capsule 0    promethazine (PHENERGAN) 12.5 MG tablet Take 1 tablet by mouth 4 times daily as needed for Nausea 20 tablet 2    azelastine (ASTELIN) 0.1 % nasal spray USE 1 TO 2 SPRAYS IN EACH NOSTRIL TWICE DAILY      HYDROcodone-acetaminophen (NORCO) 7.5-325 MG per tablet TAKE 1 TABLET BY MOUTH EVERY 6 HOURS AS NEEDED      omeprazole (PRILOSEC) 20 MG delayed release capsule       DULoxetine (CYMBALTA) 60 MG extended release capsule Take 1 capsule by mouth      losartan (COZAAR) 100 MG tablet Take 1 tablet by mouth daily      rOPINIRole (REQUIP) 1 MG tablet Take 4 tablets by mouth nightly       No current facility-administered medications on file prior to visit. 5 weeks Status Post left TKA     History: The patient returns today for post-op visit following left TKA. Their pain is improving. They are ambulating with a walker as a walking aid. They have completed home physical therapy and started outpatient therapy which is going well. They are pleased with their results thus far. Denies any new complaints today. Physical Exam:  The patient's incision is well healed. There is mild swelling and mild increased warmth. ROM is 3 to 115 degrees. There is no M/L or A/P instability. The calf is soft and non-tender. Neurologic and vascular exams are intact. X-Rays: AP and Lateral views of the left knee reveals a cemented TKA, Jb prosthesis.  There is no patella

## 2023-09-18 NOTE — H&P
Chief complaint: Back and buttock pain with activities. History of present illness: This is a very pleasant 76year old patient who presents with a greater than 1 year history of low back pain with episodic radiation to the buttocks and lower extremities, primarily on the right side. The onset of the symptoms was rather insidious. The patient describes the quality of the pain as a deep ache. The patient has noticed a progressive decrease in his ability to walk or stand for any extended period of time. His walking and standing pain is usually relieved with sitting. He has noted that pushing a cart in the store seems to help. He denies any change in bowel or bladder function since the onset of the symptoms. This patient has not had lumbar surgery in the past.  Thus far, the patient has tried regimens of muscle relaxants, Celebrex, tramadol, hydrocodone, physical therapy, and 40 pound weight loss. He has also had epidural injections and selective nerve root blocks. The patient did have significant relief of his radiating lower extremity pain. However, the benefit was only transient. PMHx/PSHx/Social History/Medications/Allergies/ROS are listed and have been reviewed. Review of systems was noted. Pertinent positives and negatives were discussed with the patient particularly those that related to musculoskeletal complaints. Nonorthopedic complaints were directed to the primary care physician. Medications: Allopurinol (300 MG); Aspirin (81 MG); CeleBREX (200 MG, Take 1 capsule(s) by mouth once a day); Cyclobenzaprine HCl (10 MG, Take 1 tablet(s) by mouth 2 times a day for 30 days); Diclofenac Sodium;Dilaudid (2 MG, Take 1 tablet(s) by mouth every 4-6 hours as needed [PRN]); Losartan Potassium (100 MG); Norco (7.5-325 MG, Take 1 po q tid prn pain); Skelaxin (800 MG, 1 po qhs);traMADol HCl (50 MG, Take 1-2 po bid prn pain)  ? ????     Allergies: NKDA  ?????    Physical Exam: This is a well developed well nourished adult male in no acute distress. Mood and affect are appropriate. Oriented to person, place, and time. Respirations are unlabored and there is no evidence of cyanosis    Chest is clear to auscultation bilaterally. Heart is regular rate and rhythm. Inspection of the back reveals no evidence of rash, such as zoster. Examination of the lumbar spine reveals a relative hypolordosis, and no evidence of significant saggital plane deformity. There is exacerbation of symptoms with lumbar extension. There is not significant tenderness to palpation along the spinous processes or paraspinal musculature. The patient ambulates with a slightly crouched posture. Straight leg testing is negative. There is minimal hip irritability with internal or external rotation bilaterally. Sensory testing reveals intact sensation to light touch and pin prick in the distribution of the L3-S1 dermatomes bilaterally, though there is subjective tingling over the bilateral buttocks and posterior thighs and right anterior knee. Reflexes   Right Left   Quadriceps (L4) 2 2   Achilles (S1) 2 2     Ankle jerk is negative for clonus    Strength testing in the lower extremity reveals the following based on the 5 point grading scale:     HF (L2) H Ab (L5) KE (L3/4) ADF (L4) EHL (L5) A Ev (S1) APF (S1)   Right 5 5 5 5 5 5 5   Left 5 5 5 5 5 5 5     The feet are warm with good capillary refill and palpable pedal pulses. Radiographic Studies:    MRI Lumbar spine, report and images independently reviewed:  Spondylotic changes are noted at L4-L5 and result in significant lumbar stenosis both foraminaly and in the lateral recess. And there is a grade 1 spondylolisthesis. .     Assessment/Plan: This patients clinical history and physical exam is consistent with neurogenic claudication which is likely due to lumbar spondylolisthesis and stenosis.   He has reasonably exhausted conservative efforts and would like to consider surgical options. ????? We discussed the details of surgery including a midline incision in over the low back followed by dissection to the area of stenosis. The nerves would be freed up by trimming any impinging structures including ligaments and bone. Then any segments that are deemed to be unstable will be fused together with either bone graft or bone aspirate/synthetic, and typically screws and rods will supplement the fusion. A drain would be inserted and the wound would be closed with suture and covered with sterile dressings. The patient would expect to stay in the hospital 2 days or until he can get about safely with minimal assistance. A short stay in a rehabilitation facility could also be considered depending on how quickly he recovers. Follow-up would be scheduled for 2-3 weeks and he would have restrictions including no driving, and no lifting greater than 15 lbs until follow up with me. He was encouraged to walk as much as possible before and after the operation to facilitate an expeditious recovery. We also discussed the potential risks of the surgery including, but not limited to infection, spinal fluid leak and potential headaches requiring him to remain supine or have a lumbar drain inserted post-operatively; injury to the cauda equina or peripheral nerve root resulting in paralysis, bowel or bladder dysfunction, or loss of use of an extremity; persistent back or leg symptoms or pain at the bone aspirate/graft site; recurrence of stenosis or the development of instability, or failure of the hardware or fusion to heal possibly needing additional surgery;  blood loss requiring transfusion; and the risks of anesthesia including, but not limited heart attack, stroke, and blood clot. The patient voiced an understanding of these issues and would like to discuss them over with her family and will get back with me with his desired treatment course.    The procedure that may prove to be beneficial here is a L4-5 laminectomy and fusion with bone marrow aspirate, allograft bone, instrumentation, and transforaminal lumbar interbody fusion.           Electronically Signed By Domonique Huitron MD [facility] Pregnancy And Lactation Text: This medication is Pregnancy Category D and isn't considered safe during pregnancy. It is excreted in breast milk.

## 2023-10-04 ENCOUNTER — TELEPHONE (OUTPATIENT)
Dept: ORTHOPEDIC SURGERY | Age: 68
End: 2023-10-04

## 2023-10-04 NOTE — TELEPHONE ENCOUNTER
Pts wife called and the pt had a xray done on his left knee. He was told he has a torn left patella. She needs to speak to Brady Kennedy. Please return her call.

## 2023-10-05 ENCOUNTER — OFFICE VISIT (OUTPATIENT)
Dept: ORTHOPEDIC SURGERY | Age: 68
End: 2023-10-05
Payer: MEDICARE

## 2023-10-05 DIAGNOSIS — Z96.651 PRESENCE OF RIGHT ARTIFICIAL KNEE JOINT: Primary | ICD-10-CM

## 2023-10-05 PROCEDURE — G8428 CUR MEDS NOT DOCUMENT: HCPCS | Performed by: ORTHOPAEDIC SURGERY

## 2023-10-05 PROCEDURE — G8484 FLU IMMUNIZE NO ADMIN: HCPCS | Performed by: ORTHOPAEDIC SURGERY

## 2023-10-05 PROCEDURE — 4004F PT TOBACCO SCREEN RCVD TLK: CPT | Performed by: ORTHOPAEDIC SURGERY

## 2023-10-05 PROCEDURE — 1123F ACP DISCUSS/DSCN MKR DOCD: CPT | Performed by: ORTHOPAEDIC SURGERY

## 2023-10-05 PROCEDURE — 99213 OFFICE O/P EST LOW 20 MIN: CPT | Performed by: ORTHOPAEDIC SURGERY

## 2023-10-05 PROCEDURE — 3017F COLORECTAL CA SCREEN DOC REV: CPT | Performed by: ORTHOPAEDIC SURGERY

## 2023-10-05 PROCEDURE — G8417 CALC BMI ABV UP PARAM F/U: HCPCS | Performed by: ORTHOPAEDIC SURGERY

## 2023-10-05 RX ORDER — PREDNISONE 10 MG/1
TABLET ORAL
Qty: 1 EACH | Refills: 0 | Status: SHIPPED | OUTPATIENT
Start: 2023-10-05

## 2023-10-05 NOTE — PROGRESS NOTES
Name: Brennan Tatum  YOB: 1955  Gender: male  MRN: [id number]      Current Outpatient Medications:     aspirin EC 81 MG EC tablet, Take 1 tablet by mouth in the morning and 1 tablet in the evening., Disp: 70 tablet, Rfl: 0    celecoxib (CELEBREX) 200 MG capsule, Take 1 capsule by mouth 2 times daily, Disp: 60 capsule, Rfl: 0    promethazine (PHENERGAN) 12.5 MG tablet, Take 1 tablet by mouth 4 times daily as needed for Nausea, Disp: 20 tablet, Rfl: 2    azelastine (ASTELIN) 0.1 % nasal spray, USE 1 TO 2 SPRAYS IN EACH NOSTRIL TWICE DAILY, Disp: , Rfl:     HYDROcodone-acetaminophen (NORCO) 7.5-325 MG per tablet, TAKE 1 TABLET BY MOUTH EVERY 6 HOURS AS NEEDED, Disp: , Rfl:     omeprazole (PRILOSEC) 20 MG delayed release capsule, , Disp: , Rfl:     DULoxetine (CYMBALTA) 60 MG extended release capsule, Take 1 capsule by mouth, Disp: , Rfl:     losartan (COZAAR) 100 MG tablet, Take 1 tablet by mouth daily, Disp: , Rfl:     rOPINIRole (REQUIP) 1 MG tablet, Take 4 tablets by mouth nightly, Disp: , Rfl:   Allergies   Allergen Reactions    Morphine Other (See Comments)     Agitation    Penicillin G     Penicillins Itching, Nausea Only and Headaches     Other reaction(s): Nausea and/or vomiting-Intolerance  Other reaction(s): Headache         status post right TKA    HPI: The patient had a right TKA performed on 2018. Here for follow up evaluation of this. He complains of pain in the right knee x2 weeks after getting a temporary spinal cord stimulator placed. He states he is having trouble walking and lifting the leg now too. Denies any new falls or injuries. PMH: Reviewed and unchanged    ROS:  As per HPI; pertinent positives and negatives are addressed with the patient, particularly, those related to musculoskeletal concerns. Non-orthopaedic concerns were referred back to the primary care physician. PE:  right Knee  Patient is comfortable and in no distress.   ROM: 10 to 115 degrees  AP

## 2025-01-04 ENCOUNTER — HOSPITAL ENCOUNTER (INPATIENT)
Age: 70
LOS: 2 days | Discharge: HOME | DRG: 872 | End: 2025-01-06
Attending: HOSPITALIST | Admitting: HOSPITALIST
Payer: MEDICARE

## 2025-01-04 DIAGNOSIS — M86.9 OSTEOMYELITIS OF LEFT FOOT, UNSPECIFIED TYPE: ICD-10-CM

## 2025-01-04 DIAGNOSIS — M86.9 OSTEOMYELITIS OF GREAT TOE: ICD-10-CM

## 2025-01-04 DIAGNOSIS — R73.9 HYPERGLYCEMIA: ICD-10-CM

## 2025-01-04 DIAGNOSIS — R79.89 ELEVATED LACTIC ACID LEVEL: ICD-10-CM

## 2025-01-04 DIAGNOSIS — M86.9 OSTEOMYELITIS OF GREAT TOE OF LEFT FOOT: Primary | ICD-10-CM

## 2025-01-04 PROBLEM — I48.20 ATRIAL FIBRILLATION, CHRONIC (HCC): Chronic | Status: ACTIVE | Noted: 2025-01-04

## 2025-01-04 LAB
ALBUMIN SERPL-MCNC: 3.4 G/DL (ref 3.2–4.6)
ALBUMIN/GLOB SERPL: 0.9 (ref 1–1.9)
ALP SERPL-CCNC: 124 U/L (ref 40–129)
ALT SERPL-CCNC: 23 U/L (ref 8–55)
ANION GAP SERPL CALC-SCNC: 18 MMOL/L (ref 7–16)
AST SERPL-CCNC: 17 U/L (ref 15–37)
BASOPHILS # BLD: 0.1 K/UL (ref 0–0.2)
BASOPHILS NFR BLD: 1 % (ref 0–2)
BILIRUB SERPL-MCNC: 0.4 MG/DL (ref 0–1.2)
BUN SERPL-MCNC: 22 MG/DL (ref 8–23)
CALCIUM SERPL-MCNC: 9.2 MG/DL (ref 8.8–10.2)
CHLORIDE SERPL-SCNC: 97 MMOL/L (ref 98–107)
CO2 SERPL-SCNC: 22 MMOL/L (ref 20–29)
CREAT SERPL-MCNC: 1.09 MG/DL (ref 0.8–1.3)
CRP SERPL HS-MCNC: 1.7 MG/L (ref 0–3)
DIFFERENTIAL METHOD BLD: ABNORMAL
EOSINOPHIL # BLD: 0.2 K/UL (ref 0–0.8)
EOSINOPHIL NFR BLD: 2 % (ref 0.5–7.8)
ERYTHROCYTE [DISTWIDTH] IN BLOOD BY AUTOMATED COUNT: 12.9 % (ref 11.9–14.6)
ERYTHROCYTE [SEDIMENTATION RATE] IN BLOOD: 17 MM/HR (ref 0–15)
GLOBULIN SER CALC-MCNC: 3.8 G/DL (ref 2.3–3.5)
GLUCOSE SERPL-MCNC: 300 MG/DL (ref 70–99)
HCT VFR BLD AUTO: 42.7 % (ref 41.1–50.3)
HGB BLD-MCNC: 14.1 G/DL (ref 13.6–17.2)
IMM GRANULOCYTES # BLD AUTO: 0.4 K/UL (ref 0–0.5)
IMM GRANULOCYTES NFR BLD AUTO: 4 % (ref 0–5)
LACTATE SERPL-SCNC: 2.1 MMOL/L (ref 0.5–2)
LACTATE SERPL-SCNC: 3.6 MMOL/L (ref 0.5–2)
LYMPHOCYTES # BLD: 1.5 K/UL (ref 0.5–4.6)
LYMPHOCYTES NFR BLD: 13 % (ref 13–44)
MCH RBC QN AUTO: 30 PG (ref 26.1–32.9)
MCHC RBC AUTO-ENTMCNC: 33 G/DL (ref 31.4–35)
MCV RBC AUTO: 90.9 FL (ref 82–102)
MONOCYTES # BLD: 0.8 K/UL (ref 0.1–1.3)
MONOCYTES NFR BLD: 7 % (ref 4–12)
NEUTS SEG # BLD: 8.5 K/UL (ref 1.7–8.2)
NEUTS SEG NFR BLD: 74 % (ref 43–78)
NRBC # BLD: 0 K/UL (ref 0–0.2)
PLATELET # BLD AUTO: 178 K/UL (ref 150–450)
PMV BLD AUTO: 10.2 FL (ref 9.4–12.3)
POTASSIUM SERPL-SCNC: 4 MMOL/L (ref 3.5–5.1)
PROCALCITONIN SERPL-MCNC: 0.08 NG/ML (ref 0–0.1)
PROT SERPL-MCNC: 7.3 G/DL (ref 6.3–8.2)
RBC # BLD AUTO: 4.7 M/UL (ref 4.23–5.6)
SODIUM SERPL-SCNC: 137 MMOL/L (ref 136–145)
WBC # BLD AUTO: 11.6 K/UL (ref 4.3–11.1)

## 2025-01-04 PROCEDURE — 80053 COMPREHEN METABOLIC PANEL: CPT

## 2025-01-04 PROCEDURE — 86141 C-REACTIVE PROTEIN HS: CPT

## 2025-01-04 PROCEDURE — 2580000003 HC RX 258: Performed by: PHYSICIAN ASSISTANT

## 2025-01-04 PROCEDURE — 85652 RBC SED RATE AUTOMATED: CPT

## 2025-01-04 PROCEDURE — 1100000000 HC RM PRIVATE

## 2025-01-04 PROCEDURE — 99285 EMERGENCY DEPT VISIT HI MDM: CPT

## 2025-01-04 PROCEDURE — 83605 ASSAY OF LACTIC ACID: CPT

## 2025-01-04 PROCEDURE — 6370000000 HC RX 637 (ALT 250 FOR IP): Performed by: PHYSICIAN ASSISTANT

## 2025-01-04 PROCEDURE — 87040 BLOOD CULTURE FOR BACTERIA: CPT

## 2025-01-04 PROCEDURE — 6360000002 HC RX W HCPCS: Performed by: PHYSICIAN ASSISTANT

## 2025-01-04 PROCEDURE — 84145 PROCALCITONIN (PCT): CPT

## 2025-01-04 PROCEDURE — 96365 THER/PROPH/DIAG IV INF INIT: CPT

## 2025-01-04 PROCEDURE — 85025 COMPLETE CBC W/AUTO DIFF WBC: CPT

## 2025-01-04 RX ORDER — POTASSIUM CHLORIDE 1500 MG/1
40 TABLET, EXTENDED RELEASE ORAL PRN
Status: DISCONTINUED | OUTPATIENT
Start: 2025-01-04 | End: 2025-01-06 | Stop reason: HOSPADM

## 2025-01-04 RX ORDER — LOSARTAN POTASSIUM 50 MG/1
100 TABLET ORAL DAILY
Status: DISCONTINUED | OUTPATIENT
Start: 2025-01-05 | End: 2025-01-06 | Stop reason: HOSPADM

## 2025-01-04 RX ORDER — INSULIN LISPRO 100 [IU]/ML
0-8 INJECTION, SOLUTION INTRAVENOUS; SUBCUTANEOUS
Status: DISCONTINUED | OUTPATIENT
Start: 2025-01-05 | End: 2025-01-06 | Stop reason: HOSPADM

## 2025-01-04 RX ORDER — MAGNESIUM SULFATE IN WATER 40 MG/ML
2000 INJECTION, SOLUTION INTRAVENOUS PRN
Status: DISCONTINUED | OUTPATIENT
Start: 2025-01-04 | End: 2025-01-06 | Stop reason: HOSPADM

## 2025-01-04 RX ORDER — ONDANSETRON 2 MG/ML
4 INJECTION INTRAMUSCULAR; INTRAVENOUS EVERY 6 HOURS PRN
Status: DISCONTINUED | OUTPATIENT
Start: 2025-01-04 | End: 2025-01-06 | Stop reason: HOSPADM

## 2025-01-04 RX ORDER — SODIUM CHLORIDE 0.9 % (FLUSH) 0.9 %
5-40 SYRINGE (ML) INJECTION EVERY 12 HOURS SCHEDULED
Status: DISCONTINUED | OUTPATIENT
Start: 2025-01-05 | End: 2025-01-06 | Stop reason: HOSPADM

## 2025-01-04 RX ORDER — DEXTROSE MONOHYDRATE 100 MG/ML
INJECTION, SOLUTION INTRAVENOUS CONTINUOUS PRN
Status: DISCONTINUED | OUTPATIENT
Start: 2025-01-04 | End: 2025-01-06 | Stop reason: HOSPADM

## 2025-01-04 RX ORDER — ACETAMINOPHEN 650 MG/1
650 SUPPOSITORY RECTAL EVERY 6 HOURS PRN
Status: DISCONTINUED | OUTPATIENT
Start: 2025-01-04 | End: 2025-01-06 | Stop reason: HOSPADM

## 2025-01-04 RX ORDER — MAGNESIUM HYDROXIDE/ALUMINUM HYDROXICE/SIMETHICONE 120; 1200; 1200 MG/30ML; MG/30ML; MG/30ML
30 SUSPENSION ORAL EVERY 6 HOURS PRN
Status: DISCONTINUED | OUTPATIENT
Start: 2025-01-04 | End: 2025-01-06 | Stop reason: HOSPADM

## 2025-01-04 RX ORDER — SODIUM CHLORIDE 0.9 % (FLUSH) 0.9 %
5-40 SYRINGE (ML) INJECTION PRN
Status: DISCONTINUED | OUTPATIENT
Start: 2025-01-04 | End: 2025-01-06 | Stop reason: HOSPADM

## 2025-01-04 RX ORDER — ACETAMINOPHEN 325 MG/1
650 TABLET ORAL EVERY 6 HOURS PRN
Status: DISCONTINUED | OUTPATIENT
Start: 2025-01-04 | End: 2025-01-06 | Stop reason: HOSPADM

## 2025-01-04 RX ORDER — 0.9 % SODIUM CHLORIDE 0.9 %
1000 INTRAVENOUS SOLUTION INTRAVENOUS
Status: COMPLETED | OUTPATIENT
Start: 2025-01-04 | End: 2025-01-04

## 2025-01-04 RX ORDER — ONDANSETRON 4 MG/1
4 TABLET, ORALLY DISINTEGRATING ORAL EVERY 8 HOURS PRN
Status: DISCONTINUED | OUTPATIENT
Start: 2025-01-04 | End: 2025-01-06 | Stop reason: HOSPADM

## 2025-01-04 RX ORDER — ASPIRIN 81 MG/1
81 TABLET ORAL EVERY 12 HOURS
Status: DISCONTINUED | OUTPATIENT
Start: 2025-01-05 | End: 2025-01-06 | Stop reason: HOSPADM

## 2025-01-04 RX ORDER — POLYETHYLENE GLYCOL 3350 17 G/17G
17 POWDER, FOR SOLUTION ORAL DAILY PRN
Status: DISCONTINUED | OUTPATIENT
Start: 2025-01-04 | End: 2025-01-06 | Stop reason: HOSPADM

## 2025-01-04 RX ORDER — PANTOPRAZOLE SODIUM 40 MG/1
40 TABLET, DELAYED RELEASE ORAL
Status: DISCONTINUED | OUTPATIENT
Start: 2025-01-05 | End: 2025-01-06 | Stop reason: HOSPADM

## 2025-01-04 RX ORDER — ROPINIROLE 2 MG/1
4 TABLET, FILM COATED ORAL NIGHTLY
Status: DISCONTINUED | OUTPATIENT
Start: 2025-01-04 | End: 2025-01-05

## 2025-01-04 RX ORDER — POTASSIUM CHLORIDE 7.45 MG/ML
10 INJECTION INTRAVENOUS PRN
Status: DISCONTINUED | OUTPATIENT
Start: 2025-01-04 | End: 2025-01-06 | Stop reason: HOSPADM

## 2025-01-04 RX ORDER — HYDROCODONE BITARTRATE AND ACETAMINOPHEN 7.5; 325 MG/1; MG/1
1 TABLET ORAL EVERY 6 HOURS PRN
Status: DISCONTINUED | OUTPATIENT
Start: 2025-01-04 | End: 2025-01-06 | Stop reason: HOSPADM

## 2025-01-04 RX ORDER — METOPROLOL SUCCINATE 25 MG/1
25 TABLET, EXTENDED RELEASE ORAL DAILY
Status: DISCONTINUED | OUTPATIENT
Start: 2025-01-05 | End: 2025-01-06 | Stop reason: HOSPADM

## 2025-01-04 RX ORDER — METOPROLOL SUCCINATE 25 MG/1
25 TABLET, EXTENDED RELEASE ORAL DAILY
COMMUNITY

## 2025-01-04 RX ORDER — SODIUM CHLORIDE 9 MG/ML
INJECTION, SOLUTION INTRAVENOUS CONTINUOUS
Status: ACTIVE | OUTPATIENT
Start: 2025-01-05 | End: 2025-01-05

## 2025-01-04 RX ORDER — IBUPROFEN 600 MG/1
1 TABLET ORAL PRN
Status: DISCONTINUED | OUTPATIENT
Start: 2025-01-04 | End: 2025-01-06 | Stop reason: HOSPADM

## 2025-01-04 RX ORDER — SODIUM CHLORIDE 9 MG/ML
INJECTION, SOLUTION INTRAVENOUS PRN
Status: DISCONTINUED | OUTPATIENT
Start: 2025-01-04 | End: 2025-01-06 | Stop reason: HOSPADM

## 2025-01-04 RX ADMIN — MEROPENEM 1000 MG: 1 INJECTION INTRAVENOUS at 22:24

## 2025-01-04 RX ADMIN — ROPINIROLE HYDROCHLORIDE 4 MG: 2 TABLET, FILM COATED ORAL at 22:10

## 2025-01-04 RX ADMIN — SODIUM CHLORIDE 1000 ML: 9 INJECTION, SOLUTION INTRAVENOUS at 22:10

## 2025-01-04 ASSESSMENT — PAIN SCALES - GENERAL: PAINLEVEL_OUTOF10: 8

## 2025-01-04 ASSESSMENT — LIFESTYLE VARIABLES
HOW OFTEN DO YOU HAVE A DRINK CONTAINING ALCOHOL: NEVER
HOW MANY STANDARD DRINKS CONTAINING ALCOHOL DO YOU HAVE ON A TYPICAL DAY: PATIENT DOES NOT DRINK

## 2025-01-05 ENCOUNTER — APPOINTMENT (OUTPATIENT)
Dept: GENERAL RADIOLOGY | Age: 70
DRG: 872 | End: 2025-01-05
Payer: MEDICARE

## 2025-01-05 LAB
ANION GAP SERPL CALC-SCNC: 9 MMOL/L (ref 7–16)
BASOPHILS # BLD: 0.1 K/UL (ref 0–0.2)
BASOPHILS NFR BLD: 1 % (ref 0–2)
BUN SERPL-MCNC: 21 MG/DL (ref 8–23)
CALCIUM SERPL-MCNC: 8.6 MG/DL (ref 8.8–10.2)
CHLORIDE SERPL-SCNC: 104 MMOL/L (ref 98–107)
CO2 SERPL-SCNC: 25 MMOL/L (ref 20–29)
CREAT SERPL-MCNC: 0.94 MG/DL (ref 0.8–1.3)
DIFFERENTIAL METHOD BLD: ABNORMAL
EOSINOPHIL # BLD: 0.2 K/UL (ref 0–0.8)
EOSINOPHIL NFR BLD: 2 % (ref 0.5–7.8)
ERYTHROCYTE [DISTWIDTH] IN BLOOD BY AUTOMATED COUNT: 13 % (ref 11.9–14.6)
EST. AVERAGE GLUCOSE BLD GHB EST-MCNC: 169 MG/DL
GLUCOSE BLD STRIP.AUTO-MCNC: 132 MG/DL (ref 65–100)
GLUCOSE BLD STRIP.AUTO-MCNC: 156 MG/DL (ref 65–100)
GLUCOSE BLD STRIP.AUTO-MCNC: 180 MG/DL (ref 65–100)
GLUCOSE BLD STRIP.AUTO-MCNC: 213 MG/DL (ref 65–100)
GLUCOSE BLD STRIP.AUTO-MCNC: 225 MG/DL (ref 65–100)
GLUCOSE SERPL-MCNC: 165 MG/DL (ref 70–99)
HBA1C MFR BLD: 7.5 % (ref 0–5.6)
HCT VFR BLD AUTO: 37.3 % (ref 41.1–50.3)
HGB BLD-MCNC: 12.3 G/DL (ref 13.6–17.2)
IMM GRANULOCYTES # BLD AUTO: 0.4 K/UL (ref 0–0.5)
IMM GRANULOCYTES NFR BLD AUTO: 4 % (ref 0–5)
LACTATE SERPL-SCNC: 1.6 MMOL/L (ref 0.5–2)
LYMPHOCYTES # BLD: 1.5 K/UL (ref 0.5–4.6)
LYMPHOCYTES NFR BLD: 16 % (ref 13–44)
MCH RBC QN AUTO: 30.1 PG (ref 26.1–32.9)
MCHC RBC AUTO-ENTMCNC: 33 G/DL (ref 31.4–35)
MCV RBC AUTO: 91.2 FL (ref 82–102)
MONOCYTES # BLD: 0.8 K/UL (ref 0.1–1.3)
MONOCYTES NFR BLD: 9 % (ref 4–12)
NEUTS SEG # BLD: 6.5 K/UL (ref 1.7–8.2)
NEUTS SEG NFR BLD: 68 % (ref 43–78)
NRBC # BLD: 0 K/UL (ref 0–0.2)
PLATELET # BLD AUTO: 145 K/UL (ref 150–450)
PMV BLD AUTO: 10.4 FL (ref 9.4–12.3)
POTASSIUM SERPL-SCNC: 3.7 MMOL/L (ref 3.5–5.1)
RBC # BLD AUTO: 4.09 M/UL (ref 4.23–5.6)
SERVICE CMNT-IMP: ABNORMAL
SODIUM SERPL-SCNC: 138 MMOL/L (ref 136–145)
WBC # BLD AUTO: 9.5 K/UL (ref 4.3–11.1)

## 2025-01-05 PROCEDURE — 73630 X-RAY EXAM OF FOOT: CPT

## 2025-01-05 PROCEDURE — 82962 GLUCOSE BLOOD TEST: CPT

## 2025-01-05 PROCEDURE — 83605 ASSAY OF LACTIC ACID: CPT

## 2025-01-05 PROCEDURE — 6370000000 HC RX 637 (ALT 250 FOR IP): Performed by: INTERNAL MEDICINE

## 2025-01-05 PROCEDURE — 1100000000 HC RM PRIVATE

## 2025-01-05 PROCEDURE — 6360000002 HC RX W HCPCS: Performed by: HOSPITALIST

## 2025-01-05 PROCEDURE — 2500000003 HC RX 250 WO HCPCS: Performed by: HOSPITALIST

## 2025-01-05 PROCEDURE — 80048 BASIC METABOLIC PNL TOTAL CA: CPT

## 2025-01-05 PROCEDURE — 6370000000 HC RX 637 (ALT 250 FOR IP): Performed by: HOSPITALIST

## 2025-01-05 PROCEDURE — 6370000000 HC RX 637 (ALT 250 FOR IP): Performed by: STUDENT IN AN ORGANIZED HEALTH CARE EDUCATION/TRAINING PROGRAM

## 2025-01-05 PROCEDURE — 83036 HEMOGLOBIN GLYCOSYLATED A1C: CPT

## 2025-01-05 PROCEDURE — 36415 COLL VENOUS BLD VENIPUNCTURE: CPT

## 2025-01-05 PROCEDURE — 2580000003 HC RX 258: Performed by: HOSPITALIST

## 2025-01-05 PROCEDURE — 85025 COMPLETE CBC W/AUTO DIFF WBC: CPT

## 2025-01-05 RX ORDER — DOXYCYCLINE 100 MG/1
100 CAPSULE ORAL EVERY 12 HOURS SCHEDULED
Status: DISCONTINUED | OUTPATIENT
Start: 2025-01-05 | End: 2025-01-06 | Stop reason: HOSPADM

## 2025-01-05 RX ORDER — ROPINIROLE 2 MG/1
4 TABLET, FILM COATED ORAL DAILY
Status: DISCONTINUED | OUTPATIENT
Start: 2025-01-05 | End: 2025-01-06 | Stop reason: HOSPADM

## 2025-01-05 RX ORDER — COLCHICINE 0.6 MG/1
1.2 TABLET ORAL ONCE
Status: COMPLETED | OUTPATIENT
Start: 2025-01-05 | End: 2025-01-05

## 2025-01-05 RX ADMIN — APIXABAN 5 MG: 5 TABLET, FILM COATED ORAL at 00:15

## 2025-01-05 RX ADMIN — APIXABAN 5 MG: 5 TABLET, FILM COATED ORAL at 20:46

## 2025-01-05 RX ADMIN — INSULIN LISPRO 2 UNITS: 100 INJECTION, SOLUTION INTRAVENOUS; SUBCUTANEOUS at 00:19

## 2025-01-05 RX ADMIN — METOPROLOL SUCCINATE 25 MG: 25 TABLET, EXTENDED RELEASE ORAL at 08:23

## 2025-01-05 RX ADMIN — LOSARTAN POTASSIUM 100 MG: 50 TABLET, FILM COATED ORAL at 08:22

## 2025-01-05 RX ADMIN — ASPIRIN 81 MG: 81 TABLET, COATED ORAL at 20:46

## 2025-01-05 RX ADMIN — MEROPENEM 1000 MG: 1 INJECTION INTRAVENOUS at 10:33

## 2025-01-05 RX ADMIN — DOXYCYCLINE HYCLATE 100 MG: 100 CAPSULE ORAL at 20:46

## 2025-01-05 RX ADMIN — SODIUM CHLORIDE, PRESERVATIVE FREE 10 ML: 5 INJECTION INTRAVENOUS at 20:45

## 2025-01-05 RX ADMIN — COLCHICINE 1.2 MG: 0.6 TABLET, FILM COATED ORAL at 20:46

## 2025-01-05 RX ADMIN — ROPINIROLE HYDROCHLORIDE 4 MG: 2 TABLET, FILM COATED ORAL at 15:31

## 2025-01-05 RX ADMIN — ASPIRIN 81 MG: 81 TABLET, COATED ORAL at 08:23

## 2025-01-05 RX ADMIN — INSULIN LISPRO 2 UNITS: 100 INJECTION, SOLUTION INTRAVENOUS; SUBCUTANEOUS at 17:07

## 2025-01-05 RX ADMIN — APIXABAN 5 MG: 5 TABLET, FILM COATED ORAL at 08:22

## 2025-01-05 RX ADMIN — SODIUM CHLORIDE: 9 INJECTION, SOLUTION INTRAVENOUS at 00:24

## 2025-01-05 RX ADMIN — INSULIN LISPRO 2 UNITS: 100 INJECTION, SOLUTION INTRAVENOUS; SUBCUTANEOUS at 20:45

## 2025-01-05 NOTE — ED PROVIDER NOTES
Emergency Department Provider Note       PCP: Madhav Weiner (Inactive)   Age: 69 y.o.   Sex: male     DISPOSITION Admitted 01/04/2025 10:45:33 PM                ICD-10-CM    1. Osteomyelitis of great toe of left foot  M86.9       2. Elevated lactic acid level  R79.89       3. Hyperglycemia  R73.9           Medical Decision Making     Patient is a 6-year-old male presenting with left first toe pain redness and swelling that has been ongoing for the last 2 weeks.  He did finish 7-day course of Keflex and went to urgent care today because he did not feel it is getting any better.  X-rays at urgent care showed findings concerning for osteomyelitis and ultimately patient was sent to the ER for evaluation.  He denies any fever or chills.  Denies any known history of diabetes.  He is afebrile nontoxic in appearance, vital signs within appropriate limits.  He does have pain and swelling to the first toe of the left foot with redness and warmth to touch.  2 calcified lesions on the toe but no redness spreading up the foot, pulses intact.  X-ray imaging from  shows: \"Findings suspicious for acute osteomyelitis of the lateral margin of the great toe tuft \"    For this reason we will check labs including CBC CMP sed rate, CRP, lactic acid and blood cultures per septic protocol.    Labs Reviewed   CBC WITH AUTO DIFFERENTIAL - Abnormal; Notable for the following components:       Result Value    WBC 11.6 (*)     Neutrophils Absolute 8.5 (*)     All other components within normal limits   COMPREHENSIVE METABOLIC PANEL - Abnormal; Notable for the following components:    Chloride 97 (*)     Anion Gap 18 (*)     Glucose 300 (*)     Globulin 3.8 (*)     Albumin/Globulin Ratio 0.9 (*)     All other components within normal limits   SEDIMENTATION RATE - Abnormal; Notable for the following components:    Sed Rate, Automated 17 (*)     All other components within normal limits   LACTATE, SEPSIS - Abnormal; Notable for the

## 2025-01-05 NOTE — ED TRIAGE NOTES
Pt presents to Ed with c/o left great toe pain, swelling and redness. Pt seen at urgent care and sent to ED for further evaluation. Md told pt that there is an infection in the bone and he will need to be admitted.

## 2025-01-05 NOTE — CARE COORDINATION
CM note:    Chart reviewed for updates. CM met with pt at bedside, introduced role, confirmed demographics and discussed d/c planning. Pt was asleep so his wife was the one that completed assessment on his behalf. Pt lives with spouse in a 1 level home with 2 steps to enter and rails on the side. He is independent with ADL's at home and uses a Rollator. Wife reports that they recently installed a walk-in-shower. Pt is an active  in the community. He has been to inpatient rehab down at [place] 3 years ago after a hip surgery. Pt has also used home health services in the past. Pt has a strong local family support system. Plan is for pt to discharge home with family support pending medical stability. CM will continue to follow up with d/c planning.     01/05/25 5467   Service Assessment   Patient Orientation Alert and Oriented   Cognition Alert   History Provided By Patient   Primary Caregiver Self   Accompanied By/Relationship N/A   Support Systems Spouse/Significant Other   Patient's Healthcare Decision Maker is: Legal Next of Kin  (Spouse)   PCP Verified by CM Yes   Last Visit to PCP Within last year   Prior Functional Level Independent in ADLs/IADLs   Current Functional Level Independent in ADLs/IADLs   Can patient return to prior living arrangement Yes   Ability to make needs known: Good   Family able to assist with home care needs: Yes   Would you like for me to discuss the discharge plan with any other family members/significant others, and if so, who? Yes  (Spouse)   Financial Resources Medicare   Community Resources None   Social/Functional History   Lives With Spouse   Type of Home House   Home Layout One level   Home Access Stairs to enter with rails   Entrance Stairs - Number of Steps 2   Bathroom Shower/Tub Walk-in shower   Home Equipment Rollator   Prior Level of Assist for ADLs Independent   Ambulation Assistance Independent   Active  Yes   Occupation Retired   Discharge Planning   Type

## 2025-01-05 NOTE — H&P
Hospitalist History and Physical   Admit Date:  2025  9:16 PM   Name:  Van Tatum   Age:  69 y.o.  Sex:  male  :  1955   MRN:  [id number]   Room:  ER02/02    Presenting Complaint: Toe Pain     Reason(s) for Admission: Osteomyelitis of great toe [M86.9]       Assessment & Plan:     Principal Problem:    Osteomyelitis of left foot -69-year-old gentleman with history of hypertension and chronic atrial fibrillation on Eliquis, borderline diabetic, presents to ER with left great toe pain and redness, status post outpatient Keflex for the past week, workup in the ER revealing probable osteomyelitis meeting sepsis criteria with lactic acid 3.6 and white blood cell count 11.6.        Active Problems:    Sepsis (HCC)      Hyperglycemia      MAURIZIO (obstructive sleep apnea)      HTN (hypertension)      Atrial fibrillation, chronic (HCC)      Osteomyelitis of great toe      Plan:   Patient will be admitted to a regular medical bed, inpatient status  For his osteomyelitis, will continue IV antibiotic started in the ER.  Merrem chosen due to several listed allergies.  Will consult infectious disease for antibiotic selection as well as length of treatment  In the ER, blood sugar noted to be 300.  Patient states he has always been borderline.  Will check hemoglobin A1c, start him on basal as well as sliding scale insulin.  Diabetic education.  Continue other home medications including his Eliquis  Plan of care discussed with patient and wife and all questions answered.      Anticipated discharge needs:         Diet: regular  VTE ppx: eliquis  Code status: FULL      History of Present Illness:   Van Tatum is a 69 y.o. male with medical history of hypertension, atrial fibrillation on Eliquis, borderline diabetes, and MAURIZIO who presents from urgent care for the complaint of left first toe pain and swelling.  He has had pain and swelling to the left first toe ongoing for the last 2 weeks.  Did see urgent

## 2025-01-05 NOTE — CONSULTS
Infectious Diseases Consult    Today's Date: 2025   Admit Date: 2025  : 1955    Impression/Plan   Suspected early OM of left great toe/diabetic foot infection -- imaged at OSH. Does have a wound, I was able to manually unroof the callous. We discussed MRI, and given that it may not provide further evidence against OM, will hold off and treat empirically otherwise.  Stop meropenem; he had tolerated cephalexin before, and so cephalosporins would have been reasonable, as a low level foot infection like this usually is not caused by resistant organisms.  Start doxycycline, with plans for 6 weeks of therapy. His lack of response to keflex (which is a fine empiric choice) Patient to call my office if not better in 4 weeks.   I think wound/foot care would benefit him more than would antibiotics. Refer to wound or podiatry if there is a close option at discharge.  Recommend referral to orthotics at discharge  Suspected R 2nd toe gout  Colchicine 1.2 mg prescribed  New onset DM -- with pre-diabetes before.   Hammer toe deformity/Charcot changes  Onychomycosis of b/l toes  Penicillin allergy listed -- patient has no recollection of this, and he has tolerated amoxicillin. Removed from allergy list.   We will not follow along, patient stable for discharge tomorrow from our perspective, and should follow up with wound care.       Anti-infectives:   S/p 7 days cephalexin  Meropenem -    Subjective:   Date of Consultation:  2025  Referring Physician: Parminder Pena,* for: assistance in management of the above    Patient is a 69 y.o. male with newly diagnosed DM (ok A1c), hammertoe deformity, tobacco use disorder (chew), and lower extremity callous/deformity who presented after being told to when his outpatient xray of his L foot suggested great toe OM at the site of a minor wound and callous. He had severe pain here and so had sought out care at an urgent care. They directed him to the ED

## 2025-01-06 ENCOUNTER — FOLLOWUP TELEPHONE ENCOUNTER (OUTPATIENT)
Dept: DIABETES SERVICES | Age: 70
End: 2025-01-06

## 2025-01-06 VITALS
HEIGHT: 70 IN | DIASTOLIC BLOOD PRESSURE: 67 MMHG | WEIGHT: 245 LBS | TEMPERATURE: 98.1 F | HEART RATE: 63 BPM | OXYGEN SATURATION: 95 % | RESPIRATION RATE: 16 BRPM | BODY MASS INDEX: 35.07 KG/M2 | SYSTOLIC BLOOD PRESSURE: 121 MMHG

## 2025-01-06 PROBLEM — A41.9 SEPSIS (HCC): Status: RESOLVED | Noted: 2023-01-08 | Resolved: 2025-01-06

## 2025-01-06 LAB
GLUCOSE BLD STRIP.AUTO-MCNC: 138 MG/DL (ref 65–100)
GLUCOSE BLD STRIP.AUTO-MCNC: 237 MG/DL (ref 65–100)
SERVICE CMNT-IMP: ABNORMAL
SERVICE CMNT-IMP: ABNORMAL

## 2025-01-06 PROCEDURE — 6370000000 HC RX 637 (ALT 250 FOR IP): Performed by: HOSPITALIST

## 2025-01-06 PROCEDURE — 82962 GLUCOSE BLOOD TEST: CPT

## 2025-01-06 PROCEDURE — 6370000000 HC RX 637 (ALT 250 FOR IP): Performed by: INTERNAL MEDICINE

## 2025-01-06 PROCEDURE — 2500000003 HC RX 250 WO HCPCS: Performed by: HOSPITALIST

## 2025-01-06 RX ORDER — COLCHICINE 0.6 MG/1
0.6 TABLET ORAL DAILY
Qty: 5 TABLET | Refills: 0 | Status: SHIPPED | OUTPATIENT
Start: 2025-01-06 | End: 2025-01-06

## 2025-01-06 RX ORDER — LACTOBACILLUS RHAMNOSUS GG 10B CELL
CAPSULE ORAL
COMMUNITY
Start: 2025-01-06 | End: 2025-01-06

## 2025-01-06 RX ORDER — LACTOBACILLUS RHAMNOSUS GG 10B CELL
CAPSULE ORAL
COMMUNITY
Start: 2025-01-06

## 2025-01-06 RX ORDER — DOXYCYCLINE 100 MG/1
100 CAPSULE ORAL EVERY 12 HOURS SCHEDULED
Qty: 84 CAPSULE | Refills: 0 | Status: SHIPPED | OUTPATIENT
Start: 2025-01-06 | End: 2025-01-06

## 2025-01-06 RX ORDER — DOXYCYCLINE 100 MG/1
100 CAPSULE ORAL EVERY 12 HOURS SCHEDULED
Qty: 84 CAPSULE | Refills: 0 | Status: SHIPPED | OUTPATIENT
Start: 2025-01-06 | End: 2025-02-17

## 2025-01-06 RX ORDER — COLCHICINE 0.6 MG/1
0.6 TABLET ORAL DAILY
Qty: 5 TABLET | Refills: 0 | Status: SHIPPED | OUTPATIENT
Start: 2025-01-06 | End: 2025-01-11

## 2025-01-06 RX ADMIN — SODIUM CHLORIDE, PRESERVATIVE FREE 10 ML: 5 INJECTION INTRAVENOUS at 08:53

## 2025-01-06 RX ADMIN — METOPROLOL SUCCINATE 25 MG: 25 TABLET, EXTENDED RELEASE ORAL at 08:52

## 2025-01-06 RX ADMIN — LOSARTAN POTASSIUM 100 MG: 50 TABLET, FILM COATED ORAL at 08:52

## 2025-01-06 RX ADMIN — DOXYCYCLINE HYCLATE 100 MG: 100 CAPSULE ORAL at 08:52

## 2025-01-06 RX ADMIN — PANTOPRAZOLE SODIUM 40 MG: 40 TABLET, DELAYED RELEASE ORAL at 05:42

## 2025-01-06 RX ADMIN — APIXABAN 5 MG: 5 TABLET, FILM COATED ORAL at 08:52

## 2025-01-06 RX ADMIN — ASPIRIN 81 MG: 81 TABLET, COATED ORAL at 08:53

## 2025-01-06 ASSESSMENT — PAIN DESCRIPTION - DESCRIPTORS: DESCRIPTORS: ACHING

## 2025-01-06 ASSESSMENT — PAIN DESCRIPTION - ORIENTATION: ORIENTATION: LEFT

## 2025-01-06 ASSESSMENT — PAIN DESCRIPTION - LOCATION: LOCATION: TOE (COMMENT WHICH ONE)

## 2025-01-06 ASSESSMENT — PAIN SCALES - GENERAL: PAINLEVEL_OUTOF10: 2

## 2025-01-06 NOTE — DISCHARGE SUMMARY
Hospitalist Discharge Summary   Admit Date:  2025  9:16 PM   DC Note date: 2025  Name:  Van Tatum   Age:  69 y.o.  Sex:  male  :  1955   MRN:  [id number]   Room:  [facility]  PCP:  Madhav Weiner (Inactive)    Presenting Complaint: Toe Pain     Initial Admission Diagnosis: Hyperglycemia [R73.9]  Elevated lactic acid level [R79.89]  Osteomyelitis of great toe of left foot [M86.9]  Osteomyelitis of great toe [M86.9]     Problem List for this Hospitalization (present on admission):    Principal Problem:    Osteomyelitis of left foot  Active Problems:    MAURIZIO (obstructive sleep apnea)    HTN (hypertension)    Atrial fibrillation, chronic (HCC)    Hyperglycemia    Osteomyelitis of great toe  Resolved Problems:    Sepsis (HCC)      Hospital Course:  Van Tatum is a 69 y.o. male with medical history of hypertension and chronic atrial fibrillation on Eliquis, borderline diabetic, presents to ER with left great toe pain and redness, status post outpatient Keflex for the past week, workup in the ER revealing probable osteomyelitis meeting sepsis criteria with lactic acid 3.6 and white blood cell count 11.6      Rounds 25:  Pt reclined in bed, wife at bedside. Pt states pain in 2nd R toe today is worse than pain in 1st L toe.  Denies fevers/chills, n/v/d, chest pain, shortness of breath. Long discussion about x-ray findings, ID consult, possible extended course of IV abx, possibility of recurrence despite full treatment.  All questions addressed at length.    2nd R toe erythematous and ttp with raised area on dorsal surface near dp joint.  Cornified growths on melanotic skin at base of 5th R toe.  Pt states birthmark, but growth has developed over last year.  Bilateral DP pulses 2+  Xray right foot shows no fracture, small bone spur on the right pinky toe, bony fusion on the first toe.  Second toe suspected gout.  Colchicine initiated.    Seen by infectious disease later in the day,

## 2025-01-06 NOTE — CARE COORDINATION
CM note:    Chart reviewed for updates. It was discussed in IDR' s that pt is medically stable for discharge. No therapy needs identified. Pt is discharging home with family support and follow up appointments. IMM given and explained; signed copy placed on medical chart. Family to transport. No further CM needs identified. CM will remain accessible for consult incase additional CM needs arise prior d/c.     01/06/25 1121   Services At/After Discharge   Transition of Care Consult (CM Consult) Discharge Planning   Services At/After Discharge None    Resource Information Provided? No   Mode of Transport at Discharge Other (see comment)   Confirm Follow Up Transport Family   Condition of Participation: Discharge Planning   The Plan for Transition of Care is related to the following treatment goals: Pt is discharging home with family support   The Patient and/Or Patient Representative agree with the Discharge Plan? Yes     LACI Bruce

## 2025-01-06 NOTE — DISCHARGE INSTRUCTIONS
Diabetes Foot Health: Care Instructions  When you have diabetes, your feet need extra care. Diabetes can damage nerves and blood vessels in your feet. You may not feel a blister, callus, or other injury. It could become a larger sore (ulcer), or it could lead to a serious infection. Taking care of your feet can help prevent these problems.  How can you care for your feet?  Caring for your feet can be quick and easy. You may want to do your daily foot care when you’re bathing or getting ready for bed.       Check your feet daily for blisters or sores. Use a mirror or have someone help you, if needed.       Wash your feet every day. Use warm (not hot) water. Pat your feet dry. Don’t rub. And dry well between your toes.       Put a thin layer of lotion on your feet. Don’t put lotion between your toes. Stop using any lotion that causes a rash.       Talk to your doctor about trimming your own toenails. Have them trimmed at your doctor’s office or at a foot clinic (podiatrist), if needed. This is especially important if you have neuropathy or vision loss, or if your toenails are thick and yellow.       If you trim your own toenails, trim them straight across. Trim them after you bathe. Use a nail file to smooth sharp edges.       Use proper footwear. Don’t wear sandals or shoes with very thin soles, and don’t go barefoot.       Tell your doctor if you have a foot problem. Get treatment for a foot problem right away, even if it’s something small like a blister.       Track your blood sugar to keep it in your target range. Follow your meal plan, and try to be active each day. Take medicines as prescribed.       If you smoke, quit or cut back as much as you can. Talk to your doctor if you need help quitting.  How can you choose shoes?    Wear shoes that fit well. Look for shoes that have plenty of space around the toes. Always wear socks with your shoes.    Break in new shoes by wearing them for no more than an hour a

## 2025-01-06 NOTE — PROGRESS NOTES
4 Eyes Skin Assessment     NAME:  Van Tatum  YOB: 1955  MEDICAL RECORD NUMBER:  [id number]    The patient is being assessed for  Admission    I agree that at least one RN has performed a thorough Head to Toe Skin Assessment on the patient. ALL assessment sites listed below have been assessed.      Areas assessed by both nurses:    Head, Face, Ears, Shoulders, Back, Chest, Arms, Elbows, Hands, Sacrum. Buttock, Coccyx, Ischium, and Legs. Feet and Heels        Does the Patient have a Wound? No noted wound(s)       Sky Prevention initiated by RN: Yes  Wound Care Orders initiated by RN: No    Pressure Injury (Stage 3,4, Unstageable, DTI, NWPT, and Complex wounds) if present, place Wound referral order by RN under : No    New Ostomies, if present place, Ostomy referral order under : No     Nurse 1 eSignature: Electronically signed by Zee Dimas RN on 1/5/25 at 1:27 AM EST    **SHARE this note so that the co-signing nurse can place an eSignature**    Nurse 2 eSignature: {Esignature:[id number]}   
Discharge instructions reviewed and copy provided for pt. Opportunity provided for questions. Pt. verbalized understanding. IV was removed without difficulty. Pt taken to car via wc.   
MG/DL    ALT 23 8 - 55 U/L    AST 17 15 - 37 U/L    Alk Phosphatase 124 40 - 129 U/L    Total Protein 7.3 6.3 - 8.2 g/dL    Albumin 3.4 3.2 - 4.6 g/dL    Globulin 3.8 (H) 2.3 - 3.5 g/dL    Albumin/Globulin Ratio 0.9 (L) 1.0 - 1.9     High sensitivity CRP    Collection Time: 01/04/25  9:26 PM   Result Value Ref Range    CRP, High Sensitivity 1.7 0.0 - 3.0 mg/L   Sedimentation Rate    Collection Time: 01/04/25  9:26 PM   Result Value Ref Range    Sed Rate, Automated 17 (H) 0 - 15 mm/hr   Lactate, Sepsis    Collection Time: 01/04/25  9:26 PM   Result Value Ref Range    Lactic Acid, Sepsis 3.6 (HH) 0.5 - 2.0 MMOL/L   Procalcitonin    Collection Time: 01/04/25  9:26 PM   Result Value Ref Range    Procalcitonin 0.08 0.00 - 0.10 ng/mL   Blood Culture 1    Collection Time: 01/04/25  9:35 PM    Specimen: Blood   Result Value Ref Range    Special Requests LEFT  Antecubital        Culture NO GROWTH AFTER 9 HOURS     Lactate, Sepsis    Collection Time: 01/04/25 11:31 PM   Result Value Ref Range    Lactic Acid, Sepsis 2.1 (H) 0.5 - 2.0 MMOL/L   POCT Glucose    Collection Time: 01/05/25 12:09 AM   Result Value Ref Range    POC Glucose 225 (H) 65 - 100 mg/dL    Performed by: [facility]    Basic Metabolic Panel w/ Reflex to MG    Collection Time: 01/05/25  2:59 AM   Result Value Ref Range    Sodium 138 136 - 145 mmol/L    Potassium 3.7 3.5 - 5.1 mmol/L    Chloride 104 98 - 107 mmol/L    CO2 25 20 - 29 mmol/L    Anion Gap 9 7 - 16 mmol/L    Glucose 165 (H) 70 - 99 mg/dL    BUN 21 8 - 23 MG/DL    Creatinine 0.94 0.80 - 1.30 MG/DL    Est, Glom Filt Rate 87 >60 ml/min/1.73m2    Calcium 8.6 (L) 8.8 - 10.2 MG/DL   CBC with Auto Differential    Collection Time: 01/05/25  2:59 AM   Result Value Ref Range    WBC 9.5 4.3 - 11.1 K/uL    RBC 4.09 (L) 4.23 - 5.6 M/uL    Hemoglobin 12.3 (L) 13.6 - 17.2 g/dL    Hematocrit 37.3 (L) 41.1 - 50.3 %    MCV 91.2 82.0 - 102.0 FL    MCH 30.1 26.1 - 32.9 PG    MCHC 33.0 31.4 - 35.0 g/dL    RDW

## 2025-01-06 NOTE — DIABETES MGMT
Patient seen for diabetes education.    Patient voices history of borderline diabetes but states he was just diagnosed with diabetes this admission. Admitting blood glucose 300. HbA1c 7.5%. Per provider patient to be discharged on lifestyle modifications for diabetes and is to follow up with PCP. Creatinine 0.94.    Patient given educational material, \"Diabetes Self-Management: A Patient Teaching Guide\", which was reviewed with patient. Patient wife at bedside who also has diabetes. Explained basic physiology of diabetes, as well as causes, signs and symptoms, and treatments for hypoglycemia and hyperglycemia. Described the effects of poor glycemic control and the development of long-term complications such as renal, eye, nerve, and cardiovascular disease. Patient denies smoking but states he dips. Cessation encouraged. Patient admitted with osteomyelitis of left foot. Described proper diabetic foot care and the importance of checking feet daily. Per patient they typically drink 4-5 diet drLuis Felipe Peppers per day, sweet tea, \"not enough water\". Reviewed effects of sweetened beverages on glycemic control and discussed alternative beverages to help improve glycemic control. Patient voices that they do not drink alcoholic beverages. Per patient they typically eat 3 meals a day (breakfast- egg/malloy sandwich on white bread, lunch and supper- sandwich with chips or meat and three. Educated re: effects of carbohydrates on blood glucose, the \"plate method\" of healthy meal planning, basics of healthy meal plan, Consistent Carbohydrate Diet, discussed the basics of carb counting and how to read a nutrition label. Outpatient referral placed for free outpatient diabetes education as patient in agreement with this. Educated patient regarding the benefits of physical activity (as cleared by provider) on glycemic control. Also explained the relationship between hyperglycemia and infection and delayed healing. Discussed target goals for

## 2025-01-09 LAB
BACTERIA SPEC CULT: NORMAL
SERVICE CMNT-IMP: NORMAL

## 2025-01-14 ENCOUNTER — TELEPHONE (OUTPATIENT)
Dept: DIABETES SERVICES | Age: 70
End: 2025-01-14

## 2025-01-14 NOTE — TELEPHONE ENCOUNTER
Hyperglycemia . Called patient about our free diabetes education program. Unable to speak to patient or leave a message for patient to call back at [phone] or [phone].

## 2025-01-20 ENCOUNTER — HOSPITAL ENCOUNTER (OUTPATIENT)
Dept: WOUND CARE | Age: 70
Discharge: HOME | End: 2025-01-20
Payer: MEDICARE

## 2025-01-20 VITALS
DIASTOLIC BLOOD PRESSURE: 67 MMHG | HEIGHT: 70 IN | RESPIRATION RATE: 18 BRPM | BODY MASS INDEX: 35.07 KG/M2 | SYSTOLIC BLOOD PRESSURE: 135 MMHG | OXYGEN SATURATION: 98 % | TEMPERATURE: 97.9 F | HEART RATE: 86 BPM | WEIGHT: 245 LBS

## 2025-01-20 DIAGNOSIS — I73.9 PAD (PERIPHERAL ARTERY DISEASE) (HCC): ICD-10-CM

## 2025-01-20 DIAGNOSIS — M86.9 OSTEOMYELITIS OF GREAT TOE: Primary | ICD-10-CM

## 2025-01-20 PROBLEM — E11.621 DIABETIC ULCER OF TOE OF LEFT FOOT ASSOCIATED WITH TYPE 2 DIABETES MELLITUS, WITH NECROSIS OF BONE (HCC): Chronic | Status: ACTIVE | Noted: 2025-01-20

## 2025-01-20 PROBLEM — E11.621 DIABETIC ULCER OF TOE OF LEFT FOOT ASSOCIATED WITH TYPE 2 DIABETES MELLITUS, WITH NECROSIS OF BONE (HCC): Chronic | Status: RESOLVED | Noted: 2025-01-20 | Resolved: 2025-01-20

## 2025-01-20 PROBLEM — E11.42 DIABETIC POLYNEUROPATHY ASSOCIATED WITH TYPE 2 DIABETES MELLITUS (HCC): Chronic | Status: RESOLVED | Noted: 2025-01-20 | Resolved: 2025-01-20

## 2025-01-20 PROBLEM — E11.42 DIABETIC POLYNEUROPATHY ASSOCIATED WITH TYPE 2 DIABETES MELLITUS (HCC): Chronic | Status: ACTIVE | Noted: 2025-01-20

## 2025-01-20 PROBLEM — L97.524 DIABETIC ULCER OF TOE OF LEFT FOOT ASSOCIATED WITH TYPE 2 DIABETES MELLITUS, WITH NECROSIS OF BONE (HCC): Chronic | Status: RESOLVED | Noted: 2025-01-20 | Resolved: 2025-01-20

## 2025-01-20 PROBLEM — L97.524 DIABETIC ULCER OF TOE OF LEFT FOOT ASSOCIATED WITH TYPE 2 DIABETES MELLITUS, WITH NECROSIS OF BONE (HCC): Chronic | Status: ACTIVE | Noted: 2025-01-20

## 2025-01-20 PROCEDURE — 99202 OFFICE O/P NEW SF 15 MIN: CPT

## 2025-01-20 PROCEDURE — 99212 OFFICE O/P EST SF 10 MIN: CPT

## 2025-01-20 ASSESSMENT — ENCOUNTER SYMPTOMS
VOMITING: 0
COLOR CHANGE: 1
NAUSEA: 0

## 2025-01-20 ASSESSMENT — PAIN SCALES - GENERAL: PAINLEVEL_OUTOF10: 4

## 2025-01-20 NOTE — DISCHARGE INSTRUCTIONS
Instructions:   No open wound at this time.  Referral for vascular sent today.  If toe opens and drains please call our office.

## 2025-01-20 NOTE — PROGRESS NOTES
[facility]  History and Physical Note   Referring Provider: DIAZ Bernard*     Van Tatum  MEDICAL RECORD NUMBER: [id number]  AGE: 70 y.o.     GENDER: male    : 1955  EPISODE DATE: 2025    Assessment and Plan:     Problem List Items Addressed This Visit          Endocrine    * (Principal) Diabetic ulcer of toe of left foot associated with type 2 diabetes mellitus, with necrosis of bone (HCC) - Primary (Chronic)    Diabetic polyneuropathy associated with type 2 diabetes mellitus (HCC) (Chronic)       Other    Osteomyelitis of great toe (Chronic)     Medical decision making:  Assessment required other independent historian(s): yes. Additional historian: spouse.   Comorbid conditions affecting wound healing: as noted in PMH and PSH, was reviewed for this visit.   Review of medical records and external note(s) from other providers was done for this visit.  Pertinent diagnostics were reviewed for this visit. Discussion of management or test interpretation with other qualified health care professional.  Prescription drug management: N/A.     Risk of complications and/or mortality of treatment plan:  The patient has a low risk of morbidity and/or mortality from additional diagnostic testing or treatment. This is due to conditions that affect wound healing as well as patient and procedure risk factors. Patient was educated and given the opportunity to ask questions. He is agreeable to treatment plan. Pertinent decisions include: N/A .  The patient's diagnosis and/or treatment is significantly limited by the following social determinants of health: N/A.    Discussed with patient/caregiver factors that negatively affect wound healing: smoking poorly controlled diabetes pressure. Discussed with patient/caregiver ways to modify these factors: smoking cessation improved blood sugar control pressure reduction/offloading. Discussed the benefit of serial

## 2025-01-20 NOTE — ASSESSMENT & PLAN NOTE
Assessment  RLE is cool to touch, toes are purple; patient endorses pain in BLE, especially at night  Pulses are not palpable in either foot, although DP and PT are both found with doppler  Patient is former smoker and current dipper; he is newly diagnosed with NIDDM  Plan  Referral to vascular surgery for arterial duplex and follow up with surgeon as needed

## 2025-01-20 NOTE — WOUND CARE
Discharge Instructions for  [facility]  [address] [place] [address]  [address] [place] [address]  Phone [phone]   Fax [phone]      NAME:  Van Tatum  YOB: 1955  MEDICAL RECORD NUMBER:  [id number]  DATE:  1/20/2025    Return Appointment:   Discharge with Kendy Polanco NP      Instructions:   No open wound at this time.  Referral for vascular sent today.  If toe opens and drains please call our office.    Should you experience increased redness, swelling, pain, foul odor, size of wound(s), or have a temperature over 101 degrees please contact the Wound Healing Center at [phone] or if after hours contact your primary care physician or go to the hospital emergency department.    Electronically signed Shukri Tovar RN on 1/20/2025 at 10:52 AM

## 2025-01-20 NOTE — FLOWSHEET NOTE
01/20/25 1022   Left Leg Edema Point of Measurement   Leg circumference 35.5 cm   Ankle circumference 20 cm   Foot circumference 26 cm   Compression Therapy Compression not ordered   LLE Neurovascular Assessment   Capillary Refill Less than/Equal to 3 seconds   Color Appropriate for Ethnicity   Temperature Warm   L Pedal Pulse +2   L Post Tibial Pulse +2 (Moderate)   Wound 01/20/25 Toe (Comment  which one) Left # 1   Date First Assessed/Time First Assessed: 01/20/25 1033   Present on Original Admission: Yes  Wound Approximate Age at First Assessment (Weeks): 8 weeks  Location: Toe (Comment  which one)  Wound Location Orientation: Left  Wound Description (Comments)...   Wound Image    Wound Etiology Diabetic   Dressing Status Other (Comment)  (MARCY)   Wound Cleansed Cleansed with saline   Dressing/Treatment Open to air   Wound Length (cm) 0 cm   Wound Width (cm) 0 cm   Wound Depth (cm) 0 cm   Wound Surface Area (cm^2) 0 cm^2   Wound Volume (cm^3) 0 cm^3   Wound Assessment Dry   Drainage Amount None (dry)   Odor None   Irais-wound Assessment Intact   Margins Attached edges   Pain Assessment   Pain Assessment 0-10   Pain Level 4     Taking ASA and eliquis

## 2025-01-20 NOTE — ASSESSMENT & PLAN NOTE
1. Blood test today  2. Start cardiac rehab  3.  See Dr. Amanda Urbano in 1 month Assessment  Left great toe is edematous but is not erythematous, and there is no open wound  Patient remains on Doxycycline, EOT 2/17/25  Patient is wearing Crocs he has modified to keep pressure off of dorsal aspect of toe  Plan  Discharge from clinic - RTC PRN open wound

## 2025-01-23 ENCOUNTER — OFFICE VISIT (OUTPATIENT)
Dept: VASCULAR SURGERY | Age: 70
End: 2025-01-23
Payer: MEDICARE

## 2025-01-23 VITALS
DIASTOLIC BLOOD PRESSURE: 72 MMHG | SYSTOLIC BLOOD PRESSURE: 126 MMHG | HEIGHT: 70 IN | OXYGEN SATURATION: 94 % | WEIGHT: 245 LBS | BODY MASS INDEX: 35.07 KG/M2 | HEART RATE: 76 BPM

## 2025-01-23 DIAGNOSIS — M86.9 OSTEOMYELITIS OF GREAT TOE: Primary | Chronic | ICD-10-CM

## 2025-01-23 PROCEDURE — 99203 OFFICE O/P NEW LOW 30 MIN: CPT | Performed by: STUDENT IN AN ORGANIZED HEALTH CARE EDUCATION/TRAINING PROGRAM

## 2025-01-23 PROCEDURE — 3074F SYST BP LT 130 MM HG: CPT | Performed by: STUDENT IN AN ORGANIZED HEALTH CARE EDUCATION/TRAINING PROGRAM

## 2025-01-23 PROCEDURE — 1123F ACP DISCUSS/DSCN MKR DOCD: CPT | Performed by: STUDENT IN AN ORGANIZED HEALTH CARE EDUCATION/TRAINING PROGRAM

## 2025-01-23 PROCEDURE — 3078F DIAST BP <80 MM HG: CPT | Performed by: STUDENT IN AN ORGANIZED HEALTH CARE EDUCATION/TRAINING PROGRAM

## 2025-01-23 RX ORDER — GLIPIZIDE 5 MG/1
5 TABLET ORAL
COMMUNITY

## 2025-01-23 NOTE — PROGRESS NOTES
VASCULAR SURGERY   [address] [address]  [phone] FAX: [phone]        Van Tatum  : 1955    Reason for visit: Left first toe nonhealing wound    Chief Complaint: 70 y.o. male presents with left first toe nonhealing wound previous infection.  Wound has a punctate opening on exam with callus secondary to rubbing trauma from his shoe.  Patient has cut out left dorsal aspect of [facility] shoe and reports no further rubbing.  Denies fevers and chills.  Reports wound improving with punctate hole.  Duplex ultrasound with adequate arterial flow bilateral lower extremities.  History obtained from patient and wife.    Plan:   Nonhealing wound left first toe with chronic osteo: would recommend shoes without contact to dorsal aspect of left first toe and right second toe.  Continued antibiotics as directed by Dr. Laughlin.  No further vascular imaging required.  Follow-up as needed.    Imaging interrupted:   BLE Art DUS    Right side findings: Resting SPENCER is 1.11.The lower extremity duplex arterial reveals no evidence of hemodynamically significant stenosis. The great toe pressure is 118 mmHg.    Left side findings: Resting SPENCER is 1.18.The lower extremity duplex arterial reveals no evidence of hemodynamically significant stenosis. The great toe pressure is 139 mmHg.    The abdominal aorta was evaluated and measures 2.1 cm X 2.0 cm.    Physical Examination:   Height: 1.778 m (5' 10\"), Weight - Scale: 111.1 kg (245 lb), BP: 126/72    General: No acute distress  HENT: AT  CV: Regular rhythm.    LUNG: No respiratory distress on RA  Abdominal: No distension.  Extremities:  punctate wound left first PIP joint, no drainage, surrounding callus with minimal erythema  Vascular:   Radial:  L    2+     R    2+    Femoral: L    2+     R    2+  DP:   L    2+     R    2+   PT:  L    2+     R    2+      Past Medical History:   Diagnosis Date    Arthritis     OA    Chewing tobacco use

## 2025-01-25 ENCOUNTER — HOSPITAL ENCOUNTER (EMERGENCY)
Age: 70
Discharge: HOME | End: 2025-01-25
Payer: MEDICARE

## 2025-01-25 VITALS
SYSTOLIC BLOOD PRESSURE: 152 MMHG | DIASTOLIC BLOOD PRESSURE: 71 MMHG | RESPIRATION RATE: 18 BRPM | OXYGEN SATURATION: 98 % | TEMPERATURE: 98 F | WEIGHT: 245 LBS | HEART RATE: 84 BPM | HEIGHT: 70 IN | BODY MASS INDEX: 35.07 KG/M2

## 2025-01-25 DIAGNOSIS — Z79.01 CHRONIC ANTICOAGULATION: ICD-10-CM

## 2025-01-25 DIAGNOSIS — S01.80XA OPEN WOUND OF FACE, INITIAL ENCOUNTER: Primary | ICD-10-CM

## 2025-01-25 PROCEDURE — 99283 EMERGENCY DEPT VISIT LOW MDM: CPT

## 2025-01-25 PROCEDURE — 6370000000 HC RX 637 (ALT 250 FOR IP): Performed by: NURSE PRACTITIONER

## 2025-01-25 PROCEDURE — 6360000002 HC RX W HCPCS: Performed by: NURSE PRACTITIONER

## 2025-01-25 RX ORDER — LIDOCAINE HYDROCHLORIDE AND EPINEPHRINE 10; 10 MG/ML; UG/ML
20 INJECTION, SOLUTION INFILTRATION; PERINEURAL ONCE
Status: COMPLETED | OUTPATIENT
Start: 2025-01-25 | End: 2025-01-25

## 2025-01-25 RX ADMIN — LIDOCAINE HYDROCHLORIDE,EPINEPHRINE BITARTRATE 20 ML: 10; .01 INJECTION, SOLUTION INFILTRATION; PERINEURAL at 11:40

## 2025-01-25 RX ADMIN — Medication 3 ML: at 10:41

## 2025-01-25 RX ADMIN — Medication 1 EACH: at 11:40

## 2025-01-25 RX ADMIN — Medication 3 ML: at 11:39

## 2025-01-25 ASSESSMENT — PAIN - FUNCTIONAL ASSESSMENT
PAIN_FUNCTIONAL_ASSESSMENT: NONE - DENIES PAIN
PAIN_FUNCTIONAL_ASSESSMENT: 0-10

## 2025-01-25 ASSESSMENT — PAIN SCALES - GENERAL: PAINLEVEL_OUTOF10: 0

## 2025-01-25 NOTE — ED NOTES
Bleeding controlled. Pt tolerated well. Wound dressing care instructions given. Pt and spouse verbalized understanding.

## 2025-01-25 NOTE — ED NOTES
Patient mobility status  with no difficulty.     I have reviewed discharge instructions with the patient and spouse.  The patient and spouse verbalized understanding.    Patient left ED via Discharge Method: ambulatory to Home with Extended Family:.    Opportunity for questions and clarification provided.     Patient given 0 scripts.

## 2025-01-25 NOTE — ED TRIAGE NOTES
Per patient ca removed from frontal portion of head x3 days ago. States bleeding starting this am and was unable to be controlled. Hx of eliquis.

## 2025-01-25 NOTE — DISCHARGE INSTRUCTIONS
Rest is much as possible  No strenuous activity  Keep the dressing on  Follow-up with your PCP or with a dermatologist on Monday to remove the dressing and recheck the area  I did require extensive cauterization.  Which is a burning type wound.  Can cause some discomfort.  You may use Tylenol over-the-counter as directed as needed for any discomfort  Keep the area clean and dry and dressed until it is rechecked.  Continue your antibiotics as directed  Return to ER if any problems.

## 2025-01-25 NOTE — ED PROVIDER NOTES
Emergency Department Provider Note       PCP: Madhav Weiner (Inactive)   Age: 70 y.o.   Sex: male     DISPOSITION Decision To Discharge 01/25/2025 11:52:28 AM    ICD-10-CM    1. Open wound of face, initial encounter  S01.80XA     Bleeding wound S/P dermatological procedure R facial      2. Chronic anticoagulation  Z79.01           Medical Decision Making     70-year-old white male with a past medical history of A-fib, osteomyelitis of the great toe, MAURIZIO, hypertension, hyperlipidemia, obesity, lumbar stenosis, hyperglycemia, peripheral arterial disease, on long-term DOAC therapy with Eliquis presents emergency room with a chief complaint of status post skin cancer treatment on Wednesday this past week.  It which he had 2 areas 1 to the right ear and 1 to the right temporal region that were hyfrecated and curetted at [facility].  He states that he woke this morning turned over in the area to the right temple started bleeding and he was unable to stop the bleeding.  They called 911.  And EMS applied a pressure dressing to the area bleeding is controlled at this time.    Patient seen and evaluated in the emergency department.  The dressing was removed and there is an area of capillary bleeding which is bleeding quite a bit after the dressing was removed.  It was stuck to the area.  But had not bled through the dressing.  Topical let was applied to the area.  Which did help control some of the bleeding.  And then quick clot gauze dressing and pressure was applied to the area which patient tolerated well.  Will hold the patient for 30 minutes make sure there is no rebleeding of this area and then discharged home.  Advised to leave the dressing intact until seen by dermatology again on Monday.       1 acute, uncomplicated illness or injury.  Shared medical decision making was utilized in creating the patients health plan today.  I independently ordered and reviewed each unique test.      [facility]    Housing Stability     Was there a time when you did not have a steady place to sleep: Not asked     Worried that the place you are staying is making you sick: Not asked        Discharge Medication List as of 1/25/2025 12:07 PM        CONTINUE these medications which have NOT CHANGED    Details   glipiZIDE (GLUCOTROL) 5 MG tablet Take 1 tablet by mouth 2 times daily (before meals)Historical Med      Lactobacillus-Inulin (CULTURELLE DIGESTIVE DAILY) CAPS One a day while on the antibiotics, so for 42 daysOTC      colchicine (COLCRYS) 0.6 MG tablet Take 1 tablet by mouth daily for 5 days, Disp-5 tablet, R-0Normal      doxycycline hyclate (VIBRAMYCIN) 100 MG capsule Take 1 capsule by mouth every 12 hours, Disp-84 capsule, R-0Normal      apixaban (ELIQUIS) 5 MG TABS tablet Take 1 tablet by mouth 2 times dailyHistorical Med      metoprolol succinate (TOPROL XL) 25 MG extended release tablet Take 1 tablet by mouth dailyHistorical Med      aspirin EC 81 MG EC tablet Take 1 tablet by mouth in the morning and 1 tablet in the evening., Disp-70 tablet, R-0Normal      celecoxib (CELEBREX) 200 MG capsule Take 1 capsule by mouth 2 times daily, Disp-60 capsule, R-0Normal      promethazine (PHENERGAN) 12.5 MG tablet Take 1 tablet by mouth 4 times daily as needed for Nausea, Disp-20 tablet, R-2Normal      azelastine (ASTELIN) 0.1 % nasal spray USE 1 TO 2 SPRAYS IN EACH NOSTRIL TWICE DAILYHistorical Med      HYDROcodone-acetaminophen (NORCO) 7.5-325 MG per tablet TAKE 1 TABLET BY MOUTH EVERY 6 HOURS AS NEEDEDHistorical Med      omeprazole (PRILOSEC) 20 MG delayed release capsule Historical Med      DULoxetine (CYMBALTA) 60 MG extended release capsule Take 1 capsule by mouthHistorical Med      losartan (COZAAR) 100 MG tablet Take 1 tablet by mouth dailyHistorical Med      rOPINIRole (REQUIP) 1 MG tablet Take 4 tablets by mouth nightlyHistorical Med              Results from this emergency department

## 2025-01-25 NOTE — ED NOTES
Wound cleaned and assessed with provider. Wound still bleeding. Let applied and then quick clot with pressure dressing applied. Pt tolerated well. Will continue to monitor.

## 2025-07-09 RX ORDER — FINASTERIDE 5 MG/1
5 TABLET, FILM COATED ORAL DAILY
COMMUNITY
Start: 2024-09-16 | End: 2025-09-16

## 2025-07-09 RX ORDER — LEVOFLOXACIN 750 MG/1
TABLET, FILM COATED ORAL
COMMUNITY
Start: 2025-04-03

## 2025-07-09 RX ORDER — CODEINE PHOSPHATE AND GUAIFENESIN 10; 100 MG/5ML; MG/5ML
SOLUTION ORAL
COMMUNITY
Start: 2025-04-03

## 2025-07-09 RX ORDER — GLIPIZIDE 5 MG/1
5 TABLET, FILM COATED, EXTENDED RELEASE ORAL 2 TIMES DAILY
COMMUNITY

## 2025-07-09 RX ORDER — LIDOCAINE HYDROCHLORIDE 20 MG/ML
SOLUTION OROPHARYNGEAL
COMMUNITY
Start: 2025-04-15

## 2025-07-09 RX ORDER — METOPROLOL SUCCINATE 25 MG/1
25 TABLET, EXTENDED RELEASE ORAL DAILY
COMMUNITY
Start: 2025-01-02 | End: 2026-01-02

## 2025-07-09 RX ORDER — ALBUTEROL SULFATE 90 UG/1
INHALANT RESPIRATORY (INHALATION)
COMMUNITY
Start: 2025-04-30

## 2025-07-09 RX ORDER — TAMSULOSIN HYDROCHLORIDE 0.4 MG/1
CAPSULE ORAL
COMMUNITY
Start: 2025-06-16

## 2025-07-09 RX ORDER — CEFUROXIME AXETIL 500 MG/1
500 TABLET ORAL EVERY 12 HOURS
COMMUNITY
Start: 2025-04-03

## 2025-07-09 RX ORDER — PREDNISONE 10 MG/1
TABLET ORAL
COMMUNITY
Start: 2025-04-03

## 2025-07-09 RX ORDER — AMITRIPTYLINE HYDROCHLORIDE 10 MG/1
TABLET ORAL
COMMUNITY

## 2025-07-09 RX ORDER — ALBUTEROL SULFATE 0.83 MG/ML
SOLUTION RESPIRATORY (INHALATION)
COMMUNITY
Start: 2025-04-03

## 2025-07-09 RX ORDER — HYDROCODONE BITARTRATE AND ACETAMINOPHEN 10; 325 MG/1; MG/1
1 TABLET ORAL EVERY 6 HOURS PRN
COMMUNITY
Start: 2025-06-02

## 2025-07-09 RX ORDER — FINASTERIDE 5 MG/1
TABLET, FILM COATED ORAL
COMMUNITY

## 2025-07-09 RX ORDER — ROPINIROLE 4 MG/1
TABLET, FILM COATED ORAL
COMMUNITY
Start: 2025-06-05

## 2025-07-09 RX ORDER — CYCLOBENZAPRINE HCL 10 MG
TABLET ORAL
COMMUNITY
Start: 2025-06-18

## 2025-07-09 RX ORDER — FLUCONAZOLE 150 MG/1
TABLET ORAL
COMMUNITY
Start: 2025-04-03

## 2025-07-10 ENCOUNTER — OFFICE VISIT (OUTPATIENT)
Dept: ORTHOPEDIC SURGERY | Age: 70
End: 2025-07-10
Payer: MEDICARE

## 2025-07-10 DIAGNOSIS — M67.412 GANGLION OF LEFT SHOULDER: ICD-10-CM

## 2025-07-10 DIAGNOSIS — M12.812 LEFT ROTATOR CUFF TEAR ARTHROPATHY: ICD-10-CM

## 2025-07-10 DIAGNOSIS — M25.512 LEFT SHOULDER PAIN, UNSPECIFIED CHRONICITY: Primary | ICD-10-CM

## 2025-07-10 DIAGNOSIS — M75.102 LEFT ROTATOR CUFF TEAR ARTHROPATHY: ICD-10-CM

## 2025-07-10 PROCEDURE — 99204 OFFICE O/P NEW MOD 45 MIN: CPT | Performed by: ORTHOPAEDIC SURGERY

## 2025-07-10 PROCEDURE — 1123F ACP DISCUSS/DSCN MKR DOCD: CPT | Performed by: ORTHOPAEDIC SURGERY

## 2025-07-10 NOTE — PROGRESS NOTES
MD Leo  07/10/25   The patient (or guardian, if applicable) and other individuals in attendance with the patient were advised that Artificial Intelligence will be utilized during this visit to record, process the conversation to generate a clinical note, and support improvement of the AI technology. The patient (or guardian, if applicable) and other individuals in attendance at the appointment consented to the use of AI, including the recording.

## (undated) DEVICE — SKIN STAPLER: Brand: SIGNET

## (undated) DEVICE — SHEET, T, LAPAROTOMY, STERILE: Brand: MEDLINE

## (undated) DEVICE — PACK PROCEDURE SURG TOT KNEE

## (undated) DEVICE — Device

## (undated) DEVICE — 3M™ TEGADERM™ TRANSPARENT FILM DRESSING FRAME STYLE, 1628, 6 IN X 8 IN (15 CM X 20 CM), 10/CT 8CT/CASE: Brand: 3M™ TEGADERM™

## (undated) DEVICE — SUTURE SZ 0 27IN 5/8 CIR UR-6  TAPER PT VIOLET ABSRB VICRYL J603H

## (undated) DEVICE — SUT ETHBND 2 30IN LR DA GRN --

## (undated) DEVICE — PUMP SUC IRR TBNG L10FT W/ HNDPC ASSEMB STRYKEFLOW 2

## (undated) DEVICE — 3M™ IOBAN™ 2 ANTIMICROBIAL INCISE DRAPE 6650EZ: Brand: IOBAN™ 2

## (undated) DEVICE — MODULAR TAP: Brand: XIA 4.5 SYSTEM -  XIA CT

## (undated) DEVICE — TRAY PREP DRY W/ PREM GLV 2 APPL 6 SPNG 2 UNDPD 1 OVERWRAP

## (undated) DEVICE — JACKSON TABLE POSITIONER KIT: Brand: MEDLINE INDUSTRIES, INC.

## (undated) DEVICE — SOLUTION ANTIFOG VIS SYS CLEARIFY LAPSCP

## (undated) DEVICE — SYR LR LCK 1ML GRAD NSAF 30ML --

## (undated) DEVICE — AGENT HEMSTAT 8ML FLX TIP MTRX + DISP SURGIFLO

## (undated) DEVICE — OSCILLATING TIP SAW CARTRIDGE: Brand: PRECISION FALCON

## (undated) DEVICE — SYR 10ML LUER LOK 1/5ML GRAD --

## (undated) DEVICE — SUTURE VCRL SZ 4-0 L18IN ABSRB UD L19MM PS-2 3/8 CIR PRIM J496H

## (undated) DEVICE — BLADE SAW PAT RMR PILT H 46MM --

## (undated) DEVICE — DRAPE SHT 3 QTR PROXIMA 53X77 --

## (undated) DEVICE — SOLUTION IV DEXTROSE/SALINE 5%-0.9% 500ML - 500ML

## (undated) DEVICE — SYRINGE CATH TIP 50ML

## (undated) DEVICE — TOTAL TRAY, DB, 100% SILI FOLEY, 16FR 10: Brand: MEDLINE

## (undated) DEVICE — Z DISCONTINUED USE 2744636  DRESSING AQUACEL 14 IN ALG W3.5XL14IN POLYUR FLM CVR W/ HYDRCOLL

## (undated) DEVICE — 3M™ STERI-DRAPE™ INSTRUMENT POUCH 1018: Brand: STERI-DRAPE™

## (undated) DEVICE — DRAPE XR C ARM 41X74IN LF --

## (undated) DEVICE — SUTURE VCRL SZ 1 L27IN ABSRB UD L36MM CP-1 1/2 CIR REV CUT J268H

## (undated) DEVICE — INTENDED FOR TISSUE SEPARATION, AND OTHER PROCEDURES THAT REQUIRE A SHARP SURGICAL BLADE TO PUNCTURE OR CUT.: Brand: BARD-PARKER SAFETY BLADES SIZE 10, STERILE

## (undated) DEVICE — SOLUTION IV 1000ML 0.9% SOD CHL

## (undated) DEVICE — SOLUTION IRRIG 3000ML 0.9% SOD CHL USP UROMATIC PLAS CONT

## (undated) DEVICE — DERMABOND SKIN ADH 0.7ML -- DERMABOND ADVANCED 12/BX

## (undated) DEVICE — 5.0MM PRECISION ROUND

## (undated) DEVICE — PREP SKN CHLRAPRP APL 26ML STR --

## (undated) DEVICE — 2000CC GUARDIAN II: Brand: GUARDIAN

## (undated) DEVICE — CORD,CAUTERY,BIPOLAR,STERILE: Brand: MEDLINE

## (undated) DEVICE — SUTURE VCRL + 3-0 L27IN ABSRB UD PS-2 L19MM 3/8 CIR PRIM VCP427H

## (undated) DEVICE — BANDAGE COMPR SELF ADH 5 YDX4 IN TAN STRL PREMIERPRO LF

## (undated) DEVICE — POSTERIOR LAMI VANPLT-LUCAS: Brand: MEDLINE INDUSTRIES, INC.

## (undated) DEVICE — STRIP SKIN CLSR W1XL5IN NYL REINF CURAD

## (undated) DEVICE — KIT EVAC 0.13IN RECT TB DIA10FR 400CC PVC 3 SPR Y CONN DRN

## (undated) DEVICE — STRIP,CLOSURE,WOUND,MEDI-STRIP,1/2X4: Brand: MEDLINE

## (undated) DEVICE — 3M™ TEGADERM™ TRANSPARENT FILM DRESSING FRAME STYLE, 1624W, 2-3/8 IN X 2-3/4 IN (6 CM X 7 CM), 100/CT 4CT/CASE: Brand: 3M™ TEGADERM™

## (undated) DEVICE — TUBING INSUFFLATION SMK EVAC HI FLO SET PNEUMOCLEAR

## (undated) DEVICE — TRAY,URINE METER,100% SILICONE,16FR10ML: Brand: MEDLINE

## (undated) DEVICE — REM POLYHESIVE ADULT PATIENT RETURN ELECTRODE: Brand: VALLEYLAB

## (undated) DEVICE — TROCARS: Brand: KII® BLUNT TIP ACCESS SYSTEM

## (undated) DEVICE — TRAY CATH 16F DRN BG LTX -- CONVERT TO ITEM 363158

## (undated) DEVICE — CARDINAL HEALTH FLEXIBLE LIGHT HANDLE COVER: Brand: CARDINAL HEALTH

## (undated) DEVICE — PACKING 8004003 NEURAY 200PK 25X25MM: Brand: NEURAY ®

## (undated) DEVICE — FAN SPRAY KIT: Brand: PULSAVAC®

## (undated) DEVICE — SUTURE PDS II SZ 1 L96IN ABSRB VLT TP-1 L65MM 1/2 CIR Z880G

## (undated) DEVICE — GAUZE,SPONGE,8"X4",12PLY,XRAY,STRL,LF: Brand: MEDLINE

## (undated) DEVICE — TROCAR: Brand: KII® SLEEVE

## (undated) DEVICE — 3M™ TEGADERM™ TRANSPARENT FILM DRESSING FRAME STYLE, 1626W, 4 IN X 4-3/4 IN (10 CM X 12 CM), 50/CT 4CT/CASE: Brand: 3M™ TEGADERM™

## (undated) DEVICE — BONE WAX WHITE: Brand: BONE WAX WHITE

## (undated) DEVICE — DRAPE,TOP,102X53,STERILE: Brand: MEDLINE

## (undated) DEVICE — LOGICUT SCISSOR LENGTH 320MM: Brand: LOGI - LAPAROSCOPIC INSTRUMENT SYSTEM

## (undated) DEVICE — SOLUTION IRRIG 3000ML 0.9% SOD CHL FLX CONT 0797208] ICU MEDICAL INC]

## (undated) DEVICE — BIPOLAR SEALER 23-112-1 AQM 6.0: Brand: AQUAMANTYS ®

## (undated) DEVICE — BAG SPEC REM 224ML W4XL6IN DIA10MM 1 HND GYN DISP ENDOPCH

## (undated) DEVICE — GLOVE ORANGE PI 7 1/2   MSG9075

## (undated) DEVICE — AMD ANTIMICROBIAL GAUZE SPONGES,12 PLY USP TYPE VII, 0.2% POLYHEXAMETHYLENE BIGUANIDE HCI (PHMB): Brand: CURITY

## (undated) DEVICE — GARMENT,MEDLINE,DVT,INT,CALF,MED, GEN2: Brand: MEDLINE

## (undated) DEVICE — GLOVE SURG SZ 65 L12IN FNGR THK79MIL GRN LTX FREE

## (undated) DEVICE — BUTTON SWITCH PENCIL BLADE ELECTRODE, HOLSTER: Brand: EDGE

## (undated) DEVICE — (D)PREP SKN CHLRAPRP APPL 26ML -- CONVERT TO ITEM 371833

## (undated) DEVICE — SYR 50ML LR LCK 1ML GRAD NSAF --

## (undated) DEVICE — STERILE PACKAGE WITH CANNULA: Brand: LITE BIO DELIVERY SYSTEM

## (undated) DEVICE — MASTISOL ADHESIVE LIQ 2/3ML

## (undated) DEVICE — SUTURE ABSRB X-1 REV CUT 1/2 CIR 22MM UD BRAID 27IN SZ 3-0 J458H

## (undated) DEVICE — TROCAR: Brand: KII FIOS FIRST ENTRY

## (undated) DEVICE — GENERAL LAPAROSCOPY: Brand: MEDLINE INDUSTRIES, INC.

## (undated) DEVICE — NEEDLE HYPO 25GA L1.5IN BLU POLYPR HUB S STL REG BVL STR

## (undated) DEVICE — GLOVE SURG SZ 6.5 L11.2IN THK8.6MIL LT BRN LTX FREE

## (undated) DEVICE — CURETTE BNE CEM 10IN DISP --

## (undated) DEVICE — INTENDED FOR TISSUE SEPARATION, AND OTHER PROCEDURES THAT REQUIRE A SHARP SURGICAL BLADE TO PUNCTURE OR CUT.: Brand: BARD-PARKER SAFETY BLADES SIZE 15, STERILE

## (undated) DEVICE — DRILL BIT: Brand: XIA 4.5 SYSTEM -  XIA CT

## (undated) DEVICE — BLADE ASSEMB CLP HAIR FINE --

## (undated) DEVICE — GAUZE,SPONGE,2"X2",8PLY,STERILE,LF,2'S: Brand: MEDLINE

## (undated) DEVICE — 1010 S-DRAPE TOWEL DRAPE 10/BX: Brand: STERI-DRAPE™

## (undated) DEVICE — MEDI-VAC YANKAUER SUCTION HANDLE W/BULBOUS TIP: Brand: CARDINAL HEALTH

## (undated) DEVICE — APPLIER CLP M/L SHFT DIA5MM 15 LIG LIGAMAX 5

## (undated) DEVICE — X-LARGE COTTON GLOVE: Brand: DEROYAL

## (undated) DEVICE — UTILITY MARKER,BLACK WITH LABELS: Brand: DEVON

## (undated) DEVICE — 3000CC GUARDIAN II: Brand: GUARDIAN

## (undated) DEVICE — SIZER SURG TIB KT DISP TRIATHLON PRECIS

## (undated) DEVICE — SUTURE VCRL SZ 2-0 L27IN ABSRB UD L36MM CP-1 1/2 CIR REV J266H

## (undated) DEVICE — CONTAINER,SPECIMEN,O.R.STRL,4.5OZ: Brand: MEDLINE

## (undated) DEVICE — SUTURE STRATAFIX SYMMETRIC PDS + SZ 2-0 L18IN ABSRB VLT SXPP1A403

## (undated) DEVICE — T4 HOOD